# Patient Record
Sex: FEMALE | Race: WHITE | NOT HISPANIC OR LATINO | Employment: OTHER | URBAN - METROPOLITAN AREA
[De-identification: names, ages, dates, MRNs, and addresses within clinical notes are randomized per-mention and may not be internally consistent; named-entity substitution may affect disease eponyms.]

---

## 2017-03-14 ENCOUNTER — COMMUNICATION - HEALTHEAST (OUTPATIENT)
Dept: FAMILY MEDICINE | Facility: CLINIC | Age: 82
End: 2017-03-14

## 2017-03-14 DIAGNOSIS — F41.9 ANXIETY: ICD-10-CM

## 2017-04-14 ENCOUNTER — COMMUNICATION - HEALTHEAST (OUTPATIENT)
Dept: FAMILY MEDICINE | Facility: CLINIC | Age: 82
End: 2017-04-14

## 2017-04-14 DIAGNOSIS — E78.00 HYPERCHOLESTEREMIA: ICD-10-CM

## 2017-05-22 ENCOUNTER — OFFICE VISIT - HEALTHEAST (OUTPATIENT)
Dept: FAMILY MEDICINE | Facility: CLINIC | Age: 82
End: 2017-05-22

## 2017-05-22 DIAGNOSIS — I10 ESSENTIAL HYPERTENSION: ICD-10-CM

## 2017-05-22 DIAGNOSIS — E03.9 HYPOTHYROIDISM: ICD-10-CM

## 2017-05-22 ASSESSMENT — MIFFLIN-ST. JEOR: SCORE: 1049.57

## 2017-05-25 ENCOUNTER — COMMUNICATION - HEALTHEAST (OUTPATIENT)
Dept: FAMILY MEDICINE | Facility: CLINIC | Age: 82
End: 2017-05-25

## 2017-05-25 ENCOUNTER — AMBULATORY - HEALTHEAST (OUTPATIENT)
Dept: FAMILY MEDICINE | Facility: CLINIC | Age: 82
End: 2017-05-25

## 2017-06-11 ENCOUNTER — COMMUNICATION - HEALTHEAST (OUTPATIENT)
Dept: FAMILY MEDICINE | Facility: CLINIC | Age: 82
End: 2017-06-11

## 2017-06-11 DIAGNOSIS — F41.9 ANXIETY: ICD-10-CM

## 2017-06-26 ENCOUNTER — COMMUNICATION - HEALTHEAST (OUTPATIENT)
Dept: FAMILY MEDICINE | Facility: CLINIC | Age: 82
End: 2017-06-26

## 2017-06-26 DIAGNOSIS — I10 ESSENTIAL HYPERTENSION: ICD-10-CM

## 2017-09-04 ENCOUNTER — COMMUNICATION - HEALTHEAST (OUTPATIENT)
Dept: FAMILY MEDICINE | Facility: CLINIC | Age: 82
End: 2017-09-04

## 2017-09-04 DIAGNOSIS — F41.9 ANXIETY: ICD-10-CM

## 2017-09-07 ENCOUNTER — COMMUNICATION - HEALTHEAST (OUTPATIENT)
Dept: FAMILY MEDICINE | Facility: CLINIC | Age: 82
End: 2017-09-07

## 2017-09-07 DIAGNOSIS — F41.9 ANXIETY: ICD-10-CM

## 2017-10-09 ENCOUNTER — COMMUNICATION - HEALTHEAST (OUTPATIENT)
Dept: FAMILY MEDICINE | Facility: CLINIC | Age: 82
End: 2017-10-09

## 2017-10-09 DIAGNOSIS — E78.00 HYPERCHOLESTEREMIA: ICD-10-CM

## 2017-10-09 DIAGNOSIS — I10 ESSENTIAL HYPERTENSION: ICD-10-CM

## 2017-10-23 ENCOUNTER — RECORDS - HEALTHEAST (OUTPATIENT)
Dept: ADMINISTRATIVE | Facility: OTHER | Age: 82
End: 2017-10-23

## 2017-11-13 ENCOUNTER — COMMUNICATION - HEALTHEAST (OUTPATIENT)
Dept: FAMILY MEDICINE | Facility: CLINIC | Age: 82
End: 2017-11-13

## 2017-11-13 DIAGNOSIS — I10 ESSENTIAL HYPERTENSION: ICD-10-CM

## 2017-11-29 ENCOUNTER — OFFICE VISIT - HEALTHEAST (OUTPATIENT)
Dept: FAMILY MEDICINE | Facility: CLINIC | Age: 82
End: 2017-11-29

## 2017-11-29 ENCOUNTER — RECORDS - HEALTHEAST (OUTPATIENT)
Dept: ADMINISTRATIVE | Facility: OTHER | Age: 82
End: 2017-11-29

## 2017-11-29 ENCOUNTER — RECORDS - HEALTHEAST (OUTPATIENT)
Dept: GENERAL RADIOLOGY | Facility: CLINIC | Age: 82
End: 2017-11-29

## 2017-11-29 ENCOUNTER — COMMUNICATION - HEALTHEAST (OUTPATIENT)
Dept: FAMILY MEDICINE | Facility: CLINIC | Age: 82
End: 2017-11-29

## 2017-11-29 DIAGNOSIS — R06.02 SOB (SHORTNESS OF BREATH) ON EXERTION: ICD-10-CM

## 2017-11-29 DIAGNOSIS — R06.02 SHORTNESS OF BREATH: ICD-10-CM

## 2017-11-29 DIAGNOSIS — E78.00 PURE HYPERCHOLESTEROLEMIA: ICD-10-CM

## 2017-11-29 DIAGNOSIS — E03.9 ACQUIRED HYPOTHYROIDISM: ICD-10-CM

## 2017-11-29 DIAGNOSIS — I48.91 ATRIAL FIBRILLATION (H): ICD-10-CM

## 2017-11-29 DIAGNOSIS — F41.9 ANXIETY: ICD-10-CM

## 2017-11-29 DIAGNOSIS — I10 ESSENTIAL HYPERTENSION: ICD-10-CM

## 2017-11-29 LAB
ATRIAL RATE - MUSE: 468 BPM
CHOLEST SERPL-MCNC: 168 MG/DL
DIASTOLIC BLOOD PRESSURE - MUSE: NORMAL MMHG
FASTING STATUS PATIENT QL REPORTED: YES
HDLC SERPL-MCNC: 87 MG/DL
INTERPRETATION ECG - MUSE: NORMAL
LDLC SERPL CALC-MCNC: 72 MG/DL
P AXIS - MUSE: NORMAL DEGREES
PR INTERVAL - MUSE: NORMAL MS
QRS DURATION - MUSE: 88 MS
QT - MUSE: 426 MS
QTC - MUSE: 469 MS
R AXIS - MUSE: 53 DEGREES
SYSTOLIC BLOOD PRESSURE - MUSE: NORMAL MMHG
T AXIS - MUSE: 51 DEGREES
TRIGL SERPL-MCNC: 47 MG/DL
VENTRICULAR RATE- MUSE: 73 BPM

## 2017-11-29 ASSESSMENT — MIFFLIN-ST. JEOR: SCORE: 1031.43

## 2017-11-30 ENCOUNTER — OFFICE VISIT - HEALTHEAST (OUTPATIENT)
Dept: CARDIOLOGY | Facility: CLINIC | Age: 82
End: 2017-11-30

## 2017-11-30 DIAGNOSIS — I48.91 ATRIAL FIBRILLATION, UNSPECIFIED TYPE (H): ICD-10-CM

## 2017-11-30 ASSESSMENT — MIFFLIN-ST. JEOR: SCORE: 1035.96

## 2017-12-01 ENCOUNTER — COMMUNICATION - HEALTHEAST (OUTPATIENT)
Dept: CARDIOLOGY | Facility: CLINIC | Age: 82
End: 2017-12-01

## 2017-12-01 LAB
ATRIAL RATE - MUSE: 340 BPM
DIASTOLIC BLOOD PRESSURE - MUSE: NORMAL MMHG
INTERPRETATION ECG - MUSE: NORMAL
P AXIS - MUSE: NORMAL DEGREES
PR INTERVAL - MUSE: NORMAL MS
QRS DURATION - MUSE: 94 MS
QT - MUSE: 422 MS
QTC - MUSE: 428 MS
R AXIS - MUSE: 58 DEGREES
SYSTOLIC BLOOD PRESSURE - MUSE: NORMAL MMHG
T AXIS - MUSE: 68 DEGREES
VENTRICULAR RATE- MUSE: 62 BPM

## 2017-12-14 ENCOUNTER — OFFICE VISIT - HEALTHEAST (OUTPATIENT)
Dept: FAMILY MEDICINE | Facility: CLINIC | Age: 82
End: 2017-12-14

## 2017-12-14 DIAGNOSIS — J40 BRONCHITIS: ICD-10-CM

## 2017-12-15 ENCOUNTER — HOSPITAL ENCOUNTER (OUTPATIENT)
Dept: CARDIOLOGY | Facility: CLINIC | Age: 82
Discharge: HOME | End: 2017-12-15
Attending: INTERNAL MEDICINE

## 2017-12-15 DIAGNOSIS — I48.91 ATRIAL FIBRILLATION, UNSPECIFIED TYPE (H): ICD-10-CM

## 2017-12-15 LAB
AORTIC ROOT: 3.2 CM
AORTIC VALVE MEAN VELOCITY: 139 CM/S
AR DECEL SLOPE: 1590 MM/S2
AR PEAK VELOCITY: 421 CM/S
AV DIMENSIONLESS INDEX VTI: 0.5
AV MEAN GRADIENT: 8 MMHG
AV PEAK GRADIENT: 15.1 MMHG
AV REGURGITANT PEAK GRADIENT: 70.9 MMHG
AV REGURGITATION PRESSURE HALF TIME: 775 MS
AV VALVE AREA: 1.5 CM2
AV VELOCITY RATIO: 0.4
BSA FOR ECHO PROCEDURE: 1.66 M2
CV BLOOD PRESSURE: NORMAL MMHG
CV ECHO HEIGHT: 64 IN
CV ECHO WEIGHT: 135 LBS
DOP CALC AO PEAK VEL: 194 CM/S
DOP CALC AO VTI: 40.5 CM
DOP CALC LVOT AREA: 2.83 CM2
DOP CALC LVOT DIAMETER: 1.9 CM
DOP CALC LVOT PEAK VEL: 83.5 CM/S
DOP CALC LVOT STROKE VOLUME: 60.4 CM3
DOP CALCLVOT PEAK VEL VTI: 21.3 CM
EJECTION FRACTION: 59 % (ref 55–75)
FRACTIONAL SHORTENING: 38.7 % (ref 28–44)
INTERVENTRICULAR SEPTUM IN END DIASTOLE: 1.03 CM (ref 0.6–0.9)
IVS/PW RATIO: 0.9
LA AREA 1: 22.8 CM2
LA AREA 2: 19.5 CM2
LEFT ATRIUM LENGTH: 5.59 CM
LEFT ATRIUM SIZE: 3.7 CM
LEFT ATRIUM VOLUME INDEX: 40.7 ML/M2
LEFT ATRIUM VOLUME: 67.6 CM3
LEFT VENTRICLE CARDIAC INDEX: 2.9 L/MIN/M2
LEFT VENTRICLE CARDIAC OUTPUT: 4.8 L/MIN
LEFT VENTRICLE DIASTOLIC VOLUME INDEX: 32.5 CM3/M2 (ref 34–74)
LEFT VENTRICLE DIASTOLIC VOLUME: 54 CM3 (ref 46–106)
LEFT VENTRICLE HEART RATE: 80 BPM
LEFT VENTRICLE MASS INDEX: 68 G/M2
LEFT VENTRICLE SYSTOLIC VOLUME INDEX: 13.3 CM3/M2 (ref 11–31)
LEFT VENTRICLE SYSTOLIC VOLUME: 22 CM3 (ref 14–42)
LEFT VENTRICULAR INTERNAL DIMENSION IN DIASTOLE: 3.33 CM (ref 3.8–5.2)
LEFT VENTRICULAR INTERNAL DIMENSION IN SYSTOLE: 2.04 CM (ref 2.2–3.5)
LEFT VENTRICULAR MASS: 112.9 G
LEFT VENTRICULAR OUTFLOW TRACT MEAN GRADIENT: 2 MMHG
LEFT VENTRICULAR OUTFLOW TRACT MEAN VELOCITY: 69.5 CM/S
LEFT VENTRICULAR OUTFLOW TRACT PEAK GRADIENT: 3 MMHG
LEFT VENTRICULAR POSTERIOR WALL IN END DIASTOLE: 1.2 CM (ref 0.6–0.9)
LV STROKE VOLUME INDEX: 36.4 ML/M2
MV AVERAGE E/E' RATIO: 17.6 CM/S
MV DECELERATION TIME: 197 MS
MV E'TISSUE VEL-LAT: 6.43 CM/S
MV E'TISSUE VEL-MED: 5.26 CM/S
MV LATERAL E/E' RATIO: 16
MV MEDIAL E/E' RATIO: 19.6
MV PEAK E VELOCITY: 103 CM/S
NUC REST DIASTOLIC VOLUME INDEX: 2160 LBS
NUC REST SYSTOLIC VOLUME INDEX: 64 IN
TRICUSPID REGURGITATION PEAK PRESSURE GRADIENT: 24 MMHG
TRICUSPID VALVE ANULAR PLANE SYSTOLIC EXCURSION: 1.8 CM
TRICUSPID VALVE PEAK REGURGITANT VELOCITY: 245 CM/S

## 2017-12-15 ASSESSMENT — MIFFLIN-ST. JEOR: SCORE: 1022.36

## 2017-12-26 ENCOUNTER — COMMUNICATION - HEALTHEAST (OUTPATIENT)
Dept: FAMILY MEDICINE | Facility: CLINIC | Age: 82
End: 2017-12-26

## 2017-12-26 DIAGNOSIS — I10 ESSENTIAL HYPERTENSION: ICD-10-CM

## 2017-12-27 ENCOUNTER — COMMUNICATION - HEALTHEAST (OUTPATIENT)
Dept: CARDIOLOGY | Facility: CLINIC | Age: 82
End: 2017-12-27

## 2017-12-27 DIAGNOSIS — I48.91 ATRIAL FIBRILLATION, UNSPECIFIED TYPE (H): ICD-10-CM

## 2018-01-18 ENCOUNTER — OFFICE VISIT - HEALTHEAST (OUTPATIENT)
Dept: CARDIOLOGY | Facility: CLINIC | Age: 83
End: 2018-01-18

## 2018-01-18 DIAGNOSIS — I10 ESSENTIAL HYPERTENSION: ICD-10-CM

## 2018-01-18 DIAGNOSIS — I48.19 PERSISTENT ATRIAL FIBRILLATION (H): ICD-10-CM

## 2018-01-18 ASSESSMENT — MIFFLIN-ST. JEOR: SCORE: 1041.18

## 2018-02-25 ENCOUNTER — COMMUNICATION - HEALTHEAST (OUTPATIENT)
Dept: FAMILY MEDICINE | Facility: CLINIC | Age: 83
End: 2018-02-25

## 2018-02-25 DIAGNOSIS — F41.9 ANXIETY: ICD-10-CM

## 2018-02-25 DIAGNOSIS — I10 ESSENTIAL HYPERTENSION: ICD-10-CM

## 2018-03-28 ENCOUNTER — OFFICE VISIT - HEALTHEAST (OUTPATIENT)
Dept: FAMILY MEDICINE | Facility: CLINIC | Age: 83
End: 2018-03-28

## 2018-03-28 DIAGNOSIS — I48.19 PERSISTENT ATRIAL FIBRILLATION (H): ICD-10-CM

## 2018-03-28 ASSESSMENT — MIFFLIN-ST. JEOR: SCORE: 1057.51

## 2018-04-08 ENCOUNTER — COMMUNICATION - HEALTHEAST (OUTPATIENT)
Dept: FAMILY MEDICINE | Facility: CLINIC | Age: 83
End: 2018-04-08

## 2018-04-08 DIAGNOSIS — E78.00 HYPERCHOLESTEREMIA: ICD-10-CM

## 2018-04-08 DIAGNOSIS — I10 ESSENTIAL HYPERTENSION: ICD-10-CM

## 2018-04-30 ENCOUNTER — OFFICE VISIT - HEALTHEAST (OUTPATIENT)
Dept: CARDIOLOGY | Facility: CLINIC | Age: 83
End: 2018-04-30

## 2018-04-30 DIAGNOSIS — R60.9 EDEMA, UNSPECIFIED TYPE: ICD-10-CM

## 2018-04-30 DIAGNOSIS — I48.19 PERSISTENT ATRIAL FIBRILLATION (H): ICD-10-CM

## 2018-04-30 DIAGNOSIS — R06.09 DYSPNEA ON EXERTION: ICD-10-CM

## 2018-04-30 LAB
ANION GAP SERPL CALCULATED.3IONS-SCNC: 12 MMOL/L (ref 5–18)
BUN SERPL-MCNC: 19 MG/DL (ref 8–28)
CALCIUM SERPL-MCNC: 9.7 MG/DL (ref 8.5–10.5)
CHLORIDE BLD-SCNC: 96 MMOL/L (ref 98–107)
CO2 SERPL-SCNC: 27 MMOL/L (ref 22–31)
CREAT SERPL-MCNC: 0.72 MG/DL (ref 0.6–1.1)
GFR SERPL CREATININE-BSD FRML MDRD: >60 ML/MIN/1.73M2
GLUCOSE BLD-MCNC: 87 MG/DL (ref 70–125)
POTASSIUM BLD-SCNC: 3.8 MMOL/L (ref 3.5–5)
SODIUM SERPL-SCNC: 135 MMOL/L (ref 136–145)
TSH SERPL DL<=0.005 MIU/L-ACNC: 0.45 UIU/ML (ref 0.3–5)

## 2018-04-30 ASSESSMENT — MIFFLIN-ST. JEOR: SCORE: 1062.05

## 2018-05-01 LAB — BNP SERPL-MCNC: 363 PG/ML (ref 0–167)

## 2018-05-14 ENCOUNTER — COMMUNICATION - HEALTHEAST (OUTPATIENT)
Dept: FAMILY MEDICINE | Facility: CLINIC | Age: 83
End: 2018-05-14

## 2018-05-14 DIAGNOSIS — E03.9 HYPOTHYROIDISM: ICD-10-CM

## 2018-05-15 ENCOUNTER — COMMUNICATION - HEALTHEAST (OUTPATIENT)
Dept: CARDIOLOGY | Facility: CLINIC | Age: 83
End: 2018-05-15

## 2018-05-16 ENCOUNTER — HOSPITAL ENCOUNTER (OUTPATIENT)
Dept: NUCLEAR MEDICINE | Facility: CLINIC | Age: 83
Discharge: HOME | End: 2018-05-16
Attending: INTERNAL MEDICINE

## 2018-05-16 ENCOUNTER — HOSPITAL ENCOUNTER (OUTPATIENT)
Dept: CARDIOLOGY | Facility: CLINIC | Age: 83
Discharge: HOME | End: 2018-05-16
Attending: INTERNAL MEDICINE

## 2018-05-16 DIAGNOSIS — R06.09 OTHER FORMS OF DYSPNEA: ICD-10-CM

## 2018-05-16 DIAGNOSIS — R06.09 DYSPNEA ON EXERTION: ICD-10-CM

## 2018-05-16 DIAGNOSIS — I48.19 PERSISTENT ATRIAL FIBRILLATION (H): ICD-10-CM

## 2018-05-16 LAB
CV STRESS CURRENT BP HE: NORMAL
CV STRESS CURRENT HR HE: 100
CV STRESS CURRENT HR HE: 101
CV STRESS CURRENT HR HE: 103
CV STRESS CURRENT HR HE: 104
CV STRESS CURRENT HR HE: 104
CV STRESS CURRENT HR HE: 107
CV STRESS CURRENT HR HE: 75
CV STRESS CURRENT HR HE: 77
CV STRESS CURRENT HR HE: 80
CV STRESS CURRENT HR HE: 81
CV STRESS CURRENT HR HE: 82
CV STRESS CURRENT HR HE: 83
CV STRESS CURRENT HR HE: 84
CV STRESS CURRENT HR HE: 86
CV STRESS CURRENT HR HE: 87
CV STRESS CURRENT HR HE: 89
CV STRESS CURRENT HR HE: 90
CV STRESS CURRENT HR HE: 90
CV STRESS CURRENT HR HE: 91
CV STRESS CURRENT HR HE: 94
CV STRESS CURRENT HR HE: 94
CV STRESS CURRENT HR HE: 96
CV STRESS CURRENT HR HE: 97
CV STRESS CURRENT HR HE: 98
CV STRESS DEVIATION TIME HE: NORMAL
CV STRESS ECHO PERCENT HR HE: NORMAL
CV STRESS EXERCISE STAGE HE: NORMAL
CV STRESS FINAL RESTING BP HE: NORMAL
CV STRESS FINAL RESTING HR HE: 80
CV STRESS MAX HR HE: 112
CV STRESS MAX TREADMILL GRADE HE: 0
CV STRESS MAX TREADMILL SPEED HE: 0
CV STRESS PEAK DIA BP HE: NORMAL
CV STRESS PEAK SYS BP HE: NORMAL
CV STRESS PHASE HE: NORMAL
CV STRESS PROTOCOL HE: NORMAL
CV STRESS RESTING PT POSITION HE: NORMAL
CV STRESS RESTING PT POSITION HE: NORMAL
CV STRESS ST DEVIATION AMOUNT HE: NORMAL
CV STRESS ST DEVIATION ELEVATION HE: NORMAL
CV STRESS ST EVELATION AMOUNT HE: NORMAL
CV STRESS TEST TYPE HE: NORMAL
CV STRESS TOTAL STAGE TIME MIN 1 HE: NORMAL
NUC STRESS EJECTION FRACTION: >70 %
STRESS ECHO BASELINE BP: NORMAL
STRESS ECHO BASELINE HR: 78
STRESS ECHO CALCULATED PERCENT HR: 84 %
STRESS ECHO LAST STRESS BP: NORMAL
STRESS ECHO LAST STRESS HR: 81
STRESS ECHO POST ESTIMATED WORKLOAD: 3.8
STRESS ECHO POST EXERCISE DUR MIN: 7
STRESS ECHO POST EXERCISE DUR SEC: 30
STRESS ECHO TARGET HR: 114

## 2018-05-21 ENCOUNTER — SURGERY - HEALTHEAST (OUTPATIENT)
Dept: CARDIOLOGY | Facility: CLINIC | Age: 83
End: 2018-05-21

## 2018-05-21 ENCOUNTER — AMBULATORY - HEALTHEAST (OUTPATIENT)
Dept: CARDIOLOGY | Facility: CLINIC | Age: 83
End: 2018-05-21

## 2018-05-21 ENCOUNTER — COMMUNICATION - HEALTHEAST (OUTPATIENT)
Dept: FAMILY MEDICINE | Facility: CLINIC | Age: 83
End: 2018-05-21

## 2018-05-21 DIAGNOSIS — F41.9 ANXIETY: ICD-10-CM

## 2018-05-21 DIAGNOSIS — I48.91 A-FIB (H): ICD-10-CM

## 2018-05-21 DIAGNOSIS — I10 ESSENTIAL HYPERTENSION: ICD-10-CM

## 2018-05-23 ENCOUNTER — AMBULATORY - HEALTHEAST (OUTPATIENT)
Dept: CARDIOLOGY | Facility: CLINIC | Age: 83
End: 2018-05-23

## 2018-05-29 ENCOUNTER — ANESTHESIA - HEALTHEAST (OUTPATIENT)
Dept: CARDIOLOGY | Facility: CLINIC | Age: 83
End: 2018-05-29

## 2018-06-25 ENCOUNTER — COMMUNICATION - HEALTHEAST (OUTPATIENT)
Dept: FAMILY MEDICINE | Facility: CLINIC | Age: 83
End: 2018-06-25

## 2018-06-25 DIAGNOSIS — I10 ESSENTIAL HYPERTENSION: ICD-10-CM

## 2018-06-28 ENCOUNTER — OFFICE VISIT - HEALTHEAST (OUTPATIENT)
Dept: CARDIOLOGY | Facility: CLINIC | Age: 83
End: 2018-06-28

## 2018-06-28 DIAGNOSIS — I48.19 PERSISTENT ATRIAL FIBRILLATION (H): ICD-10-CM

## 2018-06-28 DIAGNOSIS — I10 ESSENTIAL HYPERTENSION: ICD-10-CM

## 2018-06-28 RX ORDER — ACETAMINOPHEN 500 MG
500 TABLET ORAL AT BEDTIME
Status: SHIPPED | COMMUNITY
Start: 2018-06-28

## 2018-06-28 ASSESSMENT — MIFFLIN-ST. JEOR: SCORE: 1040.5

## 2018-08-09 ENCOUNTER — OFFICE VISIT - HEALTHEAST (OUTPATIENT)
Dept: CARDIOLOGY | Facility: CLINIC | Age: 83
End: 2018-08-09

## 2018-08-09 DIAGNOSIS — I10 ESSENTIAL HYPERTENSION: ICD-10-CM

## 2018-08-09 DIAGNOSIS — I48.19 PERSISTENT ATRIAL FIBRILLATION (H): ICD-10-CM

## 2018-08-09 ASSESSMENT — MIFFLIN-ST. JEOR: SCORE: 1045.94

## 2018-08-21 ENCOUNTER — COMMUNICATION - HEALTHEAST (OUTPATIENT)
Dept: FAMILY MEDICINE | Facility: CLINIC | Age: 83
End: 2018-08-21

## 2018-08-21 DIAGNOSIS — F41.9 ANXIETY: ICD-10-CM

## 2018-09-03 ENCOUNTER — COMMUNICATION - HEALTHEAST (OUTPATIENT)
Dept: FAMILY MEDICINE | Facility: CLINIC | Age: 83
End: 2018-09-03

## 2018-09-03 DIAGNOSIS — I10 ESSENTIAL HYPERTENSION: ICD-10-CM

## 2018-10-15 ENCOUNTER — COMMUNICATION - HEALTHEAST (OUTPATIENT)
Dept: FAMILY MEDICINE | Facility: CLINIC | Age: 83
End: 2018-10-15

## 2018-10-15 DIAGNOSIS — E78.00 HYPERCHOLESTEREMIA: ICD-10-CM

## 2018-10-18 ENCOUNTER — RECORDS - HEALTHEAST (OUTPATIENT)
Dept: ADMINISTRATIVE | Facility: OTHER | Age: 83
End: 2018-10-18

## 2018-10-24 ENCOUNTER — OFFICE VISIT - HEALTHEAST (OUTPATIENT)
Dept: FAMILY MEDICINE | Facility: CLINIC | Age: 83
End: 2018-10-24

## 2018-10-24 DIAGNOSIS — Z23 NEED FOR SHINGLES VACCINE: ICD-10-CM

## 2018-10-24 DIAGNOSIS — I10 ESSENTIAL HYPERTENSION: ICD-10-CM

## 2018-10-24 DIAGNOSIS — I48.0 EPISODIC ATRIAL FIBRILLATION (H): ICD-10-CM

## 2018-10-24 DIAGNOSIS — E78.00 HYPERCHOLESTEROLEMIA: ICD-10-CM

## 2018-10-24 DIAGNOSIS — E03.9 ACQUIRED HYPOTHYROIDISM: ICD-10-CM

## 2018-10-24 LAB
ANION GAP SERPL CALCULATED.3IONS-SCNC: 14 MMOL/L (ref 5–18)
BUN SERPL-MCNC: 12 MG/DL (ref 8–28)
CALCIUM SERPL-MCNC: 10 MG/DL (ref 8.5–10.5)
CHLORIDE BLD-SCNC: 92 MMOL/L (ref 98–107)
CHOLEST SERPL-MCNC: 197 MG/DL
CO2 SERPL-SCNC: 25 MMOL/L (ref 22–31)
CREAT SERPL-MCNC: 0.72 MG/DL (ref 0.6–1.1)
ERYTHROCYTE [DISTWIDTH] IN BLOOD BY AUTOMATED COUNT: 11.3 % (ref 11–14.5)
FASTING STATUS PATIENT QL REPORTED: NO
GFR SERPL CREATININE-BSD FRML MDRD: >60 ML/MIN/1.73M2
GLUCOSE BLD-MCNC: 93 MG/DL (ref 70–125)
HCT VFR BLD AUTO: 42.6 % (ref 35–47)
HDLC SERPL-MCNC: 104 MG/DL
HGB BLD-MCNC: 14.7 G/DL (ref 12–16)
LDLC SERPL CALC-MCNC: 77 MG/DL
MCH RBC QN AUTO: 32.7 PG (ref 27–34)
MCHC RBC AUTO-ENTMCNC: 34.4 G/DL (ref 32–36)
MCV RBC AUTO: 95 FL (ref 80–100)
PLATELET # BLD AUTO: 271 THOU/UL (ref 140–440)
PMV BLD AUTO: 7.3 FL (ref 7–10)
POTASSIUM BLD-SCNC: 3.9 MMOL/L (ref 3.5–5)
RBC # BLD AUTO: 4.48 MILL/UL (ref 3.8–5.4)
SODIUM SERPL-SCNC: 131 MMOL/L (ref 136–145)
TRIGL SERPL-MCNC: 80 MG/DL
TSH SERPL DL<=0.005 MIU/L-ACNC: 0.42 UIU/ML (ref 0.3–5)
WBC: 5.5 THOU/UL (ref 4–11)

## 2018-10-24 ASSESSMENT — MIFFLIN-ST. JEOR: SCORE: 1058.64

## 2018-11-19 ENCOUNTER — COMMUNICATION - HEALTHEAST (OUTPATIENT)
Dept: FAMILY MEDICINE | Facility: CLINIC | Age: 83
End: 2018-11-19

## 2018-11-19 DIAGNOSIS — F41.9 ANXIETY: ICD-10-CM

## 2018-12-19 ENCOUNTER — COMMUNICATION - HEALTHEAST (OUTPATIENT)
Dept: FAMILY MEDICINE | Facility: CLINIC | Age: 83
End: 2018-12-19

## 2018-12-23 ENCOUNTER — COMMUNICATION - HEALTHEAST (OUTPATIENT)
Dept: FAMILY MEDICINE | Facility: CLINIC | Age: 83
End: 2018-12-23

## 2018-12-23 ENCOUNTER — COMMUNICATION - HEALTHEAST (OUTPATIENT)
Dept: CARDIOLOGY | Facility: CLINIC | Age: 83
End: 2018-12-23

## 2018-12-23 DIAGNOSIS — I10 ESSENTIAL HYPERTENSION: ICD-10-CM

## 2019-01-14 ENCOUNTER — COMMUNICATION - HEALTHEAST (OUTPATIENT)
Dept: FAMILY MEDICINE | Facility: CLINIC | Age: 84
End: 2019-01-14

## 2019-01-14 DIAGNOSIS — E78.00 HYPERCHOLESTEREMIA: ICD-10-CM

## 2019-01-21 ENCOUNTER — COMMUNICATION - HEALTHEAST (OUTPATIENT)
Dept: CARDIOLOGY | Facility: CLINIC | Age: 84
End: 2019-01-21

## 2019-01-21 DIAGNOSIS — I48.91 ATRIAL FIBRILLATION, UNSPECIFIED TYPE (H): ICD-10-CM

## 2019-01-22 ENCOUNTER — COMMUNICATION - HEALTHEAST (OUTPATIENT)
Dept: CARDIOLOGY | Facility: CLINIC | Age: 84
End: 2019-01-22

## 2019-01-22 DIAGNOSIS — I48.91 ATRIAL FIBRILLATION, UNSPECIFIED TYPE (H): ICD-10-CM

## 2019-02-19 ENCOUNTER — COMMUNICATION - HEALTHEAST (OUTPATIENT)
Dept: FAMILY MEDICINE | Facility: CLINIC | Age: 84
End: 2019-02-19

## 2019-02-19 DIAGNOSIS — F41.9 ANXIETY: ICD-10-CM

## 2019-03-14 ENCOUNTER — OFFICE VISIT - HEALTHEAST (OUTPATIENT)
Dept: CARDIOLOGY | Facility: CLINIC | Age: 84
End: 2019-03-14

## 2019-03-14 DIAGNOSIS — I48.19 PERSISTENT ATRIAL FIBRILLATION (H): ICD-10-CM

## 2019-03-14 DIAGNOSIS — I10 ESSENTIAL HYPERTENSION: ICD-10-CM

## 2019-03-14 DIAGNOSIS — I48.91 ATRIAL FIBRILLATION (H): ICD-10-CM

## 2019-03-14 LAB
ATRIAL RATE - MUSE: 59 BPM
DIASTOLIC BLOOD PRESSURE - MUSE: NORMAL MMHG
INTERPRETATION ECG - MUSE: NORMAL
P AXIS - MUSE: 93 DEGREES
PR INTERVAL - MUSE: 250 MS
QRS DURATION - MUSE: 104 MS
QT - MUSE: 424 MS
QTC - MUSE: 419 MS
R AXIS - MUSE: 52 DEGREES
SYSTOLIC BLOOD PRESSURE - MUSE: NORMAL MMHG
T AXIS - MUSE: 64 DEGREES
VENTRICULAR RATE- MUSE: 59 BPM

## 2019-03-14 ASSESSMENT — MIFFLIN-ST. JEOR: SCORE: 1068.76

## 2019-04-03 ENCOUNTER — COMMUNICATION - HEALTHEAST (OUTPATIENT)
Dept: FAMILY MEDICINE | Facility: CLINIC | Age: 84
End: 2019-04-03

## 2019-04-03 DIAGNOSIS — Z23 NEED FOR SHINGLES VACCINE: ICD-10-CM

## 2019-04-09 ENCOUNTER — AMBULATORY - HEALTHEAST (OUTPATIENT)
Dept: NURSING | Facility: CLINIC | Age: 84
End: 2019-04-09

## 2019-04-09 DIAGNOSIS — Z23 NEED FOR SHINGLES VACCINE: ICD-10-CM

## 2019-05-13 ENCOUNTER — COMMUNICATION - HEALTHEAST (OUTPATIENT)
Dept: FAMILY MEDICINE | Facility: CLINIC | Age: 84
End: 2019-05-13

## 2019-05-13 DIAGNOSIS — E03.9 HYPOTHYROIDISM: ICD-10-CM

## 2019-05-16 ENCOUNTER — COMMUNICATION - HEALTHEAST (OUTPATIENT)
Dept: FAMILY MEDICINE | Facility: CLINIC | Age: 84
End: 2019-05-16

## 2019-05-16 DIAGNOSIS — F41.9 ANXIETY: ICD-10-CM

## 2019-05-22 ENCOUNTER — COMMUNICATION - HEALTHEAST (OUTPATIENT)
Dept: FAMILY MEDICINE | Facility: CLINIC | Age: 84
End: 2019-05-22

## 2019-05-22 DIAGNOSIS — I10 ESSENTIAL HYPERTENSION: ICD-10-CM

## 2019-06-05 ENCOUNTER — OFFICE VISIT - HEALTHEAST (OUTPATIENT)
Dept: FAMILY MEDICINE | Facility: CLINIC | Age: 84
End: 2019-06-05

## 2019-06-05 DIAGNOSIS — I10 ESSENTIAL HYPERTENSION: ICD-10-CM

## 2019-06-05 DIAGNOSIS — E03.9 ACQUIRED HYPOTHYROIDISM: ICD-10-CM

## 2019-06-05 DIAGNOSIS — I48.19 PERSISTENT ATRIAL FIBRILLATION (H): ICD-10-CM

## 2019-06-05 LAB
ANION GAP SERPL CALCULATED.3IONS-SCNC: 10 MMOL/L (ref 5–18)
BUN SERPL-MCNC: 12 MG/DL (ref 8–28)
CALCIUM SERPL-MCNC: 9.9 MG/DL (ref 8.5–10.5)
CHLORIDE BLD-SCNC: 92 MMOL/L (ref 98–107)
CO2 SERPL-SCNC: 30 MMOL/L (ref 22–31)
CREAT SERPL-MCNC: 0.74 MG/DL (ref 0.6–1.1)
GFR SERPL CREATININE-BSD FRML MDRD: >60 ML/MIN/1.73M2
GLUCOSE BLD-MCNC: 92 MG/DL (ref 70–125)
POTASSIUM BLD-SCNC: 3.9 MMOL/L (ref 3.5–5)
SODIUM SERPL-SCNC: 132 MMOL/L (ref 136–145)

## 2019-06-05 ASSESSMENT — MIFFLIN-ST. JEOR: SCORE: 1047.31

## 2019-06-20 ENCOUNTER — COMMUNICATION - HEALTHEAST (OUTPATIENT)
Dept: FAMILY MEDICINE | Facility: CLINIC | Age: 84
End: 2019-06-20

## 2019-06-20 DIAGNOSIS — F41.9 ANXIETY: ICD-10-CM

## 2019-07-01 ENCOUNTER — COMMUNICATION - HEALTHEAST (OUTPATIENT)
Dept: CARDIOLOGY | Facility: CLINIC | Age: 84
End: 2019-07-01

## 2019-07-01 DIAGNOSIS — I10 ESSENTIAL HYPERTENSION: ICD-10-CM

## 2019-07-23 ENCOUNTER — COMMUNICATION - HEALTHEAST (OUTPATIENT)
Dept: FAMILY MEDICINE | Facility: CLINIC | Age: 84
End: 2019-07-23

## 2019-07-23 DIAGNOSIS — F41.9 ANXIETY: ICD-10-CM

## 2019-07-24 ENCOUNTER — COMMUNICATION - HEALTHEAST (OUTPATIENT)
Dept: CARDIOLOGY | Facility: CLINIC | Age: 84
End: 2019-07-24

## 2019-07-24 DIAGNOSIS — I48.91 ATRIAL FIBRILLATION, UNSPECIFIED TYPE (H): ICD-10-CM

## 2019-08-07 ENCOUNTER — COMMUNICATION - HEALTHEAST (OUTPATIENT)
Dept: FAMILY MEDICINE | Facility: CLINIC | Age: 84
End: 2019-08-07

## 2019-08-07 DIAGNOSIS — E03.9 HYPOTHYROIDISM: ICD-10-CM

## 2019-08-15 ENCOUNTER — OFFICE VISIT - HEALTHEAST (OUTPATIENT)
Dept: FAMILY MEDICINE | Facility: CLINIC | Age: 84
End: 2019-08-15
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
## 2019-08-15 ASSESSMENT — MIFFLIN-ST. JEOR: SCORE: 1051.85

## 2019-08-19 ENCOUNTER — COMMUNICATION - HEALTHEAST (OUTPATIENT)
Dept: FAMILY MEDICINE | Facility: CLINIC | Age: 84
End: 2019-08-19

## 2019-08-19 DIAGNOSIS — F41.9 ANXIETY: ICD-10-CM

## 2019-09-18 ENCOUNTER — COMMUNICATION - HEALTHEAST (OUTPATIENT)
Dept: FAMILY MEDICINE | Facility: CLINIC | Age: 84
End: 2019-09-18

## 2019-09-18 DIAGNOSIS — F41.9 ANXIETY: ICD-10-CM

## 2019-10-09 ENCOUNTER — OFFICE VISIT - HEALTHEAST (OUTPATIENT)
Dept: CARDIOLOGY | Facility: CLINIC | Age: 84
End: 2019-10-09

## 2019-10-09 DIAGNOSIS — I48.91 ATRIAL FIBRILLATION, UNSPECIFIED TYPE (H): ICD-10-CM

## 2019-10-09 DIAGNOSIS — I10 ESSENTIAL HYPERTENSION: ICD-10-CM

## 2019-10-09 DIAGNOSIS — I48.19 PERSISTENT ATRIAL FIBRILLATION (H): ICD-10-CM

## 2019-10-10 ENCOUNTER — RECORDS - HEALTHEAST (OUTPATIENT)
Dept: ADMINISTRATIVE | Facility: OTHER | Age: 84
End: 2019-10-10

## 2019-10-15 ENCOUNTER — COMMUNICATION - HEALTHEAST (OUTPATIENT)
Dept: CARDIOLOGY | Facility: CLINIC | Age: 84
End: 2019-10-15

## 2019-10-15 DIAGNOSIS — I48.19 PERSISTENT ATRIAL FIBRILLATION (H): ICD-10-CM

## 2019-10-20 ENCOUNTER — COMMUNICATION - HEALTHEAST (OUTPATIENT)
Dept: FAMILY MEDICINE | Facility: CLINIC | Age: 84
End: 2019-10-20

## 2019-10-20 DIAGNOSIS — F41.9 ANXIETY: ICD-10-CM

## 2019-11-09 ENCOUNTER — COMMUNICATION - HEALTHEAST (OUTPATIENT)
Dept: FAMILY MEDICINE | Facility: CLINIC | Age: 84
End: 2019-11-09

## 2019-11-09 DIAGNOSIS — E03.9 HYPOTHYROIDISM: ICD-10-CM

## 2020-01-05 ENCOUNTER — COMMUNICATION - HEALTHEAST (OUTPATIENT)
Dept: FAMILY MEDICINE | Facility: CLINIC | Age: 85
End: 2020-01-05

## 2020-01-05 ENCOUNTER — COMMUNICATION - HEALTHEAST (OUTPATIENT)
Dept: CARDIOLOGY | Facility: CLINIC | Age: 85
End: 2020-01-05

## 2020-01-05 DIAGNOSIS — E78.00 HYPERCHOLESTEREMIA: ICD-10-CM

## 2020-01-05 DIAGNOSIS — I10 ESSENTIAL HYPERTENSION: ICD-10-CM

## 2020-01-06 ENCOUNTER — RECORDS - HEALTHEAST (OUTPATIENT)
Dept: GENERAL RADIOLOGY | Facility: CLINIC | Age: 85
End: 2020-01-06

## 2020-01-06 ENCOUNTER — OFFICE VISIT - HEALTHEAST (OUTPATIENT)
Dept: FAMILY MEDICINE | Facility: CLINIC | Age: 85
End: 2020-01-06

## 2020-01-06 DIAGNOSIS — R06.02 SOB (SHORTNESS OF BREATH): ICD-10-CM

## 2020-01-06 DIAGNOSIS — R06.02 SHORTNESS OF BREATH: ICD-10-CM

## 2020-01-06 ASSESSMENT — MIFFLIN-ST. JEOR: SCORE: 1047.31

## 2020-01-19 ENCOUNTER — COMMUNICATION - HEALTHEAST (OUTPATIENT)
Dept: FAMILY MEDICINE | Facility: CLINIC | Age: 85
End: 2020-01-19

## 2020-01-19 ENCOUNTER — COMMUNICATION - HEALTHEAST (OUTPATIENT)
Dept: CARDIOLOGY | Facility: CLINIC | Age: 85
End: 2020-01-19

## 2020-01-19 DIAGNOSIS — I48.91 ATRIAL FIBRILLATION, UNSPECIFIED TYPE (H): ICD-10-CM

## 2020-01-19 DIAGNOSIS — F41.9 ANXIETY: ICD-10-CM

## 2020-02-04 ENCOUNTER — COMMUNICATION - HEALTHEAST (OUTPATIENT)
Dept: FAMILY MEDICINE | Facility: CLINIC | Age: 85
End: 2020-02-04

## 2020-02-04 DIAGNOSIS — E03.9 HYPOTHYROIDISM: ICD-10-CM

## 2020-02-15 ENCOUNTER — COMMUNICATION - HEALTHEAST (OUTPATIENT)
Dept: FAMILY MEDICINE | Facility: CLINIC | Age: 85
End: 2020-02-15

## 2020-02-15 DIAGNOSIS — F41.9 ANXIETY: ICD-10-CM

## 2020-02-15 DIAGNOSIS — I10 ESSENTIAL HYPERTENSION: ICD-10-CM

## 2020-03-03 ENCOUNTER — COMMUNICATION - HEALTHEAST (OUTPATIENT)
Dept: FAMILY MEDICINE | Facility: CLINIC | Age: 85
End: 2020-03-03

## 2020-03-16 ENCOUNTER — COMMUNICATION - HEALTHEAST (OUTPATIENT)
Dept: FAMILY MEDICINE | Facility: CLINIC | Age: 85
End: 2020-03-16

## 2020-03-16 DIAGNOSIS — F41.9 ANXIETY: ICD-10-CM

## 2020-04-23 ENCOUNTER — OFFICE VISIT - HEALTHEAST (OUTPATIENT)
Dept: CARDIOLOGY | Facility: CLINIC | Age: 85
End: 2020-04-23

## 2020-04-23 DIAGNOSIS — I10 ESSENTIAL HYPERTENSION: ICD-10-CM

## 2020-04-23 DIAGNOSIS — I48.19 PERSISTENT ATRIAL FIBRILLATION (H): ICD-10-CM

## 2020-05-08 ENCOUNTER — OFFICE VISIT - HEALTHEAST (OUTPATIENT)
Dept: CARDIOLOGY | Facility: CLINIC | Age: 85
End: 2020-05-08

## 2020-05-08 DIAGNOSIS — I10 ESSENTIAL HYPERTENSION: ICD-10-CM

## 2020-05-08 DIAGNOSIS — I48.19 PERSISTENT ATRIAL FIBRILLATION (H): ICD-10-CM

## 2020-05-08 RX ORDER — FLECAINIDE ACETATE 50 MG/1
50 TABLET ORAL 2 TIMES DAILY
Qty: 180 TABLET | Refills: 3 | Status: SHIPPED | OUTPATIENT
Start: 2020-05-08 | End: 2022-03-28

## 2020-05-15 ENCOUNTER — COMMUNICATION - HEALTHEAST (OUTPATIENT)
Dept: FAMILY MEDICINE | Facility: CLINIC | Age: 85
End: 2020-05-15

## 2020-05-15 DIAGNOSIS — I10 ESSENTIAL HYPERTENSION: ICD-10-CM

## 2020-06-15 ENCOUNTER — COMMUNICATION - HEALTHEAST (OUTPATIENT)
Dept: FAMILY MEDICINE | Facility: CLINIC | Age: 85
End: 2020-06-15

## 2020-06-15 DIAGNOSIS — F41.9 ANXIETY: ICD-10-CM

## 2020-06-18 ENCOUNTER — OFFICE VISIT - HEALTHEAST (OUTPATIENT)
Dept: FAMILY MEDICINE | Facility: CLINIC | Age: 85
End: 2020-06-18

## 2020-06-18 DIAGNOSIS — E03.9 ACQUIRED HYPOTHYROIDISM: ICD-10-CM

## 2020-06-18 DIAGNOSIS — I48.19 PERSISTENT ATRIAL FIBRILLATION (H): ICD-10-CM

## 2020-06-18 DIAGNOSIS — I10 ESSENTIAL HYPERTENSION: ICD-10-CM

## 2020-06-18 DIAGNOSIS — E78.00 HYPERCHOLESTEREMIA: ICD-10-CM

## 2020-06-24 ENCOUNTER — COMMUNICATION - HEALTHEAST (OUTPATIENT)
Dept: FAMILY MEDICINE | Facility: CLINIC | Age: 85
End: 2020-06-24

## 2020-06-24 DIAGNOSIS — I10 ESSENTIAL HYPERTENSION: ICD-10-CM

## 2020-06-24 DIAGNOSIS — E78.00 HYPERCHOLESTEREMIA: ICD-10-CM

## 2020-07-06 ENCOUNTER — AMBULATORY - HEALTHEAST (OUTPATIENT)
Dept: LAB | Facility: CLINIC | Age: 85
End: 2020-07-06

## 2020-07-06 DIAGNOSIS — E03.9 ACQUIRED HYPOTHYROIDISM: ICD-10-CM

## 2020-07-06 DIAGNOSIS — I10 ESSENTIAL HYPERTENSION: ICD-10-CM

## 2020-07-06 DIAGNOSIS — E78.00 HYPERCHOLESTEREMIA: ICD-10-CM

## 2020-07-06 LAB
ALBUMIN SERPL-MCNC: 3.8 G/DL (ref 3.5–5)
ALP SERPL-CCNC: 86 U/L (ref 45–120)
ALT SERPL W P-5'-P-CCNC: 16 U/L (ref 0–45)
ANION GAP SERPL CALCULATED.3IONS-SCNC: 11 MMOL/L (ref 5–18)
AST SERPL W P-5'-P-CCNC: 23 U/L (ref 0–40)
BILIRUB SERPL-MCNC: 0.6 MG/DL (ref 0–1)
BUN SERPL-MCNC: 12 MG/DL (ref 8–28)
CALCIUM SERPL-MCNC: 9.8 MG/DL (ref 8.5–10.5)
CHLORIDE BLD-SCNC: 91 MMOL/L (ref 98–107)
CHOLEST SERPL-MCNC: 179 MG/DL
CO2 SERPL-SCNC: 27 MMOL/L (ref 22–31)
CREAT SERPL-MCNC: 0.74 MG/DL (ref 0.6–1.1)
FASTING STATUS PATIENT QL REPORTED: YES
GFR SERPL CREATININE-BSD FRML MDRD: >60 ML/MIN/1.73M2
GLUCOSE BLD-MCNC: 105 MG/DL (ref 70–125)
HDLC SERPL-MCNC: 85 MG/DL
LDLC SERPL CALC-MCNC: 75 MG/DL
POTASSIUM BLD-SCNC: 3.7 MMOL/L (ref 3.5–5)
PROT SERPL-MCNC: 7 G/DL (ref 6–8)
SODIUM SERPL-SCNC: 129 MMOL/L (ref 136–145)
TRIGL SERPL-MCNC: 93 MG/DL
TSH SERPL DL<=0.005 MIU/L-ACNC: 0.31 UIU/ML (ref 0.3–5)

## 2020-07-07 ENCOUNTER — COMMUNICATION - HEALTHEAST (OUTPATIENT)
Dept: FAMILY MEDICINE | Facility: CLINIC | Age: 85
End: 2020-07-07

## 2020-07-14 ENCOUNTER — COMMUNICATION - HEALTHEAST (OUTPATIENT)
Dept: FAMILY MEDICINE | Facility: CLINIC | Age: 85
End: 2020-07-14

## 2020-07-14 DIAGNOSIS — F41.9 ANXIETY: ICD-10-CM

## 2020-07-21 ENCOUNTER — COMMUNICATION - HEALTHEAST (OUTPATIENT)
Dept: CARDIOLOGY | Facility: CLINIC | Age: 85
End: 2020-07-21

## 2020-07-21 DIAGNOSIS — I48.91 ATRIAL FIBRILLATION, UNSPECIFIED TYPE (H): ICD-10-CM

## 2020-08-17 ENCOUNTER — COMMUNICATION - HEALTHEAST (OUTPATIENT)
Dept: FAMILY MEDICINE | Facility: CLINIC | Age: 85
End: 2020-08-17

## 2020-08-17 DIAGNOSIS — I10 ESSENTIAL HYPERTENSION: ICD-10-CM

## 2020-08-18 RX ORDER — CHLORTHALIDONE 25 MG/1
25 TABLET ORAL DAILY
Qty: 90 TABLET | Refills: 3 | Status: SHIPPED | OUTPATIENT
Start: 2020-08-18 | End: 2021-08-09

## 2020-09-21 ENCOUNTER — COMMUNICATION - HEALTHEAST (OUTPATIENT)
Dept: FAMILY MEDICINE | Facility: CLINIC | Age: 85
End: 2020-09-21

## 2020-09-21 DIAGNOSIS — I10 ESSENTIAL HYPERTENSION: ICD-10-CM

## 2020-10-11 ENCOUNTER — COMMUNICATION - HEALTHEAST (OUTPATIENT)
Dept: FAMILY MEDICINE | Facility: CLINIC | Age: 85
End: 2020-10-11

## 2020-10-11 DIAGNOSIS — F41.9 ANXIETY: ICD-10-CM

## 2020-11-10 ENCOUNTER — COMMUNICATION - HEALTHEAST (OUTPATIENT)
Dept: FAMILY MEDICINE | Facility: CLINIC | Age: 85
End: 2020-11-10

## 2020-11-10 DIAGNOSIS — F41.9 ANXIETY: ICD-10-CM

## 2020-11-19 ENCOUNTER — RECORDS - HEALTHEAST (OUTPATIENT)
Dept: ADMINISTRATIVE | Facility: OTHER | Age: 85
End: 2020-11-19

## 2020-12-12 ENCOUNTER — COMMUNICATION - HEALTHEAST (OUTPATIENT)
Dept: FAMILY MEDICINE | Facility: CLINIC | Age: 85
End: 2020-12-12

## 2020-12-12 DIAGNOSIS — F41.9 ANXIETY: ICD-10-CM

## 2020-12-17 ENCOUNTER — COMMUNICATION - HEALTHEAST (OUTPATIENT)
Dept: CARDIOLOGY | Facility: CLINIC | Age: 85
End: 2020-12-17

## 2020-12-17 DIAGNOSIS — I10 ESSENTIAL HYPERTENSION: ICD-10-CM

## 2020-12-17 DIAGNOSIS — I48.91 ATRIAL FIBRILLATION, UNSPECIFIED TYPE (H): ICD-10-CM

## 2020-12-21 ENCOUNTER — COMMUNICATION - HEALTHEAST (OUTPATIENT)
Dept: CARDIOLOGY | Facility: CLINIC | Age: 85
End: 2020-12-21

## 2020-12-21 DIAGNOSIS — I48.91 ATRIAL FIBRILLATION, UNSPECIFIED TYPE (H): ICD-10-CM

## 2020-12-29 ENCOUNTER — OFFICE VISIT - HEALTHEAST (OUTPATIENT)
Dept: FAMILY MEDICINE | Facility: CLINIC | Age: 85
End: 2020-12-29

## 2020-12-29 DIAGNOSIS — E78.00 HYPERCHOLESTEREMIA: ICD-10-CM

## 2020-12-29 DIAGNOSIS — Z00.00 ROUTINE GENERAL MEDICAL EXAMINATION AT A HEALTH CARE FACILITY: ICD-10-CM

## 2020-12-29 DIAGNOSIS — I10 ESSENTIAL HYPERTENSION: ICD-10-CM

## 2020-12-29 DIAGNOSIS — I48.19 PERSISTENT ATRIAL FIBRILLATION (H): ICD-10-CM

## 2020-12-29 DIAGNOSIS — E03.9 ACQUIRED HYPOTHYROIDISM: ICD-10-CM

## 2020-12-29 LAB
ALBUMIN SERPL-MCNC: 4.2 G/DL (ref 3.5–5)
ALP SERPL-CCNC: 89 U/L (ref 45–120)
ALT SERPL W P-5'-P-CCNC: 16 U/L (ref 0–45)
ANION GAP SERPL CALCULATED.3IONS-SCNC: 12 MMOL/L (ref 5–18)
AST SERPL W P-5'-P-CCNC: 23 U/L (ref 0–40)
BILIRUB SERPL-MCNC: 0.6 MG/DL (ref 0–1)
BUN SERPL-MCNC: 17 MG/DL (ref 8–28)
CALCIUM SERPL-MCNC: 9.5 MG/DL (ref 8.5–10.5)
CHLORIDE BLD-SCNC: 91 MMOL/L (ref 98–107)
CO2 SERPL-SCNC: 30 MMOL/L (ref 22–31)
CREAT SERPL-MCNC: 0.71 MG/DL (ref 0.6–1.1)
GFR SERPL CREATININE-BSD FRML MDRD: >60 ML/MIN/1.73M2
GLUCOSE BLD-MCNC: 108 MG/DL (ref 70–125)
POTASSIUM BLD-SCNC: 3.7 MMOL/L (ref 3.5–5)
PROT SERPL-MCNC: 6.8 G/DL (ref 6–8)
SODIUM SERPL-SCNC: 133 MMOL/L (ref 136–145)
TSH SERPL DL<=0.005 MIU/L-ACNC: 0.31 UIU/ML (ref 0.3–5)

## 2020-12-29 ASSESSMENT — MIFFLIN-ST. JEOR: SCORE: 1025.99

## 2021-01-06 ENCOUNTER — OFFICE VISIT - HEALTHEAST (OUTPATIENT)
Dept: CARDIOLOGY | Facility: CLINIC | Age: 86
End: 2021-01-06

## 2021-01-06 DIAGNOSIS — I48.91 ATRIAL FIBRILLATION, UNSPECIFIED TYPE (H): ICD-10-CM

## 2021-01-06 DIAGNOSIS — I10 ESSENTIAL HYPERTENSION: ICD-10-CM

## 2021-01-06 DIAGNOSIS — I48.19 PERSISTENT ATRIAL FIBRILLATION (H): ICD-10-CM

## 2021-01-06 RX ORDER — METOPROLOL TARTRATE 25 MG/1
12.5 TABLET, FILM COATED ORAL 2 TIMES DAILY
Qty: 90 TABLET | Refills: 3 | Status: SHIPPED | OUTPATIENT
Start: 2021-01-06 | End: 2022-02-28

## 2021-01-12 ENCOUNTER — COMMUNICATION - HEALTHEAST (OUTPATIENT)
Dept: FAMILY MEDICINE | Facility: CLINIC | Age: 86
End: 2021-01-12

## 2021-01-12 DIAGNOSIS — F41.9 ANXIETY: ICD-10-CM

## 2021-01-24 ENCOUNTER — COMMUNICATION - HEALTHEAST (OUTPATIENT)
Dept: FAMILY MEDICINE | Facility: CLINIC | Age: 86
End: 2021-01-24

## 2021-01-24 DIAGNOSIS — E03.9 HYPOTHYROIDISM: ICD-10-CM

## 2021-03-15 ENCOUNTER — COMMUNICATION - HEALTHEAST (OUTPATIENT)
Dept: CARDIOLOGY | Facility: CLINIC | Age: 86
End: 2021-03-15

## 2021-03-15 DIAGNOSIS — I10 ESSENTIAL HYPERTENSION: ICD-10-CM

## 2021-03-16 RX ORDER — AMLODIPINE BESYLATE 2.5 MG/1
TABLET ORAL
Qty: 180 TABLET | Refills: 1 | Status: SHIPPED | OUTPATIENT
Start: 2021-03-16 | End: 2021-10-07

## 2021-05-27 NOTE — TELEPHONE ENCOUNTER
New Appointment Needed  What is the reason for the visit:    2nd shingles vaccine  Provider Preference: nurse  How soon do you need to be seen?: next available   Waitlist offered?: No  Okay to leave a detailed message:  Yes

## 2021-05-28 NOTE — TELEPHONE ENCOUNTER
Refill Approved    Rx renewed per Medication Renewal Policy. Medication was last renewed on 5/14/18.    Debbie Joseph, [facility] Triage/Med Refill 5/13/2019     Requested Prescriptions   Pending Prescriptions Disp Refills     levothyroxine (SYNTHROID, LEVOTHROID) 75 MCG tablet [Pharmacy Med Name: LEVOTHYROXIN 75MCG  TAB] 90 tablet 3     Sig: TAKE 1 TABLET BY MOUTH ONCE DAILY       Thyroid Hormones Protocol Passed - 5/13/2019  4:40 AM        Passed - Provider visit in past 12 months or next 3 months     Last office visit with prescriber/PCP: 10/24/2018 Gabrielle Orozco MD OR same dept: 10/24/2018 Gabrielle Orozco MD OR same specialty: 10/24/2018 Gabrielle Orozco MD  Last physical: Visit date not found Last MTM visit: Visit date not found   Next visit within 3 mo: Visit date not found  Next physical within 3 mo: Visit date not found  Prescriber OR PCP: Gabrielle Orozco MD  Last diagnosis associated with med order: 1. Hypothyroidism  - levothyroxine (SYNTHROID, LEVOTHROID) 75 MCG tablet [Pharmacy Med Name: LEVOTHYROXIN 75MCG  TAB]; TAKE 1 TABLET BY MOUTH ONCE DAILY  Dispense: 90 tablet; Refill: 3    If protocol passes may refill for 12 months if within 3 months of last provider visit (or a total of 15 months).             Passed - TSH on file in past 12 months for patient age 12 & older     TSH   Date Value Ref Range Status   10/24/2018 0.42 0.30 - 5.00 uIU/mL Final

## 2021-05-28 NOTE — TELEPHONE ENCOUNTER
Controlled Substance Refill Request  Medication:   Requested Prescriptions     Pending Prescriptions Disp Refills     LORazepam (ATIVAN) 0.5 MG tablet [Pharmacy Med Name: LORAZEPAM 0.5MG     TAB] 90 tablet 0     Sig: TAKE 1 TABLET BY MOUTH ONCE DAILY AT BEDTIME     Date Last Fill: 02 20 19  Pharmacy: [email or web address]   Submit electronically to pharmacy  Controlled Substance Agreement on File:   Encounter-Level CSA Scan Date:    There are no encounter-level csa scan date.       Last office visit: Last office visit pertaining to requested medication was 10 24 19

## 2021-05-29 NOTE — TELEPHONE ENCOUNTER
Refill Approved    Rx renewed per Medication Renewal Policy. Medication was last renewed on 5/22/18  #90 R-3.    Last OV 10/24/18    Safia Kay, [facility] Triage/Med Refill 5/23/2019     Requested Prescriptions   Pending Prescriptions Disp Refills     chlorthalidone (HYGROTEN) 25 MG tablet [Pharmacy Med Name: CHLORTHALIDONE 25MG    TAB] 90 tablet 3     Sig: TAKE 1 TABLET BY MOUTH ONCE DAILY       Diuretics/Combination Diuretics Refill Protocol  Passed - 5/22/2019  7:02 PM        Passed - Visit with PCP or prescribing provider visit in past 12 months     Last office visit with prescriber/PCP: 10/24/2018 Gabrielle Orozco MD OR same dept: 10/24/2018 Gabrielle Orozco MD OR same specialty: 10/24/2018 Gabrielle Orozco MD  Last physical: Visit date not found Last MTM visit: Visit date not found   Next visit within 3 mo: Visit date not found  Next physical within 3 mo: Visit date not found  Prescriber OR PCP: Gabrielle Orozco MD  Last diagnosis associated with med order: 1. Essential hypertension  - chlorthalidone (HYGROTEN) 25 MG tablet [Pharmacy Med Name: CHLORTHALIDONE 25MG    TAB]; TAKE 1 TABLET BY MOUTH ONCE DAILY  Dispense: 90 tablet; Refill: 3    If protocol passes may refill for 12 months if within 3 months of last provider visit (or a total of 15 months).             Passed - Serum Potassium in past 12 months      Lab Results   Component Value Date    Potassium 3.9 10/24/2018             Passed - Serum Sodium in past 12 months      Lab Results   Component Value Date    Sodium 131 (L) 10/24/2018             Passed - Blood pressure on file in past 12 months     BP Readings from Last 1 Encounters:   03/14/19 114/52             Passed - Serum Creatinine in past 12 months      Creatinine   Date Value Ref Range Status   10/24/2018 0.72 0.60 - 1.10 mg/dL Final

## 2021-05-29 NOTE — PROGRESS NOTES
Chief Complaint   Patient presents with     Medication Management     Follow-up     Following up from cardiologist.        HPI: Patient presents today for medication check.  I reviewed cardiology notes that occurred a few months ago and everything seems to be going well with her atrial fibrillation.  She continues to be in normal sinus rhythm/sinus bradycardia.  She continues with her apixaban.  No significant side effects.  No unexplained bruising or bleeding.  Blood pressure well controlled there and today.  Her medications taken as directed.  She brings a list of her blood pressures taken at home and her heart rate seems to be consistently running mid 50s-mid 60s and blood pressure 80/120s-130s.    Known history of hypothyroidism managed with supplementation.  No temperature intolerance, dry skin, racing heart, hair loss.  Last TSH checked in October 2018 was 0.42.    ROS:Review of Systems - History obtained from the patient  General ROS: negative  Ophthalmic ROS: negative  Hematological and Lymphatic ROS: negative  Endocrine ROS: negative  Respiratory ROS: negative  Cardiovascular ROS: negative  Neurological ROS: negative  Dermatological ROS: negative    SH: The Patient's  reports that she has never smoked. She has never used smokeless tobacco. She reports that she drinks about 4.2 oz of alcohol per week. She reports that she does not use drugs.      FH: The Patient's family history includes Alcoholism in her brother; CABG (age of onset: 53) in her brother; Cirrhosis in her brother; Heart attack (age of onset: 49) in her brother and father; Heart failure (age of onset: 76) in her mother; Liver cancer in her father; No Medical Problems in her son, son, and son; Ovarian cancer in her paternal aunt.     Meds:    Current Outpatient Medications on File Prior to Visit   Medication Sig Dispense Refill     acetaminophen (TYLENOL) 500 MG tablet Take 500 mg by mouth at bedtime.       amLODIPine (NORVASC) 2.5 MG tablet  "TAKE 2 TABLETS BY MOUTH ONCE DAILY 180 tablet 1     apixaban (ELIQUIS) 5 mg Tab tablet Take 1 tablet (5 mg total) by mouth 2 (two) times a day. 180 tablet 1     atorvastatin (LIPITOR) 20 MG tablet Take 1 tablet (20 mg total) by mouth daily. 90 tablet 3     chlorthalidone (HYGROTEN) 25 MG tablet TAKE 1 TABLET BY MOUTH ONCE DAILY 90 tablet 2     diphenhydrAMINE (ANTIHISTAMINE) 25 mg tablet Take 25 mg by mouth at bedtime.       flecainide (TAMBOCOR) 50 MG tablet Take 1 tablet (50 mg total) by mouth 2 (two) times a day. 180 tablet 3     levothyroxine (SYNTHROID, LEVOTHROID) 75 MCG tablet TAKE 1 TABLET BY MOUTH ONCE DAILY 90 tablet 0     LORazepam (ATIVAN) 0.5 MG tablet TAKE 1 TABLET BY MOUTH ONCE DAILY AT BEDTIME 30 tablet 0     losartan (COZAAR) 100 MG tablet Take 1 tablet (100 mg total) by mouth daily. 90 tablet 3     metoprolol tartrate (LOPRESSOR) 50 MG tablet Take 0.5 tablets (25 mg total) by mouth 2 (two) times a day. 90 tablet 1     KLOR-CON M10 10 mEq tablet TAKE ONE TABLET BY MOUTH ONCE DAILY 90 tablet 2     No current facility-administered medications on file prior to visit.        O:  /58   Pulse (!) 55   Ht 5' 3\" (1.6 m)   Wt 144 lb (65.3 kg)   SpO2 97%   BMI 25.51 kg/m      Physical Examination:   General appearance - alert, well appearing, and in no distress  Mental status - alert, oriented to person, place, and time  Eyes - pupils equal and reactive, extraocular eye movements intact  Ears - bilateral TM's and external ear canals normal  Mouth - mucous membranes moist, pharynx normal without lesions  Neck - supple, no significant adenopathy.  Thyroid palpated and normal size.  Appropriate lift with swallowing.  Lymphatics - no palpable lymphadenopathy, no hepatosplenomegaly  Chest - clear to auscultation, no wheezes, rales or rhonchi, symmetric air entry  Heart -bradycardic rate and regular rhythm, S1 and S2 normal, no murmurs noted  Neurological - alert, oriented, normal speech, no focal " findings or movement disorder noted, neck supple without rigidity, cranial nerves II through XII intact, motor and sensory grossly normal bilaterally, normal muscle tone, no tremors, strength 5/5  Extremities -+1 bilateral lower extremity edema.  Pulses palpable.  Skin - normal coloration and turgor, no rashes, no suspicious skin lesions noted      A/P:     Problem List Items Addressed This Visit        ENT/CARD/PULM/ENDO Problems    Hypothyroidism (Chronic)    Essential hypertension - Primary (Chronic)    Relevant Orders    Basic Metabolic Panel    Persistent atrial fibrillation (H) (Chronic)          1. Essential hypertension  Well-controlled.  No changes.  - Basic Metabolic Panel    2. Acquired hypothyroidism  Well-controlled.  No changes.  Too soon to recheck thyroid function.    3. Persistent atrial fibrillation (H)  No evidence of atrial fibrillation today.  Cardiology notes have her following up in 6 months.  EKG reviewed.    Aguila Lal, CNP

## 2021-05-29 NOTE — TELEPHONE ENCOUNTER
Controlled Substance Refill Request  Medication:   Requested Prescriptions     Pending Prescriptions Disp Refills     LORazepam (ATIVAN) 0.5 MG tablet [Pharmacy Med Name: LORAZEPAM 0.5MG     TAB] 30 tablet 0     Sig: TAKE 1 TABLET BY MOUTH ONCE DAILY AT BEDTIME     Date Last Fill: 5/21/19  Pharm[facility]: [id number]   Submit electronically to pharmacy  Controlled Substance Agreement on File:   Encounter-Level CSA Scan Date:    There are no encounter-level csa scan date.       Last office visit: Last office visit pertaining to requested medication was 6/5/19.

## 2021-05-29 NOTE — PATIENT INSTRUCTIONS - HE
Recheck of your potassium and kidney functions today.    Otherwise, everything is looking good today!    Thank you for coming in today!    If you receive a survey from [employer] about your experience today, it would be very helpful if you could fill it out to let us know what went well and what we can improve!    General Information:    Today you had your appointment with Aguila Lal NP    My hours are:    Monday : Out of clinic  Tuesday : 8:00AM - 5:00 PM  Wednesday: 8:00AM - 5:00 PM  Thursday: 8:00AM - 5:00 PM  Friday: 8:00AM - 5:00 PM    I am not in the office Mondays. Therefore non-urgent calls and medical messages received on Monday will be addressed when I am back in the office on Tuesday. Urgent matters will be reviewed and addressed by one of my partners in the office as needed.    If lab work was done today as part of your evaluation you will generally be contacted via [employer], mail, or phone with the results within 1-5 days. If there is an alarming result we will contact you by phone. Lab results come back at varying times, I generally wait until all lab results are available before making comments on the results.     If you need refills please contact your pharmacy. They will send a refill request to me to review. Please allow 3-5 business days for us to process all refill requests.     My Clinical Assistant is Katie. Please call us at [phone] or send a medical message with any questions or concerns.

## 2021-05-29 NOTE — TELEPHONE ENCOUNTER
Pt verifies understanding and appointment scheduled for 6/5/19 at 10am with Aguila Lal CNP.     Katie Cain, CMA

## 2021-05-30 NOTE — TELEPHONE ENCOUNTER
Controlled Substance Refill Request  Medication:   Requested Prescriptions     Pending Prescriptions Disp Refills     LORazepam (ATIVAN) 0.5 MG tablet [Pharmacy Med Name: LORAZEPAM 0.5MG     TAB] 30 tablet 0     Sig: TAKE 1 TABLET BY MOUTH ONCE DAILY AT BEDTIME     Date Last Fill: 6/25/19  Pharmacy: [facility] in [place]   Submit electronically to pharmacy  Controlled Substance Agreement on File: None  Encounter-Level CSA Scan Date:    There are no encounter-level csa scan date.       Last office visit: 6/5/2019 Aguila Lal, CNP

## 2021-05-31 VITALS — BODY MASS INDEX: 22.89 KG/M2 | HEIGHT: 65 IN | WEIGHT: 137.4 LBS

## 2021-05-31 VITALS — BODY MASS INDEX: 23.39 KG/M2 | HEIGHT: 64 IN | WEIGHT: 137 LBS

## 2021-05-31 VITALS — WEIGHT: 138 LBS | BODY MASS INDEX: 23.56 KG/M2 | HEIGHT: 64 IN

## 2021-05-31 VITALS — WEIGHT: 135 LBS | HEIGHT: 64 IN | BODY MASS INDEX: 23.05 KG/M2

## 2021-05-31 VITALS — BODY MASS INDEX: 24.07 KG/M2 | WEIGHT: 141 LBS | HEIGHT: 64 IN

## 2021-05-31 VITALS — WEIGHT: 135 LBS | BODY MASS INDEX: 23.17 KG/M2

## 2021-05-31 NOTE — TELEPHONE ENCOUNTER
Controlled Substance Refill Request  Medication:   Requested Prescriptions     Pending Prescriptions Disp Refills     LORazepam (ATIVAN) 0.5 MG tablet [Pharmacy Med Name: LORAZEPAM 0.5MG     TAB] 30 tablet 0     Sig: TAKE 1 TABLET BY MOUTH ONCE DAILY AT BEDTIME     Date Last Fill: 7.24.19  Pharmacy: [facility]   Submit electronically to pharmacy  Controlled Substance Agreement on File:   Encounter-Level CSA Scan Date:    There are no encounter-level csa scan date.       Last office visit: Last office visit pertaining to requested medication was 6.5.19.

## 2021-05-31 NOTE — PROGRESS NOTES
1. Fall, initial encounter           ASSESSMENT/PLAN:     The following high BMI interventions were performed this visit: weight monitoring    1. Fall, initial encounter    -Right elbow was irrigated using saline flush and covered using Tegaderm and Kerlix gauze.  Patient was instructed to keep the site covered for the next 2 to 3 days.  She may reapply Band-Aids and cover again with gauze if there is any drainage.  We discussed signs and symptoms to watch for in regards to infection which would require further follow-up.  No antibiotic therapy is necessary at this time.      There are no Patient Instructions on file for this visit.  There are no discontinued medications.  Return if symptoms worsen or fail to improve, for right arm pain with abrasion from fall.        Jessy Vizcarra NP          SUBJECTIVE:  Katie Mar is a 87 y.o. female who presents as a walk in clinic patient for evaluation after she was getting off a tour bus today using her walker and she slipped going down the stairs.  The  was able to catch her as she was on her way down the stairs, she did not hit her head but does recall that her arm was dragging across the stairs on her way down.  She was wearing a sweatshirt when she got home to remove the sweatshirt, she noticed a very large abrasion with skin tearing on the right elbow with a moderate amount of bleeding.  She does have full range of motion today, she denies any numbness or tingling of the extremity.  She does take a blood thinner at this time for her atrial fibrillation.  She is denying pain at the site today. Recommend use of Tylenol for pain control if she develops any tonight, she may use ice packs to the site for swelling.  Initial blood pressure is elevated today, patient blames it on the recent construction going on her from the clinic.  Recheck of her blood pressure after 5 minutes was normotensive.  Chief Complaint   Patient presents with     Fall     RT  arm abrasion,          Patient Active Problem List   Diagnosis     Irritable bowel syndrome     Hypothyroidism     Essential hypertension     Persistent atrial fibrillation (H)       Current Outpatient Medications   Medication Sig Dispense Refill     acetaminophen (TYLENOL) 500 MG tablet Take 500 mg by mouth at bedtime.       amLODIPine (NORVASC) 2.5 MG tablet TAKE 2 TABLETS BY MOUTH ONCE DAILY 180 tablet 1     apixaban (ELIQUIS) 5 mg Tab tablet Take 1 tablet (5 mg total) by mouth 2 (two) times a day. 180 tablet 1     atorvastatin (LIPITOR) 20 MG tablet Take 1 tablet (20 mg total) by mouth daily. 90 tablet 3     chlorthalidone (HYGROTEN) 25 MG tablet TAKE 1 TABLET BY MOUTH ONCE DAILY 90 tablet 2     diphenhydrAMINE (ANTIHISTAMINE) 25 mg tablet Take 25 mg by mouth at bedtime.       flecainide (TAMBOCOR) 50 MG tablet Take 1 tablet (50 mg total) by mouth 2 (two) times a day. 180 tablet 3     KLOR-CON M10 10 mEq tablet TAKE ONE TABLET BY MOUTH ONCE DAILY 90 tablet 2     levothyroxine (SYNTHROID, LEVOTHROID) 75 MCG tablet TAKE 1 TABLET BY MOUTH ONCE DAILY 90 tablet 0     LORazepam (ATIVAN) 0.5 MG tablet TAKE 1 TABLET BY MOUTH ONCE DAILY AT BEDTIME 30 tablet 0     losartan (COZAAR) 100 MG tablet Take 1 tablet (100 mg total) by mouth daily. 90 tablet 3     metoprolol tartrate (LOPRESSOR) 50 MG tablet Take 0.5 tablets (25 mg total) by mouth 2 (two) times a day. 90 tablet 1     No current facility-administered medications for this visit.        Social History     Tobacco Use   Smoking Status Never Smoker   Smokeless Tobacco Never Used       REVIEW OF SYSTEMS: Denies locking, clicking, giving away, fevers, chills, sweating, rash or warmth.'    TOBACCO USE:  Social History     Tobacco Use   Smoking Status Never Smoker   Smokeless Tobacco Never Used     Social History     Socioeconomic History     Marital status:      Spouse name: Not on file     Number of children: 3     Years of education: 16     Highest education  level: Not on file   Occupational History     Occupation:      Employer: RETIRED     Comment: worked for a [employer]   Social Needs     Financial resource strain: Not on file     Food insecurity:     Worry: Not on file     Inability: Not on file     Transportation needs:     Medical: Not on file     Non-medical: Not on file   Tobacco Use     Smoking status: Never Smoker     Smokeless tobacco: Never Used   Substance and Sexual Activity     Alcohol use: Yes     Alcohol/week: 4.2 oz     Types: 7 Glasses of wine per week     Drug use: No     Sexual activity: Not Currently     Partners: Male   Lifestyle     Physical activity:     Days per week: Not on file     Minutes per session: Not on file     Stress: Not on file   Relationships     Social connections:     Talks on phone: Not on file     Gets together: Not on file     Attends Congregation service: Not on file     Active member of club or organization: Not on file     Attends meetings of clubs or organizations: Not on file     Relationship status: Not on file     Intimate partner violence:     Fear of current or ex partner: Not on file     Emotionally abused: Not on file     Physically abused: Not on file     Forced sexual activity: Not on file   Other Topics Concern     Not on file   Social History Narrative    Lives alone since her  passed in 2013. She lives in [place]. She still drives in 2018. She walks on her own. She enjoys playing bridge and going out to lunch.        She has 3 sons: Bi lives in [place] and does check in on her.  Another son lives in [place] and travels a lot.  The other son lives in [place] and is retired.  She has 2 grandchildren.        She has advanced directives and Bi is in charge of her affairs.         OBJECTIVE:            Vitals:    08/15/19 1657   BP: 138/54   Pulse:    Resp:    Temp:    SpO2:      Weight: 145 lb (65.8 kg)    Wt Readings from Last 3 Encounters:    08/15/19 145 lb (65.8 kg)   06/05/19 144 lb (65.3 kg)   03/14/19 146 lb (66.2 kg)     Body mass index is 25.69 kg/m .        Physical Exam:  GENERAL APPEARANCE: A&A, NAD, well hydrated, well nourished  HEAD: atraumatic, no deformity  NECK: Supple, without lymphadenopathy, no thyroid mass, no JVD, no bruit, full ROM  CV: RRR, rate controlled, no M/G/R   LUNGS: CTAB, normal respiratory effort  EXTREMITY: no edema, large abrasion with 2 large skin tears on the right elbow. Moderate amount of bleeding noted.   strength is equal. Patient has full ROM of right upper extremity, able to flex and extend without difficulty  SKIN:  Normal skin turgor, no lesions/rashes, warm and dry   NEURO: no gross deficits

## 2021-05-31 NOTE — TELEPHONE ENCOUNTER
Refill Approved    Rx renewed per Medication Renewal Policy. Medication was last renewed on 10.24.18.    Shoshana Pickard, [facility] Triage/Med Refill 8/9/2019     Requested Prescriptions   Pending Prescriptions Disp Refills     levothyroxine (SYNTHROID, LEVOTHROID) 75 MCG tablet [Pharmacy Med Name: LEVOTHYROXIN 75MCG  TAB] 90 tablet 0     Sig: TAKE 1 TABLET BY MOUTH ONCE DAILY       Thyroid Hormones Protocol Passed - 8/7/2019  7:50 PM        Passed - Provider visit in past 12 months or next 3 months     Last office visit with prescriber/PCP: 10/24/2018 Gabrielle Orozco MD OR same dept: 6/5/2019 Aguila Lal CNP OR same specialty: 6/5/2019 Aguila Lal CNP  Last physical: Visit date not found Last MTM visit: Visit date not found   Next visit within 3 mo: Visit date not found  Next physical within 3 mo: Visit date not found  Prescriber OR PCP: Gabrielle Orozco MD  Last diagnosis associated with med order: 1. Hypothyroidism  - levothyroxine (SYNTHROID, LEVOTHROID) 75 MCG tablet [Pharmacy Med Name: LEVOTHYROXIN 75MCG  TAB]; TAKE 1 TABLET BY MOUTH ONCE DAILY  Dispense: 90 tablet; Refill: 0    If protocol passes may refill for 12 months if within 3 months of last provider visit (or a total of 15 months).             Passed - TSH on file in past 12 months for patient age 12 & older     TSH   Date Value Ref Range Status   10/24/2018 0.42 0.30 - 5.00 uIU/mL Final

## 2021-06-01 VITALS — BODY MASS INDEX: 23.49 KG/M2 | HEIGHT: 65 IN | WEIGHT: 141 LBS

## 2021-06-01 VITALS — HEIGHT: 64 IN | WEIGHT: 139 LBS | BODY MASS INDEX: 23.73 KG/M2

## 2021-06-01 VITALS — BODY MASS INDEX: 23.93 KG/M2 | WEIGHT: 140.2 LBS | HEIGHT: 64 IN

## 2021-06-01 VITALS — HEIGHT: 65 IN | BODY MASS INDEX: 23.66 KG/M2 | WEIGHT: 142 LBS

## 2021-06-01 NOTE — TELEPHONE ENCOUNTER
Controlled Substance Refill Request  Medication:   Requested Prescriptions     Pending Prescriptions Disp Refills     LORazepam (ATIVAN) 0.5 MG tablet [Pharmacy Med Name: LORAZEPAM 0.5MG     TAB] 30 tablet 0     Sig: TAKE 1 TABLET BY MOUTH AT BEDTIME AS NEEDED FOR SLEEP     Date Last Fill: 8/20/2019  Pharmacy: [facility], [place], [place]   Submit electronically to pharmacy  Controlled Substance Agreement on File:   Encounter-Level CSA Scan Date:    There are no encounter-level csa scan date.       Last office visit: 8/15/2019. Last office visit pertaining to requested medication was ? Chart reviewed to 8/9/2018 and it was not found.

## 2021-06-02 VITALS — HEIGHT: 64 IN | WEIGHT: 146 LBS | BODY MASS INDEX: 24.92 KG/M2

## 2021-06-02 VITALS — WEIGHT: 143 LBS | HEIGHT: 64 IN | BODY MASS INDEX: 24.41 KG/M2

## 2021-06-02 NOTE — PATIENT INSTRUCTIONS - HE
Katie Mar,    It was a pleasure to see you today at the [facility].     My recommendations after this visit include:    Continue current medications, except:  Decrease metoprolol tartrate to 25 mg (1/2 tab) twice daily.  New prescription for 25 mg tabs.  Take an extra tab of metoprolol at onset of A fib.    Follow up in 1 year, or sooner if needed.    Pauline Carr, [facility], Electrophysiology  [phone]  EP nurses [phone]

## 2021-06-02 NOTE — TELEPHONE ENCOUNTER
Controlled Substance Refill Request  Medication:   Requested Prescriptions     Pending Prescriptions Disp Refills     LORazepam (ATIVAN) 0.5 MG tablet [Pharmacy Med Name: LORAZEPAM 0.5MG     TAB] 30 tablet 0     Sig: TAKE 1 TABLET BY MOUTH AT BEDTIME AS NEEDED FOR SLEEP     Date Last Fill: 9/19/19  Pharmacy:  [id number]  Submit electronically to pharmacy  Controlled Substance Agreement on File:   Encounter-Level CSA Scan Date:    There are no encounter-level csa scan date.       Last office visit: Last office visit pertaining to requested medication was 10/24/18.

## 2021-06-03 VITALS — BODY MASS INDEX: 25.69 KG/M2 | HEIGHT: 63 IN | WEIGHT: 145 LBS

## 2021-06-03 VITALS
WEIGHT: 145 LBS | RESPIRATION RATE: 16 BRPM | DIASTOLIC BLOOD PRESSURE: 68 MMHG | HEART RATE: 60 BPM | SYSTOLIC BLOOD PRESSURE: 146 MMHG | BODY MASS INDEX: 25.69 KG/M2

## 2021-06-03 VITALS — HEIGHT: 63 IN | WEIGHT: 144 LBS | BODY MASS INDEX: 25.52 KG/M2

## 2021-06-03 NOTE — TELEPHONE ENCOUNTER
RN cannot approve Refill Request    RN can NOT refill this medication PCP messaged that patient is overdue for Labs. Last office visit: 10/24/2018 Gabrielle Orozco MD Last Physical: Visit date not found Last MTM visit: Visit date not found Last visit same specialty: 8/15/2019 Jessy Vizcarra NP.  Next visit within 3 mo: Visit date not found  Next physical within 3 mo: Visit date not found      Grace L Adenike, [facility] Triage/Med Refill 11/9/2019    Requested Prescriptions   Pending Prescriptions Disp Refills     levothyroxine (SYNTHROID, LEVOTHROID) 75 MCG tablet [Pharmacy Med Name: LEVOTHYROXIN 75MCG  TAB] 90 tablet 0     Sig: TAKE 1 TABLET BY MOUTH ONCE DAILY       Thyroid Hormones Protocol Failed - 11/9/2019  7:29 PM        Failed - TSH on file in past 12 months for patient age 12 & older     TSH   Date Value Ref Range Status   10/24/2018 0.42 0.30 - 5.00 uIU/mL Final                   Passed - Provider visit in past 12 months or next 3 months     Last office visit with prescriber/PCP: 10/24/2018 Gabrielle Orozco MD OR same dept: 8/15/2019 Jessy Vizcarra NP OR same specialty: 8/15/2019 Jessy Vizcarra NP  Last physical: Visit date not found Last MTM visit: Visit date not found   Next visit within 3 mo: Visit date not found  Next physical within 3 mo: Visit date not found  Prescriber OR PCP: Gabrielle Orozco MD  Last diagnosis associated with med order: 1. Hypothyroidism  - levothyroxine (SYNTHROID, LEVOTHROID) 75 MCG tablet [Pharmacy Med Name: LEVOTHYROXIN 75MCG  TAB]; TAKE 1 TABLET BY MOUTH ONCE DAILY  Dispense: 90 tablet; Refill: 0    If protocol passes may refill for 12 months if within 3 months of last provider visit (or a total of 15 months).

## 2021-06-04 VITALS
SYSTOLIC BLOOD PRESSURE: 132 MMHG | WEIGHT: 144 LBS | DIASTOLIC BLOOD PRESSURE: 60 MMHG | HEART RATE: 53 BPM | BODY MASS INDEX: 25.51 KG/M2

## 2021-06-04 VITALS
DIASTOLIC BLOOD PRESSURE: 62 MMHG | HEIGHT: 63 IN | HEART RATE: 70 BPM | WEIGHT: 144 LBS | SYSTOLIC BLOOD PRESSURE: 130 MMHG | BODY MASS INDEX: 25.52 KG/M2

## 2021-06-04 VITALS — SYSTOLIC BLOOD PRESSURE: 134 MMHG | WEIGHT: 139 LBS | DIASTOLIC BLOOD PRESSURE: 62 MMHG | BODY MASS INDEX: 24.62 KG/M2

## 2021-06-04 VITALS
DIASTOLIC BLOOD PRESSURE: 63 MMHG | HEART RATE: 52 BPM | WEIGHT: 138 LBS | SYSTOLIC BLOOD PRESSURE: 127 MMHG | BODY MASS INDEX: 24.45 KG/M2

## 2021-06-04 NOTE — TELEPHONE ENCOUNTER
Refill Approved    Rx renewed per Medication Renewal Policy. Medication was last renewed on 1/14/19.12/25/19.    Debbie Joseph, [facility] Triage/Med Refill 1/5/2020     Requested Prescriptions   Pending Prescriptions Disp Refills     atorvastatin (LIPITOR) 20 MG tablet [Pharmacy Med Name: Atorvastatin Calcium 20 MG Oral Tablet] 90 tablet 0     Sig: TAKE 1 TABLET BY MOUTH ONCE DAILY       Statins Refill Protocol (Hmg CoA Reductase Inhibitors) Passed - 1/5/2020  1:32 PM        Passed - PCP or prescribing provider visit in past 12 months      Last office visit with prescriber/PCP: 10/24/2018 Gabrielle Orozco MD OR same dept: 8/15/2019 Jessy Vizcarra NP OR same specialty: 8/15/2019 Jessy Vizcarra NP  Last physical: Visit date not found Last MTM visit: Visit date not found   Next visit within 3 mo: Visit date not found  Next physical within 3 mo: Visit date not found  Prescriber OR PCP: Gabrielle Orozco MD  Last diagnosis associated with med order: 1. Hypercholesteremia  - atorvastatin (LIPITOR) 20 MG tablet [Pharmacy Med Name: Atorvastatin Calcium 20 MG Oral Tablet]; TAKE 1 TABLET BY MOUTH ONCE DAILY  Dispense: 90 tablet; Refill: 0    2. Essential hypertension  - losartan (COZAAR) 100 MG tablet [Pharmacy Med Name: Losartan Potassium 100 MG Oral Tablet]; TAKE 1 TABLET BY MOUTH ONCE DAILY  Dispense: 90 tablet; Refill: 0    If protocol passes may refill for 12 months if within 3 months of last provider visit (or a total of 15 months).             losartan (COZAAR) 100 MG tablet [Pharmacy Med Name: Losartan Potassium 100 MG Oral Tablet] 90 tablet 0     Sig: TAKE 1 TABLET BY MOUTH ONCE DAILY       Angiotensin Receptor Blocker Protocol Passed - 1/5/2020  1:32 PM        Passed - PCP or prescribing provider visit in past 12 months       Last office visit with prescriber/PCP: 10/24/2018 Gabrielle Orozco MD OR same dept: 8/15/2019 Jessy Vizcarra NP OR same specialty: 8/15/2019 Jessy Vizcarra NP   Last physical: Visit date not found Last MTM visit: Visit date not found   Next visit within 3 mo: Visit date not found  Next physical within 3 mo: Visit date not found  Prescriber OR PCP: Gabrielle Orozco MD  Last diagnosis associated with med order: 1. Hypercholesteremia  - atorvastatin (LIPITOR) 20 MG tablet [Pharmacy Med Name: Atorvastatin Calcium 20 MG Oral Tablet]; TAKE 1 TABLET BY MOUTH ONCE DAILY  Dispense: 90 tablet; Refill: 0    2. Essential hypertension  - losartan (COZAAR) 100 MG tablet [Pharmacy Med Name: Losartan Potassium 100 MG Oral Tablet]; TAKE 1 TABLET BY MOUTH ONCE DAILY  Dispense: 90 tablet; Refill: 0    If protocol passes may refill for 12 months if within 3 months of last provider visit (or a total of 15 months).             Passed - Serum potassium within the past 12 months     Lab Results   Component Value Date    Potassium 3.9 06/05/2019             Passed - Blood pressure filed in past 12 months     BP Readings from Last 1 Encounters:   10/09/19 146/68             Passed - Serum creatinine within the past 12 months     Creatinine   Date Value Ref Range Status   06/05/2019 0.74 0.60 - 1.10 mg/dL Final

## 2021-06-04 NOTE — TELEPHONE ENCOUNTER
RN cannot approve Refill Request    RN can NOT refill this medication med is not covered by policy/route to provider     . Last office visit: 10/9/2019 Pauline Carr CNP Last Physical: Visit date not found Last MTM visit: Visit date not found Last visit same specialty: 10/9/2019 Pauline Carr CNP.  Next visit within 3 mo: Visit date not found  Next physical within 3 mo: Visit date not found      Debbie Joseph, [facility] Triage/Med Refill 1/5/2020    Requested Prescriptions   Pending Prescriptions Disp Refills     amLODIPine (NORVASC) 2.5 MG tablet [Pharmacy Med Name: amLODIPine Besylate 2.5 MG Oral Tablet] 180 tablet 3     Sig: TAKE 2 TABLETS BY MOUTH ONCE DAILY       There is no refill protocol information for this order

## 2021-06-05 VITALS
WEIGHT: 139.3 LBS | HEART RATE: 57 BPM | DIASTOLIC BLOOD PRESSURE: 60 MMHG | OXYGEN SATURATION: 97 % | HEIGHT: 63 IN | BODY MASS INDEX: 24.68 KG/M2 | SYSTOLIC BLOOD PRESSURE: 123 MMHG

## 2021-06-05 NOTE — TELEPHONE ENCOUNTER
RN cannot approve Refill Request    RN can NOT refill this medication Protocol failed and NO refill given..    Debbie Joseph, [facility] Triage/Med Refill 2/4/2020    Requested Prescriptions   Pending Prescriptions Disp Refills     levothyroxine (SYNTHROID, LEVOTHROID) 75 MCG tablet [Pharmacy Med Name: Levothyroxine Sodium 75 MCG Oral Tablet] 90 tablet 3     Sig: TAKE 1 TABLET BY MOUTH ONCE DAILY       Thyroid Hormones Protocol Failed - 2/4/2020  7:47 PM        Failed - TSH on file in past 12 months for patient age 12 & older     TSH   Date Value Ref Range Status   10/24/2018 0.42 0.30 - 5.00 uIU/mL Final                   Passed - Provider visit in past 12 months or next 3 months     Last office visit with prescriber/PCP: 1/6/2020 Gabrielle Orozco MD OR same dept: 1/6/2020 Gabrielle Orozco MD OR same specialty: 1/6/2020 Gabrielle Orozco MD  Last physical: Visit date not found Last MTM visit: Visit date not found   Next visit within 3 mo: Visit date not found  Next physical within 3 mo: Visit date not found  Prescriber OR PCP: Gabrielle Orozco MD  Last diagnosis associated with med order: 1. Hypothyroidism  - levothyroxine (SYNTHROID, LEVOTHROID) 75 MCG tablet [Pharmacy Med Name: Levothyroxine Sodium 75 MCG Oral Tablet]; TAKE 1 TABLET BY MOUTH ONCE DAILY  Dispense: 90 tablet; Refill: 0    If protocol passes may refill for 12 months if within 3 months of last provider visit (or a total of 15 months).

## 2021-06-05 NOTE — PROGRESS NOTES
PROGRESS NOTE       SUBJECTIVE:  Katie Mar is a 88 y.o. female   Chief Complaint   Patient presents with     Medication Refill     MED CHECK AND REFILLS      Fatigue     Patient is here for her blood pressure check.  She admits that she feels older than she used to and does not have as much energy.  She feels short of breath when she does things.  She has no history of smoking although she does recall her father smoked when she was a child.  Has had no other exposures to any thing that could cause problems with her lungs.  Her heart rhythm has been appropriate and recently the beta-blocker was slightly decreased and now her pulse has come up into the 60s as opposed to the low 50s.  She did not notice the difference physiologically.  This change was made by cardiology.    Patient Active Problem List   Diagnosis     Irritable bowel syndrome     Hypothyroidism     Essential hypertension     Persistent atrial fibrillation       Current Outpatient Medications   Medication Sig Dispense Refill     acetaminophen (TYLENOL) 500 MG tablet Take 500 mg by mouth at bedtime.       apixaban (ELIQUIS) 5 mg Tab tablet Take 1 tablet (5 mg total) by mouth 2 (two) times a day. 180 tablet 1     atorvastatin (LIPITOR) 20 MG tablet TAKE 1 TABLET BY MOUTH ONCE DAILY 90 tablet 1     chlorthalidone (HYGROTEN) 25 MG tablet TAKE 1 TABLET BY MOUTH ONCE DAILY 90 tablet 2     diphenhydrAMINE (ANTIHISTAMINE) 25 mg tablet Take 25 mg by mouth at bedtime.       flecainide (TAMBOCOR) 50 MG tablet TAKE 1 TABLET BY MOUTH TWICE DAILY 180 tablet 2     levothyroxine (SYNTHROID, LEVOTHROID) 75 MCG tablet TAKE 1 TABLET BY MOUTH ONCE DAILY 90 tablet 0     LORazepam (ATIVAN) 0.5 MG tablet TAKE 1 TABLET BY MOUTH AT BEDTIME AS NEEDED FOR SLEEP 30 tablet 2     losartan (COZAAR) 100 MG tablet TAKE 1 TABLET BY MOUTH ONCE DAILY 90 tablet 1     metoprolol tartrate (LOPRESSOR) 25 MG tablet Take 0.5 tablets (12.5 mg total) by mouth 2 (two) times a day. 180  tablet 3     amLODIPine (NORVASC) 2.5 MG tablet TAKE 2 TABLETS BY MOUTH ONCE DAILY 180 tablet 3     No current facility-administered medications for this visit.        Social History     Tobacco Use   Smoking Status Never Smoker   Smokeless Tobacco Never Used       REVIEW OF SYSTEMS:  Patient denies fever, chills, dizziness, headache, visual change, ear pain, cough, chest pain,  abdominal pain, extremity pain or swelling, rash,  depression or anxiety.    POSITIVES: Shortness of breath and fatigue.      OBJECTIVE:       Vitals:    01/06/20 1135   BP: 130/62   Pulse: 70     Weight: 144 lb (65.3 kg)    Wt Readings from Last 3 Encounters:   01/06/20 144 lb (65.3 kg)   10/09/19 145 lb (65.8 kg)   08/15/19 145 lb (65.8 kg)     Body mass index is 25.51 kg/m .        Physical Exam:  GENERAL APPEARANCE: A&A, NAD, well hydrated, well nourished  SKIN:  Normal skin turgor, no lesions/rashes   EARS: TM's normal, gray with nl light reflex  OROPHARYNX: without erythema, no post nasal drainage or thrush  NECK: Supple, without lymphadenopathy, no thyroid mass, no carotid bruits.  CV: RRR, no M/G/R   LUNGS: CTAB, normal respiratory effort  EXTREMITY: Extremities normal, atraumatic, no swelling  NEURO: no gross deficits   PSYCHIATRIC:  Mood appropriate, memory intact        ASSESSMENT/PLAN:     1. SOB (shortness of breath)  Chest x-ray is completely normal.  Patient is reassured that things are looking fine and she should get a little bit of exercise every day and try to slowly increase.  She should follow-up in 3 months.  Sooner if any change in symptoms.  - XR Chest 2 Views; Future        I spent a total of 25 minutes face to face with the patient.  Over 50% of the time spent counseling and educating the patient about all of the above.      Gabrielle Orozco MD

## 2021-06-05 NOTE — TELEPHONE ENCOUNTER
Controlled Substance Refill Request  Medication Name:   Requested Prescriptions     Pending Prescriptions Disp Refills     LORazepam (ATIVAN) 0.5 MG tablet [Pharmacy Med Name: LORazepam 0.5 MG Oral Tablet] 30 tablet 0     Sig: TAKE 1 TABLET BY MOUTH AT BEDTIME AS NEEDED FOR SLEEP     Date Last Fill: 10/22/19  Requested Pharmacy: [facility]  Submit electronically to pharmacy  Controlled Substance Agreement on file:   Encounter-Level CSA Scan Date:    There are no encounter-level csa scan date.        Last office visit:  1/6/20

## 2021-06-06 ENCOUNTER — COMMUNICATION - HEALTHEAST (OUTPATIENT)
Dept: FAMILY MEDICINE | Facility: CLINIC | Age: 86
End: 2021-06-06

## 2021-06-06 DIAGNOSIS — F41.9 ANXIETY: ICD-10-CM

## 2021-06-06 NOTE — TELEPHONE ENCOUNTER
Controlled Substance Refill Request  Medication Name:   Requested Prescriptions     Pending Prescriptions Disp Refills     chlorthalidone (HYGROTEN) 25 MG tablet [Pharmacy Med Name: Chlorthalidone 25 MG Oral Tablet] 90 tablet 0     Sig: TAKE 1 TABLET BY MOUTH ONCE DAILY     LORazepam (ATIVAN) 0.5 MG tablet [Pharmacy Med Name: LORazepam 0.5 MG Oral Tablet] 30 tablet 0     Sig: TAKE 1 TABLET BY MOUTH AT BEDTIME AS NEEDED FOR SLEEP     Date Last Fill: 1/22/20  Requested Pharmacy: [facility]  Submit electronically to pharmacy  Controlled Substance Agreement on file:   Encounter-Level CSA Scan Date:    There are no encounter-level csa scan date.        Last office visit:  1/6/20         Rx renewed per Medication Renewal policy  Chlorthalidone  Lab Results   Component Value Date    CREATININE 0.74 06/05/2019    BUN 12 06/05/2019     (L) 06/05/2019    K 3.9 06/05/2019    CL 92 (L) 06/05/2019    CO2 30 06/05/2019

## 2021-06-06 NOTE — TELEPHONE ENCOUNTER
Controlled Substance Refill Request  Medication Name:   Requested Prescriptions     Pending Prescriptions Disp Refills     LORazepam (ATIVAN) 0.5 MG tablet [Pharmacy Med Name: LORazepam 0.5 MG Oral Tablet] 30 tablet 0     Sig: TAKE 1 TABLET BY MOUTH AT BEDTIME AS NEEDED FOR SLEEP     Date Last Fill: 2/20/20  Requested Pharmacy: [facility]  Submit electronically to pharmacy  Controlled Substance Agreement on file:   Encounter-Level CSA Scan Date:    There are no encounter-level csa scan date.        Last office visit:  1/6/20

## 2021-06-07 NOTE — PROGRESS NOTES
Review of Systems - History obtained from the patient  General ROS: negative  Psychological ROS: positive for - anxiety and depression  Ophthalmic ROS: negative  ENT ROS: positive for - hearing change  Allergy and Immunology ROS: negative  Hematological and Lymphatic ROS: positive for - bleeding problems and fatigue  Endocrine ROS: negative  Respiratory ROS: positive for - shortness of breath  Cardiovascular ROS: positive for - irregular heartbeat  Gastrointestinal ROS: negative  Genito-Urinary ROS: positive for - nocturia  Musculoskeletal ROS: negative  Neurological ROS: negative  Dermatological ROS: negative

## 2021-06-07 NOTE — PATIENT INSTRUCTIONS - HE
Katie Mar,    It was a pleasure to see you today at the [facility].     My recommendations after this visit include:    Increase flecainide to 100 mg (2 tabs) two times a day  Decrease metoprolol back to 12.5 mg (1/2 tab) two times a day     Follow up in 2 weeks    Pauline Carr, [facility], Electrophysiology  [phone]  EP nurses [phone]

## 2021-06-08 RX ORDER — LORAZEPAM 0.5 MG/1
TABLET ORAL
Qty: 30 TABLET | Refills: 0 | Status: SHIPPED | OUTPATIENT
Start: 2021-06-08 | End: 2022-03-28

## 2021-06-08 NOTE — PATIENT INSTRUCTIONS - HE
Katie Mar,    It was a pleasure to see you today at the [facility].     My recommendations after this visit include:    Continue flecainide 50 mg (1 tab) two times a day with metoprolol 12.5 (1/2 tab) twice daily    Follow up in 3 months, or sooner if needed    Pauline Carr, [facility], Electrophysiology  [phone]  EP nurses [phone]

## 2021-06-08 NOTE — PROGRESS NOTES
Review of Systems - History obtained from the patient  General ROS: negative  Psychological ROS: positive for - anxiety  Ophthalmic ROS: negative   ENT ROS: negative  Hematological and Lymphatic ROS: positive for - easy bruising  Respiratory ROS: negative  Cardiovascular ROS: positive for - irregular heartbeat, palpitations, shortness of breath and leg swelling  Gastrointestinal ROS: negative  Genito-Urinary ROS: positive for - frequent urination at night   Musculoskeletal ROS: positive for - joint pain and muscle pain  Neurological ROS: positive for - confusion, dizziness and daytime sleepiness  Dermatological ROS: negative

## 2021-06-08 NOTE — TELEPHONE ENCOUNTER
Controlled Substance Refill Request  Medication Name:   Requested Prescriptions     Pending Prescriptions Disp Refills     LORazepam (ATIVAN) 0.5 MG tablet [Pharmacy Med Name: LORazepam 0.5 MG Oral Tablet] 90 tablet 0     Sig: TAKE 1 TABLET BY MOUTH AT BEDTIME AS NEEDED FOR SLEEP     Date Last Fill: 3/18/20  Requested Pharmacy: [facility]  Submit electronically to pharmacy  Controlled Substance Agreement on file:   Encounter-Level CSA Scan Date:    There are no encounter-level csa scan date.        Last office visit:  1/6/20

## 2021-06-09 NOTE — TELEPHONE ENCOUNTER
Refill Approved    Rx renewed per Medication Renewal Policy. Medication was last renewed on 1/5/20.    Debbie Joseph, [facility] Triage/Med Refill 6/24/2020     Requested Prescriptions   Pending Prescriptions Disp Refills     losartan (COZAAR) 100 MG tablet [Pharmacy Med Name: Losartan Potassium 100 MG Oral Tablet] 90 tablet 0     Sig: Take 1 tablet by mouth once daily       Angiotensin Receptor Blocker Protocol Failed - 6/24/2020 10:46 AM        Failed - Serum potassium within the past 12 months     No results found for: LN-POTASSIUM          Failed - Serum creatinine within the past 12 months     Creatinine   Date Value Ref Range Status   06/05/2019 0.74 0.60 - 1.10 mg/dL Final             Passed - PCP or prescribing provider visit in past 12 months       Last office visit with prescriber/PCP: 1/6/2020 Gabrielle Orozco MD OR same dept: 1/6/2020 Gabrielle Orozco MD OR same specialty: 1/6/2020 Gabrielle Orozco MD  Last physical: Visit date not found Last MTM visit: Visit date not found   Next visit within 3 mo: Visit date not found  Next physical within 3 mo: Visit date not found  Prescriber OR PCP: Gabrielle Orozco MD  Last diagnosis associated with med order: 1. Essential hypertension  - losartan (COZAAR) 100 MG tablet [Pharmacy Med Name: Losartan Potassium 100 MG Oral Tablet]; TAKE 1 TABLET BY MOUTH ONCE DAILY  Dispense: 90 tablet; Refill: 0    2. Hypercholesteremia  - atorvastatin (LIPITOR) 20 MG tablet [Pharmacy Med Name: Atorvastatin Calcium 20 MG Oral Tablet]; TAKE 1 TABLET BY MOUTH ONCE DAILY  Dispense: 90 tablet; Refill: 0    If protocol passes may refill for 12 months if within 3 months of last provider visit (or a total of 15 months).             Passed - Blood pressure filed in past 12 months     BP Readings from Last 1 Encounters:   06/18/20 127/63                atorvastatin (LIPITOR) 20 MG tablet [Pharmacy Med Name: Atorvastatin Calcium 20 MG Oral Tablet] 90 tablet 0     Sig: Take 1 tablet by  mouth once daily       Statins Refill Protocol (Hmg CoA Reductase Inhibitors) Passed - 6/24/2020 10:46 AM        Passed - PCP or prescribing provider visit in past 12 months      Last office visit with prescriber/PCP: 1/6/2020 Gabrielle Orozco MD OR same dept: 1/6/2020 Gabrielle Orozco MD OR same specialty: 1/6/2020 Gabrielle Orozco MD  Last physical: Visit date not found Last MTM visit: Visit date not found   Next visit within 3 mo: Visit date not found  Next physical within 3 mo: Visit date not found  Prescriber OR PCP: Gabrielle Orozco MD  Last diagnosis associated with med order: 1. Essential hypertension  - losartan (COZAAR) 100 MG tablet [Pharmacy Med Name: Losartan Potassium 100 MG Oral Tablet]; TAKE 1 TABLET BY MOUTH ONCE DAILY  Dispense: 90 tablet; Refill: 0    2. Hypercholesteremia  - atorvastatin (LIPITOR) 20 MG tablet [Pharmacy Med Name: Atorvastatin Calcium 20 MG Oral Tablet]; TAKE 1 TABLET BY MOUTH ONCE DAILY  Dispense: 90 tablet; Refill: 0    If protocol passes may refill for 12 months if within 3 months of last provider visit (or a total of 15 months).

## 2021-06-09 NOTE — TELEPHONE ENCOUNTER
Last Med Check: ?    Next med check due on: ?    CSA on File: None on file.     Future Appointment Scheduled ? No.     Last Med Refill? 6/16/20      Katie Cain, CMA

## 2021-06-09 NOTE — PROGRESS NOTES
"Katie Mar is a 88 y.o. female who is being evaluated via a billable telephone visit.      The patient has been notified of following:     \"This telephone visit will be conducted via a call between you and your physician/provider. We have found that certain health care needs can be provided without the need for a physical exam.  This service lets us provide the care you need with a short phone conversation.  If a prescription is necessary we can send it directly to your pharmacy.  If lab work is needed we can place an order for that and you can then stop by our lab to have the test done at a later time.    Telephone visits are billed at different rates depending on your insurance coverage. During this emergency period, for some insurers they may be billed the same as an in-person visit.  Please reach out to your insurance provider with any questions.    If during the course of the call the physician/provider feels a telephone visit is not appropriate, you will not be charged for this service.\"    Patient has given verbal consent to a Telephone visit? Yes    What phone number would you like to be contacted at? [phone].    Patient would like to receive their AVS by AVS Preference: Mikala.    Additional provider notes: Patient presents today for a telephone visit for med check.  Known history of atrial fibrillation.  No chest pain or palpitations.  Continues to follow with cardiology.  Rate controlled.  Continues with Eliquis.  Hypercholesterolemia treated with atorvastatin 20 mg.  BP checks have shown good control.  Continues with levothyroxine 75 mcg daily.  No missed dosages.  No temperature intolerance.  No skin dryness.  No palpitations or hair thinning.    Assessment/Plan:  1. Essential hypertension    Well-controlled.  Update labs.    - Comprehensive Metabolic Panel; Future    2. Acquired hypothyroidism  Update labs.  - Thyroid Stimulating Hormone (TSH); Future    3. Persistent atrial fibrillation " (H)  Continue with recommendations from cardiology.    4. Hypercholesteremia  Recheck and update labs.  - Lipid [place] FASTING; Future        Phone call duration:  16 minutes    Aguila Lal, CNP

## 2021-06-09 NOTE — TELEPHONE ENCOUNTER
RN cannot approve Refill Request    RN can NOT refill this medication Protocol failed and NO refill given.       Debbie Joseph, [facility] Triage/Med Refill 6/24/2020    Requested Prescriptions   Pending Prescriptions Disp Refills     losartan (COZAAR) 100 MG tablet [Pharmacy Med Name: Losartan Potassium 100 MG Oral Tablet] 90 tablet 3     Sig: Take 1 tablet by mouth once daily       Angiotensin Receptor Blocker Protocol Failed - 6/24/2020 10:46 AM        Failed - Serum potassium within the past 12 months     No results found for: LN-POTASSIUM          Failed - Serum creatinine within the past 12 months     Creatinine   Date Value Ref Range Status   06/05/2019 0.74 0.60 - 1.10 mg/dL Final             Passed - PCP or prescribing provider visit in past 12 months       Last office visit with prescriber/PCP: 1/6/2020 Gabrielle Orozco MD OR same dept: 1/6/2020 Gabrielle Orozco MD OR same specialty: 1/6/2020 Gabrielle Orozco MD  Last physical: Visit date not found Last MTM visit: Visit date not found   Next visit within 3 mo: Visit date not found  Next physical within 3 mo: Visit date not found  Prescriber OR PCP: Gabrielle Orozco MD  Last diagnosis associated with med order: 1. Essential hypertension  - losartan (COZAAR) 100 MG tablet [Pharmacy Med Name: Losartan Potassium 100 MG Oral Tablet]; Take 1 tablet by mouth once daily  Dispense: 90 tablet; Refill: 3    2. Hypercholesteremia  - atorvastatin (LIPITOR) 20 MG tablet; Take 1 tablet by mouth once daily  Dispense: 90 tablet; Refill: 3    If protocol passes may refill for 12 months if within 3 months of last provider visit (or a total of 15 months).             Passed - Blood pressure filed in past 12 months     BP Readings from Last 1 Encounters:   06/18/20 127/63              Signed Prescriptions Disp Refills    atorvastatin (LIPITOR) 20 MG tablet 90 tablet 3     Sig: Take 1 tablet by mouth once daily       Statins Refill Protocol (Hmg CoA Reductase  Inhibitors) Passed - 6/24/2020 10:46 AM        Passed - PCP or prescribing provider visit in past 12 months      Last office visit with prescriber/PCP: 1/6/2020 Gabrielle Orozco MD OR same dept: 1/6/2020 Gabrielle Orozco MD OR same specialty: 1/6/2020 Gabrielle Orozco MD  Last physical: Visit date not found Last MTM visit: Visit date not found   Next visit within 3 mo: Visit date not found  Next physical within 3 mo: Visit date not found  Prescriber OR PCP: Gabrielle Orozco MD  Last diagnosis associated with med order: 1. Essential hypertension  - losartan (COZAAR) 100 MG tablet [Pharmacy Med Name: Losartan Potassium 100 MG Oral Tablet]; Take 1 tablet by mouth once daily  Dispense: 90 tablet; Refill: 3    2. Hypercholesteremia  - atorvastatin (LIPITOR) 20 MG tablet; Take 1 tablet by mouth once daily  Dispense: 90 tablet; Refill: 3    If protocol passes may refill for 12 months if within 3 months of last provider visit (or a total of 15 months).

## 2021-06-10 ENCOUNTER — COMMUNICATION - HEALTHEAST (OUTPATIENT)
Dept: FAMILY MEDICINE | Facility: CLINIC | Age: 86
End: 2021-06-10

## 2021-06-10 DIAGNOSIS — E78.00 HYPERCHOLESTEREMIA: ICD-10-CM

## 2021-06-10 DIAGNOSIS — I10 ESSENTIAL HYPERTENSION: ICD-10-CM

## 2021-06-10 NOTE — PROGRESS NOTES
ASSESSMENT:  1. Essential hypertension  B/P is well controlled  - Basic Metabolic Panel    2. Hypothyroidism    - levothyroxine (SYNTHROID, LEVOTHROID) 75 MCG tablet; TAKE ONE TABLET BY MOUTH ONCE DAILY  Dispense: 90 tablet; Refill: 3      PLAN:      Orders Placed This Encounter   Procedures     Basic Metabolic Panel     Medications Discontinued During This Encounter   Medication Reason     levothyroxine (SYNTHROID, LEVOTHROID) 75 MCG tablet Reorder       Return in about 6 months (around 11/22/2017) for medication check.  Gabrielle Orozco MD    CHIEF COMPLAINT:  Chief Complaint   Patient presents with     Medication Refill     med check , refills      Blood Pressure Check       HISTORY OF PRESENT ILLNESS:  Katie is a 85 y.o. female presenting to the clinic today for a blood pressure and medication check. She has record of her blood pressures since her last check, her systolic numbers are all under 140 but 12 out of her 45 recordings were between 140-150.. She has some anxiety and notes that her BP is higher during stressful times. She states that she is rarely thirsty and has to remind herself to drink water but often forgets. She cannot walk as fast as she would like without becoming short of breath, she is looking forward to summer time when she will be able to walk more outside. Her ankles are slightly swollen by the end of each day.    Health Maintenance: She will have labs drawn today, see results in chart.     REVIEW OF SYSTEMS:   She needs a refill of her levothyroxine medication today. She had a viral illness with a slight fever for 3 weeks in February. She was able to clear this without medication and is doing well. She has a history of depression which is worse on cloudy and rainy days.  She practices her balance and states that she is doing well, she can count up to 15 standing on one foot. She has knee stiffness and uses Joint Flex every night. 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 She has history of carpal tunnel surgery many years ago in her right thumb. She watches her diet because she transitions between diarrhea and constipation. She received a bidet for mothers day which helps her hemorrhoids. All other systems are negative.    PFSH:  She has a gentleman friend that she travels some places with. She drove to [place] with him 2 years ago in the summer. She has a son in [place] and he has a son in [place] that they also go to visit.    TOBACCO USE:  History   Smoking Status     Never Smoker   Smokeless Tobacco     Not on file     Social History     Social History     Marital status:      Spouse name: N/A     Number of children: N/A     Years of education: N/A     Occupational History     Not on file.     Social History Main Topics     Smoking status: Never Smoker     Smokeless tobacco: Not on file     Alcohol use Not on file     Drug use: Not on file     Sexual activity: Not on file     Other Topics Concern     Not on file     Social History Narrative       VITALS:  Vitals:    05/22/17 0914   BP: 118/62   Pulse: 64     Wt Readings from Last 3 Encounters:   05/22/17 141 lb (64 kg)   10/20/16 145 lb (65.8 kg)   04/07/16 146 lb (66.2 kg)     Weight: 141 lb (64 kg)  Body mass index is 24.2 kg/(m^2).  No results found for this or any previous visit (from the past 240 hour(s)).    Physical Exam:  GENERAL APPEARANCE: A&A, NAD, well hydrated, well nourished  SKIN:  Normal skin turgor, no lesions/rashes   EARS: TM's normal, gray with nl light reflex  OROPHARYNX: without erythema, no post nasal drainage or thrush  NECK: Supple, without lymphadenopathy, no thyroid mass  CV: RRR, no M/G/R   LUNGS: CTAB, normal respiratory effort  EXTREMITY: no edema   NEURO: no gross deficits   PSYCHIATRIC;  Mood appropriate, memory intact    Additional history summarized:    ADDITIONAL HISTORY SUMMARIZED (2): None.  DECISION TO OBTAIN EXTRA INFORMATION (1): None.   RADIOLOGY TESTS (1): None.  LABS  (1): Labs reviewed and ordered.  MEDICINE TESTS (1): None.  INDEPENDENT REVIEW (2 each): None.     The visit lasted a total of 25 minutes face to face with the patient. Over 50% of the time was spent counseling and educating the patient about hypertension check.    I, Laurie Amaro, am scribing for and in the presence of Dr. Orozco    I, Dr. Orozco, personally performed the services described in this documentation, as scribed by Laurie Amaro in my presence, and it is both accurate and complete.     MEDICATIONS:  Current Outpatient Prescriptions   Medication Sig Dispense Refill     amLODIPine (NORVASC) 2.5 MG tablet Take 1 tablet (2.5 mg total) by mouth daily. 90 tablet 3     atenolol (TENORMIN) 25 MG tablet TAKE ONE TABLET BY MOUTH TWICE DAILY 180 tablet 3     atorvastatin (LIPITOR) 20 MG tablet TAKE ONE TABLET BY MOUTH ONCE DAILY 90 tablet 1     chlorthalidone (HYGROTEN) 25 MG tablet TAKE ONE TABLET BY MOUTH ONCE DAILY 90 tablet 3     levothyroxine (SYNTHROID, LEVOTHROID) 75 MCG tablet TAKE ONE TABLET BY MOUTH ONCE DAILY 90 tablet 3     LORazepam (ATIVAN) 0.5 MG tablet TAKE ONE TABLET BY MOUTH ONCE DAILY AT BEDTIME 90 tablet 0     losartan (COZAAR) 100 MG tablet TAKE ONE TABLET BY MOUTH ONCE DAILY 90 tablet 2     No current facility-administered medications for this visit.        Total data points: 1    Gabrielle Orozco MD

## 2021-06-10 NOTE — TELEPHONE ENCOUNTER
Refill Approved    Rx renewed per Medication Renewal Policy. Medication was last renewed on 5/18/20.    Debbie Joseph, [facility] Triage/Med Refill 8/18/2020     Requested Prescriptions   Pending Prescriptions Disp Refills     chlorthalidone (HYGROTEN) 25 MG tablet 90 tablet 0     Sig: Take 1 tablet (25 mg total) by mouth daily.       Diuretics/Combination Diuretics Refill Protocol  Passed - 8/17/2020  3:23 PM        Passed - Visit with PCP or prescribing provider visit in past 12 months     Last office visit with prescriber/PCP: 6/5/2019 Aguila Lal CNP OR same dept: 1/6/2020 Gabrielle Orozco MD OR same specialty: 1/6/2020 Gabrielle Orozco MD  Last physical: Visit date not found Last MTM visit: Visit date not found   Next visit within 3 mo: Visit date not found  Next physical within 3 mo: Visit date not found  Prescriber OR PCP: Aguila Lal CNP  Last diagnosis associated with med order: 1. Essential hypertension  - chlorthalidone (HYGROTEN) 25 MG tablet; Take 1 tablet (25 mg total) by mouth daily.  Dispense: 90 tablet; Refill: 0    If protocol passes may refill for 12 months if within 3 months of last provider visit (or a total of 15 months).             Passed - Serum Potassium in past 12 months      Lab Results   Component Value Date    Potassium 3.7 07/06/2020             Passed - Serum Sodium in past 12 months      Lab Results   Component Value Date    Sodium 129 (L) 07/06/2020             Passed - Blood pressure on file in past 12 months     BP Readings from Last 1 Encounters:   06/18/20 127/63             Passed - Serum Creatinine in past 12 months      Creatinine   Date Value Ref Range Status   07/06/2020 0.74 0.60 - 1.10 mg/dL Final

## 2021-06-11 ENCOUNTER — COMMUNICATION - HEALTHEAST (OUTPATIENT)
Dept: FAMILY MEDICINE | Facility: CLINIC | Age: 86
End: 2021-06-11

## 2021-06-11 DIAGNOSIS — E78.00 HYPERCHOLESTEREMIA: ICD-10-CM

## 2021-06-11 DIAGNOSIS — I10 ESSENTIAL HYPERTENSION: ICD-10-CM

## 2021-06-11 NOTE — TELEPHONE ENCOUNTER
Refill Approved    Rx renewed per Medication Renewal Policy. Medication was last renewed on 6/25/20.    Debbie Joseph, [facility] Triage/Med Refill 9/23/2020     Requested Prescriptions   Pending Prescriptions Disp Refills     losartan (COZAAR) 100 MG tablet [Pharmacy Med Name: Losartan Potassium 100 MG Oral Tablet] 90 tablet 0     Sig: Take 1 tablet by mouth once daily       Angiotensin Receptor Blocker Protocol Passed - 9/21/2020  9:04 AM        Passed - PCP or prescribing provider visit in past 12 months       Last office visit with prescriber/PCP: 6/5/2019 Aguila Lal CNP OR same dept: 1/6/2020 Gabrielle Orozco MD OR same specialty: 1/6/2020 Gabrielle Orozco MD  Last physical: Visit date not found Last MTM visit: Visit date not found   Next visit within 3 mo: Visit date not found  Next physical within 3 mo: Visit date not found  Prescriber OR PCP: Aguila Lal CNP  Last diagnosis associated with med order: 1. Essential hypertension  - losartan (COZAAR) 100 MG tablet [Pharmacy Med Name: Losartan Potassium 100 MG Oral Tablet]; Take 1 tablet by mouth once daily  Dispense: 90 tablet; Refill: 0    If protocol passes may refill for 12 months if within 3 months of last provider visit (or a total of 15 months).             Passed - Serum potassium within the past 12 months     Lab Results   Component Value Date    Potassium 3.7 07/06/2020             Passed - Blood pressure filed in past 12 months     BP Readings from Last 1 Encounters:   06/18/20 127/63             Passed - Serum creatinine within the past 12 months     Creatinine   Date Value Ref Range Status   07/06/2020 0.74 0.60 - 1.10 mg/dL Final

## 2021-06-12 NOTE — TELEPHONE ENCOUNTER
Controlled Substance Refill Request  Medication Name:   Requested Prescriptions     Pending Prescriptions Disp Refills     LORazepam (ATIVAN) 0.5 MG tablet [Pharmacy Med Name: LORazepam 0.5 MG Oral Tablet] 30 tablet 0     Sig: TAKE 1 TABLET BY MOUTH AT BEDTIME AS NEEDED     Date Last Fill: 7/14/20  Requested Pharmacy: [facility]  Submit electronically to pharmacy  Controlled Substance Agreement on file:   Encounter-Level CSA Scan Date:    There are no encounter-level csa scan date.        Last office visit:  6/18/20

## 2021-06-13 RX ORDER — ATORVASTATIN CALCIUM 20 MG/1
20 TABLET, FILM COATED ORAL DAILY
Qty: 90 TABLET | Refills: 1 | Status: SHIPPED | OUTPATIENT
Start: 2021-06-13 | End: 2022-01-05

## 2021-06-13 RX ORDER — LOSARTAN POTASSIUM 100 MG/1
100 TABLET ORAL DAILY
Qty: 90 TABLET | Refills: 1 | Status: SHIPPED | OUTPATIENT
Start: 2021-06-13 | End: 2022-01-05

## 2021-06-13 NOTE — TELEPHONE ENCOUNTER
Controlled Substance Refill Request  Medication Name:   Requested Prescriptions     Pending Prescriptions Disp Refills     LORazepam (ATIVAN) 0.5 MG tablet [Pharmacy Med Name: LORazepam 0.5 MG Oral Tablet] 30 tablet 0     Sig: TAKE 1 TABLET BY MOUTH AT BEDTIME AS NEEDED     Date Last Fill: 10/13/20  Requested Pharmacy: [facility]  Submit electronically to pharmacy  Controlled Substance Agreement on file:   Encounter-Level CSA Scan Date:    There are no encounter-level csa scan date.        Last office visit:  6/18/20  Vidhya Ortiz RN, MA  [facility]    Triage Nurse Advisor

## 2021-06-13 NOTE — TELEPHONE ENCOUNTER
Please call patient. Just about a year since last seen in person. Refill sent, but see about getting her in for her annual wellness visit so we can update labs and vitals.    Aguila Lal, CNP

## 2021-06-13 NOTE — TELEPHONE ENCOUNTER
RN cannot approve Refill Request    RN can NOT refill this medication med is not covered by policy/route to provider. Last office visit: 10/9/2019 Pauline Carr CNP Last Physical: Visit date not found Last MTM visit: Visit date not found Last visit same specialty: 10/9/2019 Pauline Carr CNP.  Next visit within 3 mo: Visit date not found  Next physical within 3 mo: Visit date not found      Debbie Joseph, [facility] Triage/Med Refill 12/18/2020    Requested Prescriptions   Pending Prescriptions Disp Refills     amLODIPine (NORVASC) 2.5 MG tablet [Pharmacy Med Name: amLODIPine Besylate 2.5 MG Oral Tablet] 180 tablet 0     Sig: Take 2 tablets by mouth once daily       There is no refill protocol information for this order        metoprolol tartrate (LOPRESSOR) 25 MG tablet [Pharmacy Med Name: Metoprolol Tartrate 25 MG Oral Tablet] 90 tablet 0     Sig: Take 1/2 (one-half) tablet by mouth twice daily       There is no refill protocol information for this order

## 2021-06-13 NOTE — TELEPHONE ENCOUNTER
Controlled Substance Refill Request  Medication Name:   Requested Prescriptions     Pending Prescriptions Disp Refills     LORazepam (ATIVAN) 0.5 MG tablet [Pharmacy Med Name: LORazepam 0.5 MG Oral Tablet] 30 tablet 0     Sig: TAKE 1 TABLET BY MOUTH AT BEDTIME AS NEEDED     Date Last Fill: 11/16/20  Requested Pharmacy: [facility]  Submit electronically to pharmacy  Controlled Substance Agreement on file:   Encounter-Level CSA Scan Date:    There are no encounter-level csa scan date.        Last office visit:  6/18/20  Vidhya Ortiz RN, MA  [facility]    Triage Nurse Advisor

## 2021-06-13 NOTE — TELEPHONE ENCOUNTER
30-day prescription sent to the pharmacy.  See about having her come in for her 6-month follow-up.  Can do annual wellness visit.      Aguila Lal, CNP

## 2021-06-14 NOTE — PROGRESS NOTES
"[facility] Note    Assessment:    Katie Mar is a 86 y.o. old female with HTN, HL, and hypohyroidism here for w/u of SOB and newly diagnosed AF.    Plan:    #  AF - newly diagnosed but likely ongoing for a few months by history; CHADSVasc of 4 (2 for age, 1 for gender, 1 for HTN)  - therefore, should be anticoagulated - will start apixaban today, would decrease ASA to 81mg daily  - will switch atenolol to metoprolol  - in 6 wks., could consider a trial of DCCV  - would also refer to  for consideration of utility of PVI in this symptomatic lady w/ BMI 23 - she wants to think about it, and will then let us know  - will also get a TTE to screen for valvular disease and evaluate ventricular function    # HTN - continue losartan, amlodipine, chlorthalidone, switch atenolol to metoprolol as per above    # HL -continue atorva, (HDL 87, LDL 72 on most recent re-check yesterday)    ANN MAYA  [facility]  [phone]  ______________________________________________________________________    Subjective:  CC: SOB    I had the opportunity to see Katie Mar at the [facility]. Katie Mar is a 86 y.o. female with a known history of HTN, HL, and hypohyroidism here for w/u of SOB and newly diagnosed AF.    She was seen for her 6 mos visit w/ her PCP yesterday and c/o a few months' of worsening duration dyspbea. She tells me that she has felt completely \"fine\" ~6 mos ago, and then in the end of May '17, she noted that she began to develop worsening SOB, mostly SIMPSON w/ walking, worse over the past couple of months.She used to be able to walk all 4 floors of her senior home, but is now down to being able to do no more than 2. She also notes worsenin SIMPSON w/ any incline. She denies CP, orthopnea/PND/syncope/pre-syncope but gets a little lightheaded w/ extreme exertion; no edema/palpitations/N/V/D/F/C/wt.changes. + Occassional leg aching but no " claudication.    EKG in the office w/ coarse AF at 62.    ______________________________________________________________________    Review of Systems:   As noted in HPI, all others reviewed and are negative    Problem List:  Patient Active Problem List   Diagnosis     Irritable Bowel Syndrome     Contusion Of The Face With Intact Skin Surface     Hypothyroidism     Hypercholesterolemia     Hypertension     Anxiety     Cataract In The Right Eye     Medical History:  No past medical history on file.  Surgical History:  Past Surgical History:   Procedure Laterality Date     CARPAL TUNNEL RELEASE      right thumb     Social History:  Social History     Social History     Marital status:      Spouse name: N/A     Number of children: N/A     Years of education: N/A     Occupational History     Not on file.     Social History Main Topics     Smoking status: Never Smoker     Smokeless tobacco: Not on file     Alcohol use Not on file     Drug use: Not on file     Sexual activity: Not on file     Other Topics Concern     Not on file     Social History Narrative     Family History:  Family History   Problem Relation Age of Onset     Ovarian cancer Paternal Aunt      Allergies:  Allergies   Allergen Reactions     Clindamycin Hives     Doxycycline Hyclate      Penicillins      Tetanus Toxoid      Medications:  Current Outpatient Prescriptions   Medication Sig Dispense Refill     amLODIPine (NORVASC) 2.5 MG tablet TAKE ONE TABLET BY MOUTH ONCE DAILY 90 tablet 1     aspirin 325 MG EC tablet Take 650 mg by mouth at bedtime.       atenolol (TENORMIN) 25 MG tablet TAKE ONE TABLET BY MOUTH TWICE DAILY 180 tablet 0     atorvastatin (LIPITOR) 20 MG tablet TAKE ONE TABLET BY MOUTH ONCE DAILY 90 tablet 1     chlorthalidone (HYGROTEN) 25 MG tablet TAKE ONE TABLET BY MOUTH ONCE DAILY 90 tablet 0     levothyroxine (SYNTHROID, LEVOTHROID) 75 MCG tablet TAKE ONE TABLET BY MOUTH ONCE DAILY 90 tablet 3     LORazepam (ATIVAN) 0.5 MG tablet  "TAKE ONE TABLET BY MOUTH ONCE DAILY AT BEDTIME 90 tablet 0     losartan (COZAAR) 100 MG tablet TAKE ONE TABLET BY MOUTH ONCE DAILY 90 tablet 1     potassium chloride SA (K-DUR,KLOR-CON) 10 MEQ tablet Take 1 tablet (10 mEq total) by mouth daily. 90 tablet 3     No current facility-administered medications for this visit.      Objective:   Vital signs:  /68 (Patient Site: Left Arm, Patient Position: Sitting, Cuff Size: Adult Regular)  Pulse 60  Resp 16  Ht 5' 4\" (1.626 m)  Wt 138 lb (62.6 kg)  BMI 23.69 kg/m2    Physical Exam:  GENERAL APPEARANCE: Alert, cooperative and in no acute distress.   HEENT: No scleral icterus. Oral mucuos membranes pink and moist.   NECK: JVP 6. No Hepatojugular reflux. Thyroid not visualized. No lymphadenopathy   CHEST: clear to auscultation   CARDIOVASCULAR: S1, S2 without murmur ,clicks or rubs. Brachial, radial and posterior tibial pulses are intact and symetric. No carotid bruits noted. No edema  ABDOMEN: Nontender. BS+. No bruits.   SKIN: No Xanthelasma   Musculoskeletal: No cyanosis, clubbing or swelling.    Lab Results:  LIPIDS:  Lab Results   Component Value Date    CHOL 168 11/29/2017    CHOL 174 10/20/2016    CHOL 192 04/07/2016     Lab Results   Component Value Date    HDL 87 11/29/2017    HDL 84 10/20/2016    HDL 84 04/07/2016     Lab Results   Component Value Date    LDLCALC 72 11/29/2017    LDLCALC 76 10/20/2016    LDLCALC 94 04/07/2016     Lab Results   Component Value Date    TRIG 47 11/29/2017    TRIG 68 10/20/2016    TRIG 72 04/07/2016     No components found for: CHOLHDL    BMP:  Lab Results   Component Value Date    CREATININE 0.69 11/29/2017    BUN 10 11/29/2017     (L) 11/29/2017    K 4.1 11/29/2017    CL 92 (L) 11/29/2017    CO2 28 11/29/2017     [facility]    "

## 2021-06-14 NOTE — PROGRESS NOTES
Subjective:      Patient ID: Katie Mar is a 86 y.o. female.    Chief Complaint:    HPI  Katie Mar is a 86 y.o. female who presents today complaining of cough x 4 She has also had nasal congestion, mucous, and shortness of breath. She denies any head congestion, fever, or abdominal complaints. She is up to date on her vaccinations, and does not have any history of pneumonia. She has never taken an inhaler. Last night for the first time she took Mucinex-DM and she states it made her fall asleep. The cough is sometimes productive with white and yellow mucous.       Past Surgical History:   Procedure Laterality Date     CARPAL TUNNEL RELEASE      right thumb       Family History   Problem Relation Age of Onset     Ovarian cancer Paternal Aunt        Social History   Substance Use Topics     Smoking status: Never Smoker     Smokeless tobacco: None     Alcohol use None       Review of Systems   Constitutional: Negative for fever.   HENT: Negative for congestion, ear pain, rhinorrhea, sinus pain and sore throat.    Respiratory: Positive for cough and chest tightness.    Gastrointestinal: Negative.        Objective:     /82  Pulse 77  Temp 97.8  F (36.6  C) (Oral)   Resp 12  Wt 135 lb (61.2 kg)  SpO2 97%  BMI 23.17 kg/m2    Physical Exam   Constitutional: She appears well-developed and well-nourished. No distress.   HENT:   Head: Normocephalic and atraumatic.   Right Ear: External ear normal.   Left Ear: External ear normal.   Eyes: Conjunctivae are normal.   Neck: Normal range of motion. Neck supple.   Cardiovascular: Normal rate, regular rhythm and normal heart sounds.  Exam reveals no gallop and no friction rub.    No murmur heard.  Pulmonary/Chest: Effort normal and breath sounds normal. No respiratory distress. She has no wheezes. She has no rales.   Crackles present.   Lymphadenopathy:     She has no cervical adenopathy.   Skin: She is not diaphoretic.   Psychiatric: She has a normal mood  and affect. Her behavior is normal. Judgment and thought content normal.   Nursing note and vitals reviewed.      Radiology:  Xr Chest 2 Views    Result Date: 12/14/2017  EXAM DATE:         12/14/2017 [facility] X-RAY CHEST, 2 VIEWS, FRONTAL AND LATERAL 12/14/2017 11:45 AM INDICATION: CRACKLES WITH PRODUCTIVE COUGH. UPPER LUNG FIELDS COMPARISON: 11/29/2017 FINDINGS: Heart size mildly enlarged. Slight hyperinflation likely due to COPD. Lungs otherwise clear with no acute new findings.       Assessment:     Procedures    1. Bronchitis  azithromycin (ZITHROMAX) 250 MG tablet    albuterol (PROAIR HFA;PROVENTIL HFA;VENTOLIN HFA) 90 mcg/actuation inhaler    benzonatate (TESSALON) 200 MG capsule    XR Chest 2 Views         Patient Instructions   1. You will notified via [facility] of your xray results.  2. Take antibiotic as prescribed.   3. Take albuterol 2 puffs every 6 hours as needed for shortness of breath.   4. Keep your appointment for your Echo tomorrow.   5. Continue taking the Mucinex as you have been.   6. Take Tessalon Perles up to three times daily for cough relief.   7. Follow up if you do not have improvement after 7 days.

## 2021-06-14 NOTE — TELEPHONE ENCOUNTER
Last Med Check: 12/29/20     Next med check due on: 12/29/21    CSA on File: none on file    Future Appointment Scheduled ? no    Last Med Refill? 12/15/20    Mira Duval CMA

## 2021-06-14 NOTE — PATIENT INSTRUCTIONS - HE
Katie Mar,    It was a pleasure to see you today at the [facility].     My recommendations after this visit include:    Continue current medications    Follow up in 6 months, or sooner if needed for recurrent A fib    Pauline Carr, CNP  [facility], Electrophysiology  [phone]  EP nurses [phone]

## 2021-06-14 NOTE — PROGRESS NOTES
Assessment and Plan:     Patient has been advised of split billing requirements and indicates understanding: Yes  1. Routine general medical examination at a health care facility    2. Essential hypertension  Well-controlled.  Follows with cardiology.  - Comprehensive Metabolic Panel    3. Hypercholesteremia  On atorvastatin.  Consider discontinue at age 89.  - Comprehensive Metabolic Panel    4. Acquired hypothyroidism  Recheck  - Thyroid Stimulating Hormone (TSH)    5. Persistent atrial fibrillation (H)  Stable        The patient's current medical problems were reviewed.    I have had an Advance Directives discussion with the patient.  The following health maintenance schedule was reviewed with the patient and provided in printed form in the after visit summary:   Health Maintenance   Topic Date Due     TD 18+ HE  06/05/2021 (Originally 9/27/1949)     MEDICARE ANNUAL WELLNESS VISIT  12/29/2021     FALL RISK ASSESSMENT  12/29/2021     ADVANCE CARE PLANNING  12/29/2025     Pneumococcal Vaccine: 65+ Years  Completed     INFLUENZA VACCINE RULE BASED  Completed     ZOSTER VACCINES  Completed     Pneumococcal Vaccine: Pediatrics (0 to 5 Years) and At-Risk Patients (6 to 64 Years)  Aged Out     HEPATITIS B VACCINES  Aged Out        Subjective:   Chief Complaint: Katie Mar is an 89 y.o. female here for an Annual Wellness visit.   HPI: Overall doing well.  Just before Easter she did have an episode of atrial fibrillation, but she was able to keep this under control with mindful breathing.  No further episodes that she can feel.  No chest pain issues.  Continues with metoprolol and flecainide for this.  Blood pressure today shows good control.  She brings in her numbers showing persistently good blood pressures.  Heart rate 53-63.  Continues with levothyroxine.    Review of Systems: Please see above.  The rest of the review of systems are negative for all systems.    Patient Care Team:  Aguila Lal CNP as  PCP - General (Nurse Practitioner)  Pauline Carr CNP as Nurse Practitioner (Cardiology)  Gabrielle Orozco MD as Assigned PCP  Pauline Carr CNP as Assigned Heart and Vascular Provider     Patient Active Problem List   Diagnosis     Irritable bowel syndrome     Hypothyroidism     Essential hypertension     Persistent atrial fibrillation (H)     Hypercholesteremia     Past Medical History:   Diagnosis Date     Anxiety      Essential hypertension      Hypercholesterolemia      Persistent atrial fibrillation (H) 2018      Past Surgical History:   Procedure Laterality Date     CARDIOVERSION  2018     CARPAL TUNNEL RELEASE Right      CATARACT EXTRACTION, BILATERAL Bilateral      DILATION AND CURETTAGE OF UTERUS        Family History   Problem Relation Age of Onset     Ovarian cancer Paternal Aunt      Heart failure Mother 76         at home     Heart attack Father 49        and a second at age 54     Liver cancer Father      Heart attack Brother 49        and  of the second at 62     CABG Brother 53     No Medical Problems Son      Alcoholism Brother      Cirrhosis Brother      No Medical Problems Son         Lives in [place]     No Medical Problems Son       Social History     Socioeconomic History     Marital status:      Spouse name: Not on file     Number of children: 3     Years of education: 16     Highest education level: Not on file   Occupational History     Occupation:      Employer: RETIRED     Comment: worked for a [employer]   Social Needs     Financial resource strain: Not on file     Food insecurity     Worry: Not on file     Inability: Not on file     Transportation needs     Medical: Not on file     Non-medical: Not on file   Tobacco Use     Smoking status: Never Smoker     Smokeless tobacco: Never Used   Substance and Sexual Activity     Alcohol use: Yes     Alcohol/week: 7.0 standard drinks     Types: 7 Glasses of wine per week     Drug use: No     Sexual activity: Not  Currently     Partners: Male   Lifestyle     Physical activity     Days per week: Not on file     Minutes per session: Not on file     Stress: Not on file   Relationships     Social connections     Talks on phone: Not on file     Gets together: Not on file     Attends [employer]alian service: Not on file     Active member of club or organization: Not on file     Attends meetings of clubs or organizations: Not on file     Relationship status: Not on file     Intimate partner violence     Fear of current or ex partner: Not on file     Emotionally abused: Not on file     Physically abused: Not on file     Forced sexual activity: Not on file   Other Topics Concern     Not on file   Social History Narrative    Lives alone since her  passed in 2013. She lives in [place]. She still drives in 2018. She walks on her own. She enjoys playing bridge and going out to lunch.        She has 3 sons: Bi lives in [place] and does check in on her.  Another son lives in [place] and travels a lot.  The other son lives in [place] and is retired.  She has 2 grandchildren.        She has advanced directives and Bi is in charge of her affairs.      Current Outpatient Medications   Medication Sig Dispense Refill     acetaminophen (TYLENOL) 500 MG tablet Take 500 mg by mouth at bedtime.       amLODIPine (NORVASC) 2.5 MG tablet Take 2 tablets by mouth once daily 180 tablet 0     atorvastatin (LIPITOR) 20 MG tablet Take 1 tablet by mouth once daily 90 tablet 3     chlorthalidone (HYGROTEN) 25 MG tablet Take 1 tablet (25 mg total) by mouth daily. 90 tablet 3     diphenhydrAMINE (ANTIHISTAMINE) 25 mg tablet Take 25 mg by mouth at bedtime.       ELIQUIS 5 mg Tab tablet Take 1 tablet by mouth twice daily 180 tablet 1     flecainide (TAMBOCOR) 50 MG tablet Take 1 tablet (50 mg total) by mouth 2 (two) times a day. 180 tablet 3     levothyroxine (SYNTHROID, LEVOTHROID) 75 MCG tablet TAKE  "1 TABLET BY MOUTH ONCE DAILY 90 tablet 3     LORazepam (ATIVAN) 0.5 MG tablet TAKE 1 TABLET BY MOUTH AT BEDTIME AS NEEDED 30 tablet 0     losartan (COZAAR) 100 MG tablet Take 1 tablet by mouth once daily 90 tablet 2     metoprolol tartrate (LOPRESSOR) 25 MG tablet Take 0.5 tablets (12.5 mg total) by mouth 2 (two) times a day. 180 tablet 0     No current facility-administered medications for this visit.       Objective:   Vital Signs:   Visit Vitals  /60   Pulse (!) 57   Ht 5' 3\" (1.6 m)   Wt 139 lb 4.8 oz (63.2 kg)   SpO2 97%   BMI 24.68 kg/m           VisionScreening:  No exam data present     PHYSICAL EXAM   General appearance - alert, well appearing, and in no distress, oriented to person, place, and time and normal appearing weight  Mental status - alert, oriented to person, place, and time, normal mood, behavior, speech, dress, motor activity, and thought processes  Eyes - pupils equal and reactive, extraocular eye movements intact  Ears - bilateral TM's and external ear canals normal  Nose - normal and patent, no erythema, discharge or polyps  Mouth - mucous membranes moist, pharynx normal without lesions  Neck - supple, no significant adenopathy, carotids upstroke normal bilaterally, no bruits  Lymphatics - no palpable lymphadenopathy, no hepatosplenomegaly  Chest - clear to auscultation, no wheezes, rales or rhonchi, symmetric air entry  Heart - normal rate and regular rhythm, S1 and S2 normal, no murmurs noted  Abdomen - soft, nontender, nondistended, no masses or organomegaly  Back exam - full range of motion, no tenderness, palpable spasm or pain on motion  Neurological - alert, oriented, normal speech, no focal findings or movement disorder noted, neck supple without rigidity, cranial nerves II through XII intact, motor and sensory grossly normal bilaterally, normal muscle tone, no tremors, strength 5/5  Musculoskeletal - no joint tenderness, deformity or swelling  Extremities - peripheral pulses " normal, no pedal edema, no clubbing or cyanosis  Skin - normal coloration and turgor, no rashes, no suspicious skin lesions noted      Assessment Results 12/29/2020   Activities of Daily Living No help needed   Instrumental Activities of Daily Living No help needed   Mini Cog Total Score 3   Some recent data might be hidden     A Mini-Cog score of 0-2 suggests the possibility of dementia, score of 3-5 suggests no dementia    Identified Health Risks:     The patient was counseled and encouraged to consider modifying their diet and eating habits. She was provided with information on recommended healthy diet options.  Information on urinary incontinence and treatment options given to patient.  The patient was provided with suggestions to help her develop a healthy emotional lifestyle.   Patient's advanced directive was discussed and I am comfortable with the patient's wishes.    Aguila Lal, CNP

## 2021-06-14 NOTE — PROGRESS NOTES
Review of Systems - History obtained from the patient  General ROS: negative  Psychological ROS: positive for depression and anxiety   Ophthalmic ROS: negative  ENT ROS: negative  Allergy and Immunology ROS: negative  Hematological and Lymphatic ROS: negative  Endocrine ROS: negative  Respiratory ROS: positive for - shortness of breath  Cardiovascular ROS: positive for - dyspnea on exertion  Gastrointestinal ROS: negative  Genito-Urinary ROS: positive for - nocturia  Musculoskeletal ROS: positive for - arthritis  Neurological ROS: positive for - confusion  Dermatological ROS: negative

## 2021-06-14 NOTE — TELEPHONE ENCOUNTER
Refill Approved    Rx renewed per Medication Renewal Policy. Medication was last renewed on 2/5/20.    Debbie Joseph, [facility] Triage/Med Refill 1/25/2021     Requested Prescriptions   Pending Prescriptions Disp Refills     EUTHYROX 75 mcg tablet [Pharmacy Med Name: Euthyrox 75 MCG Oral Tablet] 90 tablet 0     Sig: Take 1 tablet by mouth once daily       Thyroid Hormones Protocol Passed - 1/24/2021  6:36 PM        Passed - Provider visit in past 12 months or next 3 months     Last office visit with prescriber/PCP: 1/6/2020 Gabrielle Orozco MD OR same dept: Visit date not found OR same specialty: 1/6/2020 Gabrielle Orozco MD  Last physical: Visit date not found Last MTM visit: Visit date not found   Next visit within 3 mo: Visit date not found  Next physical within 3 mo: Visit date not found  Prescriber OR PCP: Gabrielle Orozco MD  Last diagnosis associated with med order: 1. Hypothyroidism  - EUTHYROX 75 mcg tablet [Pharmacy Med Name: Euthyrox 75 MCG Oral Tablet]; Take 1 tablet by mouth once daily  Dispense: 90 tablet; Refill: 0    If protocol passes may refill for 12 months if within 3 months of last provider visit (or a total of 15 months).             Passed - TSH on file in past 12 months for patient age 12 & older     TSH   Date Value Ref Range Status   12/29/2020 0.31 0.30 - 5.00 uIU/mL Final

## 2021-06-14 NOTE — PROGRESS NOTES
PROGRESS NOTE       SUBJECTIVE:  Katie Mar is a 86 y.o. female   Chief Complaint   Patient presents with     Follow-up     6 month med check , sob, sometimes, on going for a few months    Safia is here today for blood pressure check.  She states that since I last saw her she has noted shortness of breath with exertion.  She thinks it started this summer.  It just seems to be more effort for her to do things.  There is sometimes a cough in her throat.  She will take an antihistamine if that is bothering.  She is to be able to go up 4 floors of steps without stopping and now she can only go to floors.  She exercises inside now because the cold air bothers her.  Her blood pressure seems to be fine and she provides documentation since May 2017.  There are only 3 pressures out of 26 where the systolic is above 130.  Most of her pressures are in the 120s over 60s.  Her pulse is mostly in the 50s and 60s.      Patient Active Problem List   Diagnosis     Irritable Bowel Syndrome     Contusion Of The Face With Intact Skin Surface     Hypothyroidism     Hypercholesterolemia     Hypertension     Anxiety     Cataract In The Right Eye       Current Outpatient Prescriptions   Medication Sig Dispense Refill     amLODIPine (NORVASC) 2.5 MG tablet TAKE ONE TABLET BY MOUTH ONCE DAILY 90 tablet 1     atenolol (TENORMIN) 25 MG tablet TAKE ONE TABLET BY MOUTH TWICE DAILY 180 tablet 0     atorvastatin (LIPITOR) 20 MG tablet Take 1 tablet (20 mg total) by mouth daily. 90 tablet 3     chlorthalidone (HYGROTEN) 25 MG tablet TAKE ONE TABLET BY MOUTH ONCE DAILY 90 tablet 0     levothyroxine (SYNTHROID, LEVOTHROID) 75 MCG tablet TAKE ONE TABLET BY MOUTH ONCE DAILY 90 tablet 3     LORazepam (ATIVAN) 0.5 MG tablet TAKE ONE TABLET BY MOUTH ONCE DAILY AT BEDTIME 90 tablet 0     losartan (COZAAR) 100 MG tablet TAKE ONE TABLET BY MOUTH ONCE DAILY 90 tablet 1     potassium chloride SA (K-DUR,KLOR-CON) 10 MEQ tablet Take 1 tablet (10 mEq  total) by mouth daily. 90 tablet 3     atorvastatin (LIPITOR) 20 MG tablet TAKE ONE TABLET BY MOUTH ONCE DAILY 90 tablet 1     No current facility-administered medications for this visit.        History   Smoking Status     Never Smoker   Smokeless Tobacco     Not on file       REVIEW OF SYSTEMS:  Patient denies fever, chills, dizziness, headache, visual change, cough, chest pain, abdominal pain, extremity pain or swelling.  POSITIVES:  SOB        OBJECTIVE:       Vitals:    11/29/17 0921   BP: 120/82   Pulse: 65   SpO2: 98%     Weight: 137 lb (62.1 kg)  Wt Readings from Last 3 Encounters:   11/29/17 137 lb (62.1 kg)   05/22/17 141 lb (64 kg)   10/20/16 145 lb (65.8 kg)     Body mass index is 23.52 kg/(m^2).        Physical Exam:  GENERAL APPEARANCE: A&A, NAD, well hydrated, well nourished  SKIN:  Normal skin turgor, no lesions/rashes   EARS: TM's normal, gray with nl light reflex  OROPHARYNX: without erythema, no post nasal drainage or thrush  NECK: Supple, without lymphadenopathy, no thyroid mass  CV: Irregular, no M/G/R   LUNGS: CTAB, normal respiratory effort  EXTREMITY: Extremities normal, atraumatic, no swelling  NEURO: no gross deficits   PSYCHIATRIC:  Mood appropriate, memory intact    EKG is done and shows atrial fibrillation with a controlled rate of 73.    ASSESSMENT/PLAN:     1. Hypercholesterolemia    - Lipid Cascade    2. Acquired hypothyroidism    - Thyroid Stimulating Hormone (TSH)    3. Hypertension    - HM2(CBC w/o Differential)  - Basic Metabolic Panel    4. SOB (shortness of breath) on exertion    - XR Chest 2 Views; Future  - Electrocardiogram Perform - Clinic    5. Anxiety    - LORazepam (ATIVAN) 0.5 MG tablet; TAKE ONE TABLET BY MOUTH ONCE DAILY AT BEDTIME  Dispense: 90 tablet; Refill: 0    6. Atrial fibrillation    - [facility]    She will follow-up with me after she sees cardiology.      The visit lasted a total of 40 minutes face to face with the patient.   Over 50% of the time spent counseling and educating the patient about all of the above.      Gabrielle Orozco MD

## 2021-06-14 NOTE — TELEPHONE ENCOUNTER
Controlled Substance Refill Request  Medication Name:   Requested Prescriptions     Pending Prescriptions Disp Refills     LORazepam (ATIVAN) 0.5 MG tablet [Pharmacy Med Name: LORazepam 0.5 MG Oral Tablet] 30 tablet 0     Sig: TAKE 1 TABLET BY MOUTH AT BEDTIME AS NEEDED     Date Last Fill: 12/15/2020 30 tablets with 0 refills.   Requested Pharmacy: [facility]  Submit electronically to pharmacy  Controlled Substance Agreement on file:   Encounter-Level CSA Scan Date:    There are no encounter-level csa scan date.        Last office visit:  12/29/2020 with PCP.          Pauline Chavez RN, BSN Nurse Triage Advisor 7:13 PM 1/12/2021

## 2021-06-15 ENCOUNTER — OFFICE VISIT - HEALTHEAST (OUTPATIENT)
Dept: FAMILY MEDICINE | Facility: CLINIC | Age: 86
End: 2021-06-15

## 2021-06-15 DIAGNOSIS — I10 ESSENTIAL HYPERTENSION: ICD-10-CM

## 2021-06-15 DIAGNOSIS — E55.9 VITAMIN D DEFICIENCY: ICD-10-CM

## 2021-06-15 DIAGNOSIS — R53.83 FATIGUE, UNSPECIFIED TYPE: ICD-10-CM

## 2021-06-15 DIAGNOSIS — E03.9 ACQUIRED HYPOTHYROIDISM: ICD-10-CM

## 2021-06-15 DIAGNOSIS — E78.00 HYPERCHOLESTEREMIA: ICD-10-CM

## 2021-06-15 LAB
ALBUMIN SERPL-MCNC: 4.3 G/DL (ref 3.5–5)
ALP SERPL-CCNC: 91 U/L (ref 45–120)
ALT SERPL W P-5'-P-CCNC: 16 U/L (ref 0–45)
ANION GAP SERPL CALCULATED.3IONS-SCNC: 12 MMOL/L (ref 5–18)
AST SERPL W P-5'-P-CCNC: 21 U/L (ref 0–40)
BILIRUB SERPL-MCNC: 0.5 MG/DL (ref 0–1)
BUN SERPL-MCNC: 14 MG/DL (ref 8–28)
CALCIUM SERPL-MCNC: 9.4 MG/DL (ref 8.5–10.5)
CHLORIDE BLD-SCNC: 91 MMOL/L (ref 98–107)
CO2 SERPL-SCNC: 26 MMOL/L (ref 22–31)
CREAT SERPL-MCNC: 0.77 MG/DL (ref 0.6–1.1)
GFR SERPL CREATININE-BSD FRML MDRD: >60 ML/MIN/1.73M2
GLUCOSE BLD-MCNC: 125 MG/DL (ref 70–125)
POTASSIUM BLD-SCNC: 4 MMOL/L (ref 3.5–5)
PROT SERPL-MCNC: 7.1 G/DL (ref 6–8)
SODIUM SERPL-SCNC: 129 MMOL/L (ref 136–145)
TSH SERPL DL<=0.005 MIU/L-ACNC: 0.84 UIU/ML (ref 0.3–5)
VIT B12 SERPL-MCNC: 880 PG/ML (ref 213–816)

## 2021-06-15 ASSESSMENT — MIFFLIN-ST. JEOR: SCORE: 1039.14

## 2021-06-15 NOTE — TELEPHONE ENCOUNTER
RN cannot approve Refill Request    RN can NOT refill this medication med is not covered by policy/route to provider. Last office visit: 6/5/2019 Aguila Lal CNP Last Physical: 12/29/2020 Last MTM visit: Visit date not found Last visit same specialty: 10/9/2019 Pauline Carr CNP.  Next visit within 3 mo: Visit date not found  Next physical within 3 mo: Visit date not found      Sharon Frausto Triage/Med Refill 3/15/2021    Requested Prescriptions   Pending Prescriptions Disp Refills     amLODIPine (NORVASC) 2.5 MG tablet [Pharmacy Med Name: amLODIPine Besylate 2.5 MG Oral Tablet] 180 tablet 0     Sig: Take 2 tablets by mouth once daily       There is no refill protocol information for this order

## 2021-06-15 NOTE — PROGRESS NOTES
[facility]--Electrophysiology    Assessment/Plan:      Problem List Items Addressed This Visit     Hypertension    Persistent atrial fibrillation - Primary        1.  Persistent atrial fibrillation: Safia was previously thought to be symptomatic, though symptoms of fatigue and dyspnea on exertion have since resolved.  We had a long discussion of the physiology and natural progression of atrial fibrillation as well as treatment options of rate control versus rhythm control depending upon the presence of symptoms.  We discussed undergoing cardioversion for symptom clarification.  Discussed the cardioversion procedure, risks, expected recovery, and follow-up.  She was given some information on both atrial fibrillation and cardioversion to review at home.  At this point, she is leaning toward following a rate control strategy, but will call if she decides that she would prefer to undergo cardioversion.  We discussed that the longer she remains in atrial fibrillation, the more difficult it will be to attain and maintain sinus rhythm.    She was reassured that atrial fibrillation is not life-threatening, but carries an increased risk for stroke.  She has a FHI5NT2-[id number] score of 4 for age >75, female gender, and hypertension.  Continue Eliquis 5 mg twice daily for stroke prophylaxis.    2.  Hypertension: Blood pressure at target today on her current medication regimen.    Follow-up in 1 year, or sooner as needed.    Subjective:      Katie Mar, a very pleasant 86-year-old woman, comes in today accompanied by her daughter-in-law for EP evaluation of atrial fibrillation.  She was noted to be in atrial fibrillation with controlled ventricular response as an incidental finding during a routine exam on 11/29/2017.  She notes no specific awareness of arrhythmia, but noted that she was more tired and had some progressive dyspnea on exertion over the summer.  She was started on Eliquis 5 mg twice daily for  stroke prophylaxis on 12/1/2017 and reports no missed doses or bleeding issues.  She also has a history of hypertension, hyperlipidemia, hypothyroidism that has been well controlled for many years, and anxiety.  Echocardiogram shows normal left ventricular systolic performance and no significant valvular disease, though mild to moderate left atrial enlargement was noted.    Safia states that she has been feeling very well.  She states that she had bronchitis from which she is now recovered.  She reports that her energy level and breathing are back to normal and she has resumed previous level of activity, primarily walking.  She denies chest discomfort, palpitations, shortness of breath, paroxysmal nocturnal dyspnea, orthopnea, lightheadedness, dizziness, pre-syncope, or syncope.    Medical, surgical, family, social history, and medications were reviewed and updated as necessary.    Patient Active Problem List   Diagnosis     Irritable Bowel Syndrome     Contusion Of The Face With Intact Skin Surface     Hypothyroidism     Hypercholesterolemia     Hypertension     Anxiety     Persistent atrial fibrillation       Past Medical History:   Diagnosis Date     Atrial fibrillation        Past Surgical History:   Procedure Laterality Date     CARPAL TUNNEL RELEASE      right thumb     CARPAL TUNNEL RELEASE       CATARACT EXTRACTION, BILATERAL       DILATION AND CURETTAGE OF UTERUS         Allergies   Allergen Reactions     Clindamycin Hives     Doxycycline Hyclate      Penicillins      Tetanus Toxoid        Current Outpatient Prescriptions   Medication Sig Dispense Refill     albuterol (PROAIR HFA;PROVENTIL HFA;VENTOLIN HFA) 90 mcg/actuation inhaler Inhale 2 puffs every 6 (six) hours as needed for wheezing. 1 each 0     amLODIPine (NORVASC) 2.5 MG tablet TAKE ONE TABLET BY MOUTH ONCE DAILY 90 tablet 1     apixaban (ELIQUIS) 5 mg Tab tablet Take 1 tablet (5 mg total) by mouth 2 (two) times a day. 180 tablet 1     atorvastatin  "(LIPITOR) 20 MG tablet TAKE ONE TABLET BY MOUTH ONCE DAILY 90 tablet 1     benzonatate (TESSALON) 200 MG capsule Take 1 capsule (200 mg total) by mouth 3 (three) times a day as needed. 21 capsule 0     chlorthalidone (HYGROTEN) 25 MG tablet TAKE ONE TABLET BY MOUTH ONCE DAILY 90 tablet 0     levothyroxine (SYNTHROID, LEVOTHROID) 75 MCG tablet TAKE ONE TABLET BY MOUTH ONCE DAILY 90 tablet 3     LORazepam (ATIVAN) 0.5 MG tablet TAKE ONE TABLET BY MOUTH ONCE DAILY AT BEDTIME 90 tablet 0     losartan (COZAAR) 100 MG tablet TAKE ONE TABLET BY MOUTH ONCE DAILY 90 tablet 1     metoprolol tartrate (LOPRESSOR) 50 MG tablet Take 1 tablet (50 mg total) by mouth 2 (two) times a day. 60 tablet 12     potassium chloride SA (K-DUR,KLOR-CON) 10 MEQ tablet Take 1 tablet (10 mEq total) by mouth daily. 90 tablet 3     No current facility-administered medications for this visit.        Family History   Problem Relation Age of Onset     Ovarian cancer Paternal Aunt      Heart failure Mother      Heart attack Father      in 40s     Cancer Father      liver     Heart attack Brother      in 40s       Social History   Substance Use Topics     Smoking status: Never Smoker     Smokeless tobacco: Never Used     Alcohol use None       Review of Systems:   General: WNL  Eyes: WNL  Ears/Nose/Throat: WNL  Lungs: WNL  Heart: WNL  Stomach: WNL  Bladder: WNL  Muscle/Joints: WNL  Skin: WNL  Nervous System: WNL  Mental Health: WNL     Blood: WNL      Objective:    /58 (Patient Site: Right Arm, Patient Position: Sitting, Cuff Size: Adult Regular)  Pulse 64  Resp 16  Ht 5' 4.5\" (1.638 m) Comment: shoes on  Wt 137 lb 6.4 oz (62.3 kg) Comment: shoes on  BMI 23.22 kg/m2    Physical Exam  General Appearance:  Alert, well-appearing, in no acute distress.  Appears younger than stated age.   HEENT:  Atraumatic, normocephalic; no scleral icterus, EOM intact, PERRL; the mucous membranes were pink and moist; no obvious thyromegaly.   Chest: Chest " symmetric, spine straight.     Lungs:   Respirations unlabored; the lungs are clear to auscultation.   Cardiovascular:   Auscultation reveals normal first and second heart sounds with no murmurs, rubs, or gallops.  Irregularly irregular rate and rhythm. No JVD.  Radial and posterior tibial pulses are intact.    Abdomen:  Soft, nontender, nondistended, bowel sounds present.     Extremities: No cyanosis, clubbing, or edema.   Musculoskeletal:  Moves all extremities.     Skin: No xanthelasma.   Neurologic: Mood and affect are appropriate. Oriented to person, place, time, and situation.       Cardiographics (personally reviewed)  ECG 2017 shows atrial fibrillation with controlled ventricular response at 62 bpm  EC2017 shows atrial fibrillation with controlled ventricular response at 73 bpm.    Echocardiogram: Done 12/15/2017  1. Normal left ventricular size and systolic performance with a visually estimated ejection fraction of 65%.   2. There is trace aortic insufficiency.   3. Normal right ventricular size and systolic performance.   4. There is mild to moderate left atrial enlargement. There is mild right atrial enlargement.     Lab Review   Lab Results   Component Value Date    CREATININE 0.69 2017    BUN 10 2017     (L) 2017    K 4.1 2017    CL 92 (L) 2017    CO2 28 2017     Lab Results   Component Value Date    TSH 0.51 2017     Lab Results   Component Value Date    WBC 6.5 2017    HGB 14.7 2017    HCT 44.1 2017    MCV 93 2017     2017         Greater than 50 minutes were spent face to face in this visit with more than 50% of the time discussing diagnoses as listed above, counseling, and coordination of care.      Pauline Carr, [facility], [place], Electrophysiology  [phone]    This note has been dictated using voice recognition software. Any grammatical, typographical, or context distortions are  unintentional and inherent to the software.

## 2021-06-16 PROBLEM — E78.00 HYPERCHOLESTEREMIA: Status: ACTIVE | Noted: 2020-06-18

## 2021-06-16 LAB — 25(OH)D3 SERPL-MCNC: 30.6 NG/ML (ref 30–80)

## 2021-06-17 NOTE — PROGRESS NOTES
" PROGRESS NOTE       SUBJECTIVE:  Katie Mar is a 86 y.o. female   Chief Complaint   Patient presents with     Follow-up     pt was seen urgent care for bronciits in Dec. getting better but still having SOB , was dx with afib,    Patient states that she is \"puffing away\".  She has no energy and has to sit down after doing things.  She had a bad episode of bronchitis but her cough is now gone.  She sleeps well off and on.  She has always been very active and she is disappointed in her health because she simply cannot do things like she expects herself to be able to.  She has kept good records of her blood pressures which range from 1 teens over 60s to a highest of 151/83.  Mostly under 120/80.  Pulse is controlled between 60 and it was 91 on one occasion.  Most are in the 60s.  Echocardiogram was done in December and was basically fine showing an ejection fraction of 65%.  She does have mild atrial enlargement.  Atrial fibrillation was discovered last November but her shortness of breath started a while before that.  Cardioversion was offered but patient did not feel it was necessary.  Now given her continued shortness of breath she is thinking she should have been more aggressive in this management.  She denies any chest pain.    Patient Active Problem List   Diagnosis     Irritable Bowel Syndrome     Contusion Of The Face With Intact Skin Surface     Hypothyroidism     Hypercholesterolemia     Hypertension     Anxiety     Persistent atrial fibrillation       Current Outpatient Prescriptions   Medication Sig Dispense Refill     amLODIPine (NORVASC) 2.5 MG tablet TAKE ONE TABLET BY MOUTH ONCE DAILY 90 tablet 1     apixaban (ELIQUIS) 5 mg Tab tablet Take 1 tablet (5 mg total) by mouth 2 (two) times a day. 180 tablet 1     atorvastatin (LIPITOR) 20 MG tablet TAKE ONE TABLET BY MOUTH ONCE DAILY 90 tablet 1     chlorthalidone (HYGROTEN) 25 MG tablet TAKE ONE TABLET BY MOUTH ONCE DAILY 90 tablet 0     " levothyroxine (SYNTHROID, LEVOTHROID) 75 MCG tablet TAKE ONE TABLET BY MOUTH ONCE DAILY 90 tablet 3     LORazepam (ATIVAN) 0.5 MG tablet TAKE ONE TABLET BY MOUTH ONCE DAILY AT BEDTIME 90 tablet 0     losartan (COZAAR) 100 MG tablet TAKE ONE TABLET BY MOUTH ONCE DAILY 90 tablet 1     metoprolol tartrate (LOPRESSOR) 50 MG tablet Take 1 tablet (50 mg total) by mouth 2 (two) times a day. 60 tablet 12     potassium chloride SA (K-DUR,KLOR-CON) 10 MEQ tablet Take 1 tablet (10 mEq total) by mouth daily. 90 tablet 3     albuterol (PROAIR HFA;PROVENTIL HFA;VENTOLIN HFA) 90 mcg/actuation inhaler Inhale 2 puffs every 6 (six) hours as needed for wheezing. 1 each 0     benzonatate (TESSALON) 200 MG capsule Take 1 capsule (200 mg total) by mouth 3 (three) times a day as needed. 21 capsule 0     No current facility-administered medications for this visit.        History   Smoking Status     Never Smoker   Smokeless Tobacco     Never Used       REVIEW OF SYSTEMS: Gabrielle Orozco MD          OBJECTIVE:       Vitals:    03/28/18 1042   BP: 130/82   Pulse:    SpO2:      Weight: 141 lb (64 kg)  Wt Readings from Last 3 Encounters:   03/28/18 141 lb (64 kg)   01/18/18 137 lb 6.4 oz (62.3 kg)   12/15/17 135 lb (61.2 kg)     Body mass index is 23.83 kg/(m^2).        Physical Exam:  GENERAL APPEARANCE: A&A, NAD, well hydrated, well nourished  SKIN:  Normal skin turgor, no lesions/rashes   EARS: TM's normal, gray with nl light reflex  OROPHARYNX: without erythema, no post nasal drainage or thrush  NECK: Supple, without lymphadenopathy, no thyroid mass  CV: RRR, no M/G/R   LUNGS: CTAB, normal respiratory effort  ABDOMEN: S&NT, no masses, no organomegaly   EXTREMITY: Extremities normal, atraumatic, no swelling  NEURO: no gross deficits   PSYCHIATRIC:  Mood appropriate, memory intact        ASSESSMENT/PLAN:     1. Persistent atrial fibrillation  I see a distinct change in her symptomatology and I have encouraged her to reconsider doing a  cardioversion.  The longer she goes in atrial fibrillation it may become more difficult to convert her.  Patient agrees to revisit this and cardiology referral is placed.  - Ambulatory referral to Cardiology      There are no Patient Instructions on file for this visit.  There are no discontinued medications.  She will see me back after she sees cardiology.    The visit lasted a total of 30 minutes face to face with the patient.  Over 50% of the time spent counseling and educating the patient about all of the above.      Gabrielle Orozco MD

## 2021-06-18 NOTE — ANESTHESIA POSTPROCEDURE EVALUATION
Patient: Katie Mar  * No procedures listed *  Anesthesia type: general    Patient location: PACU  Last vitals:   Vitals:    05/29/18 1400   BP:    Pulse: 69   Resp: 26   Temp: 36.3  C (97.4  F)   SpO2: 100%     Post vital signs: stable  Level of consciousness: awake and responds to simple questions  Post-anesthesia pain: pain controlled  Post-anesthesia nausea and vomiting: no  Pulmonary: unassisted, return to baseline  Cardiovascular: stable and blood pressure at baseline  Hydration: adequate  Anesthetic events: no    QCDR Measures:  ASA# 11 - Joanie-op Cardiac Arrest: ASA11B - Patient did NOT experience unanticipated cardiac arrest  ASA# 12 - Joanie-op Mortality Rate: ASA12B - Patient did NOT die  ASA# 13 - PACU Re-Intubation Rate: ASA13B - Patient did NOT require a new airway mgmt  ASA# 10 - Composite Anes Safety: ASA10A - No serious adverse event    Additional Notes:

## 2021-06-18 NOTE — PROGRESS NOTES
9/27/1931  Home:[phone] (home) Cell:There is no such number on file (mobile).  Emergency Contact: Alejandro Mar [phone]    +++Important patient information for CSC/Cath Lab staff : PT IS ON ELIQUIS+++    [facility] EP Cath Lab Procedure Order     Cardioversion:    Cardioversion     PT IS ON ELIQUIS AND NO DOSES MISSED AND CLEARED BY BAYRON AGUILAR CNP    Diagnosis:  AF  Anticipated Case Duration:  Standard  Scheduling Needs/Timeframe:  PT IS SET FOR TUESDAY 5/29/2018 AT 11 30 WITH BAYRON AGUILAR CNP    Current Device: None None  Device Company/Device Rep Needed for Procedure: None    Anesthesia:  General-CV Only  Research Protocol:  No    [facility] EP Cath Lab Prep   Ordering Provider: DR CASTILLO  Ordering Date: 5/21/2018  Orders Status: Intial order placed and Order set placed  EP NC Contact: Elba Griffin LPN    H&P:  Compled by DR CASTILLO on 5/21/2018 if scheduled within 30 days, pt to schedule with PMD if procedure outside of this timeframe  PCP: Gabrielle Orozco MD, [phone]    Pre-op Labs: N/A for procedure    Medical Records Pertinent for Procedure:  N/A    Patient Education:    PT HAS A  FOR PROCEDURE  PT INSTRUCTED TO HOLD ANY VITAMINS, MINERALS CALCIUM, IRON OR SUPPLEMENTS THE MORNING OF CV  PT INSTRUCTED PER BAYRON AGUILAR CNP TO DECREASE METOPROLOL 25 MG THE MORNING OF CV  PT IS ON ELIQUIS AND NO MISSED DOSES, CLEARED WITH BAYRON AGUILAR CNP  PT INSTRUCTED TO BATHE OR SHOWER BEFORE COMING IN  PT INSTRUCTED TO LEAVE JEWELRY AT HOME  PT WILL HAVE A 4 WEEK FOLLOW UP WITH BAYRON AGUILAR CNP AND SCHEDULERS HAVE BEEN NOTIFIED     Teach with Patient: Completed via phone on 5/22/2018    Risks Reviewed:     Cardioversion    >90% acute success rate, <10% failure to convert or   reverts shortly after cardioversion.    <1% embolic event of (CVA, pulmonary embolism, or   other site).    75% risk for superficial burn.  Risks associated with general anesthesia will be addressed by the Anesthesiology Department    Consent:  Will be obtained in [facility] day of procedure    Pre-Procedure Instructions that were Reviewed with Patient:  NPO after midnight, Remove all jewelry prior to coming in for procedure, Shower prior to arrival, Transportation arrangements needed s/p procedure, Post-procedure follow up process and Sedation plan/orders    Pre-Procedure Medication Instructions:  Instructions given to pt regarding anticoagulants: Eliquis- instructed to continue anticoagulation uninterrupted through their procedure  Instructions given to pt regarding antiarrhythmic medication: Beta Blocker; Pt instructed to continue medication prior to procedure  Instructions for medication, other than anticoagulants/antiarrhythmics listed above, given to pt: to take all morning medications with small sips of water, with the exception of OTC supplements and MVI    Allergies   Allergen Reactions     Clindamycin Hives     Doxycycline Hyclate      Penicillins      Tetanus Toxoid        Current Outpatient Prescriptions:      amLODIPine (NORVASC) 2.5 MG tablet, TAKE ONE TABLET BY MOUTH ONCE DAILY, Disp: 90 tablet, Rfl: 1     apixaban (ELIQUIS) 5 mg Tab tablet, Take 1 tablet (5 mg total) by mouth 2 (two) times a day., Disp: 180 tablet, Rfl: 1     atorvastatin (LIPITOR) 20 MG tablet, TAKE ONE TABLET BY MOUTH ONCE DAILY, Disp: 90 tablet, Rfl: 1     chlorthalidone (HYGROTEN) 25 MG tablet, TAKE 1 TABLET BY MOUTH ONCE DAILY, Disp: 90 tablet, Rfl: 3     levothyroxine (SYNTHROID, LEVOTHROID) 75 MCG tablet, TAKE ONE TABLET BY MOUTH ONCE DAILY, Disp: 90 tablet, Rfl: 3     LORazepam (ATIVAN) 0.5 MG tablet, TAKE 1 TABLET BY MOUTH ONCE DAILY AT BEDTIME, Disp: 90 tablet, Rfl: 0     losartan (COZAAR) 100 MG tablet, TAKE ONE TABLET BY MOUTH ONCE DAILY, Disp: 90 tablet, Rfl: 1     metoprolol tartrate (LOPRESSOR) 50 MG tablet, Take 1 tablet (50 mg total) by mouth 2 (two) times a day., Disp: 60 tablet, Rfl: 12     potassium chloride SA (K-DUR,KLOR-CON) 10 MEQ tablet, Take 1 tablet (10  mEq total) by mouth daily., Disp: 90 tablet, Rfl: 3    Documentation Date:5/23/2018 8:55 AM  Torsten Griffin LPN

## 2021-06-18 NOTE — ANESTHESIA PREPROCEDURE EVALUATION
Anesthesia Evaluation      Patient summary reviewed     Airway   Mallampati: II  Neck ROM: full   Pulmonary - normal exam    breath sounds clear to auscultation                         Cardiovascular   (+) dysrhythmias, ,     Rhythm: irregular  Rate: normal,         Neuro/Psych - negative ROS     Endo/Other       GI/Hepatic/Renal       Other findings: IBS      Dental - normal exam                        Anesthesia Plan  Planned anesthetic: general mask    ASA 2   Induction: intravenous   Anesthetic plan and risks discussed with: patient    Post-op plan: routine recovery

## 2021-06-20 NOTE — LETTER
Letter by Aguila Lal CNP at      Author: Aguila Lal CNP Service: -- Author Type: --    Filed:  Encounter Date: 7/7/2020 Status: (Other)         Katie Mar  [address] [place] [id number]             July 7, 2020         Dear Ms. Mar,    Below are the results from your recent visit:    Resulted Orders   Comprehensive Metabolic Panel   Result Value Ref Range    Sodium 129 (L) 136 - 145 mmol/L    Potassium 3.7 3.5 - 5.0 mmol/L    Chloride 91 (L) 98 - 107 mmol/L    CO2 27 22 - 31 mmol/L    Anion Gap, Calculation 11 5 - 18 mmol/L    Glucose 105 70 - 125 mg/dL    BUN 12 8 - 28 mg/dL    Creatinine 0.74 0.60 - 1.10 mg/dL    GFR MDRD Af Amer >60 >60 mL/min/1.73m2    GFR MDRD Non Af Amer >60 >60 mL/min/1.73m2    Bilirubin, Total 0.6 0.0 - 1.0 mg/dL    Calcium 9.8 8.5 - 10.5 mg/dL    Protein, Total 7.0 6.0 - 8.0 g/dL    Albumin 3.8 3.5 - 5.0 g/dL    Alkaline Phosphatase 86 45 - 120 U/L    AST 23 0 - 40 U/L    ALT 16 0 - 45 U/L    Narrative    Fasting Glucose reference range is 70-99 mg/dL per  American Diabetes Association (ADA) guidelines.   Lipid [place] FASTING   Result Value Ref Range    Cholesterol 179 <=199 mg/dL    Triglycerides 93 <=149 mg/dL    HDL Cholesterol 85 >=50 mg/dL    LDL Calculated 75 <=129 mg/dL    Patient Fasting > 8hrs? Yes    Thyroid Stimulating Hormone (TSH)   Result Value Ref Range    TSH 0.31 0.30 - 5.00 uIU/mL       Cholesterol levels look great.  Normal kidney function and liver enzymes.  Sodium level is a little low, but stable compared to previous years.    Please call with questions or contact us using [employer].    Sincerely,         Aguila Lal CNP

## 2021-06-21 NOTE — PROGRESS NOTES
PROGRESS NOTE       SUBJECTIVE:  Katie Mar is a 87 y.o. female   Chief Complaint   Patient presents with     Medication Refill     med check and refills          Patient Active Problem List   Diagnosis     Irritable bowel syndrome     Hypothyroidism     Essential hypertension     Persistent atrial fibrillation (H)       Current Outpatient Prescriptions   Medication Sig Dispense Refill     acetaminophen (TYLENOL) 500 MG tablet Take 500 mg by mouth at bedtime.       amLODIPine (NORVASC) 2.5 MG tablet Take 2 tablets (5 mg total) by mouth daily. 180 tablet 1     apixaban (ELIQUIS) 5 mg Tab tablet Take 1 tablet (5 mg total) by mouth 2 (two) times a day. 180 tablet 1     atorvastatin (LIPITOR) 20 MG tablet TAKE 1 TABLET BY MOUTH ONCE DAILY 90 tablet 0     chlorthalidone (HYGROTEN) 25 MG tablet TAKE 1 TABLET BY MOUTH ONCE DAILY 90 tablet 3     diphenhydrAMINE (ANTIHISTAMINE) 25 mg tablet Take 25 mg by mouth at bedtime.       flecainide (TAMBOCOR) 50 MG tablet Take 1 tablet (50 mg total) by mouth 2 (two) times a day. 180 tablet 3     KLOR-CON M10 10 mEq tablet TAKE ONE TABLET BY MOUTH ONCE DAILY 90 tablet 2     levothyroxine (SYNTHROID, LEVOTHROID) 75 MCG tablet TAKE ONE TABLET BY MOUTH ONCE DAILY 90 tablet 3     LORazepam (ATIVAN) 0.5 MG tablet TAKE 1 TABLET BY MOUTH ONCE DAILY AT BEDTIME 90 tablet 0     losartan (COZAAR) 100 MG tablet TAKE ONE TABLET BY MOUTH ONCE DAILY 90 tablet 1     metoprolol tartrate (LOPRESSOR) 50 MG tablet Take 0.5 tablets (25 mg total) by mouth 2 (two) times a day. 60 tablet 12     No current facility-administered medications for this visit.        History   Smoking Status     Never Smoker   Smokeless Tobacco     Never Used       REVIEW OF SYSTEMS:  Patient denies fever, chills, dizziness, headache, visual change, ear pain, cough, chest pain, shortness of breath, abdominal pain, extremity pain or swelling, rash,  depression or anxiety.          OBJECTIVE:       Vitals:    10/24/18 1114    BP: 120/64   Pulse: 70     Weight: 143 lb (64.9 kg)  Wt Readings from Last 3 Encounters:   10/24/18 143 lb (64.9 kg)   08/09/18 140 lb 3.2 oz (63.6 kg)   06/28/18 139 lb (63 kg)     Body mass index is 24.55 kg/(m^2).    She brings in documentation of her blood pressures over the last 3 months she has checked 14 times.  Only 4 of them have a systolic pressure in the 140s.  Otherwise her diastolic is upper 50s-60.    Physical Exam:  GENERAL APPEARANCE: A&A, NAD, well hydrated, well nourished.  She appears much younger than her 87 years.  SKIN:  Normal skin turgor, no lesions/rashes   EARS: TM's normal, gray with nl light reflex  OROPHARYNX: without erythema, no post nasal drainage or thrush  NECK: Supple, without lymphadenopathy, no thyroid mass  CV: Patient's rhythm today is very regular without extra beats.  I hear no murmur  LUNGS: CTAB, normal respiratory effort  EXTREMITY: Extremities normal, atraumatic, subtle amount of ankle swelling may be from the addition of the amlodipine.  NEURO: no gross deficits   PSYCHIATRIC:  Mood appropriate, memory intact        ASSESSMENT/PLAN:     1. Essential hypertension  Well-controlled.  We will continue amlodipine, chlorthalidone, losartan, and metoprolol.  - Basic Metabolic Panel  - HM2(CBC w/o Differential)    2. Acquired hypothyroidism  Has been stable on replacement.  - Thyroid Stimulating Hormone (TSH)    3. Episodic atrial fibrillation (H)  Managed by cardiology  (Pauline Carr CNP)    4. Hypercholesterolemia    - Lipid [place]  Shingles vaccine is reviewed and discussed.  I recommend that she do the 2 dose vaccination regimen.    She should see me back in 6 months.    I spent a total of 28 minutes face to face with the patient.  Over 50% of the time spent counseling and educating the patient about all of the above.      Gabrielle Orozco MD

## 2021-06-23 NOTE — TELEPHONE ENCOUNTER
Refill Approved    Rx renewed per Medication Renewal Policy. Medication was last renewed on 10/16/18.    Giuliana Wagner, [facility] Triage/Med Refill 1/14/2019     Requested Prescriptions   Pending Prescriptions Disp Refills     atorvastatin (LIPITOR) 20 MG tablet [Pharmacy Med Name: ATORVASTATIN 20MG   TAB] 90 tablet 0     Sig: TAKE 1 TABLET BY MOUTH ONCE DAILY    Statins Refill Protocol (Hmg CoA Reductase Inhibitors) Passed - 1/14/2019  9:40 AM       Passed - PCP or prescribing provider visit in past 12 months     Last office visit with prescriber/PCP: 10/24/2018 Gabrielle Orozco MD OR same dept: 10/24/2018 Gabrielle Orozco MD OR same specialty: 10/24/2018 Gabrielle Orozco MD  Last physical: Visit date not found Last MTM visit: Visit date not found   Next visit within 3 mo: Visit date not found  Next physical within 3 mo: Visit date not found  Prescriber OR PCP: Gabrielle Orozco MD  Last diagnosis associated with med order: 1. Hypercholesteremia  - atorvastatin (LIPITOR) 20 MG tablet [Pharmacy Med Name: ATORVASTATIN 20MG   TAB]; TAKE 1 TABLET BY MOUTH ONCE DAILY  Dispense: 90 tablet; Refill: 0    If protocol passes may refill for 12 months if within 3 months of last provider visit (or a total of 15 months).

## 2021-06-24 NOTE — PATIENT INSTRUCTIONS - HE
Katie Mar,    It was a pleasure to see you today at the [facility].     My recommendations after this visit include:    Continue current medications.    Recommend light knee high compression stockings.    Call if A fib increases in frequency or duration (or if you are stuck in A fib).    Follow up in 6 months, or sooner if needed.    Pauline Carr, [facility], Electrophysiology  [phone]  [place] (nurse) [phone]

## 2021-06-24 NOTE — TELEPHONE ENCOUNTER
Controlled Substance Refill Request  Medication:   Requested Prescriptions     Pending Prescriptions Disp Refills     LORazepam (ATIVAN) 0.5 MG tablet [Pharmacy Med Name: LORAZEPAM 0.5MG     TAB] 90 tablet 0     Sig: TAKE 1 TABLET BY MOUTH ONCE DAILY AT BEDTIME     Date Last Fill: 11/20/18   #90 R-0  Pharmacy: [facility]   Submit electronically to pharmacy  Controlled Substance Agreement on File:   Encounter-Level CSA Scan Date:    There are no encounter-level csa scan date.       Last office visit: 10/24/2018 Gabrielle Orozco MD Katie Beck RN Triage [facility]

## 2021-06-24 NOTE — TELEPHONE ENCOUNTER
Last  Office Visit: 10/24/18.    Last Med Check: 10/24/18.    CSA on File: None on file.     Future Appointment: Due in April.     Katie Cain CMA

## 2021-06-25 NOTE — TELEPHONE ENCOUNTER
Refill Approved    Rx renewed per Medication Renewal Policy. Medication was last renewed on 9/23/20, last OV 12/29/20.    Vidhya Ortiz, [facility] Triage/Med Refill 6/13/2021     Requested Prescriptions   Pending Prescriptions Disp Refills     losartan (COZAAR) 100 MG tablet [Pharmacy Med Name: Losartan Potassium 100 MG Oral Tablet] 90 tablet 0     Sig: Take 1 tablet by mouth once daily       Angiotensin Receptor Blocker Protocol Passed - 6/10/2021  8:50 AM        Passed - PCP or prescribing provider visit in past 12 months       Last office visit with prescriber/PCP: 6/5/2019 Aguila Lal CNP OR same dept: Visit date not found OR same specialty: 1/6/2020 Gabrielle Orozco MD  Last physical: 12/29/2020 Last MTM visit: Visit date not found   Next visit within 3 mo: Visit date not found  Next physical within 3 mo: Visit date not found  Prescriber OR PCP: Aguila Lal CNP  Last diagnosis associated with med order: 1. Essential hypertension  - losartan (COZAAR) 100 MG tablet [Pharmacy Med Name: Losartan Potassium 100 MG Oral Tablet]; Take 1 tablet by mouth once daily  Dispense: 90 tablet; Refill: 0    2. Hypercholesteremia  - atorvastatin (LIPITOR) 20 MG tablet [Pharmacy Med Name: Atorvastatin Calcium 20 MG Oral Tablet]; Take 1 tablet by mouth once daily  Dispense: 90 tablet; Refill: 0    If protocol passes may refill for 12 months if within 3 months of last provider visit (or a total of 15 months).             Passed - Serum potassium within the past 12 months     Lab Results   Component Value Date    Potassium 3.7 12/29/2020             Passed - Blood pressure filed in past 12 months     BP Readings from Last 1 Encounters:   12/29/20 123/60             Passed - Serum creatinine within the past 12 months     Creatinine   Date Value Ref Range Status   12/29/2020 0.71 0.60 - 1.10 mg/dL Final                atorvastatin (LIPITOR) 20 MG tablet [Pharmacy Med Name: Atorvastatin Calcium 20 MG Oral Tablet] 90  tablet 0     Sig: Take 1 tablet by mouth once daily       Statins Refill Protocol (Hmg CoA Reductase Inhibitors) Passed - 6/10/2021  8:50 AM        Passed - PCP or prescribing provider visit in past 12 months      Last office visit with prescriber/PCP: 6/5/2019 Aguila Lal CNP OR same dept: Visit date not found OR same specialty: 1/6/2020 Gabrielle Orozco MD  Last physical: 12/29/2020 Last MTM visit: Visit date not found   Next visit within 3 mo: Visit date not found  Next physical within 3 mo: Visit date not found  Prescriber OR PCP: Aguila Lal CNP  Last diagnosis associated with med order: 1. Essential hypertension  - losartan (COZAAR) 100 MG tablet [Pharmacy Med Name: Losartan Potassium 100 MG Oral Tablet]; Take 1 tablet by mouth once daily  Dispense: 90 tablet; Refill: 0    2. Hypercholesteremia  - atorvastatin (LIPITOR) 20 MG tablet [Pharmacy Med Name: Atorvastatin Calcium 20 MG Oral Tablet]; Take 1 tablet by mouth once daily  Dispense: 90 tablet; Refill: 0    If protocol passes may refill for 12 months if within 3 months of last provider visit (or a total of 15 months).

## 2021-06-25 NOTE — TELEPHONE ENCOUNTER
Please call the patient.  Refill sent off of lorazepam.  Just about at that 6-month yessenia for a checkup for the controlled substance.  Can be virtual if she desires.    Aguila Lal, CNP

## 2021-06-25 NOTE — TELEPHONE ENCOUNTER
Controlled Substance Refill Request  Medication Name:   Requested Prescriptions     Pending Prescriptions Disp Refills     LORazepam (ATIVAN) 0.5 MG tablet [Pharmacy Med Name: LORazepam 0.5 MG Oral Tablet] 30 tablet 0     Sig: TAKE 1 TABLET BY MOUTH AT BEDTIME AS NEEDED     Date Last Fill: 1/13/21  Requested Pharmacy: [facility]  Submit electronically to pharmacy  Controlled Substance Agreement on file:   Encounter-Level CSA Scan Date:    There are no encounter-level csa scan date.        Last office visit:  12/29/20  Vidhya Ortiz RN, MA  [facility]    Triage Nurse Advisor

## 2021-06-25 NOTE — TELEPHONE ENCOUNTER
Medication: LORazepam 0.5 mg tablet  Last Date Filled 1/13/2021  Last appointment addressing medication use: 6/18/2020       Taken as prescribed from physician notes? YES    CSA in last year: NO    Random Utox in last year: NO  (if any of the above answer NO - schedule with PCP)     Opioids + benzodiazepines? NO  (if the above answer YES - schedule with PCP every 6 months)       All responses suggest: .    Everton Marin, SMA

## 2021-06-26 NOTE — PROGRESS NOTES
Progress Notes by Jefry Dc MD at 4/30/2018  3:30 PM     Author: Jefry Dc MD Service: -- Author Type: Physician    Filed: 4/30/2018  5:05 PM Encounter Date: 4/30/2018 Status: Signed    : Jefry Dc MD (Physician)           Click to link to [facility] Consultation Note    Thank you, Dr. Orozco, for asking Katie Mar to meet with me in consultation today at the [facility] for a second opinion regarding her atrial fibrillation.     Assessment:    1. Persistent atrial fibrillation  Holter Monitor   2. Dyspnea on exertion  NM Exercise Stress Test   3. Edema, unspecified type  Thyroid Stimulating Hormone (TSH)    BNP(B-type Natriuretic Peptide)    Basic metabolic panel   4. Dyspnea on exertion   BNP(B-type Natriuretic Peptide)       Discussion:    Katie states that she has had increased shortness of breath and fatigue for approximately 10 months.  She has done further research about cardioversion and now thinks it might be worth trying.  However, she is also concerned about her family history of premature atherosclerotic cardiovascular disease in her father and brother.  She has not had her heart rate control in atrial fibrillation assessed by Holter monitoring.    Plan:    1.  Laboratory will be drawn today to assess her fatigue, dyspnea, and edema.  2.  Holter monitor to ensure she has a well-controlled ventricular response to atrial fibrillation.  3.  Exercise nuclear stress test to evaluate for ischemic heart disease.    We will call her with the test results and further advice.  If the above testing is unremarkable, we will set up elective direct-current cardioversion.    An After Visit Summary was printed and given to the patient.    Current History:    I met with Katie today for second opinion consultation as requested by her family physician, Dr. Gabrielle Orozco.  I learned that Katie is a retired   who went back to complete her undergraduate education at age 48 after raising 3 sons.  She has been  for several years and lives independently in a senior apartment complex.  She still drives, enjoys going out to lunch, and plays bridge regularly.  She also walks on her own most days.    Since last summer she has noticed worsening fatigue and shortness of breath.  She has also had slight worsening of chronic lower extremity edema, which is generally worse in the left leg.  She does not describe angina pectoris, palpitations, lightheadedness, syncope, orthopnea.    The remainder of her system review is notable for easy bruising, anxiety, nocturia and joint discomfort.    Patient Active Problem List   Diagnosis   ? Irritable bowel syndrome   ? Hypothyroidism   ? Essential hypertension   ? Persistent atrial fibrillation       Past Medical History:  Past Medical History:   Diagnosis Date   ? Anxiety    ? Atrial fibrillation    ? Essential hypertension    ? Hypercholesterolemia        Past Surgical History:  Past Surgical History:   Procedure Laterality Date   ? CARPAL TUNNEL RELEASE Right    ? CATARACT EXTRACTION, BILATERAL Bilateral    ? DILATION AND CURETTAGE OF UTERUS         Family History:  Family History   Problem Relation Age of Onset   ? Ovarian cancer Paternal Aunt    ? Heart failure Mother 76      at home   ? Heart attack Father 49     and a second at age 54   ? Cancer Father 83     liver   ? Heart attack Brother 49     and  of the second at 62   ? CABG Brother 53   ? No Medical Problems Son    ? Alcoholism Brother    ? Cirrhosis Brother    ? No Medical Problems Son    ? No Medical Problems Son        Social History:   reports that she has never smoked. She has never used smokeless tobacco. She reports that she drinks about 4.2 oz of alcohol per week  She reports that she does not use illicit drugs.    Medications:  Outpatient Encounter Prescriptions as of 2018   Medication Sig Dispense  "Refill   ? albuterol (PROAIR HFA;PROVENTIL HFA;VENTOLIN HFA) 90 mcg/actuation inhaler Inhale 2 puffs every 6 (six) hours as needed for wheezing. 1 each 0   ? amLODIPine (NORVASC) 2.5 MG tablet TAKE ONE TABLET BY MOUTH ONCE DAILY 90 tablet 1   ? apixaban (ELIQUIS) 5 mg Tab tablet Take 1 tablet (5 mg total) by mouth 2 (two) times a day. 180 tablet 1   ? atorvastatin (LIPITOR) 20 MG tablet TAKE ONE TABLET BY MOUTH ONCE DAILY 90 tablet 1   ? benzonatate (TESSALON) 200 MG capsule Take 1 capsule (200 mg total) by mouth 3 (three) times a day as needed. 21 capsule 0   ? chlorthalidone (HYGROTEN) 25 MG tablet TAKE ONE TABLET BY MOUTH ONCE DAILY 90 tablet 0   ? levothyroxine (SYNTHROID, LEVOTHROID) 75 MCG tablet TAKE ONE TABLET BY MOUTH ONCE DAILY 90 tablet 3   ? LORazepam (ATIVAN) 0.5 MG tablet TAKE ONE TABLET BY MOUTH ONCE DAILY AT BEDTIME 90 tablet 0   ? losartan (COZAAR) 100 MG tablet TAKE ONE TABLET BY MOUTH ONCE DAILY 90 tablet 1   ? metoprolol tartrate (LOPRESSOR) 50 MG tablet Take 1 tablet (50 mg total) by mouth 2 (two) times a day. 60 tablet 12   ? potassium chloride SA (K-DUR,KLOR-CON) 10 MEQ tablet Take 1 tablet (10 mEq total) by mouth daily. 90 tablet 3     No facility-administered encounter medications on file as of 4/30/2018.        Allergies:  Clindamycin; Doxycycline hyclate; Penicillins; and Tetanus toxoid    Review of Systems:     General: WNL  Eyes: WNL  Ears/Nose/Throat: WNL  Lungs: WNL  Heart: Shortness of Breath with activity, Leg Swelling  Stomach: WNL  Bladder: Frequent Urination at Night  Muscle/Joints: Joint Pain  Skin: WNL  Nervous System: WNL  Mental Health: Anxiety     Blood: Easy Bruising    Objective:     Physical Exam:  Wt Readings from Last 5 Encounters:   04/30/18 142 lb (64.4 kg)   03/28/18 141 lb (64 kg)   01/18/18 137 lb 6.4 oz (62.3 kg)   12/15/17 135 lb (61.2 kg)   12/14/17 135 lb (61.2 kg)      5' 4.5\" (1.638 m)  Body mass index is 24 kg/(m^2).  /68 (Patient Site: Right Arm, " "Patient Position: Sitting, Cuff Size: Adult Regular)  Pulse 68  Resp 16  Ht 5' 4.5\" (1.638 m)  Wt 142 lb (64.4 kg)  BMI 24 kg/m2    General Appearance: Alert and not in distress   HEENT: No scleral icterus; the tongue is midline and the mucous membranes are pink and moist   Neck: No cervical bruits, adenopathy, or thyromegaly; jugular venous pressure is less than 5 cm   Chest: The spine is straight and the chest is symmetric   Lungs: Respirations are unlabored; the lungs are clear to auscultation   Cardiovasular: Auscultation reveals an irregular rhythm with normal first and second heart sounds and no murmurs, rubs, or gallops; the carotid, radial, femoral, and posterior tibial pulses are intact   Abdomen: No organomegaly, masses, bruits, or tenderness; bowel sounds are present   Extremities: No cyanosis, clubbing or edema   Skin: No xanthelasma   Neurologic: Mood and affect are appropriate       Cardiac testing:    EKG: Atrial fibrillation with a controlled ventricular response per my personal review.    Echocardiogram:   Results for orders placed during the hospital encounter of 12/15/17   Echo Complete [ECH10] 12/15/2017    Narrative 1. Normal left ventricular size and systolic performance with a visually   estimated ejection fraction of 65%.   2. There is trace aortic insufficiency.   3. Normal right ventricular size and systolic performance.   4. There is mild to moderate left atrial enlargement. There is mild right   atrial enlargement.        Imaging:    No results found.    Lab Review:    Lab Results   Component Value Date     (L) 11/29/2017    K 4.1 11/29/2017    CL 92 (L) 11/29/2017    CO2 28 11/29/2017    BUN 10 11/29/2017    CREATININE 0.69 11/29/2017    CALCIUM 9.8 11/29/2017     Lab Results   Component Value Date    WBC 6.5 11/29/2017    HGB 14.7 11/29/2017    HCT 44.1 11/29/2017    MCV 93 11/29/2017     11/29/2017     Lab Results   Component Value Date    CHOL 168 11/29/2017    TRIG " 47 11/29/2017    HDL 87 11/29/2017     Creatinine (mg/dL)   Date Value   11/29/2017 0.69   05/22/2017 0.79   10/20/2016 0.70   04/07/2016 0.78     LDL Calculated (mg/dL)   Date Value   11/29/2017 72   10/20/2016 76   04/07/2016 94           Much or all of the text in this note was generated through the use of the Dragon Dictate voice-to-text software. Errors in spelling or words which seem out of context are unintentional. Sound alike errors, in particular, may have escaped editing.

## 2021-06-26 NOTE — PROGRESS NOTES
Progress Notes by Pauline Carr CNP at 6/28/2018  8:30 AM     Author: Pauline Carr CNP Service: -- Author Type: Nurse Practitioner    Filed: 6/28/2018 10:02 AM Encounter Date: 6/28/2018 Status: Signed    : Pauline Carr CNP (Nurse Practitioner)           Click to link to [facility]     [facility] HEART CARE ELECTROPHYSIOLOGY NOTE      Assessment/Recommendations   Assessment/Plan:    1.  Persistent atrial fibrillation: Significant symptomatic improvement with maintenance of sinus rhythm.  This confirms has symptoms of A. fib consist of fatigue and dyspnea on exertion.  We reviewed the physiology and natural progression of atrial fibrillation as well as treatment options of rate control versus rhythm control depending upon the presence of symptoms.  She was in atrial fibrillation for at least 6 months prior to cardioversion and echo shows mild to moderate left atrial enlargement, recommend initiation of antiarrhythmic drug therapy to maintain sinus rhythm.  Start flecainide 50 mg twice daily, continue metoprolol tartrate 25 mg twice daily.    He was reassured that atrial fibrillation is not life-threatening, but carries an increased risk for stroke.  She has a [id number] score of 4 for age >75, female gender, and hypertension.  Continue Eliquis 5 mg twice daily for stroke prophylaxis.  She reports no missed doses or bleeding issues.    2.  Hypertension: Blood pressure at target today and per home readings.  Bilateral lower extremity edema is likely due to the increase in amlodipine dose.  Do not recommend additional diuretic as long as it continues to be mild.  We discussed the importance of following a low-sodium diet and she was encouraged to elevate her legs when sitting.    Follow up in 4-6 weeks.     History of Present Illness    Ms. Katie Mar is a very pleasant 86 y.o. female who comes in today for EP follow-up of atrial fibrillation.  She was diagnosed with atrial fibrillation  with controlled ventricular response on 11/29/2017 as an incidental finding during routine exam.  She was initially thought to have symptoms of fatigue and dyspnea on exertion, though symptoms resolved prior to her initial evaluation by me in January 2018, despite remaining in atrial fibrillation.  At that point, she decided against undergoing cardioversion.  She since had recurrence of symptoms and underwent cardioversion on 5/29/2018 for symptom clarification.  Metoprolol was decreased to 25 mg twice daily after cardioversion due to sinus bradycardia with a corresponding increase in amlodipine to 5 mg daily for hypertension.  She continues on Eliquis 5 mg twice daily for stroke prophylaxis.    Safia states that she feels much better since cardioversion, both in regards to energy level and breathing.  She states she still has some mild dyspnea with more strenuous activity, but nothing significant.  She has been checking her pulse daily and has noted no irregularity.  Her blood pressures have consistently been 120s over 60s.  She has noted some swelling in her feet and ankles.  She denies chest discomfort, palpitations, abdominal fullness/bloating, shortness of breath at rest or with normal activity, paroxysmal nocturnal dyspnea, orthopnea, lightheadedness, dizziness, pre-syncope, or syncope.    Cardiographics (personally reviewed):  EKG, Done 5/20/2019, post cardioversion, shows sinus bradycardia 58 bpm with a first-degree AV block, QT/QTc interval measures 444/435 ms.  EKG 11/30/2017 shows atrial fibrillation with controlled ventricular response at 62 bpm.    24-hour Holter monitor worn 5/16/2018 showed persistent atrial fibrillation with controlled ventricular response.    Echo done 12/15/2017:  1. Normal left ventricular size and systolic performance with a visually estimated ejection fraction of 65%.   2. There is trace aortic insufficiency.   3. Normal right ventricular size and systolic performance.    4. There is mild to moderate left atrial enlargement. There is mild right atrial enlargement.     NM pharmacologic stress test done 5/16/2018 is negative for inducible myocardial ischemia or infarction.  LVEF greater than 70%.       Physical Examination Review of Systems   Vitals:    06/28/18 0834   BP: 138/60   Pulse: 60   Resp: 16     Body mass index is 23.86 kg/(m^2).  Wt Readings from Last 3 Encounters:   06/28/18 139 lb (63 kg)   05/29/18 139 lb (63 kg)   04/30/18 142 lb (64.4 kg)     General Appearance:   Alert, well-appearing and in no acute distress.   HEENT: Atraumatic, normocephalic.  No scleral icterus, normal conjunctivae, EOM intact; mucous membranes pink and moist.     Chest: Chest symmetric, spine straight.   Lungs:   Respirations unlabored: Lungs are clear to auscultation.   Cardiovascular:   Normal first and second heart sounds with no murmurs, rubs, or gallops.  Regular rate and rhythm.  Radial and posterior tibial pulses are intact, no JVD, 1+ bilateral lower extremity edema.   Abdomen:  Soft, nontender, nondistended, bowel sounds present   Extremities: No cyanosis or clubbing   Musculoskeletal: Moves all extremities   Skin: Warm, dry, intact.    Neurologic: Mood and affect are appropriate, alert and oriented to person, place, time, and situation    General: WNL  Eyes: WNL  Ears/Nose/Throat: WNL  Lungs: WNL  Heart: WNL  Stomach: WNL  Bladder: WNL  Muscle/Joints: WNL  Skin: WNL  Nervous System: WNL  Mental Health: WNL     Blood: WNL     Medical History  Surgical History Family History Social History   Past Medical History:   Diagnosis Date   ? Anxiety    ? Essential hypertension    ? Hypercholesterolemia    ? Persistent atrial fibrillation (H) 1/18/2018    Past Surgical History:   Procedure Laterality Date   ? CARDIOVERSION  05/29/2018   ? CARPAL TUNNEL RELEASE Right    ? CATARACT EXTRACTION, BILATERAL Bilateral    ? DILATION AND CURETTAGE OF UTERUS      Family History   Problem Relation Age of  Onset   ? Ovarian cancer Paternal Aunt    ? Heart failure Mother 76      at home   ? Heart attack Father 49     and a second at age 54   ? Liver cancer Father    ? Heart attack Brother 49     and  of the second at 62   ? CABG Brother 53   ? No Medical Problems Son    ? Alcoholism Brother    ? Cirrhosis Brother    ? No Medical Problems Son    ? No Medical Problems Son     Social History     Social History   ? Marital status:      Spouse name: N/A   ? Number of children: 3   ? Years of education: 16     Occupational History   ?  Retired     worked for a [employer]     Social History Main Topics   ? Smoking status: Never Smoker   ? Smokeless tobacco: Never Used   ? Alcohol use 4.2 oz/week     7 Glasses of wine per week   ? Drug use: No   ? Sexual activity: Not Currently     Partners: Male     Other Topics Concern   ? Not on file     Social History Narrative    Lives alone since her  passed in . She lives in [facility]. She still drives in 2018. She walks on her own. She enjoys playing bridge and going out to lunch.          Medications  Allergies   Current Outpatient Prescriptions   Medication Sig Dispense Refill   ? acetaminophen (TYLENOL) 500 MG tablet Take 500 mg by mouth at bedtime.     ? amLODIPine (NORVASC) 2.5 MG tablet Take 2 tablets (5 mg total) by mouth daily. 180 tablet 1   ? apixaban (ELIQUIS) 5 mg Tab tablet Take 1 tablet (5 mg total) by mouth 2 (two) times a day. 180 tablet 1   ? atorvastatin (LIPITOR) 20 MG tablet TAKE ONE TABLET BY MOUTH ONCE DAILY 90 tablet 1   ? chlorthalidone (HYGROTEN) 25 MG tablet TAKE 1 TABLET BY MOUTH ONCE DAILY 90 tablet 3   ? diphenhydrAMINE (ANTIHISTAMINE) 25 mg tablet Take 25 mg by mouth at bedtime.     ? levothyroxine (SYNTHROID, LEVOTHROID) 75 MCG tablet TAKE ONE TABLET BY MOUTH ONCE DAILY 90 tablet 3   ? LORazepam (ATIVAN) 0.5 MG tablet TAKE 1 TABLET BY MOUTH ONCE DAILY AT BEDTIME 90 tablet 0   ?  losartan (COZAAR) 100 MG tablet TAKE ONE TABLET BY MOUTH ONCE DAILY 90 tablet 1   ? metoprolol tartrate (LOPRESSOR) 50 MG tablet Take 0.5 tablets (25 mg total) by mouth 2 (two) times a day. 60 tablet 12   ? potassium chloride SA (K-DUR,KLOR-CON) 10 MEQ tablet Take 1 tablet (10 mEq total) by mouth daily. 90 tablet 3   ? flecainide (TAMBOCOR) 50 MG tablet Take 1 tablet (50 mg total) by mouth 2 (two) times a day. 60 tablet 6     No current facility-administered medications for this visit.       Allergies   Allergen Reactions   ? Clindamycin Hives   ? Doxycycline Hyclate Hives   ? Penicillins Hives   ? Tetanus Toxoid      Site reaction. Hard area of redness and pain.      Medical, surgical, family, social history, and medications were all reviewed and updated as necessary.   Lab Results    Chemistry CBC Other   Lab Results   Component Value Date    CREATININE 0.72 04/30/2018    BUN 19 04/30/2018     (L) 04/30/2018    K 3.8 05/29/2018    CL 96 (L) 04/30/2018    CO2 27 04/30/2018     Creatinine (mg/dL)   Date Value   04/30/2018 0.72   11/29/2017 0.69   05/22/2017 0.79   10/20/2016 0.70    Lab Results   Component Value Date    WBC 6.5 11/29/2017    HGB 14.7 11/29/2017    HCT 44.1 11/29/2017    MCV 93 11/29/2017     11/29/2017    No results found for: INR  Lab Results   Component Value Date    TSH 0.45 04/30/2018            Greater than than 40 minutes were spent face to face in this visit with more than 50% spent discussing diagnoses as listed above, counseling, and coordination of care.    This note has been dictated using voice recognition software. Any grammatical, typographical, or context distortions are unintentional and inherent to the software.    Pauline Carr, [facility]   Electrophysiology

## 2021-06-26 NOTE — PROGRESS NOTES
Progress Notes by Pauline Carr CNP at 8/9/2018 12:50 PM     Author: Pauline Carr CNP Service: -- Author Type: Nurse Practitioner    Filed: 8/9/2018  1:35 PM Encounter Date: 8/9/2018 Status: Signed    : Pauline Carr CNP (Nurse Practitioner)           Click to link to [facility]     [facility] HEART CARE ELECTROPHYSIOLOGY NOTE      Assessment/Recommendations   Assessment/Plan:    1.  Persistent atrial fibrillation: No symptomatology or evidence of recurrence of A. fib.  We reviewed the physiology and natural progression of A. fib.  She was instructed to continue with her daily pulse checks and call if episodes of A. fib increase in frequency or duration.  Continue flecainide 50 mg twice daily with metoprolol tartrate 25 mg twice daily.    She has a [id number] score of 4 for age >75, female gender, and hypertension.  Continue Eliquis 5 mg twice daily for stroke prophylaxis.  She reports no missed doses or bleeding issues.    2.  Hypertension: Blood pressure at target today and per home readings.  Continues to have some mild bilateral lower extremity edema, likely due in part to amlodipine.  She is following a low-sodium diet.  She was encouraged to elevate her legs when sitting.  Recommended weight compression stockings.    Follow up in 6 months.     History of Present Illness    Ms. Katie Mar is a very pleasant 86 y.o. female who comes in today for EP follow-up of atrial fibrillation.  She was diagnosed with atrial fibrillation with controlled ventricular response on 11/29/2017 as an incidental finding during routine exam.  She was initially thought to have symptoms of fatigue and dyspnea on exertion, though symptoms resolved prior to her initial evaluation by me in January 2018, despite remaining in atrial fibrillation.  At that point, she decided against undergoing cardioversion.  She since had recurrence of symptoms and underwent cardioversion on 5/29/2018.  She was subsequently  "started on flecainide 50 mg twice daily along with her metoprolol tartrate 25 mg twice daily.  She continues on Eliquis 5 mg twice daily for stroke prophylaxis.    Safia states that she has been feeling \"great\".  She states she has so much more energy.  She has been checking her pulse daily and has noted no irregularity, heart rate consistently around 60 bpm at rest.  Blood pressures have been around 120s/60s.  She continues to have some mild swelling in her feet and ankles, progressive throughout the day, but minimal in the morning.  She denies chest discomfort, palpitations, abdominal fullness/bloating, shortness of breath, paroxysmal nocturnal dyspnea, orthopnea, lightheadedness, dizziness, pre-syncope, or syncope.    Cardiographics (personally reviewed):  EKG, Done 5/29/2018, post cardioversion, shows sinus bradycardia 58 bpm with a first-degree AV block, QT/QTc interval measures 444/435 ms.  EKG 11/30/2017 shows atrial fibrillation with controlled ventricular response at 62 bpm.    24-hour Holter monitor worn 5/16/2018 showed persistent atrial fibrillation with controlled ventricular response.    Echo done 12/15/2017:  1. Normal left ventricular size and systolic performance with a visually estimated ejection fraction of 65%.   2. There is trace aortic insufficiency.   3. Normal right ventricular size and systolic performance.   4. There is mild to moderate left atrial enlargement. There is mild right atrial enlargement.     NM pharmacologic stress test done 5/16/2018 is negative for inducible myocardial ischemia or infarction.  LVEF greater than 70%.     Physical Examination Review of Systems   Vitals:    08/09/18 1246   BP: 138/50   Pulse: 62   Resp: 8     Body mass index is 24.07 kg/(m^2).  Wt Readings from Last 3 Encounters:   08/09/18 140 lb 3.2 oz (63.6 kg)   06/28/18 139 lb (63 kg)   05/29/18 139 lb (63 kg)     General Appearance:   Alert, well-appearing and in no acute distress.   HEENT: Atraumatic, " normocephalic.  No scleral icterus, normal conjunctivae, EOM intact; mucous membranes pink and moist.     Chest: Chest symmetric, spine straight.   Lungs:   Respirations unlabored, lungs are clear to auscultation.   Cardiovascular:   Normal first and second heart sounds with no murmurs, rubs, or gallops.  Regular rate and rhythm.  Radial and posterior tibial pulses are intact, trace bilateral lower extremity edema.   Abdomen:  Soft, nontender, nondistended, bowel sounds present   Extremities: No cyanosis or clubbing   Musculoskeletal: Moves all extremities   Skin: Warm, dry, intact.    Neurologic: Mood and affect are appropriate, alert and oriented to person, place, time, and situation    General: WNL  Eyes: WNL  Ears/Nose/Throat: WNL  Lungs: WNL  Heart: WNL  Stomach: WNL  Bladder: WNL  Muscle/Joints: WNL  Skin: WNL  Nervous System: WNL  Mental Health: WNL     Blood: WNL     Medical History  Surgical History Family History Social History   Past Medical History:   Diagnosis Date   ? Anxiety    ? Essential hypertension    ? Hypercholesterolemia    ? Persistent atrial fibrillation (H) 2018    Past Surgical History:   Procedure Laterality Date   ? CARDIOVERSION  2018   ? CARPAL TUNNEL RELEASE Right    ? CATARACT EXTRACTION, BILATERAL Bilateral    ? DILATION AND CURETTAGE OF UTERUS      Family History   Problem Relation Age of Onset   ? Ovarian cancer Paternal Aunt    ? Heart failure Mother 76      at home   ? Heart attack Father 49     and a second at age 54   ? Liver cancer Father    ? Heart attack Brother 49     and  of the second at 62   ? CABG Brother 53   ? No Medical Problems Son    ? Alcoholism Brother    ? Cirrhosis Brother    ? No Medical Problems Son    ? No Medical Problems Son     Social History     Social History   ? Marital status:      Spouse name: N/A   ? Number of children: 3   ? Years of education: 16     Occupational History   ?  Retired     worked for a [employer]      Social History Main Topics   ? Smoking status: Never Smoker   ? Smokeless tobacco: Never Used   ? Alcohol use 4.2 oz/week     7 Glasses of wine per week   ? Drug use: No   ? Sexual activity: Not Currently     Partners: Male     Other Topics Concern   ? Not on file     Social History Narrative    Lives alone since her  passed in 2013. She lives in [facility]. She still drives in 2018. She walks on her own. She enjoys playing bridge and going out to lunch.          Medications  Allergies   Current Outpatient Prescriptions   Medication Sig Dispense Refill   ? acetaminophen (TYLENOL) 500 MG tablet Take 500 mg by mouth at bedtime.     ? amLODIPine (NORVASC) 2.5 MG tablet Take 2 tablets (5 mg total) by mouth daily. 180 tablet 1   ? apixaban (ELIQUIS) 5 mg Tab tablet Take 1 tablet (5 mg total) by mouth 2 (two) times a day. 180 tablet 1   ? atorvastatin (LIPITOR) 20 MG tablet TAKE ONE TABLET BY MOUTH ONCE DAILY 90 tablet 1   ? chlorthalidone (HYGROTEN) 25 MG tablet TAKE 1 TABLET BY MOUTH ONCE DAILY 90 tablet 3   ? diphenhydrAMINE (ANTIHISTAMINE) 25 mg tablet Take 25 mg by mouth at bedtime.     ? flecainide (TAMBOCOR) 50 MG tablet Take 1 tablet (50 mg total) by mouth 2 (two) times a day. 180 tablet 3   ? levothyroxine (SYNTHROID, LEVOTHROID) 75 MCG tablet TAKE ONE TABLET BY MOUTH ONCE DAILY 90 tablet 3   ? LORazepam (ATIVAN) 0.5 MG tablet TAKE 1 TABLET BY MOUTH ONCE DAILY AT BEDTIME 90 tablet 0   ? losartan (COZAAR) 100 MG tablet TAKE ONE TABLET BY MOUTH ONCE DAILY 90 tablet 1   ? metoprolol tartrate (LOPRESSOR) 50 MG tablet Take 0.5 tablets (25 mg total) by mouth 2 (two) times a day. 60 tablet 12   ? potassium chloride SA (K-DUR,KLOR-CON) 10 MEQ tablet Take 1 tablet (10 mEq total) by mouth daily. 90 tablet 3     No current facility-administered medications for this visit.       Allergies   Allergen Reactions   ? Clindamycin Hives   ? Doxycycline Hyclate Hives   ?  Penicillins Hives   ? Tetanus Toxoid      Site reaction. Hard area of redness and pain.      Medical, surgical, family, social history, and medications were all reviewed and updated as necessary.   Lab Results    Chemistry CBC Other   Lab Results   Component Value Date    CREATININE 0.72 04/30/2018    BUN 19 04/30/2018     (L) 04/30/2018    K 3.8 05/29/2018    CL 96 (L) 04/30/2018    CO2 27 04/30/2018     Creatinine (mg/dL)   Date Value   04/30/2018 0.72   11/29/2017 0.69   05/22/2017 0.79   10/20/2016 0.70    Lab Results   Component Value Date    WBC 6.5 11/29/2017    HGB 14.7 11/29/2017    HCT 44.1 11/29/2017    MCV 93 11/29/2017     11/29/2017    No results found for: INR  Lab Results   Component Value Date    TSH 0.45 04/30/2018              This note has been dictated using voice recognition software. Any grammatical, typographical, or context distortions are unintentional and inherent to the software.    Pauline Carr, [facility]   Electrophysiology

## 2021-06-26 NOTE — PROGRESS NOTES
"Chief Complaint   Patient presents with     Follow-up     Check up- age related ches and pains. Afib in February, \"cured\" it with \"mindful breathing\"        HPI: Patient presents today for medication check.  Overall doing well.  Last known history of A. fib was in February 2020 which she was able to control with mindful breathing.  She does note that triggers for her A. fib seem to be stress.  She is still driving but keeping it local around town and not at night.  She checks her blood pressure once a week and her pulse is consistently in the mid 50s/low 60s.  Blood pressure continually shows good control with no hypotensive events.  She is using her walker at her senior living complex which helps for her to exercise longer and feel more confident.  No chest pain.  No palpitations.  Denies lower extremity swelling issues.  Due for follow-up with cardiology.  Last notes mention that she does not qualify for dose decrease in her Eliquis.    ROS:Review of Systems - negative except for what's listed in the HPI    SH: The Patient's  reports that she has never smoked. She has never used smokeless tobacco. She reports current alcohol use of about 7.0 standard drinks of alcohol per week. She reports that she does not use drugs.      FH: The Patient's family history includes Alcoholism in her brother; CABG (age of onset: 53) in her brother; Cirrhosis in her brother; Heart attack (age of onset: 49) in her brother and father; Heart failure (age of onset: 76) in her mother; Liver cancer in her father; No Medical Problems in her son, son, and son; Ovarian cancer in her paternal aunt.     Meds:    Current Outpatient Medications on File Prior to Visit   Medication Sig Dispense Refill     amLODIPine (NORVASC) 2.5 MG tablet Take 2 tablets by mouth once daily 180 tablet 1     apixaban ANTICOAGULANT (ELIQUIS) 5 mg Tab tablet Take 1 tablet (5 mg total) by mouth 2 (two) times a day. 180 tablet 3     atorvastatin (LIPITOR) 20 MG tablet " "Take 1 tablet (20 mg total) by mouth daily. 90 tablet 1     chlorthalidone (HYGROTEN) 25 MG tablet Take 1 tablet (25 mg total) by mouth daily. 90 tablet 3     diphenhydrAMINE (ANTIHISTAMINE) 25 mg tablet Take 25 mg by mouth at bedtime.       EUTHYROX 75 mcg tablet Take 1 tablet by mouth once daily 90 tablet 3     flecainide (TAMBOCOR) 50 MG tablet Take 1 tablet (50 mg total) by mouth 2 (two) times a day. 180 tablet 3     LORazepam (ATIVAN) 0.5 MG tablet TAKE 1 TABLET BY MOUTH AT BEDTIME AS NEEDED 30 tablet 0     losartan (COZAAR) 100 MG tablet Take 1 tablet (100 mg total) by mouth daily. 90 tablet 1     metoprolol tartrate (LOPRESSOR) 25 MG tablet Take 0.5 tablets (12.5 mg total) by mouth 2 (two) times a day. 90 tablet 3     acetaminophen (TYLENOL) 500 MG tablet Take 500 mg by mouth at bedtime.       No current facility-administered medications on file prior to visit.        O:  /54   Pulse 75   Ht 5' 3\" (1.6 m)   Wt 142 lb 3.2 oz (64.5 kg)   SpO2 97%   Breastfeeding No   BMI 25.19 kg/m      Physical Examination:   General appearance - alert, well appearing, and in no distress  Mental status - alert, oriented to person, place, and time  Eyes -PERRLA  Ears - bilateral TM's and external ear canals normal  Chest - clear to auscultation, no wheezes, rales or rhonchi, symmetric air entry  Heart - normal rate and regular rhythm, S1 and S2 normal, no murmurs noted  Abdomen - soft, nontender, nondistended, no masses or organomegaly  Neurological - alert, oriented, normal speech, no focal findings or movement disorder noted, neck supple without rigidity, cranial nerves II through XII intact, motor and sensory grossly normal bilaterally, normal muscle tone, no tremors, strength 5/5  Extremities - peripheral pulses normal, no peripheral edema  Skin - normal coloration and turgor.      A/P:     Problem List Items Addressed This Visit        [facility] Cardiac Problems    Hypothyroidism - Primary (Chronic)    " Relevant Orders    Thyroid Stimulating Hormone (TSH)    Essential hypertension (Chronic)    Relevant Orders    Comprehensive Metabolic Panel    Hypercholesteremia    Relevant Orders    Comprehensive Metabolic Panel      Other Visit Diagnoses     Vitamin D deficiency        Relevant Orders    Vitamin D, Total (25-Hydroxy)    Fatigue, unspecified type        Relevant Orders    Vitamin B12        Recheck TSH.  No change to antihypertensive regimen.  Update metabolic panel.  Patient has questions about low vitamin D and B12.  Given symptoms, it could be worthwhile to check.  Keep planned follow-up with cardiology.    1. Acquired hypothyroidism  - Thyroid Stimulating Hormone (TSH)    2. Essential hypertension  - Comprehensive Metabolic Panel    3. Hypercholesteremia  - Comprehensive Metabolic Panel    4. Vitamin D deficiency  - Vitamin D, Total (25-Hydroxy)    5. Fatigue, unspecified type  - Vitamin B12        Aguila Lal, CNP      This note has been dictated using voice recognition software. Any grammatical or context distortions are unintentional and inherent to the software.

## 2021-06-26 NOTE — PATIENT INSTRUCTIONS - HE
Updating labs today including the b12 and D.    Otherwise you're looking good!    Follow up with cardiology later this summer.

## 2021-06-27 NOTE — PROGRESS NOTES
Progress Notes by Pauline Carr CNP at 3/14/2019  9:50 AM     Author: Pauline Carr CNP Service: -- Author Type: Nurse Practitioner    Filed: 3/14/2019 10:38 AM Encounter Date: 3/14/2019 Status: Signed    : Pauline Carr CNP (Nurse Practitioner)           Click to link to [facility]     [facility] HEART CARE ELECTROPHYSIOLOGY NOTE      Assessment/Recommendations   Assessment/Plan:    1.  Persistent atrial fibrillation: No symptomatology or evidence of recurrence of A. fib.   She was instructed to continue with her daily pulse checks and call if episodes of A. fib increase in frequency or duration.  EKG done today, QTc well within safety parameters.  Continue flecainide 50 mg twice daily with metoprolol tartrate 25 mg twice daily.    She has a [id number] score of 4 for age >75, female gender, and hypertension.  Continue Eliquis 5 mg twice daily for stroke prophylaxis.  She reports no side effects, missed doses, or bleeding issues.    2.  Hypertension: Blood pressure at target today and per home readings.  Again recommended knee-high compression stockings for her lower extremity edema which continues to be mild.    3.  Dyspnea on exertion: No evidence of acute fluid overload on exam today.  Blood pressures and heart rates look good and she is maintaining sinus rhythm.  Nuclear stress test was negative for ischemia.  This does not appear to be cardiac in etiology.  Encouraged her to remain active.    Follow up in 6 months.     History of Present Illness    Ms. Katie Mar is a very pleasant 87 y.o. female who comes in today for EP follow-up of atrial fibrillation.  She was diagnosed with atrial fibrillation with controlled ventricular response on 11/29/2017 as an incidental finding during routine exam.  She was initially thought to have symptoms of fatigue and dyspnea on exertion, though symptoms resolved prior to her initial evaluation by me in January 2018, despite remaining in atrial  fibrillation.  At that point, she decided against undergoing cardioversion.  She since had recurrence of symptoms and underwent cardioversion on 5/29/2018 after which she had significant improvement in her energy level and activity tolerance.  She was subsequently started on flecainide 50 mg twice daily along with her metoprolol tartrate 25 mg twice daily.  She continues on Eliquis 5 mg twice daily for stroke prophylaxis.    Safia states that she has been feeling very well.  She still gets winded and tired with exertion, but states she has not had any episodes of A. fib.  Her blood pressures have been consistently 130s/60s with resting heart rate in the upper 50s-60s.  She continues to have some mild swelling in her feet and ankles, progressive throughout the day, but none to minimal in the morning.  She denies chest discomfort, palpitations, abdominal fullness/bloating, shortness of breath at rest or light activity, paroxysmal nocturnal dyspnea, orthopnea, lightheadedness, dizziness, pre-syncope, or syncope.    Cardiographics (personally reviewed):  EKG, Done 3/14/2019 shows sinus rhythm at 59 bpm with first-degree AV block, QT/QTc interval measures 424/419 ms.  EKG 11/30/2017 shows atrial fibrillation with controlled ventricular response at 62 bpm.    24-hour Holter monitor worn 5/16/2018 showed persistent atrial fibrillation with controlled ventricular response.    Echo done 12/15/2017:  1. Normal left ventricular size and systolic performance with a visually estimated ejection fraction of 65%.   2. There is trace aortic insufficiency.   3. Normal right ventricular size and systolic performance.   4. There is mild to moderate left atrial enlargement. There is mild right atrial enlargement.     NM pharmacologic stress test done 5/16/2018 is negative for inducible myocardial ischemia or infarction.  LVEF greater than 70%.     Physical Examination Review of Systems   Vitals:    03/14/19 0943   BP: 114/52   Pulse: 60    Resp: 16     Body mass index is 25.23 kg/m .  Wt Readings from Last 3 Encounters:   19 146 lb (66.2 kg)   10/24/18 143 lb (64.9 kg)   18 140 lb 3.2 oz (63.6 kg)     General Appearance:   Alert, well-appearing and in no acute distress.   HEENT: Atraumatic, normocephalic.  No scleral icterus, normal conjunctivae, EOM intact; mucous membranes pink and moist.     Chest: Chest symmetric, spine straight.   Lungs:   Respirations unlabored, lungs are clear to auscultation.   Cardiovascular:   Normal first and second heart sounds with no murmurs, rubs, or gallops.  Regular rate and rhythm.  Radial and posterior tibial pulses are intact, trace left lower extremity edema.   Abdomen:  Soft, nontender, nondistended, bowel sounds present   Extremities: No cyanosis or clubbing   Musculoskeletal: Moves all extremities   Skin: Warm, dry, intact.    Neurologic: Mood and affect are appropriate, alert and oriented to person, place, time, and situation    General: WNL  Eyes: Visual Distubance  Ears/Nose/Throat: WNL  Lungs: Shortness of Breath  Heart: Shortness of Breath with activity, Leg Swelling  Stomach: WNL  Bladder: Frequent Urination at Night  Muscle/Joints: WNL  Skin: WNL  Nervous System: Daytime Sleepiness  Mental Health: Anxiety     Blood: WNL     Medical History  Surgical History Family History Social History   Past Medical History:   Diagnosis Date   ? Anxiety    ? Essential hypertension    ? Hypercholesterolemia    ? Persistent atrial fibrillation (H) 2018    Past Surgical History:   Procedure Laterality Date   ? CARDIOVERSION  2018   ? CARPAL TUNNEL RELEASE Right    ? CATARACT EXTRACTION, BILATERAL Bilateral    ? DILATION AND CURETTAGE OF UTERUS      Family History   Problem Relation Age of Onset   ? Ovarian cancer Paternal Aunt    ? Heart failure Mother 76         at home   ? Heart attack Father 49        and a second at age 54   ? Liver cancer Father    ? Heart attack Brother 49        and   of the second at 62   ? CABG Brother 53   ? No Medical Problems Son    ? Alcoholism Brother    ? Cirrhosis Brother    ? No Medical Problems Son         Lives in [place]   ? No Medical Problems Son     Social History     Socioeconomic History   ? Marital status:      Spouse name: Not on file   ? Number of children: 3   ? Years of education: 16   ? Highest education level: Not on file   Occupational History   ? Occupation:      Employer: RETIRED     Comment: worked for a [employer]   Social Needs   ? Financial resource strain: Not on file   ? Food insecurity:     Worry: Not on file     Inability: Not on file   ? Transportation needs:     Medical: Not on file     Non-medical: Not on file   Tobacco Use   ? Smoking status: Never Smoker   ? Smokeless tobacco: Never Used   Substance and Sexual Activity   ? Alcohol use: Yes     Alcohol/week: 4.2 oz     Types: 7 Glasses of wine per week   ? Drug use: No   ? Sexual activity: Not Currently     Partners: Male   Lifestyle   ? Physical activity:     Days per week: Not on file     Minutes per session: Not on file   ? Stress: Not on file   Relationships   ? Social connections:     Talks on phone: Not on file     Gets together: Not on file     Attends [employer] service: Not on file     Active member of club or organization: Not on file     Attends meetings of clubs or organizations: Not on file     Relationship status: Not on file   ? Intimate partner violence:     Fear of current or ex partner: Not on file     Emotionally abused: Not on file     Physically abused: Not on file     Forced sexual activity: Not on file   Other Topics Concern   ? Not on file   Social History Narrative    Lives alone since her  passed in . She lives in [place]. She still drives in 2018. She walks on her own. She enjoys playing bridge and going out to lunch.        She has 3 sons: Bi lives in [place] and does check in on her.   Another son lives in [place] and travels a lot.  The other son lives in [place] and is retired.  She has 2 grandchildren.        She has advanced directives and Bi is in charge of her affairs.          Medications  Allergies   Current Outpatient Medications   Medication Sig Dispense Refill   ? acetaminophen (TYLENOL) 500 MG tablet Take 500 mg by mouth at bedtime.     ? amLODIPine (NORVASC) 2.5 MG tablet TAKE 2 TABLETS BY MOUTH ONCE DAILY 180 tablet 1   ? apixaban (ELIQUIS) 5 mg Tab tablet Take 1 tablet (5 mg total) by mouth 2 (two) times a day. 180 tablet 1   ? atorvastatin (LIPITOR) 20 MG tablet Take 1 tablet (20 mg total) by mouth daily. 90 tablet 3   ? chlorthalidone (HYGROTEN) 25 MG tablet TAKE 1 TABLET BY MOUTH ONCE DAILY 90 tablet 3   ? diphenhydrAMINE (ANTIHISTAMINE) 25 mg tablet Take 25 mg by mouth at bedtime.     ? flecainide (TAMBOCOR) 50 MG tablet Take 1 tablet (50 mg total) by mouth 2 (two) times a day. 180 tablet 3   ? KLOR-CON M10 10 mEq tablet TAKE ONE TABLET BY MOUTH ONCE DAILY 90 tablet 2   ? levothyroxine (SYNTHROID, LEVOTHROID) 75 MCG tablet TAKE ONE TABLET BY MOUTH ONCE DAILY 90 tablet 3   ? LORazepam (ATIVAN) 0.5 MG tablet TAKE 1 TABLET BY MOUTH ONCE DAILY AT BEDTIME 90 tablet 0   ? losartan (COZAAR) 100 MG tablet Take 1 tablet (100 mg total) by mouth daily. 90 tablet 3   ? metoprolol tartrate (LOPRESSOR) 50 MG tablet Take 0.5 tablets (25 mg total) by mouth 2 (two) times a day. 90 tablet 1     No current facility-administered medications for this visit.       Allergies   Allergen Reactions   ? Clindamycin Hives   ? Doxycycline Hyclate Hives   ? Penicillins Hives   ? Tetanus Toxoid      Site reaction. Hard area of redness and pain.      Medical, surgical, family, social history, and medications were all reviewed and updated as necessary.   Lab Results    Chemistry CBC Other   Lab Results   Component Value Date    CREATININE 0.72 10/24/2018    BUN 12 10/24/2018     (L) 10/24/2018     K 3.9 10/24/2018    CL 92 (L) 10/24/2018    CO2 25 10/24/2018     Creatinine (mg/dL)   Date Value   10/24/2018 0.72   04/30/2018 0.72   11/29/2017 0.69   05/22/2017 0.79    Lab Results   Component Value Date    WBC 5.5 10/24/2018    HGB 14.7 10/24/2018    HCT 42.6 10/24/2018    MCV 95 10/24/2018     10/24/2018    No results found for: INR  Lab Results   Component Value Date    TSH 0.42 10/24/2018              This note has been dictated using voice recognition software. Any grammatical, typographical, or context distortions are unintentional and inherent to the software.    Pauline Carr, [facility]   Electrophysiology

## 2021-06-28 NOTE — PROGRESS NOTES
Progress Notes by Pauline Carr CNP at 10/9/2019  9:50 AM     Author: Pauline Carr CNP Service: -- Author Type: Nurse Practitioner    Filed: 10/9/2019 10:35 AM Encounter Date: 10/9/2019 Status: Signed    : Pauline Carr CNP (Nurse Practitioner)             Assessment/Recommendations   Assessment/Plan:    1.  Persistent atrial Fibrillation: No symptomatology or evidence of recurrence of A. fib.  She appears to have a bit more sinus bradycardia with heart rates consistently in the 50s at rest.  This may be contributing to her dyspnea on exertion and fatigue.  Decrease metoprolol tartrate to 12.5 mg twice daily.  Continue flecainide 50 mg twice daily.    She was reassured that atrial fibrillation is not life-threatening, but carries an increased risk for stroke.  She has a [id number] score of 4 for age >75, female gender, and hypertension.  Due to her recent fall, we discussed risk for bleed on a blood thinner and the alternative of left atrial appendage closure with the Watchman device.  However, she does not have significant instability or recurrent falls, so will remain on oral anticoagulation.  Continue Eliquis 5 mg twice daily for stroke prophylaxis    2.  Hypertension: Blood pressure insistently well within target per home readings, consistently 110s/60s.  Denies orthostatic lightheadedness.  Continue metoprolol, amlodipine, chlorthalidone, and losartan.  We again discussed compression stockings for lower extremity edema which remains mild.    Follow up in 1 year.     History of Present Illness/Subjective    Ms. Katie Mar is a 88 y.o. female who comes in today for EP follow-up of atrial fibrillation.  She was diagnosed with atrial fibrillation with controlled ventricular response on 11/29/2017 as an incidental finding during routine exam.  She was initially thought to have symptoms of fatigue and dyspnea on exertion, though symptoms resolved prior to her initial evaluation by me in January  2018, despite remaining in atrial fibrillation.  At that point, she decided against undergoing cardioversion.  She since had recurrence of symptoms and underwent cardioversion on 5/29/2018 after which she had significant improvement in her energy level and activity tolerance.  She was subsequently started on flecainide 50 mg twice daily along with her metoprolol tartrate 25 mg twice daily.  She continues on Eliquis 5 mg twice daily for stroke prophylaxis.     Safia states that she continues to feel very well.  She continues to get a bit winded and tired with exertion.  She has not had any episodes of A. fib.  Her blood pressures have been consistently 110s/60s with resting heart rate in the 50s.  She continues to have some mild swelling in her feet and ankles, progressive throughout the day, but gone by morning.  She denies chest discomfort, palpitations, abdominal fullness/bloating, shortness of breath at rest or light activity, paroxysmal nocturnal dyspnea, orthopnea, lightheadedness, dizziness, pre-syncope, or syncope.  She had a mechanical slip and fall in August and often walks with a walking stick, but denies any significant issues with balance.    Cardiographics (personally reviewed):  EKG, Done 3/14/2019 shows sinus rhythm at 59 bpm with first-degree AV block, QT/QTc interval measures 424/419 ms.  EKG 11/30/2017 shows atrial fibrillation with controlled ventricular response at 62 bpm.     24-hour Holter monitor worn 5/16/2018 showed persistent atrial fibrillation with controlled ventricular response.     Echo done 12/15/2017:  1. Normal left ventricular size and systolic performance with a visually estimated ejection fraction of 65%.   2. There is trace aortic insufficiency.   3. Normal right ventricular size and systolic performance.   4. There is mild to moderate left atrial enlargement. There is mild right atrial enlargement.      NM pharmacologic stress test done 5/16/2018 is negative for inducible  myocardial ischemia or infarction.  LVEF greater than 70%.       Physical Examination Review of Systems   Vitals:    10/09/19 0951   BP: 146/68   Pulse: 60   Resp: 16     Body mass index is 25.69 kg/m .  Wt Readings from Last 3 Encounters:   10/09/19 145 lb (65.8 kg)   08/15/19 145 lb (65.8 kg)   19 144 lb (65.3 kg)     General Appearance:   Alert, well-appearing and in no acute distress.   HEENT: Atraumatic, normocephalic.  No scleral icterus, normal conjunctivae, EOMs intact, PERRL.  Mucous membranes pink and moist.     Chest/Lungs:   Chest symmetric, spine straight.  Respirations unlabored.  Lungs are clear to auscultation.   Cardiovascular:   Regular rate and rhythm.  Normal first and second heart sounds with no murmurs, rubs, or gallops; radial and posterior tibial pulses are intact, No JVD, trace left ankle edema.   Abdomen:  Soft, nondistended, bowel sounds present.   Extremities: No cyanosis or clubbing.   Musculoskeletal: Moves all extremities.  Gait steady.   Skin: Warm, dry, intact.    Neurologic: Mood and affect are appropriate.  Alert and oriented to person, place, time, and situation.    General: WNL  Eyes: WNL  Ears/Nose/Throat: WNL  Lungs: WNL  Heart: Shortness of Breath with activity  Stomach: WNL  Bladder: WNL  Muscle/Joints: WNL  Skin: WNL  Nervous System: WNL  Mental Health: WNL     Blood: WNL       Medical History  Surgical History Family History Social History   Past Medical History:   Diagnosis Date   ? Anxiety    ? Essential hypertension    ? Hypercholesterolemia    ? Persistent atrial fibrillation 2018    Past Surgical History:   Procedure Laterality Date   ? CARDIOVERSION  2018   ? CARPAL TUNNEL RELEASE Right    ? CATARACT EXTRACTION, BILATERAL Bilateral    ? DILATION AND CURETTAGE OF UTERUS      Family History   Problem Relation Age of Onset   ? Ovarian cancer Paternal Aunt    ? Heart failure Mother 76         at home   ? Heart attack Father 49        and a second at  age 54   ? Liver cancer Father    ? Heart attack Brother 49        and  of the second at 62   ? CABG Brother 53   ? No Medical Problems Son    ? Alcoholism Brother    ? Cirrhosis Brother    ? No Medical Problems Son         Lives in [place]   ? No Medical Problems Son     Social History     Tobacco Use   ? Smoking status: Never Smoker   ? Smokeless tobacco: Never Used   Substance Use Topics   ? Alcohol use: Yes     Alcohol/week: 7.0 standard drinks     Types: 7 Glasses of wine per week   ? Drug use: No          Medications  Allergies   Current Outpatient Medications   Medication Sig Dispense Refill   ? acetaminophen (TYLENOL) 500 MG tablet Take 500 mg by mouth at bedtime.     ? amLODIPine (NORVASC) 2.5 MG tablet TAKE 2 TABLETS BY MOUTH ONCE DAILY 180 tablet 1   ? apixaban (ELIQUIS) 5 mg Tab tablet Take 1 tablet (5 mg total) by mouth 2 (two) times a day. 180 tablet 1   ? atorvastatin (LIPITOR) 20 MG tablet Take 1 tablet (20 mg total) by mouth daily. 90 tablet 3   ? chlorthalidone (HYGROTEN) 25 MG tablet TAKE 1 TABLET BY MOUTH ONCE DAILY 90 tablet 2   ? diphenhydrAMINE (ANTIHISTAMINE) 25 mg tablet Take 25 mg by mouth at bedtime.     ? flecainide (TAMBOCOR) 50 MG tablet Take 1 tablet (50 mg total) by mouth 2 (two) times a day. 180 tablet 3   ? levothyroxine (SYNTHROID, LEVOTHROID) 75 MCG tablet TAKE 1 TABLET BY MOUTH ONCE DAILY 90 tablet 0   ? LORazepam (ATIVAN) 0.5 MG tablet TAKE 1 TABLET BY MOUTH AT BEDTIME AS NEEDED FOR SLEEP 30 tablet 0   ? losartan (COZAAR) 100 MG tablet Take 1 tablet (100 mg total) by mouth daily. 90 tablet 3   ? metoprolol tartrate (LOPRESSOR) 25 MG tablet Take 0.5 tablets (12.5 mg total) by mouth 2 (two) times a day. 180 tablet 3     No current facility-administered medications for this visit.     Allergies   Allergen Reactions   ? Clindamycin Hives   ? Doxycycline Hyclate Hives   ? Penicillins Hives   ? Tetanus Toxoid      Site reaction. Hard area of redness and pain.         Lab Results     Chemistry CBC Other   Lab Results   Component Value Date    CREATININE 0.74 06/05/2019    BUN 12 06/05/2019     (L) 06/05/2019    K 3.9 06/05/2019    CL 92 (L) 06/05/2019    CO2 30 06/05/2019     Creatinine (mg/dL)   Date Value   06/05/2019 0.74   10/24/2018 0.72   04/30/2018 0.72   11/29/2017 0.69      Lab Results   Component Value Date    WBC 5.5 10/24/2018    HGB 14.7 10/24/2018    HCT 42.6 10/24/2018    MCV 95 10/24/2018     10/24/2018    Lab Results   Component Value Date     (H) 04/30/2018    TSH 0.42 10/24/2018     No results found for: INR     This note has been dictated using voice recognition software. Any grammatical, typographical, or context distortions are unintentional and inherent to the software.

## 2021-06-29 NOTE — PROGRESS NOTES
"Progress Notes by Pauline Carr CNP at 5/8/2020  9:10 AM     Author: Pauline Carr CNP Service: -- Author Type: Nurse Practitioner    Filed: 5/8/2020  9:42 AM Encounter Date: 5/8/2020 Status: Signed    : Pauline Carr CNP (Nurse Practitioner)           The patient has been notified of following:     \"This telephone visit will be conducted via a call between you and your physician/provider. We have found that certain health care needs can be provided without the need for a physical exam.  This service lets us provide the care you need with a phone conversation.  If a prescription is necessary we can send it directly to your pharmacy.  If lab work is needed we can place an order for that and you can then stop by our lab to have the test done at a later time. If during the course of the call the physician/provider feels a telephone visit is not appropriate, you will not be charged for this service.\" Verbal consent has been obtained for this service by care team member:         HEART CARE PHONE ENCOUNTER        The patient has chosen to have the visit conducted as a telephone visit, to reduce risk of exposure given the current status of Coronavirus in our community. This telephone visit is being conducted via a call between the patient and physician/provider. Health care needs are being provided without a physical exam.     Assessment/Recommendations   Assessment and Plan:    1.  Persistent atrial Fibrillation:  She did not tolerate the increase in flecainide due to side effects of dizziness, though palpitations have essentially resolved on their own, likely stress related PACs.  Continue flecainide 50 mg twice daily with metoprolol tartrate 12.5 mg twice daily.  She continues to have mild bradycardia, but is asymptomatic.     She was reassured that atrial fibrillation is not life-threatening, but carries an increased risk for stroke.  She has a [id number] score of 4 for age >75, female gender, and " "hypertension.   Continue Eliquis 5 mg twice daily for stroke prophylaxis.  (She does not meet criteria for dose adjustment of Eliquis)     2.  Hypertension: Blood pressure at target per home readings.  Denies orthostatic lightheadedness.  Continue metoprolol, amlodipine, chlorthalidone, and losartan.      Follow Up Plan: Follow up in 3 months  I have reviewed the note as documented.  This accurately captures the substance of my conversation with the patient.    Total time of call between patient and provider was 12 minutes   Start Time: 0914   Stop Time: 0926       History of Present Illness/Subjective    Katie Mar is a 88 y.o. female who is being evaluated via a billable telephone visit for urgent EP follow up of atrial fibrillation.  She has a history of atrial fibrillation, hypertension, hypothyroidism, and IBS.  She was diagnosed with atrial fibrillation with controlled ventricular response on 11/29/2017 as an incidental finding during routine exam.  She was initially thought to have symptoms of fatigue and dyspnea on exertion, though symptoms resolved prior to her initial evaluation by me in January 2018, despite remaining in atrial fibrillation.  At that point, she decided against undergoing cardioversion.  She since had recurrence of symptoms and underwent cardioversion on 5/29/2018 after which she had significant improvement in her energy level and activity tolerance.  She was subsequently started on flecainide 50 mg twice daily along with her metoprolol tartrate 25 mg twice daily. Metoprolol was decreased due to sinus bradycardia.  She continues on Eliquis 5 mg twice daily for stroke prophylaxis.     Katie states that she was dizzy on the higher dose of flecainide, so she decreased back to 50 mg two times a day and feels better.  She notes that the \"skips\" did not decrease on the higher dose, but have subsequently resolved.  She has been doing breathing exercises to decrease stress, so believes " "the \"skips\" were stress-related.  She continues to get a bit winded and tired with exertion, but states this is at baseline.  Her blood pressures have been consistently 120s/60s with resting heart rate in the 50s.  She continues to have some mild swelling in her feet and ankles, progressive throughout the day, but gone by morning.  She denies chest discomfort, palpitations, abdominal fullness/bloating, shortness of breath at rest or light activity, paroxysmal nocturnal dyspnea, orthopnea, lightheadedness, dizziness, pre-syncope, or syncope.       Cardiographics (EKGs personally reviewed):  EKG, Done 3/14/2019 shows sinus rhythm at 59 bpm with first-degree AV block, QT/QTc interval measures 424/419 ms.  EKG 11/30/2017 shows atrial fibrillation with controlled ventricular response at 62 bpm.     24-hour Holter monitor worn 5/16/2018 showed persistent atrial fibrillation with controlled ventricular response.     Echo done 12/15/2017:  1. Normal left ventricular size and systolic performance with a visually estimated ejection fraction of 65%.   2. There is trace aortic insufficiency.   3. Normal right ventricular size and systolic performance.   4. There is mild to moderate left atrial enlargement. There is mild right atrial enlargement.      NM pharmacologic stress test done 5/16/2018 is negative for inducible myocardial ischemia or infarction.  LVEF greater than 70%.    I have reviewed and updated the patient's Past Medical History, Social History, Family History and Medication List.     Physical Examination not performed given phone encounter Review of Systems          See CMA note attached, personally reviewed.                                      Medical History  Surgical History Family History Social History   Past Medical History:   Diagnosis Date   ? Anxiety    ? Essential hypertension    ? Hypercholesterolemia    ? Persistent atrial fibrillation 1/18/2018    Past Surgical History:   Procedure Laterality Date "   ? CARDIOVERSION  2018   ? CARPAL TUNNEL RELEASE Right    ? CATARACT EXTRACTION, BILATERAL Bilateral    ? DILATION AND CURETTAGE OF UTERUS      Family History   Problem Relation Age of Onset   ? Ovarian cancer Paternal Aunt    ? Heart failure Mother 76         at home   ? Heart attack Father 49        and a second at age 54   ? Liver cancer Father    ? Heart attack Brother 49        and  of the second at 62   ? CABG Brother 53   ? No Medical Problems Son    ? Alcoholism Brother    ? Cirrhosis Brother    ? No Medical Problems Son         Lives in [place]   ? No Medical Problems Son     Social History     Socioeconomic History   ? Marital status:      Spouse name: Not on file   ? Number of children: 3   ? Years of education: 16   ? Highest education level: Not on file   Occupational History   ? Occupation:      Employer: RETIRED     Comment: worked for a [employer]   Social Needs   ? Financial resource strain: Not on file   ? Food insecurity     Worry: Not on file     Inability: Not on file   ? Transportation needs     Medical: Not on file     Non-medical: Not on file   Tobacco Use   ? Smoking status: Never Smoker   ? Smokeless tobacco: Never Used   Substance and Sexual Activity   ? Alcohol use: Yes     Alcohol/week: 7.0 standard drinks     Types: 7 Glasses of wine per week   ? Drug use: No   ? Sexual activity: Not Currently     Partners: Male   Lifestyle   ? Physical activity     Days per week: Not on file     Minutes per session: Not on file   ? Stress: Not on file   Relationships   ? Social connections     Talks on phone: Not on file     Gets together: Not on file     Attends Nondenominational service: Not on file     Active member of club or organization: Not on file     Attends meetings of clubs or organizations: Not on file     Relationship status: Not on file   ? Intimate partner violence     Fear of current or ex partner: Not on file     Emotionally abused: Not on file     Physically abused:  Not on file     Forced sexual activity: Not on file   Other Topics Concern   ? Not on file   Social History Narrative    Lives alone since her  passed in 2013. She lives in [facility]. She still drives in 2018. She walks on her own. She enjoys playing bridge and going out to lunch.        She has 3 sons: Bi lives in [facility] and does check in on her.  Another son lives in [facility] and travels a lot.  The other son lives in [facility] and is retired.  She has 2 grandchildren.        She has advanced directives and Bi is in charge of her affairs.          Medications  Allergies   Current Outpatient Medications   Medication Sig Dispense Refill   ? acetaminophen (TYLENOL) 500 MG tablet Take 500 mg by mouth at bedtime.     ? amLODIPine (NORVASC) 2.5 MG tablet TAKE 2 TABLETS BY MOUTH ONCE DAILY 180 tablet 3   ? atorvastatin (LIPITOR) 20 MG tablet TAKE 1 TABLET BY MOUTH ONCE DAILY 90 tablet 1   ? chlorthalidone (HYGROTEN) 25 MG tablet TAKE 1 TABLET BY MOUTH ONCE DAILY 90 tablet 0   ? diphenhydrAMINE (ANTIHISTAMINE) 25 mg tablet Take 25 mg by mouth at bedtime.     ? ELIQUIS 5 mg Tab tablet TAKE 1 TABLET BY MOUTH TWICE DAILY 180 tablet 1   ? flecainide (TAMBOCOR) 50 MG tablet Take 1 tablet (50 mg total) by mouth 2 (two) times a day. 180 tablet 3   ? levothyroxine (SYNTHROID, LEVOTHROID) 75 MCG tablet TAKE 1 TABLET BY MOUTH ONCE DAILY 90 tablet 3   ? LORazepam (ATIVAN) 0.5 MG tablet TAKE 1 TABLET BY MOUTH AT BEDTIME AS NEEDED FOR SLEEP 90 tablet 0   ? losartan (COZAAR) 100 MG tablet TAKE 1 TABLET BY MOUTH ONCE DAILY 90 tablet 1   ? metoprolol tartrate (LOPRESSOR) 25 MG tablet Take 0.5 tablets (12.5 mg total) by mouth 2 (two) times a day. 180 tablet 3     No current facility-administered medications for this visit.     Allergies   Allergen Reactions   ? Clindamycin Hives   ? Doxycycline Hyclate Hives   ? Penicillins Hives   ? Tetanus Toxoid      Site reaction. Hard  area of redness and pain.         Lab Results    Chemistry/lipid CBC Cardiac Enzymes/BNP/TSH/INR   Lab Results   Component Value Date    CHOL 197 10/24/2018     10/24/2018    LDLCALC 77 10/24/2018    TRIG 80 10/24/2018    CREATININE 0.74 06/05/2019    BUN 12 06/05/2019    K 3.9 06/05/2019     (L) 06/05/2019    CL 92 (L) 06/05/2019    CO2 30 06/05/2019    Lab Results   Component Value Date    WBC 5.5 10/24/2018    HGB 14.7 10/24/2018    HCT 42.6 10/24/2018    MCV 95 10/24/2018     10/24/2018    Lab Results   Component Value Date     (H) 04/30/2018    TSH 0.42 10/24/2018        Pauline Carr CNP  Cardiac Electrophysiology

## 2021-06-29 NOTE — PROGRESS NOTES
"Progress Notes by Pauline Carr CNP at 4/23/2020  3:30 PM     Author: Pauline Carr CNP Service: -- Author Type: Nurse Practitioner    Filed: 4/23/2020  4:03 PM Encounter Date: 4/23/2020 Status: Signed    : Pauline Carr CNP (Nurse Practitioner)           The patient has been notified of following:     \"This telephone visit will be conducted via a call between you and your physician/provider. We have found that certain health care needs can be provided without the need for a physical exam.  This service lets us provide the care you need with a phone conversation.  If a prescription is necessary we can send it directly to your pharmacy.  If lab work is needed we can place an order for that and you can then stop by our lab to have the test done at a later time. If during the course of the call the physician/provider feels a telephone visit is not appropriate, you will not be charged for this service.\" Verbal consent has been obtained for this service by care team member:         HEART CARE PHONE ENCOUNTER        The patient has chosen to have the visit conducted as a telephone visit, to reduce risk of exposure given the current status of Coronavirus in our community. This telephone visit is being conducted via a call between the patient and physician/provider. Health care needs are being provided without a physical exam.     Assessment/Recommendations   Assessment and Plan:    1.  Persistent atrial Fibrillation:  Intermittent palpitations since 4/9/2020 without other associated symptoms.  Decrease metoprolol tartrate back to 12.5 mg twice daily due to sinus bradycardia.  Increase flecainide to 100 mg twice daily.       She was reassured that atrial fibrillation is not life-threatening, but carries an increased risk for stroke.  She has a [id number] score of 4 for age >75, female gender, and hypertension.   Continue Eliquis 5 mg twice daily for stroke prophylaxis.     2.  Hypertension: Blood pressure at " "target per home readings.  Denies orthostatic lightheadedness.  Continue metoprolol, amlodipine, chlorthalidone, and losartan.      Follow Up Plan: Follow up in 2 weeks  I have reviewed the note as documented.  This accurately captures the substance of my conversation with the patient.    Total time of call between patient and provider was 21 minutes   Start Time: 1530   Stop Time: 1551       History of Present Illness/Subjective    Katie Mar is a 88 y.o. female who is being evaluated via a billable telephone visit for urgent EP follow up of atrial fibrillation.  She has a history of atrial fibrillation, hypertension, hypothyroidism, and IBS.  She was diagnosed with atrial fibrillation with controlled ventricular response on 11/29/2017 as an incidental finding during routine exam.  She was initially thought to have symptoms of fatigue and dyspnea on exertion, though symptoms resolved prior to her initial evaluation by me in January 2018, despite remaining in atrial fibrillation.  At that point, she decided against undergoing cardioversion.  She since had recurrence of symptoms and underwent cardioversion on 5/29/2018 after which she had significant improvement in her energy level and activity tolerance.  She was subsequently started on flecainide 50 mg twice daily along with her metoprolol tartrate 25 mg twice daily.  She continues on Eliquis 5 mg twice daily for stroke prophylaxis.     Safia states that she feels well, but has been having intermittent palpitations off and on for the past 2 weeks.  Some days she has frequent skips, other days none.  She increased her metoprolol back to 25 mg twice daily and it was quiet for a couple of days, then with recurrent \"skips\".  She has no other associated symptoms.  She continues to get a bit winded and tired with exertion, but states this is at baseline.  Her blood pressures have been consistently 120s/60s with resting heart rate in the 50s.  She has had a couple " of heart rate readings in the upper 40s since increasing metoprolol.  She continues to have some mild swelling in her feet and ankles, progressive throughout the day, but gone by morning.  She denies chest discomfort, palpitations, abdominal fullness/bloating, shortness of breath at rest or light activity, paroxysmal nocturnal dyspnea, orthopnea, lightheadedness, dizziness, pre-syncope, or syncope.       Cardiographics (EKGs personally reviewed):  EKG, Done 3/14/2019 shows sinus rhythm at 59 bpm with first-degree AV block, QT/QTc interval measures 424/419 ms.  EKG 11/30/2017 shows atrial fibrillation with controlled ventricular response at 62 bpm.     24-hour Holter monitor worn 5/16/2018 showed persistent atrial fibrillation with controlled ventricular response.     Echo done 12/15/2017:  1. Normal left ventricular size and systolic performance with a visually estimated ejection fraction of 65%.   2. There is trace aortic insufficiency.   3. Normal right ventricular size and systolic performance.   4. There is mild to moderate left atrial enlargement. There is mild right atrial enlargement.      NM pharmacologic stress test done 5/16/2018 is negative for inducible myocardial ischemia or infarction.  LVEF greater than 70%.    I have reviewed and updated the patient's Past Medical History, Social History, Family History and Medication List.     Physical Examination not performed given phone encounter Review of Systems          See CMA note attached, personally reviewed.                                      Medical History  Surgical History Family History Social History   Past Medical History:   Diagnosis Date   ? Anxiety    ? Essential hypertension    ? Hypercholesterolemia    ? Persistent atrial fibrillation 1/18/2018    Past Surgical History:   Procedure Laterality Date   ? CARDIOVERSION  05/29/2018   ? CARPAL TUNNEL RELEASE Right    ? CATARACT EXTRACTION, BILATERAL Bilateral    ? DILATION AND CURETTAGE OF UTERUS       Family History   Problem Relation Age of Onset   ? Ovarian cancer Paternal Aunt    ? Heart failure Mother 76         at home   ? Heart attack Father 49        and a second at age 54   ? Liver cancer Father    ? Heart attack Brother 49        and  of the second at 62   ? CABG Brother 53   ? No Medical Problems Son    ? Alcoholism Brother    ? Cirrhosis Brother    ? No Medical Problems Son         Lives in [place]   ? No Medical Problems Son     Social History     Socioeconomic History   ? Marital status:      Spouse name: Not on file   ? Number of children: 3   ? Years of education: 16   ? Highest education level: Not on file   Occupational History   ? Occupation:      Employer: RETIRED     Comment: worked for a [employer]PA   Social Needs   ? Financial resource strain: Not on file   ? Food insecurity     Worry: Not on file     Inability: Not on file   ? Transportation needs     Medical: Not on file     Non-medical: Not on file   Tobacco Use   ? Smoking status: Never Smoker   ? Smokeless tobacco: Never Used   Substance and Sexual Activity   ? Alcohol use: Yes     Alcohol/week: 7.0 standard drinks     Types: 7 Glasses of wine per week   ? Drug use: No   ? Sexual activity: Not Currently     Partners: Male   Lifestyle   ? Physical activity     Days per week: Not on file     Minutes per session: Not on file   ? Stress: Not on file   Relationships   ? Social connections     Talks on phone: Not on file     Gets together: Not on file     Attends Voodoo service: Not on file     Active member of club or organization: Not on file     Attends meetings of clubs or organizations: Not on file     Relationship status: Not on file   ? Intimate partner violence     Fear of current or ex partner: Not on file     Emotionally abused: Not on file     Physically abused: Not on file     Forced sexual activity: Not on file   Other Topics Concern   ? Not on file   Social History Narrative    Lives alone since her   passed in 2013. She lives in [facility]. She still drives in 2018. She walks on her own. She enjoys playing bridge and going out to lunch.        She has 3 sons: Bi lives in [facility] and does check in on her.  Another son lives in [facility] [place] and travels a lot.  The other son lives in [facility] and is retired.  She has 2 grandchildren.        She has advanced directives and Bi is in charge of her affairs.          Medications  Allergies   Current Outpatient Medications   Medication Sig Dispense Refill   ? acetaminophen (TYLENOL) 500 MG tablet Take 500 mg by mouth at bedtime.     ? amLODIPine (NORVASC) 2.5 MG tablet TAKE 2 TABLETS BY MOUTH ONCE DAILY 180 tablet 3   ? atorvastatin (LIPITOR) 20 MG tablet TAKE 1 TABLET BY MOUTH ONCE DAILY 90 tablet 1   ? chlorthalidone (HYGROTEN) 25 MG tablet TAKE 1 TABLET BY MOUTH ONCE DAILY 90 tablet 0   ? diphenhydrAMINE (ANTIHISTAMINE) 25 mg tablet Take 25 mg by mouth at bedtime.     ? ELIQUIS 5 mg Tab tablet TAKE 1 TABLET BY MOUTH TWICE DAILY 180 tablet 1   ? flecainide (TAMBOCOR) 50 MG tablet Take 2 tablets (100 mg total) by mouth 2 (two) times a day. 180 tablet 2   ? levothyroxine (SYNTHROID, LEVOTHROID) 75 MCG tablet TAKE 1 TABLET BY MOUTH ONCE DAILY 90 tablet 3   ? LORazepam (ATIVAN) 0.5 MG tablet TAKE 1 TABLET BY MOUTH AT BEDTIME AS NEEDED FOR SLEEP 90 tablet 0   ? losartan (COZAAR) 100 MG tablet TAKE 1 TABLET BY MOUTH ONCE DAILY 90 tablet 1   ? metoprolol tartrate (LOPRESSOR) 25 MG tablet Take 0.5 tablets (12.5 mg total) by mouth 2 (two) times a day. (Patient taking differently: Take 25 mg by mouth 2 (two) times a day. ) 180 tablet 3     No current facility-administered medications for this visit.     Allergies   Allergen Reactions   ? Clindamycin Hives   ? Doxycycline Hyclate Hives   ? Penicillins Hives   ? Tetanus Toxoid      Site reaction. Hard area of redness and pain.         Lab Results    Chemistry/lipid CBC  Cardiac Enzymes/BNP/TSH/INR   Lab Results   Component Value Date    CHOL 197 10/24/2018     10/24/2018    LDLCALC 77 10/24/2018    TRIG 80 10/24/2018    CREATININE 0.74 06/05/2019    BUN 12 06/05/2019    K 3.9 06/05/2019     (L) 06/05/2019    CL 92 (L) 06/05/2019    CO2 30 06/05/2019    Lab Results   Component Value Date    WBC 5.5 10/24/2018    HGB 14.7 10/24/2018    HCT 42.6 10/24/2018    MCV 95 10/24/2018     10/24/2018    Lab Results   Component Value Date     (H) 04/30/2018    TSH 0.42 10/24/2018        Pauline Carr CNP  Cardiac Electrophysiology

## 2021-06-30 ENCOUNTER — RECORDS - HEALTHEAST (OUTPATIENT)
Dept: ADMINISTRATIVE | Facility: OTHER | Age: 86
End: 2021-06-30

## 2021-06-30 DIAGNOSIS — I48.19 PERSISTENT ATRIAL FIBRILLATION (H): ICD-10-CM

## 2021-06-30 RX ORDER — FLECAINIDE ACETATE 50 MG/1
TABLET ORAL
Qty: 180 TABLET | Refills: 1 | Status: SHIPPED | OUTPATIENT
Start: 2021-06-30 | End: 2022-02-08

## 2021-06-30 NOTE — PROGRESS NOTES
"Progress Notes by Pauline Carr CNP at 1/6/2021  9:50 AM     Author: Pauline Carr CNP Service: -- Author Type: Nurse Practitioner    Filed: 1/6/2021 10:23 AM Encounter Date: 1/6/2021 Status: Signed    : Pauline Carr CNP (Nurse Practitioner)           The patient has been notified of following:     \"This telephone visit will be conducted via a call between you and your physician/provider. We have found that certain health care needs can be provided without the need for a physical exam.  This service lets us provide the care you need with a phone conversation.  If a prescription is necessary we can send it directly to your pharmacy.  If lab work is needed we can place an order for that and you can then stop by our lab to have the test done at a later time. If during the course of the call the physician/provider feels a telephone visit is not appropriate, you will not be charged for this service.\" Verbal consent has been obtained for this service by care team member:         HEART CARE PHONE ENCOUNTER        The patient has chosen to have the visit conducted as a telephone visit, to reduce risk of exposure given the current status of Coronavirus in our community. This telephone visit is being conducted via a call between the patient and physician/provider. Health care needs are being provided without a physical exam.     Assessment/Recommendations   Assessment and Plan:    1.  Persistent atrial Fibrillation:  Only one brief episode of Afib.  She was in regular rhythm at her recent annual exam (note reviewed).  Continue flecainide 50 mg twice daily with metoprolol tartrate 12.5 mg twice daily.  She continues to have mild asymptomatic bradycardia.     She was reassured that atrial fibrillation is not life-threatening, but carries an increased risk for stroke.  She has a [id number] score of 4 for age >75, female gender, and hypertension.   Continue Eliquis 5 mg twice daily for stroke prophylaxis.  (She does " "not meet criteria for dose adjustment of Eliquis)     2.  Hypertension: Blood pressure at target per home readings and recent clinic visit.  Denies orthostatic lightheadedness.  Continue metoprolol, amlodipine, chlorthalidone, and losartan.      Follow Up Plan: Follow up in 6 months  I have reviewed the note as documented.  This accurately captures the substance of my conversation with the patient.    Total time of call between patient and provider was 13 minutes   Start Time: 1001   Stop Time: 1014       History of Present Illness/Subjective    Katie Mar is a 89 y.o. female who is being evaluated via a billable telephone visit for urgent EP follow up of atrial fibrillation.  She has a history of atrial fibrillation, hypertension, hypothyroidism, and IBS.  She was diagnosed with atrial fibrillation with controlled ventricular response on 11/29/2017 as an incidental finding during routine exam.  She was initially thought to have symptoms of fatigue and dyspnea on exertion, though symptoms resolved prior to her initial evaluation by me in January 2018, despite remaining in atrial fibrillation.  At that point, she decided against undergoing cardioversion.  However, she had recurrence of symptoms, so underwent cardioversion on 5/29/2018.  She had significant improvement in her energy level and activity tolerance with maintenance of sinus rhythm.  She was subsequently started on flecainide 50 mg twice daily along with her metoprolol tartrate 25 mg twice daily. Metoprolol was decreased due to sinus bradycardia. She did not tolerate a higher dose of flecainide due to side effects of dizziness.  She continues on Eliquis 5 mg twice daily for stroke prophylaxis.     Safia states that she has been feeling well.  She has had only one brief episode of A fib she believes was triggered by stres and terminated with \"mindful breathing\".  She continues to do her breathing exercises to decrease stress.  She gets a bit winded " and tired with exertion, but states this is at baseline.  Her blood pressures have been consistently 120s/60s with resting heart rate in the 50s.  She continues to have some mild swelling in her feet and ankles, progressive throughout the day, but gone by morning.  She denies chest discomfort, palpitations, abdominal fullness/bloating, shortness of breath at rest or light activity, paroxysmal nocturnal dyspnea, orthopnea, lightheadedness, dizziness, pre-syncope, or syncope.      Vitals - Patient Reported  Systolic (Patient Reported): 136  Diastolic (Patient Reported): 60  Weight (Patient Reported): 136 lb 9.6 oz (62 kg)  Pulse (Patient Reported): 55     Cardiographics (EKGs personally reviewed):  EKG, Done 3/14/2019 shows sinus rhythm at 59 bpm with first-degree AV block, QT/QTc interval measures 424/419 ms.  EKG 11/30/2017 shows atrial fibrillation with controlled ventricular response at 62 bpm.     24-hour Holter monitor worn 5/16/2018 showed persistent atrial fibrillation with controlled ventricular response.     Echo done 12/15/2017:  1. Normal left ventricular size and systolic performance with a visually estimated ejection fraction of 65%.   2. There is trace aortic insufficiency.   3. Normal right ventricular size and systolic performance.   4. There is mild to moderate left atrial enlargement. There is mild right atrial enlargement.      NM pharmacologic stress test done 5/16/2018 is negative for inducible myocardial ischemia or infarction.  LVEF greater than 70%.    I have reviewed and updated the patient's Past Medical History, Social History, Family History and Medication List.     Physical Examination not performed given phone encounter Review of Systems          See CMA note attached, personally reviewed.                                      Medical History  Surgical History Family History Social History   Past Medical History:   Diagnosis Date   ? Anxiety    ? Essential hypertension    ?  Hypercholesterolemia    ? Persistent atrial fibrillation (H) 2018    Past Surgical History:   Procedure Laterality Date   ? CARDIOVERSION  2018   ? CARPAL TUNNEL RELEASE Right    ? CATARACT EXTRACTION, BILATERAL Bilateral    ? DILATION AND CURETTAGE OF UTERUS      Family History   Problem Relation Age of Onset   ? Ovarian cancer Paternal Aunt    ? Heart failure Mother 76         at home   ? Heart attack Father 49        and a second at age 54   ? Liver cancer Father    ? Heart attack Brother 49        and  of the second at 62   ? CABG Brother 53   ? No Medical Problems Son    ? Alcoholism Brother    ? Cirrhosis Brother    ? No Medical Problems Son         Lives in [place]   ? No Medical Problems Son     Social History     Socioeconomic History   ? Marital status:      Spouse name: Not on file   ? Number of children: 3   ? Years of education: 16   ? Highest education level: Not on file   Occupational History   ? Occupation:      Employer: RETIRED     Comment: worked for a [employer]   Social Needs   ? Financial resource strain: Not on file   ? Food insecurity     Worry: Not on file     Inability: Not on file   ? Transportation needs     Medical: Not on file     Non-medical: Not on file   Tobacco Use   ? Smoking status: Never Smoker   ? Smokeless tobacco: Never Used   Substance and Sexual Activity   ? Alcohol use: Yes     Alcohol/week: 7.0 standard drinks     Types: 7 Glasses of wine per week   ? Drug use: No   ? Sexual activity: Not Currently     Partners: Male   Lifestyle   ? Physical activity     Days per week: Not on file     Minutes per session: Not on file   ? Stress: Not on file   Relationships   ? Social connections     Talks on phone: Not on file     Gets together: Not on file     Attends Christian service: Not on file     Active member of club or organization: Not on file     Attends meetings of clubs or organizations: Not on file     Relationship status: Not on file   ?  Intimate partner violence     Fear of current or ex partner: Not on file     Emotionally abused: Not on file     Physically abused: Not on file     Forced sexual activity: Not on file   Other Topics Concern   ? Not on file   Social History Kathrine    Lives alone since her  passed in 2013. She lives in [facility]. She still drives in 2018. She walks on her own. She enjoys playing bridge and going out to lunch.        She has 3 sons: Bi lives in [facility] and does check in on her.  Another son lives in [facility] and travels a lot.  The other son lives in [facility] and is retired.  She has 2 grandchildren.        She has advanced directives and Bi is in charge of her affairs.          Medications  Allergies   Current Outpatient Medications   Medication Sig Dispense Refill   ? acetaminophen (TYLENOL) 500 MG tablet Take 500 mg by mouth at bedtime.     ? amLODIPine (NORVASC) 2.5 MG tablet Take 2 tablets by mouth once daily 180 tablet 0   ? apixaban ANTICOAGULANT (ELIQUIS) 5 mg Tab tablet Take 1 tablet (5 mg total) by mouth 2 (two) times a day. 180 tablet 3   ? atorvastatin (LIPITOR) 20 MG tablet Take 1 tablet by mouth once daily 90 tablet 3   ? chlorthalidone (HYGROTEN) 25 MG tablet Take 1 tablet (25 mg total) by mouth daily. 90 tablet 3   ? diphenhydrAMINE (ANTIHISTAMINE) 25 mg tablet Take 25 mg by mouth at bedtime.     ? flecainide (TAMBOCOR) 50 MG tablet Take 1 tablet (50 mg total) by mouth 2 (two) times a day. 180 tablet 3   ? levothyroxine (SYNTHROID, LEVOTHROID) 75 MCG tablet TAKE 1 TABLET BY MOUTH ONCE DAILY 90 tablet 3   ? LORazepam (ATIVAN) 0.5 MG tablet TAKE 1 TABLET BY MOUTH AT BEDTIME AS NEEDED 30 tablet 0   ? losartan (COZAAR) 100 MG tablet Take 1 tablet by mouth once daily 90 tablet 2   ? metoprolol tartrate (LOPRESSOR) 25 MG tablet Take 0.5 tablets (12.5 mg total) by mouth 2 (two) times a day. 90 tablet 3     No current facility-administered  medications for this visit.     Allergies   Allergen Reactions   ? Clindamycin Hives   ? Doxycycline Hyclate Hives   ? Penicillins Hives   ? Tetanus Toxoid      Site reaction. Hard area of redness and pain.         Lab Results    Chemistry/lipid CBC Cardiac Enzymes/BNP/TSH/INR   Lab Results   Component Value Date    CHOL 179 07/06/2020    HDL 85 07/06/2020    LDLCALC 75 07/06/2020    TRIG 93 07/06/2020    CREATININE 0.71 12/29/2020    BUN 17 12/29/2020    K 3.7 12/29/2020     (L) 12/29/2020    CL 91 (L) 12/29/2020    CO2 30 12/29/2020    Lab Results   Component Value Date    WBC 5.5 10/24/2018    HGB 14.7 10/24/2018    HCT 42.6 10/24/2018    MCV 95 10/24/2018     10/24/2018    Lab Results   Component Value Date     (H) 04/30/2018    TSH 0.31 12/29/2020        Pauline Carr CNP  Cardiac Electrophysiology

## 2021-07-06 VITALS
OXYGEN SATURATION: 97 % | BODY MASS INDEX: 25.2 KG/M2 | SYSTOLIC BLOOD PRESSURE: 122 MMHG | DIASTOLIC BLOOD PRESSURE: 54 MMHG | HEIGHT: 63 IN | WEIGHT: 142.2 LBS | HEART RATE: 75 BPM

## 2021-07-08 ENCOUNTER — COMMUNICATION - HEALTHEAST (OUTPATIENT)
Dept: FAMILY MEDICINE | Facility: CLINIC | Age: 86
End: 2021-07-08

## 2021-07-08 DIAGNOSIS — F41.9 ANXIETY: ICD-10-CM

## 2021-07-08 RX ORDER — LORAZEPAM 0.5 MG/1
TABLET ORAL
Qty: 30 TABLET | Refills: 2 | Status: SHIPPED | OUTPATIENT
Start: 2021-07-08 | End: 2022-03-28

## 2021-07-08 NOTE — TELEPHONE ENCOUNTER
Telephone Encounter by Can Marin MA at 7/8/2021  7:50 AM     Author: Can Marin MA Service: -- Author Type: Medical Assistant    Filed: 7/8/2021  7:51 AM Encounter Date: 7/8/2021 Status: Signed    : Can Marin MA (Medical Assistant)       Medication: LORazepam (ATIVAN) 0.5 MG tablet     Last Date Filled 6/8/2021  Last appointment addressing medication use: 6/15/2021      Taken as prescribed from physician notes? YES    CSA in last year: NO    Random Utox in last year: NO  (if any of the above answer NO - schedule with PCP)     Opioids + benzodiazepines? YES  (if the above answer YES - schedule with PCP every 6 months)       All responses suggest: Refilling prescription

## 2021-07-08 NOTE — TELEPHONE ENCOUNTER
Telephone Encounter by Tom Gee RN at 7/8/2021  7:14 AM     Author: Tom Gee RN Service: -- Author Type: Registered Nurse    Filed: 7/8/2021  7:15 AM Encounter Date: 7/8/2021 Status: Signed    : Tom Gee RN (Registered Nurse)       Controlled Substance Refill Request  Medication Name:   Requested Prescriptions     Pending Prescriptions Disp Refills   ? LORazepam (ATIVAN) 0.5 MG tablet [Pharmacy Med Name: LORazepam 0.5 MG Oral Tablet] 30 tablet 0     Sig: TAKE 1 TABLET BY MOUTH AT BEDTIME AS NEEDED     Date Last Fill: 6/8/21  Requested Pharmacy: [facility]  Submit electronically to pharmacy  Controlled Substance Agreement on file:   Encounter-Level CSA Scan Date:    There are no encounter-level csa scan date.        Last office visit:  6/15/21

## 2021-07-21 ENCOUNTER — RECORDS - HEALTHEAST (OUTPATIENT)
Dept: ADMINISTRATIVE | Facility: CLINIC | Age: 86
End: 2021-07-21

## 2021-07-22 ENCOUNTER — RECORDS - HEALTHEAST (OUTPATIENT)
Dept: FAMILY MEDICINE | Facility: CLINIC | Age: 86
End: 2021-07-22

## 2021-07-22 DIAGNOSIS — Z13.9 SCREENING FOR CONDITION: ICD-10-CM

## 2021-08-04 DIAGNOSIS — I10 ESSENTIAL HYPERTENSION: ICD-10-CM

## 2021-08-06 NOTE — PATIENT INSTRUCTIONS - HE
Patient Instructions by Aguila Lal CNP at 12/29/2020  9:20 AM     Author: Aguila Lal CNP Service: -- Author Type: Nurse Practitioner    Filed: 12/29/2020  9:28 AM Encounter Date: 12/29/2020 Status: Signed    : Aguila Lal CNP (Nurse Practitioner)         Patient Education   Understanding USDA MyPlate  The USDA (US Department of Agriculture) has guidelines to help you make healthy food choices. These are called MyPlate. MyPlate shows the food groups that make up healthy meals using the image of a place setting. Before you eat, think about the healthiest choices for what to put onto your plate or into your cup or bowl. To learn more about building a healthy plate, visit www.choosemyplate.gov.       The Food Groups    Fruits: Any fruit or 100% fruit juice counts as part of the Fruit Group. Fruits may be fresh, canned, frozen, or dried, and may be whole, cut-up, or pureed. Make half your plate fruits and vegetables.    Vegetables: Any vegetable or 100% vegetable juice counts as a member of the Vegetable Group. Vegetables may be fresh, frozen, canned, or dried. They can be served raw or cooked and may be whole, cut-up, or mashed. Make half your plate fruits and vegetables.     Grains: All foods made from grains are part of the Grains Group. These include wheat, rice, oats, cornmeal, and barley such as bread, pasta, oatmeal, cereal, tortillas, and grits. Grains should be no more than a quarter of your plate. At least half of your grains should be whole grains.    Protein: This group includes meat, poultry, seafood, beans and peas, eggs, processed soy products (like tofu), nuts (including nut butters), and seeds. Make protein choices no more than a quarter of your plate. Meat and poultry choices should be lean or low fat.    Dairy: All fluid milk products and foods made from milk that contain calcium, like yogurt and cheese are part of the Dairy Group. (Foods that have little calcium, such as cream,  butter, and cream cheese, are not part of the group.) Most dairy choices should be low-fat or fat-free.    Oils: These are fats that are liquid at room temperature. They include canola, corn, olive, soybean, and sunflower oil. Foods that are mainly oil include mayonnaise, certain salad dressings, and soft margarines. You should have only 5 to 7 teaspoons of oils a day. You probably already get this much from the food you eat.  Use [employer] to Help Build Your Meals  The [employer] can help you plan and track your meals and activity. You can look up individual foods to see or compare their nutritional value. You can get guidelines for what and how much you should eat. You can compare your food choices. And you can assess personal physical activities and see ways you can improve. Go to www.[employer].gov/Jaspercker/.    3053-2914 The [employer]. [address] [place] [address]. All rights reserved. This information is not intended as a substitute for professional medical care. Always follow your healthcare professional's instructions.           Patient Education   Urinary Incontinence, Female (Adult)  Urinary incontinence means loss of control of the bladder. This problem affects many women, especially as they get older. If you have incontinence, you may be embarrassed to ask for help. But know that this problem can be treated.  Types of Incontinence  There are different types of incontinence. Two of the main types are described here. You can have more than one type.    Stress incontinence. With this type, urine leaks when pressure (stress) is put on the bladder. This may happen when you cough, sneeze, or laugh. Stress incontinence most often occurs because the pelvic floor muscles that support the bladder and urethra are weak. This can happen after pregnancy and vaginal childbirth or a hysterectomy. It can also be due to excess body weight or hormone changes.    Urge incontinence (also  called overactive bladder). With this type, a sudden urge to urinate is felt often. This may happen even though there may not be much urine in the bladder. The need to urinate often during the night is common. Urge incontinence most often occurs because of bladder spasms. This may be due to bladder irritation or infection. Damage to bladder nerves or pelvic muscles, constipation, and certain medicines can also lead to urge incontinence.  Treatment of urinary incontinence depends on the cause. Further evaluation is needed to find the type you have. This will likely include an exam and certain tests. Based on the results, you and your healthcare provider can then plan treatment. Until a diagnosis is made, the home care tips below can help relieve symptoms.  Home care    Do pelvic floor muscle exercises, if they are prescribed. The pelvic floor muscles help support the bladder and urethra. Many women find that their symptoms improve when doing special exercises that strengthen these muscles. To do the exercises contract the muscles you would use to stop your stream of urine, but do this when youre not urinating. Hold for 10 seconds, then relax. Repeat 10 to 20 times in a row, at least 3 times a day. Your provider may give you other instructions for how to do the exercises and how often.    Keep a bladder diary. This helps track how often and how much you urinate over a set period of time. Bring this diary with you to your next visit with the provider. The information can help your provider learn more about your bladder problem.    Lose weight, if advised to by your provider. Excess weight puts pressure on the bladder. Your provider can help you create a weight-loss plan thats right for you. This may include exercising more and making certain diet changes.    Don't consume foods and drinks that may irritate the bladder. These can include alcohol and caffeinated drinks.    Quit smoking. Smoking and other tobacco use can  lead to chronic cough that strains the pelvic floor muscles. Smoking may also damage the bladder and urethra. Talk with your provider about treatments or methods you can use to quit smoking.    If drinking large amounts of fluid causes you to have symptoms, you may be advised to limit your fluid intake. You may also be advised to drink most of your fluids during the day and to limit fluids at night.    If youre worried about urine leakage or accidents, you may wear absorbent pads to catch urine. Change the pads often. This helps reduce discomfort. It may also reduce the risk of skin or bladder infections.  Follow-up care  Follow up with your healthcare provider, or as directed. It may take some to find the right treatment for your problem. Your treatment plan may include special therapies or medicines. Certain procedures or surgery may also be options. Be sure to discuss any questions you have with your provider.  When to seek medical advice  Call the healthcare provider right away if any of these occur:    Fever of 100.4 F (38 C) or higher, or as directed by your provider    Bladder pain or fullness    Abdominal swelling    Nausea or vomiting    Back pain    Weakness, dizziness or fainting  Date Last Reviewed: 10/1/2017    4905-5023 The [employer]. [address] [place] [address]. All rights reserved. This information is not intended as a substitute for professional medical care. Always follow your healthcare professional's instructions.     Patient Education   Your Health Risk Assessment indicates you feel you are not in good emotional health.    Recreation   Recreation is not limited to sports and team events. It includes any activity that provides relaxation, interest, enjoyment, and exercise. Recreation provides an outlet for physical, mental, and social energy. It can give a sense of worth and achievement. It can help you stay healthy.    Mental Exercise and Social Involvement  Mental and  emotional health is as important as physical health. Keep in touch with friends and family. Stay as active as possible. Continue to learn and challenge yourself.   Things you can do to stay mentally active are:    Learn something new, like a foreign language or musical instrument.     Play SCRABBLE or do crossword puzzles. If you cannot find people to play these games with you at home, you can play them with others on your computer through the Internet.     Join a games club--anything from card games to chess or checkers or lawn bowling.     Start a new hobby.     Go back to school.     Volunteer.     Read.     Keep up with world events.         Advance Directive  Patients advance directive was discussed and I am comfortable with the patients wishes.  Patient Education   Personalized Prevention Plan  You are due for the preventive services outlined below.  Your care team is available to assist you in scheduling these services.  If you have already completed any of these items, please share that information with your care team to update in your medical record.  Health Maintenance   Topic Date Due   ? TD 18+ HE  06/05/2021 (Originally 9/27/1949)   ? MEDICARE ANNUAL WELLNESS VISIT  12/29/2021   ? FALL RISK ASSESSMENT  12/29/2021   ? ADVANCE CARE PLANNING  06/05/2024   ? Pneumococcal Vaccine: 65+ Years  Completed   ? INFLUENZA VACCINE RULE BASED  Completed   ? ZOSTER VACCINES  Completed   ? Pneumococcal Vaccine: Pediatrics (0 to 5 Years) and At-Risk Patients (6 to 64 Years)  Aged Out   ? HEPATITIS B VACCINES  Aged Out

## 2021-08-08 NOTE — TELEPHONE ENCOUNTER
"Routing refill request to provider for review/approval because:  Labs not current:  Na    Last Written Prescription Date:  8/18/20  Last Fill Quantity: 90,  # refills: 3   Last office visit provider:  6/15/21     Requested Prescriptions   Pending Prescriptions Disp Refills     chlorthalidone (HYGROTON) 25 MG tablet [Pharmacy Med Name: Chlorthalidone 25 MG Oral Tablet] 90 tablet 0     Sig: Take 1 tablet by mouth once daily       Diuretics (Including Combos) Protocol Failed - 8/4/2021  7:55 PM        Failed - Normal serum sodium on file in past 12 months     Recent Labs   Lab Test 06/15/21  0955   *              Passed - Blood pressure under 140/90 in past 12 months     BP Readings from Last 3 Encounters:   06/15/21 122/54   12/29/20 123/60   06/18/20 127/63                 Passed - Recent (12 mo) or future (30 days) visit within the authorizing provider's specialty     Patient has had an office visit with the authorizing provider or a provider within the authorizing providers department within the previous 12 mos or has a future within next 30 days. See \"Patient Info\" tab in inbasket, or \"Choose Columns\" in Meds & Orders section of the refill encounter.              Passed - Medication is active on med list        Passed - Patient is age 18 or older        Passed - No active pregancy on record        Passed - Normal serum creatinine on file in past 12 months     Recent Labs   Lab Test 06/15/21  0955   CR 0.77              Passed - Normal serum potassium on file in past 12 months     Recent Labs   Lab Test 06/15/21  0955   POTASSIUM 4.0                    Passed - No positive pregnancy test in past 12 months             jamal rao RN 08/08/21 10:58 AM  "

## 2021-08-09 ENCOUNTER — NURSE TRIAGE (OUTPATIENT)
Dept: NURSING | Facility: CLINIC | Age: 86
End: 2021-08-09

## 2021-08-09 RX ORDER — CHLORTHALIDONE 25 MG/1
TABLET ORAL
Qty: 90 TABLET | Refills: 3 | Status: SHIPPED | OUTPATIENT
Start: 2021-08-09 | End: 2022-04-21

## 2021-08-09 NOTE — TELEPHONE ENCOUNTER
Pt calls again for chlorthalidone refill.  Refill was previously failed due to non-current labs.  However upon chart review, labs are indeed current.  Pt's refill therefore processed now for full year supply.  No further action required for this encounter.  Now closing.

## 2021-08-09 NOTE — TELEPHONE ENCOUNTER
Pt calls again re chlorthalidone refill.  Refill was previously failed due to non-current labs.  However upon chart review, labs are indeed current.  Pt's refill therefore processed now for full year supply.  No further action required for this encounter.  Now closing.    [facility] Nurse Advisor     Additional Information    Caller requesting a refill, no triage required, and triager able to refill per department policy    Protocols used: MEDICATION QUESTION CALL-A-OH

## 2021-08-10 DIAGNOSIS — I10 ESSENTIAL HYPERTENSION: ICD-10-CM

## 2021-08-12 RX ORDER — CHLORTHALIDONE 25 MG/1
25 TABLET ORAL DAILY
Qty: 90 TABLET | Refills: 3 | OUTPATIENT
Start: 2021-08-12

## 2021-10-06 DIAGNOSIS — F41.9 ANXIETY: ICD-10-CM

## 2021-10-06 DIAGNOSIS — I10 ESSENTIAL HYPERTENSION: ICD-10-CM

## 2021-10-06 DIAGNOSIS — G47.00 INSOMNIA, UNSPECIFIED TYPE: Primary | ICD-10-CM

## 2021-10-07 RX ORDER — LORAZEPAM 0.5 MG/1
TABLET ORAL
Qty: 30 TABLET | Refills: 0 | Status: SHIPPED | OUTPATIENT
Start: 2021-10-07 | End: 2021-11-05

## 2021-10-07 RX ORDER — AMLODIPINE BESYLATE 2.5 MG/1
TABLET ORAL
Qty: 180 TABLET | Refills: 2 | Status: ON HOLD | OUTPATIENT
Start: 2021-10-07 | End: 2022-04-21

## 2021-10-07 RX ORDER — LORAZEPAM 0.5 MG/1
TABLET ORAL
Qty: 30 TABLET | Refills: 2 | Status: CANCELLED | OUTPATIENT
Start: 2021-10-07

## 2021-10-07 NOTE — TELEPHONE ENCOUNTER
Medication: lorazepam (ATIVAN) 0.5 MG tablet  Last Date Filled 8/9/21  Last appointment addressing medication use: 11/29/17      Taken as prescribed from physician notes? YES    CSA in last year: NO    Random Utox in last year: NO  (if any of the above answer NO - schedule with PCP)     Opioids + benzodiazepines? NO  (if the above answer YES - schedule with PCP every 6 months)       All responses suggest: .

## 2021-10-07 NOTE — TELEPHONE ENCOUNTER
"Last Written Prescription Date:  3/16/21  Last Fill Quantity: 180,  # refills: 1   Last office visit provider:  6/15/21     Requested Prescriptions   Pending Prescriptions Disp Refills     LORazepam (ATIVAN) 0.5 MG tablet [Pharmacy Med Name: LORazepam 0.5 MG Oral Tablet] 30 tablet 0     Sig: TAKE 1 TABLET BY MOUTH AT BEDTIME AS NEEDED       There is no refill protocol information for this order        amLODIPine (NORVASC) 2.5 MG tablet [Pharmacy Med Name: amLODIPine Besylate 2.5 MG Oral Tablet] 180 tablet 0     Sig: Take 2 tablets by mouth once daily       Calcium Channel Blockers Protocol  Passed - 10/6/2021  8:06 PM        Passed - Blood pressure under 140/90 in past 12 months     BP Readings from Last 3 Encounters:   06/15/21 122/54   12/29/20 123/60   06/18/20 127/63                 Passed - Recent (12 mo) or future (30 days) visit within the authorizing provider's specialty     Patient has had an office visit with the authorizing provider or a provider within the authorizing providers department within the previous 12 mos or has a future within next 30 days. See \"Patient Info\" tab in inbasket, or \"Choose Columns\" in Meds & Orders section of the refill encounter.              Passed - Medication is active on med list        Passed - Patient is age 18 or older        Passed - No active pregnancy on record        Passed - Normal serum creatinine on file in past 12 months     Recent Labs   Lab Test 06/15/21  0955   CR 0.77       Ok to refill medication if creatinine is low          Passed - No positive pregnancy test in past 12 months             Gabby Benjamin RN 10/07/21 10:41 AM  "

## 2021-10-07 NOTE — TELEPHONE ENCOUNTER
"Routing refill request to provider for review/approval because:  Controlled substance request.    Last Written Prescription Date:  7/8/21  Last Fill Quantity: 30,  # refills: 2   Last office visit provider:  6/15/21     Requested Prescriptions   Pending Prescriptions Disp Refills     LORazepam (ATIVAN) 0.5 MG tablet [Pharmacy Med Name: LORazepam 0.5 MG Oral Tablet] 30 tablet 0     Sig: TAKE 1 TABLET BY MOUTH AT BEDTIME AS NEEDED       There is no refill protocol information for this order      Signed Prescriptions Disp Refills    amLODIPine (NORVASC) 2.5 MG tablet 180 tablet 2     Sig: Take 2 tablets by mouth once daily       Calcium Channel Blockers Protocol  Passed - 10/6/2021  8:06 PM        Passed - Blood pressure under 140/90 in past 12 months     BP Readings from Last 3 Encounters:   06/15/21 122/54   12/29/20 123/60   06/18/20 127/63                 Passed - Recent (12 mo) or future (30 days) visit within the authorizing provider's specialty     Patient has had an office visit with the authorizing provider or a provider within the authorizing providers department within the previous 12 mos or has a future within next 30 days. See \"Patient Info\" tab in inbasket, or \"Choose Columns\" in Meds & Orders section of the refill encounter.              Passed - Medication is active on med list        Passed - Patient is age 18 or older        Passed - No active pregnancy on record        Passed - Normal serum creatinine on file in past 12 months     Recent Labs   Lab Test 06/15/21  0955   CR 0.77       Ok to refill medication if creatinine is low          Passed - No positive pregnancy test in past 12 months             Gabby Benjamin RN 10/07/21 10:42 AM  "

## 2021-10-16 ENCOUNTER — HEALTH MAINTENANCE LETTER (OUTPATIENT)
Age: 86
End: 2021-10-16

## 2021-11-02 DIAGNOSIS — F41.9 ANXIETY: ICD-10-CM

## 2021-11-02 DIAGNOSIS — G47.00 INSOMNIA, UNSPECIFIED TYPE: ICD-10-CM

## 2021-11-04 NOTE — TELEPHONE ENCOUNTER
Routing refill request to provider for review/approval because:  Controlled substance request    Last Written Prescription Date:  10/7/21  Last Fill Quantity: 30,  # refills: 0   Last office visit provider:  6/15/21     Requested Prescriptions   Pending Prescriptions Disp Refills     LORazepam (ATIVAN) 0.5 MG tablet [Pharmacy Med Name: LORazepam 0.5 MG Oral Tablet] 30 tablet 0     Sig: TAKE 1 TABLET BY MOUTH AT BEDTIME AS NEEDED       There is no refill protocol information for this order          Tom Gee RN 11/04/21 8:33 AM

## 2021-11-05 RX ORDER — LORAZEPAM 0.5 MG/1
TABLET ORAL
Qty: 30 TABLET | Refills: 0 | Status: SHIPPED | OUTPATIENT
Start: 2021-11-05 | End: 2021-12-07

## 2022-01-02 DIAGNOSIS — I48.19 PERSISTENT ATRIAL FIBRILLATION (H): Primary | ICD-10-CM

## 2022-01-02 DIAGNOSIS — G47.00 INSOMNIA, UNSPECIFIED TYPE: ICD-10-CM

## 2022-01-02 DIAGNOSIS — I10 ESSENTIAL HYPERTENSION: ICD-10-CM

## 2022-01-02 DIAGNOSIS — F41.9 ANXIETY: ICD-10-CM

## 2022-01-02 DIAGNOSIS — E78.00 HYPERCHOLESTEREMIA: ICD-10-CM

## 2022-01-03 RX ORDER — FLECAINIDE ACETATE 50 MG/1
TABLET ORAL
Qty: 60 TABLET | Refills: 0 | Status: SHIPPED | OUTPATIENT
Start: 2022-01-03 | End: 2022-02-07

## 2022-01-04 ENCOUNTER — OFFICE VISIT (OUTPATIENT)
Dept: FAMILY MEDICINE | Facility: CLINIC | Age: 87
End: 2022-01-04
Payer: MEDICARE

## 2022-01-04 VITALS
SYSTOLIC BLOOD PRESSURE: 128 MMHG | HEART RATE: 69 BPM | DIASTOLIC BLOOD PRESSURE: 52 MMHG | OXYGEN SATURATION: 98 % | WEIGHT: 138.3 LBS | BODY MASS INDEX: 24.5 KG/M2

## 2022-01-04 DIAGNOSIS — I10 ESSENTIAL HYPERTENSION: ICD-10-CM

## 2022-01-04 DIAGNOSIS — H91.90 HEARING LOSS, UNSPECIFIED HEARING LOSS TYPE, UNSPECIFIED LATERALITY: ICD-10-CM

## 2022-01-04 DIAGNOSIS — I48.19 PERSISTENT ATRIAL FIBRILLATION (H): ICD-10-CM

## 2022-01-04 DIAGNOSIS — E03.9 HYPOTHYROIDISM, UNSPECIFIED TYPE: Primary | ICD-10-CM

## 2022-01-04 DIAGNOSIS — E78.00 HYPERCHOLESTEREMIA: ICD-10-CM

## 2022-01-04 LAB
ALBUMIN SERPL-MCNC: 4.1 G/DL (ref 3.5–5)
ALP SERPL-CCNC: 84 U/L (ref 45–120)
ALT SERPL W P-5'-P-CCNC: 16 U/L (ref 0–45)
ANION GAP SERPL CALCULATED.3IONS-SCNC: 12 MMOL/L (ref 5–18)
AST SERPL W P-5'-P-CCNC: 23 U/L (ref 0–40)
BILIRUB SERPL-MCNC: 0.5 MG/DL (ref 0–1)
BUN SERPL-MCNC: 11 MG/DL (ref 8–28)
CALCIUM SERPL-MCNC: 9.9 MG/DL (ref 8.5–10.5)
CHLORIDE BLD-SCNC: 91 MMOL/L (ref 98–107)
CO2 SERPL-SCNC: 28 MMOL/L (ref 22–31)
CREAT SERPL-MCNC: 0.76 MG/DL (ref 0.6–1.1)
GFR SERPL CREATININE-BSD FRML MDRD: 74 ML/MIN/1.73M2
GLUCOSE BLD-MCNC: 108 MG/DL (ref 70–125)
POTASSIUM BLD-SCNC: 3.6 MMOL/L (ref 3.5–5)
PROT SERPL-MCNC: 7 G/DL (ref 6–8)
SODIUM SERPL-SCNC: 131 MMOL/L (ref 136–145)
TSH SERPL DL<=0.005 MIU/L-ACNC: 0.14 UIU/ML (ref 0.3–5)

## 2022-01-04 PROCEDURE — 36415 COLL VENOUS BLD VENIPUNCTURE: CPT | Performed by: NURSE PRACTITIONER

## 2022-01-04 PROCEDURE — 80053 COMPREHEN METABOLIC PANEL: CPT | Performed by: NURSE PRACTITIONER

## 2022-01-04 PROCEDURE — 99214 OFFICE O/P EST MOD 30 MIN: CPT | Performed by: NURSE PRACTITIONER

## 2022-01-04 PROCEDURE — 84443 ASSAY THYROID STIM HORMONE: CPT | Performed by: NURSE PRACTITIONER

## 2022-01-04 RX ORDER — LEVOTHYROXINE SODIUM 75 UG/1
TABLET ORAL
COMMUNITY
Start: 2021-10-24 | End: 2022-01-27

## 2022-01-04 NOTE — PATIENT INSTRUCTIONS
It looks like you're due to check in with cardiology since it's been a year.     Their contact number is [phone] to set up an appointment with Pauline.     Updating every 6 month labs today.     Ears look ok. Consider getting that hearing test through [facility] like you were thinking about.

## 2022-01-04 NOTE — PROGRESS NOTES
Chief Complaint   Patient presents with     Recheck Medication     pt sent refill requests online.      Health Maintenance     wants ears checked.        HPI: Patient presents today for medication check.  Overall doing well.  Continues with flecainide for management of A. fib and low-dose metoprolol.  She brings in a log of her blood pressures which consistently show good control.  Heart rate today is 69 but her at home heart rate has been averaging in the mid 50s.  No lightheaded episodes.  No recent falls.  No chest pain or palpitations.  She still will use mindful breathing when she feels more chest palpitations which usually resolves the symptoms within 5-10 minutes.  Overdue for follow-up with cardiology.    Patient has noticed worsening double vision over the last few months.  She does have an appointment with ophthalmology coming up shortly.  Because of this she is thinking about giving up her car.  No visual field cut.  This has been a slow progressive issue for her.  She does have access to transportation via her son who lives nearby and a transport bus at [facility].    Patient makes mention of some intermittent hearing difficulties.  She occasionally will miss words in a sentence.  No ear pain.  No drainage.  She wonders if she has a cerumen impaction.    ROS:Review of Systems - negative except for what's listed in the HPI    SH: The Patient's  reports that she has never smoked. She has never used smokeless tobacco. She reports current alcohol use of about 7.0 standard drinks of alcohol per week. She reports that she does not use drugs.      FH: The Patient's family history includes Alcoholism in her brother; CABG (age of onset: 53.00) in her brother; Cirrhosis in her brother; Coronary Artery Disease (age of onset: 49.00) in her brother and father; Heart Failure (age of onset: 76.00) in her mother; Liver Cancer in her father; No Known Problems in her son, son, and son; Ovarian Cancer in her paternal  aunt.     Meds:    Current Outpatient Medications   Medication     acetaminophen (TYLENOL) 500 MG tablet     amLODIPine (NORVASC) 2.5 MG tablet     apixaban ANTICOAGULANT (ELIQUIS) 5 mg Tab tablet     atorvastatin (LIPITOR) 20 MG tablet     chlorthalidone (HYGROTON) 25 MG tablet     EUTHYROX 75 MCG tablet     flecainide (TAMBOCOR) 50 MG tablet     LORazepam (ATIVAN) 0.5 MG tablet     losartan (COZAAR) 100 MG tablet     metoprolol tartrate (LOPRESSOR) 25 MG tablet     diphenhydrAMINE (ANTIHISTAMINE) 25 mg tablet     flecainide (TAMBOCOR) 50 MG tablet     flecainide (TAMBOCOR) 50 MG tablet     LORazepam (ATIVAN) 0.5 MG tablet     LORazepam (ATIVAN) 0.5 MG tablet     MEDICATION CANNOT BE REORDERED - PLEASE MANUALLY REORDER AND DISCONTINUE THE OLD ORDER     No current facility-administered medications for this visit.       O:  /52   Pulse 69   Wt 62.7 kg (138 lb 4.8 oz)   LMP  (LMP Unknown)   SpO2 98%   Breastfeeding No   BMI 24.50 kg/m      Physical Examination:   General appearance - alert, well appearing, and in no distress  Mental status - alert, oriented to person, place, and time  Eyes -PERRLA  Ears - bilateral TM's and external ear canals normal  Lymphatics - no palpable lymphadenopathy  Chest - clear to auscultation, no wheezes, rales or rhonchi, symmetric air entry  Heart - normal rate and regular rhythm, S1 and S2 normal, no murmurs noted  Abdomen - soft, nontender, nondistended, no masses or organomegaly  Neurological - alert, oriented, normal speech, no focal findings or movement disorder noted, neck supple without rigidity, cranial nerves II through XII intact, motor and sensory grossly normal bilaterally, normal muscle tone, no tremors, strength 5/5  Musculoskeletal -walking with walker for stability.  Extremities - peripheral pulses normal, no peripheral edema  Skin - normal coloration and turgor.      A/P:       ICD-10-CM    1. Hypothyroidism, unspecified type  E03.9 TSH     Comprehensive  metabolic panel (BMP + Alb, Alk Phos, ALT, AST, Total. Bili, TP)   2. Essential hypertension  I10 Comprehensive metabolic panel (BMP + Alb, Alk Phos, ALT, AST, Total. Bili, TP)   3. Persistent atrial fibrillation (H)  I48.19 Comprehensive metabolic panel (BMP + Alb, Alk Phos, ALT, AST, Total. Bili, TP)   4. Hypercholesteremia  E78.00 Comprehensive metabolic panel (BMP + Alb, Alk Phos, ALT, AST, Total. Bili, TP)     Overall doing well.  Continue same prescriptions through cardiology.  Blood pressure shows good control.  Update labs.  Reviewed no cerumen impaction.  Would benefit from audiology exam which patient plans to do through [facility].  Recommended following up with cardiology    Hypothyroidism, unspecified type  - TSH  - Comprehensive metabolic panel (BMP + Alb, Alk Phos, ALT, AST, Total. Bili, TP)    Essential hypertension  - Comprehensive metabolic panel (BMP + Alb, Alk Phos, ALT, AST, Total. Bili, TP)    Persistent atrial fibrillation (H)  - Comprehensive metabolic panel (BMP + Alb, Alk Phos, ALT, AST, Total. Bili, TP)    Hypercholesteremia  - Comprehensive metabolic panel (BMP + Alb, Alk Phos, ALT, AST, Total. Bili, TP)    Hearing loss    Aguila Lal, CNP      This note has been dictated using voice recognition software. Any grammatical or context distortions are unintentional and inherent to the software.

## 2022-01-04 NOTE — LETTER
January 5, 2022      Katie Mar  [address]   [facility] [address]        Dear ,    We are writing to inform you of your test results.    Electrolytes are stable.  Kidneys and liver look good.  Your thyroid levels show that you are very mildly overtreated, but this is not far off from your baseline.  Given that you feel completely normal I would say that we can just leave things where they are and recheck again in 6 months.  If continually overtreated then we can always bring your thyroid medication dosing down a little bit.      Resulted Orders   TSH   Result Value Ref Range    TSH 0.14 (L) 0.30 - 5.00 uIU/mL   Comprehensive metabolic panel (BMP + Alb, Alk Phos, ALT, AST, Total. Bili, TP)   Result Value Ref Range    Sodium 131 (L) 136 - 145 mmol/L    Potassium 3.6 3.5 - 5.0 mmol/L    Chloride 91 (L) 98 - 107 mmol/L    Carbon Dioxide (CO2) 28 22 - 31 mmol/L    Anion Gap 12 5 - 18 mmol/L    Urea Nitrogen 11 8 - 28 mg/dL    Creatinine 0.76 0.60 - 1.10 mg/dL    Calcium 9.9 8.5 - 10.5 mg/dL    Glucose 108 70 - 125 mg/dL    Alkaline Phosphatase 84 45 - 120 U/L    AST 23 0 - 40 U/L    ALT 16 0 - 45 U/L    Protein Total 7.0 6.0 - 8.0 g/dL    Albumin 4.1 3.5 - 5.0 g/dL    Bilirubin Total 0.5 0.0 - 1.0 mg/dL    GFR Estimate 74 >60 mL/min/1.73m2      Comment:      Effective December 21, 2021 eGFRcr in adults is calculated using the 2021 CKD-EPI creatinine equation which includes age and gender (Kranthi et al., NEJM, DOI: 10.1056/FAPChs0876215)       If you have any questions or concerns, please call the clinic at the number listed above.       Sincerely,      Aguila Lal NP

## 2022-01-05 DIAGNOSIS — I10 ESSENTIAL HYPERTENSION: ICD-10-CM

## 2022-01-05 RX ORDER — ATORVASTATIN CALCIUM 20 MG/1
TABLET, FILM COATED ORAL
Qty: 90 TABLET | Refills: 1 | Status: SHIPPED | OUTPATIENT
Start: 2022-01-05

## 2022-01-05 RX ORDER — LOSARTAN POTASSIUM 100 MG/1
TABLET ORAL
Qty: 90 TABLET | Refills: 1 | Status: SHIPPED | OUTPATIENT
Start: 2022-01-05

## 2022-01-05 RX ORDER — LORAZEPAM 0.5 MG/1
TABLET ORAL
Qty: 30 TABLET | Refills: 5 | Status: ON HOLD | OUTPATIENT
Start: 2022-01-05 | End: 2022-04-21

## 2022-01-05 NOTE — TELEPHONE ENCOUNTER
"Routing refill request to provider for review/approval because:  Labs not current:  Lipid panel.  Controlled Substance Request-lorazepam.    Last Written Prescription Date:  06/13/2021-atorvastatin  Last Fill Quantity: 90,  # refills: 1   Last office visit provider:  01/04/2021 with Aguila Lal CNP    Last Written Prescription Date:  12/07/2021-lorazepam  Last Fill Quantity: 30,  # refills: 0   Last office visit provider:  01/04/2021 with Aguila Lal CNP    Last Written Prescription Date:  06/13/2021-losartan.  Last Fill Quantity: 90,  # refills: 1   Last office visit provider:  01/04/2021 with Aguila Lal CNP    Requested Prescriptions   Pending Prescriptions Disp Refills     losartan (COZAAR) 100 MG tablet [Pharmacy Med Name: Losartan Potassium 100 MG Oral Tablet] 90 tablet 0     Sig: Take 1 tablet by mouth once daily       Angiotensin-II Receptors Passed - 1/2/2022  6:11 PM        Passed - Last blood pressure under 140/90 in past 12 months     BP Readings from Last 3 Encounters:   01/04/22 128/52   06/15/21 122/54   12/29/20 123/60                 Passed - Recent (12 mo) or future (30 days) visit within the authorizing provider's specialty     Patient has had an office visit with the authorizing provider or a provider within the authorizing providers department within the previous 12 mos or has a future within next 30 days. See \"Patient Info\" tab in inbasket, or \"Choose Columns\" in Meds & Orders section of the refill encounter.              Passed - Medication is active on med list        Passed - Patient is age 18 or older        Passed - No active pregnancy on record        Passed - Normal serum creatinine on file in past 12 months     Recent Labs   Lab Test 01/04/22  1049   CR 0.76       Ok to refill medication if creatinine is low          Passed - Normal serum potassium on file in past 12 months     Recent Labs   Lab Test 01/04/22  1049   POTASSIUM 3.6                    Passed - No positive pregnancy " "test in past 12 months           LORazepam (ATIVAN) 0.5 MG tablet [Pharmacy Med Name: LORazepam 0.5 MG Oral Tablet] 30 tablet 0     Sig: TAKE 1 TABLET BY MOUTH AT BEDTIME AS NEEDED       There is no refill protocol information for this order        atorvastatin (LIPITOR) 20 MG tablet [Pharmacy Med Name: Atorvastatin Calcium 20 MG Oral Tablet] 90 tablet 0     Sig: Take 1 tablet by mouth once daily       Statins Protocol Failed - 1/2/2022  6:11 PM        Failed - LDL on file in past 12 months     Recent Labs   Lab Test 07/06/20  1110   LDL 75             Passed - No abnormal creatine kinase in past 12 months     No lab results found.             Passed - Recent (12 mo) or future (30 days) visit within the authorizing provider's specialty     Patient has had an office visit with the authorizing provider or a provider within the authorizing providers department within the previous 12 mos or has a future within next 30 days. See \"Patient Info\" tab in inbasket, or \"Choose Columns\" in Meds & Orders section of the refill encounter.              Passed - Medication is active on med list        Passed - Patient is age 18 or older        Passed - No active pregnancy on record        Passed - No positive pregnancy test in past 12 months             Gabrielle Gillis 01/05/22 12:11 AM  "

## 2022-01-07 RX ORDER — LOSARTAN POTASSIUM 100 MG/1
100 TABLET ORAL DAILY
Qty: 90 TABLET | Refills: 1 | OUTPATIENT
Start: 2022-01-07

## 2022-01-07 NOTE — TELEPHONE ENCOUNTER
Duplicate Rx, medication was sent in recently to same pharmacy requested.     losartan (COZAAR) 100 MG tablet 90 tablet 1 1/5/2022  No   Sig: Take 1 tablet by mouth once daily   Sent to pharmacy as: Losartan Potassium 100 MG Oral Tablet (COZAAR)   Class: E-Prescribe   Order: [id number]   E-Prescribing Status: Receipt confirmed by pharmacy (1/5/2022  4:22 PM CST)         Pauline Chavez RN, BSN Nurse Triage Advisor 12:04 PM 1/7/2022

## 2022-01-26 RX ORDER — LEVOTHYROXINE SODIUM 75 UG/1
TABLET ORAL
Qty: 90 TABLET | Refills: 0 | OUTPATIENT
Start: 2022-01-26

## 2022-01-26 NOTE — TELEPHONE ENCOUNTER
Outpatient Medication Detail     Disp Refills Start End JIMI   levothyroxine (SYNTHROID, LEVOTHROID) 75 MCG tablet (Discontinued) 90 tablet 3 2/5/2020 1/25/2021 No   Sig: TAKE 1 TABLET BY MOUTH ONCE DAILY   Sent to pharmacy as: levothyroxine 75 mcg tablet (SYNTHROID, LEVOTHROID)   E-Prescribing Status: Receipt confirmed by pharmacy (2/5/2020  9:31 AM CST)       levothyroxine (SYNTHROID, LEVOTHROID) 75 MCG tablet [[id number]]    Electronically signed by: Gabrielle Orozco MD on 02/05/20 0931 Status: Discontinued   Ordering user: Gabrielle Orozco MD 02/05/20 0931 Authorized by: Gabrielle Orozco MD   Frequency:  02/05/20 - 01/25/21 Released by: Gabrielle Orozco MD 02/05/20 0931   Discontinued by: Debbie Joseph RN 01/25/21 0909   Diagnoses  Hypothyroidism [E03.9]

## 2022-01-27 DIAGNOSIS — E03.9 HYPOTHYROIDISM, UNSPECIFIED TYPE: Primary | ICD-10-CM

## 2022-01-27 NOTE — TELEPHONE ENCOUNTER
Medication: Euthyrox 75mcg tablet  Last Date Filled: 1/25/21 #90 RF 3  Last appointment addressing medication: 1/4/22  Last B/P:  BP Readings from Last 3 Encounters:   01/04/22 128/52   06/15/21 122/54   12/29/20 123/60     Last labs pertaining to refill:1/4/22

## 2022-01-27 NOTE — TELEPHONE ENCOUNTER
Reason for Call:  Other prescription    Detailed comments: Pt calling because farzad said they discontinued her med EUTHYROX 75 MCG tablet and she needs a new script sent over she is almost out  Phone Number Patient can be reached at: Home number on file [phone] (home)    Best Time: anytime    Can we leave a detailed message on this number? YES    Call taken on 1/27/2022 at 10:49 AM by Carley Cuenca

## 2022-01-28 RX ORDER — LEVOTHYROXINE SODIUM 75 UG/1
75 TABLET ORAL DAILY
Qty: 90 TABLET | Refills: 3 | Status: SHIPPED | OUTPATIENT
Start: 2022-01-28 | End: 2022-05-26

## 2022-01-30 ENCOUNTER — HEALTH MAINTENANCE LETTER (OUTPATIENT)
Age: 87
End: 2022-01-30

## 2022-02-07 ENCOUNTER — TRANSFERRED RECORDS (OUTPATIENT)
Dept: HEALTH INFORMATION MANAGEMENT | Facility: CLINIC | Age: 87
End: 2022-02-07
Payer: MEDICARE

## 2022-02-08 DIAGNOSIS — I48.19 PERSISTENT ATRIAL FIBRILLATION (H): ICD-10-CM

## 2022-02-08 RX ORDER — FLECAINIDE ACETATE 50 MG/1
50 TABLET ORAL 2 TIMES DAILY
Qty: 180 TABLET | Refills: 0 | Status: SHIPPED | OUTPATIENT
Start: 2022-02-08 | End: 2022-03-28

## 2022-03-28 ENCOUNTER — OFFICE VISIT (OUTPATIENT)
Dept: CARDIOLOGY | Facility: CLINIC | Age: 87
End: 2022-03-28
Payer: MEDICARE

## 2022-03-28 VITALS
HEART RATE: 60 BPM | DIASTOLIC BLOOD PRESSURE: 62 MMHG | SYSTOLIC BLOOD PRESSURE: 144 MMHG | RESPIRATION RATE: 12 BRPM | BODY MASS INDEX: 24.62 KG/M2 | WEIGHT: 139 LBS

## 2022-03-28 DIAGNOSIS — I10 ESSENTIAL HYPERTENSION: Chronic | ICD-10-CM

## 2022-03-28 DIAGNOSIS — I48.19 PERSISTENT ATRIAL FIBRILLATION (H): Primary | ICD-10-CM

## 2022-03-28 DIAGNOSIS — I48.91 ATRIAL FIBRILLATION, UNSPECIFIED TYPE (H): ICD-10-CM

## 2022-03-28 LAB
ATRIAL RATE - MUSE: 59 BPM
DIASTOLIC BLOOD PRESSURE - MUSE: NORMAL MMHG
INTERPRETATION ECG - MUSE: NORMAL
P AXIS - MUSE: 83 DEGREES
PR INTERVAL - MUSE: 286 MS
QRS DURATION - MUSE: 110 MS
QT - MUSE: 418 MS
QTC - MUSE: 413 MS
R AXIS - MUSE: 47 DEGREES
SYSTOLIC BLOOD PRESSURE - MUSE: NORMAL MMHG
T AXIS - MUSE: 61 DEGREES
VENTRICULAR RATE- MUSE: 59 BPM

## 2022-03-28 PROCEDURE — 93000 ELECTROCARDIOGRAM COMPLETE: CPT | Performed by: INTERNAL MEDICINE

## 2022-03-28 PROCEDURE — 99214 OFFICE O/P EST MOD 30 MIN: CPT | Performed by: NURSE PRACTITIONER

## 2022-03-28 RX ORDER — FLECAINIDE ACETATE 50 MG/1
50 TABLET ORAL 2 TIMES DAILY
Qty: 180 TABLET | Refills: 3 | Status: SHIPPED | OUTPATIENT
Start: 2022-03-28

## 2022-03-28 RX ORDER — METOPROLOL TARTRATE 25 MG/1
TABLET, FILM COATED ORAL
Qty: 90 TABLET | Refills: 3 | Status: ON HOLD | OUTPATIENT
Start: 2022-03-28 | End: 2022-04-21

## 2022-03-28 NOTE — PATIENT INSTRUCTIONS
Katie Mar,    It was a pleasure to see you today at the [facility].     My recommendations after this visit include:    Continue current medications    Call if you have frequent or prolonged A fib    Follow up in 1 year, or sooner if needed    Pauline Carr, [facility], Electrophysiology  [phone]  EP nurses [phone]

## 2022-03-28 NOTE — LETTER
3/28/2022    Aguila Lal, NP  [address] [place] [facility] [address]    RE: Katie Mar       Dear Colleague,     [facility] the [facility].    HEART CARE ELECTROPHYSIOLOGY NOTE      [facility]  [phone]      Assessment/Recommendations   Assessment/Plan:  1.  Persistent atrial Fibrillation:   No symptomatology or evidence of AF recurrence.  Continue flecainide 50 mg twice daily with metoprolol tartrate 12.5 mg twice daily.  She continues to have mild, but asymptomatic asymptomatic bradycardia.     She was reassured that atrial fibrillation is not life-threatening, but carries an increased risk for stroke.  She has a [id number] score of 4 for age >75, female gender, and hypertension.   Continue Eliquis 5 mg twice daily for stroke prophylaxis.      2.  Hypertension: Blood pressure elevated today in clinic, but consistently at target per home readings and recent clinic visit.   Continue metoprolol, amlodipine, chlorthalidone, and losartan.      Follow up in 1 year     History of Present Illness/Subjective    HPI: Katie Mar is a 89 year old female who comes in today for EP follow-up of atrial fibrillation.  She has a history of atrial fibrillation, hypertension, hypercholesterolemia, hypothyroidism, and IBS.      She was diagnosed with atrial fibrillation with controlled ventricular response on 11/29/2017 as an incidental finding during routine exam.  She was initially thought to have symptoms of fatigue and dyspnea on exertion, though symptoms resolved prior to her initial evaluation by me in January 2018, despite remaining in atrial fibrillation.  At that point, she decided against undergoing cardioversion.  However, she had recurrence of symptoms, so underwent cardioversion on 5/29/2018.  She had significant improvement in her energy level and activity tolerance with maintenance of sinus rhythm.  She was  "subsequently started on flecainide 50 mg twice daily along with her metoprolol tartrate 25 mg twice daily. Metoprolol was decreased due to sinus bradycardia. She did not tolerate a higher dose of flecainide due to side effects of dizziness.  She continues on Eliquis 5 mg twice daily for stroke prophylaxis.     Safia states that she has been feeling well.    She has not had any episodes of A. fib.  She continues to do her \"mindful breathing\" to decrease stress.  She has some very mild fatigue and dyspnea on exertion, both she reports at baseline.  Her blood pressures have been consistently 120s/60s with resting heart rate in the 50s-60.  She gets some mild swelling in her feet and ankles, progressive throughout the day, but gone by morning.  She denies chest discomfort, palpitations, abdominal fullness/bloating, shortness of breath at rest or light activity, paroxysmal nocturnal dyspnea, orthopnea, lightheadedness, dizziness, pre-syncope, or syncope.  She has some double vision, so is not driving as much.     Cardiographics (EKGs personally reviewed):  EKG done 3/28/2022 shows sinus rhythm at 59 bpm with first-degree AV block, QT/QTc interval measures 418/413 ms.  EKG 11/30/2017 shows atrial fibrillation with controlled ventricular response at 62 bpm.     24-hour Holter monitor worn 5/16/2018 showed persistent atrial fibrillation with controlled ventricular response.     Echo done 12/15/2017:  1. Normal left ventricular size and systolic performance with a visually estimated ejection fraction of 65%.   2. There is trace aortic insufficiency.   3. Normal right ventricular size and systolic performance.   4. There is mild to moderate left atrial enlargement. There is mild right atrial enlargement.      NM pharmacologic stress test done 5/16/2018 is negative for inducible myocardial ischemia or infarction.  LVEF greater than 70%.    I have reviewed and updated the patient's Past Medical History, Social History, Family " History and Medication List.  Outside records personally reviewed.     Physical Examination  Review of Systems   Vitals: BP (!) 144/62 (BP Location: Left arm, Patient Position: Sitting, Cuff Size: Adult Large)   Pulse 60   Resp 12   Wt 63 kg (139 lb)   LMP  (LMP Unknown)   BMI 24.62 kg/m    BMI= Body mass index is 24.62 kg/m .  Wt Readings from Last 3 Encounters:   22 63 kg (139 lb)   22 62.7 kg (138 lb 4.8 oz)   06/15/21 64.5 kg (142 lb 3.2 oz)       General Appearance:   Alert, well-appearing and in no acute distress.   HEENT: Atraumatic, normocephalic.  No scleral icterus, normal conjunctivae, EOMs intact, PERRL.  Wearing a mask.   Chest/Lungs:   Chest symmetric, spine straight.  Respirations unlabored.  Lungs are clear to auscultation.   Cardiovascular:   Regular rate and rhythm.  Normal first and second heart sounds with no murmurs, rubs, or gallops; radial and posterior tibial pulses are intact, no edema.   Abdomen:  Soft, nondistended, bowel sounds present.   Extremities: No cyanosis or clubbing.   Musculoskeletal: Moves all extremities.     Skin: Warm, dry, intact.    Neurologic: Mood and affect are appropriate.  Alert and oriented to person, place, time, and situation.        ROS: 10 point ROS neg other than the symptoms noted above in the HPI.         Medical History  Surgical History Family History Social History   Past Medical History:   Diagnosis Date     Anxiety state      Essential hypertension      Persistent atrial fibrillation (H) 2018     Pure hypercholesterolemia      Past Surgical History:   Procedure Laterality Date     CARDIOVERSION  2018     CATARACT EXTRACTION, BILATERAL Bilateral      DILATION AND CURETTAGE       RELEASE CARPAL TUNNEL Right      Family History   Problem Relation Age of Onset     Ovarian Cancer Paternal Aunt      Heart Failure Mother 76.00         at home     Coronary Artery Disease Father 49.00        and a second at age 54     Liver Cancer  Father      Coronary Artery Disease Brother 49.00        and  of the second at 62     CABG Brother 53.00     No Known Problems Son      Alcoholism Brother      Cirrhosis Brother      No Known Problems Son         Lives in [place]     No Known Problems Son         Social History     Socioeconomic History     Marital status:      Spouse name: Not on file     Number of children: 3     Years of education: 16     Highest education level: Not on file   Occupational History     Not on file   Tobacco Use     Smoking status: Never Smoker     Smokeless tobacco: Never Used   Substance and Sexual Activity     Alcohol use: Yes     Alcohol/week: 7.0 standard drinks     Drug use: No     Sexual activity: Not Currently     Partners: Male   Other Topics Concern     Not on file   Social History Narrative    Lives alone since her  passed in . She lives in [place]. She still drives in 2018. She walks on her own. She enjoys playing bridge and going out to lunch.    She has 3 sons: Bi lives in [place] and does check in on her.  Another son lives in [place] and travels a lot.  The other son lives in [place] [facility] and is retired.  She has 2 grandchildren.    She has advanced directives and Bi is in charge of her affairs.     Social Determinants of Health     Financial Resource Strain: Not on file   Food Insecurity: Not on file   Transportation Needs: Not on file   Physical Activity: Not on file   Stress: Not on file   Social Connections: Not on file   Intimate Partner Violence: Not on file   Housing Stability: Not on file           Medications  Allergies   Current Outpatient Medications   Medication Sig Dispense Refill     acetaminophen (TYLENOL) 500 MG tablet [ACETAMINOPHEN (TYLENOL) 500 MG TABLET] Take 500 mg by mouth at bedtime.       amLODIPine (NORVASC) 2.5 MG tablet Take 2 tablets by mouth once daily 180 tablet 2     apixaban ANTICOAGULANT (ELIQUIS  ANTICOAGULANT) 5 MG tablet Take 1 tablet (5 mg) by mouth 2 times daily 180 tablet 3     atorvastatin (LIPITOR) 20 MG tablet Take 1 tablet by mouth once daily 90 tablet 1     chlorthalidone (HYGROTON) 25 MG tablet Take 1 tablet by mouth once daily 90 tablet 3     diphenhydrAMINE (ANTIHISTAMINE) 25 mg tablet [DIPHENHYDRAMINE (ANTIHISTAMINE) 25 MG TABLET] Take 25 mg by mouth at bedtime.       EUTHYROX 75 MCG tablet Take 1 tablet (75 mcg) by mouth daily 90 tablet 3     flecainide (TAMBOCOR) 50 MG tablet Take 1 tablet (50 mg) by mouth 2 times daily 180 tablet 3     LORazepam (ATIVAN) 0.5 MG tablet TAKE 1 TABLET BY MOUTH AT BEDTIME AS NEEDED 30 tablet 5     losartan (COZAAR) 100 MG tablet Take 1 tablet by mouth once daily 90 tablet 1     metoprolol tartrate (LOPRESSOR) 25 MG tablet Take 1/2 (one-half) tablet by mouth twice daily 90 tablet 3       Allergies   Allergen Reactions     Clindamycin Hives     Doxycycline Hyclate [Doxycycline] Hives     Penicillins Hives     Tetanus Toxoid Unknown     Site reaction. Hard area of redness and pain.          Lab Results    Chemistry/lipid CBC Cardiac Enzymes/BNP/TSH/INR   Recent Labs   Lab Test 07/06/20  1110   CHOL 179   HDL 85   LDL 75   TRIG 93     Recent Labs   Lab Test 07/06/20  1110 10/24/18  1156 11/29/17  1045   LDL 75 77 72     Recent Labs   Lab Test 01/04/22  1049   *   POTASSIUM 3.6   CHLORIDE 91*   CO2 28      BUN 11   CR 0.76   GFRESTIMATED 74   MAIRA 9.9     Recent Labs   Lab Test 01/04/22  1049 06/15/21  0955 12/29/20  0947   CR 0.76 0.77 0.71      Recent Labs   Lab Test 10/24/18  1156   WBC 5.5   HGB 14.7   HCT 42.6   MCV 95        Recent Labs   Lab Test 10/24/18  1156   HGB 14.7    No results for input(s): TROPONINI in the last 51312 hours.  Recent Labs   Lab Test 04/30/18  1653   *     Recent Labs   Lab Test 01/04/22  1049   TSH 0.14*     No results for input(s): INR in the last 49498 hours.                                                Thank you for allowing me to participate in the care of your patient.      Sincerely,     FÉLIX Goodwin CNP     [facility]  cc:   FÉLIX Goodwin CNP  [address] [place] [id number]

## 2022-03-28 NOTE — PROGRESS NOTES
HEART CARE ELECTROPHYSIOLOGY NOTE      [facility]  [phone]      Assessment/Recommendations   Assessment/Plan:  1.  Persistent atrial Fibrillation:   No symptomatology or evidence of AF recurrence.  Continue flecainide 50 mg twice daily with metoprolol tartrate 12.5 mg twice daily.  She continues to have mild, but asymptomatic asymptomatic bradycardia.     She was reassured that atrial fibrillation is not life-threatening, but carries an increased risk for stroke.  She has a [id number] score of 4 for age >75, female gender, and hypertension.   Continue Eliquis 5 mg twice daily for stroke prophylaxis.      2.  Hypertension: Blood pressure elevated today in clinic, but consistently at target per home readings and recent clinic visit.   Continue metoprolol, amlodipine, chlorthalidone, and losartan.      Follow up in 1 year     History of Present Illness/Subjective    HPI: Katie Mar is a 89 year old female who comes in today for EP follow-up of atrial fibrillation.  She has a history of atrial fibrillation, hypertension, hypercholesterolemia, hypothyroidism, and IBS.      She was diagnosed with atrial fibrillation with controlled ventricular response on 11/29/2017 as an incidental finding during routine exam.  She was initially thought to have symptoms of fatigue and dyspnea on exertion, though symptoms resolved prior to her initial evaluation by me in January 2018, despite remaining in atrial fibrillation.  At that point, she decided against undergoing cardioversion.  However, she had recurrence of symptoms, so underwent cardioversion on 5/29/2018.  She had significant improvement in her energy level and activity tolerance with maintenance of sinus rhythm.  She was subsequently started on flecainide 50 mg twice daily along with her metoprolol tartrate 25 mg twice daily. Metoprolol was decreased due to sinus bradycardia. She did not tolerate a higher dose of flecainide due to side  "effects of dizziness.  She continues on Eliquis 5 mg twice daily for stroke prophylaxis.     Safia states that she has been feeling well.    She has not had any episodes of A. fib.  She continues to do her \"mindful breathing\" to decrease stress.  She has some very mild fatigue and dyspnea on exertion, both she reports at baseline.  Her blood pressures have been consistently 120s/60s with resting heart rate in the 50s-60.  She gets some mild swelling in her feet and ankles, progressive throughout the day, but gone by morning.  She denies chest discomfort, palpitations, abdominal fullness/bloating, shortness of breath at rest or light activity, paroxysmal nocturnal dyspnea, orthopnea, lightheadedness, dizziness, pre-syncope, or syncope.  She has some double vision, so is not driving as much.     Cardiographics (EKGs personally reviewed):  EKG done 3/28/2022 shows sinus rhythm at 59 bpm with first-degree AV block, QT/QTc interval measures 418/413 ms.  EKG 11/30/2017 shows atrial fibrillation with controlled ventricular response at 62 bpm.     24-hour Holter monitor worn 5/16/2018 showed persistent atrial fibrillation with controlled ventricular response.     Echo done 12/15/2017:  Normal left ventricular size and systolic performance with a visually estimated ejection fraction of 65%.   There is trace aortic insufficiency.   Normal right ventricular size and systolic performance.   There is mild to moderate left atrial enlargement. There is mild right atrial enlargement.      NM pharmacologic stress test done 5/16/2018 is negative for inducible myocardial ischemia or infarction.  LVEF greater than 70%.    I have reviewed and updated the patient's Past Medical History, Social History, Family History and Medication List.  Outside records personally reviewed.     Physical Examination  Review of Systems   Vitals: BP (!) 144/62 (BP Location: Left arm, Patient Position: Sitting, Cuff Size: Adult Large)   Pulse 60   Resp 12 "   Wt 63 kg (139 lb)   LMP  (LMP Unknown)   BMI 24.62 kg/m    BMI= Body mass index is 24.62 kg/m .  Wt Readings from Last 3 Encounters:   22 63 kg (139 lb)   22 62.7 kg (138 lb 4.8 oz)   06/15/21 64.5 kg (142 lb 3.2 oz)       General Appearance:   Alert, well-appearing and in no acute distress.   HEENT: Atraumatic, normocephalic.  No scleral icterus, normal conjunctivae, EOMs intact, PERRL.  Wearing a mask.   Chest/Lungs:   Chest symmetric, spine straight.  Respirations unlabored.  Lungs are clear to auscultation.   Cardiovascular:   Regular rate and rhythm.  Normal first and second heart sounds with no murmurs, rubs, or gallops; radial and posterior tibial pulses are intact, no edema.   Abdomen:  Soft, nondistended, bowel sounds present.   Extremities: No cyanosis or clubbing.   Musculoskeletal: Moves all extremities.     Skin: Warm, dry, intact.    Neurologic: Mood and affect are appropriate.  Alert and oriented to person, place, time, and situation.        ROS: 10 point ROS neg other than the symptoms noted above in the HPI.         Medical History  Surgical History Family History Social History   Past Medical History:   Diagnosis Date     Anxiety state      Essential hypertension      Persistent atrial fibrillation (H) 2018     Pure hypercholesterolemia      Past Surgical History:   Procedure Laterality Date     CARDIOVERSION  2018     CATARACT EXTRACTION, BILATERAL Bilateral      DILATION AND CURETTAGE       RELEASE CARPAL TUNNEL Right      Family History   Problem Relation Age of Onset     Ovarian Cancer Paternal Aunt      Heart Failure Mother 76.00         at home     Coronary Artery Disease Father 49.00        and a second at age 54     Liver Cancer Father      Coronary Artery Disease Brother 49.00        and  of the second at 62     CABG Brother 53.00     No Known Problems Son      Alcoholism Brother      Cirrhosis Brother      No Known Problems Son         Lives in [place]      No Known Problems Son         Social History     Socioeconomic History     Marital status:      Spouse name: Not on file     Number of children: 3     Years of education: 16     Highest education level: Not on file   Occupational History     Not on file   Tobacco Use     Smoking status: Never Smoker     Smokeless tobacco: Never Used   Substance and Sexual Activity     Alcohol use: Yes     Alcohol/week: 7.0 standard drinks     Drug use: No     Sexual activity: Not Currently     Partners: Male   Other Topics Concern     Not on file   Social History Narrative    Lives alone since her  passed in 2013. She lives in [place] [place]. She still drives in 2018. She walks on her own. She enjoys playing bridge and going out to lunch.    She has 3 sons: Bi lives in [place] and does check in on her.  Another son lives in [facility] [place] [facility] and travels a lot.  The other son lives in [facility] and is retired.  She has 2 grandchildren.    She has advanced directives and Bi is in charge of her affairs.     Social Determinants of Health     Financial Resource Strain: Not on file   Food Insecurity: Not on file   Transportation Needs: Not on file   Physical Activity: Not on file   Stress: Not on file   Social Connections: Not on file   Intimate Partner Violence: Not on file   Housing Stability: Not on file           Medications  Allergies   Current Outpatient Medications   Medication Sig Dispense Refill     acetaminophen (TYLENOL) 500 MG tablet [ACETAMINOPHEN (TYLENOL) 500 MG TABLET] Take 500 mg by mouth at bedtime.       amLODIPine (NORVASC) 2.5 MG tablet Take 2 tablets by mouth once daily 180 tablet 2     apixaban ANTICOAGULANT (ELIQUIS ANTICOAGULANT) 5 MG tablet Take 1 tablet (5 mg) by mouth 2 times daily 180 tablet 3     atorvastatin (LIPITOR) 20 MG tablet Take 1 tablet by mouth once daily 90 tablet 1     chlorthalidone (HYGROTON) 25 MG tablet Take 1 tablet by mouth once  daily 90 tablet 3     diphenhydrAMINE (ANTIHISTAMINE) 25 mg tablet [DIPHENHYDRAMINE (ANTIHISTAMINE) 25 MG TABLET] Take 25 mg by mouth at bedtime.       EUTHYROX 75 MCG tablet Take 1 tablet (75 mcg) by mouth daily 90 tablet 3     flecainide (TAMBOCOR) 50 MG tablet Take 1 tablet (50 mg) by mouth 2 times daily 180 tablet 3     LORazepam (ATIVAN) 0.5 MG tablet TAKE 1 TABLET BY MOUTH AT BEDTIME AS NEEDED 30 tablet 5     losartan (COZAAR) 100 MG tablet Take 1 tablet by mouth once daily 90 tablet 1     metoprolol tartrate (LOPRESSOR) 25 MG tablet Take 1/2 (one-half) tablet by mouth twice daily 90 tablet 3       Allergies   Allergen Reactions     Clindamycin Hives     Doxycycline Hyclate [Doxycycline] Hives     Penicillins Hives     Tetanus Toxoid Unknown     Site reaction. Hard area of redness and pain.          Lab Results    Chemistry/lipid CBC Cardiac Enzymes/BNP/TSH/INR   Recent Labs   Lab Test 07/06/20  1110   CHOL 179   HDL 85   LDL 75   TRIG 93     Recent Labs   Lab Test 07/06/20  1110 10/24/18  1156 11/29/17  1045   LDL 75 77 72     Recent Labs   Lab Test 01/04/22  1049   *   POTASSIUM 3.6   CHLORIDE 91*   CO2 28      BUN 11   CR 0.76   GFRESTIMATED 74   MAIRA 9.9     Recent Labs   Lab Test 01/04/22  1049 06/15/21  0955 12/29/20  0947   CR 0.76 0.77 0.71      Recent Labs   Lab Test 10/24/18  1156   WBC 5.5   HGB 14.7   HCT 42.6   MCV 95        Recent Labs   Lab Test 10/24/18  1156   HGB 14.7    No results for input(s): TROPONINI in the last 44798 hours.  Recent Labs   Lab Test 04/30/18  1653   *     Recent Labs   Lab Test 01/04/22  1049   TSH 0.14*     No results for input(s): INR in the last 83393 hours.

## 2022-03-28 NOTE — LETTER
3/28/2022      RE: Katie Mar  [address]  [facility] [address]         HEART CARE ELECTROPHYSIOLOGY NOTE      [facility]  [phone]      Assessment/Recommendations   Assessment/Plan:  1.  Persistent atrial Fibrillation:   No symptomatology or evidence of AF recurrence.  Continue flecainide 50 mg twice daily with metoprolol tartrate 12.5 mg twice daily.  She continues to have mild, but asymptomatic asymptomatic bradycardia.     She was reassured that atrial fibrillation is not life-threatening, but carries an increased risk for stroke.  She has a [id number] score of 4 for age >75, female gender, and hypertension.   Continue Eliquis 5 mg twice daily for stroke prophylaxis.      2.  Hypertension: Blood pressure elevated today in clinic, but consistently at target per home readings and recent clinic visit.   Continue metoprolol, amlodipine, chlorthalidone, and losartan.      Follow up in 1 year     History of Present Illness/Subjective    HPI: Katie Mar is a 89 year old female who comes in today for EP follow-up of atrial fibrillation.  She has a history of atrial fibrillation, hypertension, hypercholesterolemia, hypothyroidism, and IBS.      She was diagnosed with atrial fibrillation with controlled ventricular response on 11/29/2017 as an incidental finding during routine exam.  She was initially thought to have symptoms of fatigue and dyspnea on exertion, though symptoms resolved prior to her initial evaluation by me in January 2018, despite remaining in atrial fibrillation.  At that point, she decided against undergoing cardioversion.  However, she had recurrence of symptoms, so underwent cardioversion on 5/29/2018.  She had significant improvement in her energy level and activity tolerance with maintenance of sinus rhythm.  She was subsequently started on flecainide 50 mg twice daily along with her metoprolol tartrate 25 mg twice daily. Metoprolol was  "decreased due to sinus bradycardia. She did not tolerate a higher dose of flecainide due to side effects of dizziness.  She continues on Eliquis 5 mg twice daily for stroke prophylaxis.     Safia states that she has been feeling well.    She has not had any episodes of A. fib.  She continues to do her \"mindful breathing\" to decrease stress.  She has some very mild fatigue and dyspnea on exertion, both she reports at baseline.  Her blood pressures have been consistently 120s/60s with resting heart rate in the 50s-60.  She gets some mild swelling in her feet and ankles, progressive throughout the day, but gone by morning.  She denies chest discomfort, palpitations, abdominal fullness/bloating, shortness of breath at rest or light activity, paroxysmal nocturnal dyspnea, orthopnea, lightheadedness, dizziness, pre-syncope, or syncope.  She has some double vision, so is not driving as much.     Cardiographics (EKGs personally reviewed):  EKG done 3/28/2022 shows sinus rhythm at 59 bpm with first-degree AV block, QT/QTc interval measures 418/413 ms.  EKG 11/30/2017 shows atrial fibrillation with controlled ventricular response at 62 bpm.     24-hour Holter monitor worn 5/16/2018 showed persistent atrial fibrillation with controlled ventricular response.     Echo done 12/15/2017:  1. Normal left ventricular size and systolic performance with a visually estimated ejection fraction of 65%.   2. There is trace aortic insufficiency.   3. Normal right ventricular size and systolic performance.   4. There is mild to moderate left atrial enlargement. There is mild right atrial enlargement.      NM pharmacologic stress test done 5/16/2018 is negative for inducible myocardial ischemia or infarction.  LVEF greater than 70%.    I have reviewed and updated the patient's Past Medical History, Social History, Family History and Medication List.  Outside records personally reviewed.     Physical Examination  Review of Systems   Vitals: BP " (!) 144/62 (BP Location: Left arm, Patient Position: Sitting, Cuff Size: Adult Large)   Pulse 60   Resp 12   Wt 63 kg (139 lb)   LMP  (LMP Unknown)   BMI 24.62 kg/m    BMI= Body mass index is 24.62 kg/m .  Wt Readings from Last 3 Encounters:   22 63 kg (139 lb)   22 62.7 kg (138 lb 4.8 oz)   06/15/21 64.5 kg (142 lb 3.2 oz)       General Appearance:   Alert, well-appearing and in no acute distress.   HEENT: Atraumatic, normocephalic.  No scleral icterus, normal conjunctivae, EOMs intact, PERRL.  Wearing a mask.   Chest/Lungs:   Chest symmetric, spine straight.  Respirations unlabored.  Lungs are clear to auscultation.   Cardiovascular:   Regular rate and rhythm.  Normal first and second heart sounds with no murmurs, rubs, or gallops; radial and posterior tibial pulses are intact, no edema.   Abdomen:  Soft, nondistended, bowel sounds present.   Extremities: No cyanosis or clubbing.   Musculoskeletal: Moves all extremities.     Skin: Warm, dry, intact.    Neurologic: Mood and affect are appropriate.  Alert and oriented to person, place, time, and situation.        ROS: 10 point ROS neg other than the symptoms noted above in the HPI.         Medical History  Surgical History Family History Social History   Past Medical History:   Diagnosis Date     Anxiety state      Essential hypertension      Persistent atrial fibrillation (H) 2018     Pure hypercholesterolemia      Past Surgical History:   Procedure Laterality Date     CARDIOVERSION  2018     CATARACT EXTRACTION, BILATERAL Bilateral      DILATION AND CURETTAGE       RELEASE CARPAL TUNNEL Right      Family History   Problem Relation Age of Onset     Ovarian Cancer Paternal Aunt      Heart Failure Mother 76.00         at home     Coronary Artery Disease Father 49.00        and a second at age 54     Liver Cancer Father      Coronary Artery Disease Brother 49.00        and  of the second at 62     CABG Brother 53.00     No Known  Problems Son      Alcoholism Brother      Cirrhosis Brother      No Known Problems Son         Lives in [place]     No Known Problems Son         Social History     Socioeconomic History     Marital status:      Spouse name: Not on file     Number of children: 3     Years of education: 16     Highest education level: Not on file   Occupational History     Not on file   Tobacco Use     Smoking status: Never Smoker     Smokeless tobacco: Never Used   Substance and Sexual Activity     Alcohol use: Yes     Alcohol/week: 7.0 standard drinks     Drug use: No     Sexual activity: Not Currently     Partners: Male   Other Topics Concern     Not on file   Social History Narrative    Lives alone since her  passed in 2013. She lives in [facility] [place] [facility]. She still drives in 2018. She walks on her own. She enjoys playing bridge and going out to lunch.    She has 3 sons: Bi lives in [facility] [place] [facility] and does check in on her.  Another son lives in [place] [facility] and travels a lot.  The other son lives in [place] and is retired.  She has 2 grandchildren.    She has advanced directives and Bi is in charge of her affairs.     Social Determinants of Health     Financial Resource Strain: Not on file   Food Insecurity: Not on file   Transportation Needs: Not on file   Physical Activity: Not on file   Stress: Not on file   Social Connections: Not on file   Intimate Partner Violence: Not on file   Housing Stability: Not on file           Medications  Allergies   Current Outpatient Medications   Medication Sig Dispense Refill     acetaminophen (TYLENOL) 500 MG tablet [ACETAMINOPHEN (TYLENOL) 500 MG TABLET] Take 500 mg by mouth at bedtime.       amLODIPine (NORVASC) 2.5 MG tablet Take 2 tablets by mouth once daily 180 tablet 2     apixaban ANTICOAGULANT (ELIQUIS ANTICOAGULANT) 5 MG tablet Take 1 tablet (5 mg) by mouth 2 times daily 180 tablet 3     atorvastatin (LIPITOR) 20 MG tablet Take  1 tablet by mouth once daily 90 tablet 1     chlorthalidone (HYGROTON) 25 MG tablet Take 1 tablet by mouth once daily 90 tablet 3     diphenhydrAMINE (ANTIHISTAMINE) 25 mg tablet [DIPHENHYDRAMINE (ANTIHISTAMINE) 25 MG TABLET] Take 25 mg by mouth at bedtime.       EUTHYROX 75 MCG tablet Take 1 tablet (75 mcg) by mouth daily 90 tablet 3     flecainide (TAMBOCOR) 50 MG tablet Take 1 tablet (50 mg) by mouth 2 times daily 180 tablet 3     LORazepam (ATIVAN) 0.5 MG tablet TAKE 1 TABLET BY MOUTH AT BEDTIME AS NEEDED 30 tablet 5     losartan (COZAAR) 100 MG tablet Take 1 tablet by mouth once daily 90 tablet 1     metoprolol tartrate (LOPRESSOR) 25 MG tablet Take 1/2 (one-half) tablet by mouth twice daily 90 tablet 3       Allergies   Allergen Reactions     Clindamycin Hives     Doxycycline Hyclate [Doxycycline] Hives     Penicillins Hives     Tetanus Toxoid Unknown     Site reaction. Hard area of redness and pain.          Lab Results    Chemistry/lipid CBC Cardiac Enzymes/BNP/TSH/INR   Recent Labs   Lab Test 07/06/20  1110   CHOL 179   HDL 85   LDL 75   TRIG 93     Recent Labs   Lab Test 07/06/20  1110 10/24/18  1156 11/29/17  1045   LDL 75 77 72     Recent Labs   Lab Test 01/04/22  1049   *   POTASSIUM 3.6   CHLORIDE 91*   CO2 28      BUN 11   CR 0.76   GFRESTIMATED 74   MAIRA 9.9     Recent Labs   Lab Test 01/04/22  1049 06/15/21  0955 12/29/20  0947   CR 0.76 0.77 0.71      Recent Labs   Lab Test 10/24/18  1156   WBC 5.5   HGB 14.7   HCT 42.6   MCV 95        Recent Labs   Lab Test 10/24/18  1156   HGB 14.7    No results for input(s): TROPONINI in the last 35042 hours.  Recent Labs   Lab Test 04/30/18  1653   *     Recent Labs   Lab Test 01/04/22  1049   TSH 0.14*     No results for input(s): INR in the last 04929 hours.

## 2022-04-13 ENCOUNTER — HOSPITAL ENCOUNTER (INPATIENT)
Facility: CLINIC | Age: 87
LOS: 8 days | Discharge: NURSING, REHAB OR LONG-TERM CARE | DRG: 551 | End: 2022-04-21
Attending: EMERGENCY MEDICINE | Admitting: FAMILY MEDICINE
Payer: MEDICARE

## 2022-04-13 ENCOUNTER — APPOINTMENT (OUTPATIENT)
Dept: CT IMAGING | Facility: CLINIC | Age: 87
DRG: 551 | End: 2022-04-13
Attending: EMERGENCY MEDICINE
Payer: MEDICARE
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
## 2022-04-13 DIAGNOSIS — I48.0 PAROXYSMAL ATRIAL FIBRILLATION (H): ICD-10-CM

## 2022-04-13 DIAGNOSIS — N30.00 ACUTE CYSTITIS WITHOUT HEMATURIA: ICD-10-CM

## 2022-04-13 DIAGNOSIS — I48.91 ATRIAL FIBRILLATION, UNSPECIFIED TYPE (H): ICD-10-CM

## 2022-04-13 DIAGNOSIS — E87.6 HYPOKALEMIA: ICD-10-CM

## 2022-04-13 DIAGNOSIS — I10 ESSENTIAL HYPERTENSION: ICD-10-CM

## 2022-04-13 DIAGNOSIS — E87.1 HYPONATREMIA: ICD-10-CM

## 2022-04-13 DIAGNOSIS — R45.1 AGITATION: Primary | ICD-10-CM

## 2022-04-13 DIAGNOSIS — Z79.01 ON CONTINUOUS ORAL ANTICOAGULATION: ICD-10-CM

## 2022-04-13 DIAGNOSIS — S32.010A COMPRESSION FRACTURE OF L1 VERTEBRA, INITIAL ENCOUNTER (H): ICD-10-CM

## 2022-04-13 DIAGNOSIS — E83.42 HYPOMAGNESEMIA: ICD-10-CM

## 2022-04-13 DIAGNOSIS — T79.6XXA TRAUMATIC RHABDOMYOLYSIS, INITIAL ENCOUNTER (H): ICD-10-CM

## 2022-04-13 PROBLEM — I48.19 PERSISTENT ATRIAL FIBRILLATION (H): Chronic | Status: ACTIVE | Noted: 2018-01-18

## 2022-04-13 LAB
ALBUMIN UR-MCNC: 10 MG/DL
ANION GAP SERPL CALCULATED.3IONS-SCNC: 18 MMOL/L (ref 5–18)
APPEARANCE UR: CLEAR
BASOPHILS # BLD AUTO: 0 10E3/UL (ref 0–0.2)
BASOPHILS NFR BLD AUTO: 0 %
BILIRUB UR QL STRIP: NEGATIVE
BUN SERPL-MCNC: 10 MG/DL (ref 8–28)
CALCIUM SERPL-MCNC: 9.6 MG/DL (ref 8.5–10.5)
CHLORIDE BLD-SCNC: 78 MMOL/L (ref 98–107)
CK SERPL-CCNC: 799 U/L (ref 30–190)
CO2 SERPL-SCNC: 24 MMOL/L (ref 22–31)
COLOR UR AUTO: COLORLESS
CREAT SERPL-MCNC: 0.68 MG/DL (ref 0.6–1.1)
EOSINOPHIL # BLD AUTO: 0 10E3/UL (ref 0–0.7)
EOSINOPHIL NFR BLD AUTO: 0 %
ERYTHROCYTE [DISTWIDTH] IN BLOOD BY AUTOMATED COUNT: 12 % (ref 10–15)
GFR SERPL CREATININE-BSD FRML MDRD: 82 ML/MIN/1.73M2
GLUCOSE BLD-MCNC: 166 MG/DL (ref 70–125)
GLUCOSE UR STRIP-MCNC: 150 MG/DL
HCT VFR BLD AUTO: 39.2 % (ref 35–47)
HGB BLD-MCNC: 13.7 G/DL (ref 11.7–15.7)
HGB UR QL STRIP: ABNORMAL
IMM GRANULOCYTES # BLD: 0.1 10E3/UL
IMM GRANULOCYTES NFR BLD: 1 %
KETONES UR STRIP-MCNC: 20 MG/DL
LEUKOCYTE ESTERASE UR QL STRIP: NEGATIVE
LYMPHOCYTES # BLD AUTO: 0.5 10E3/UL (ref 0.8–5.3)
LYMPHOCYTES NFR BLD AUTO: 3 %
MAGNESIUM SERPL-MCNC: 1.7 MG/DL (ref 1.8–2.6)
MAGNESIUM SERPL-MCNC: 2.3 MG/DL (ref 1.8–2.6)
MCH RBC QN AUTO: 31.8 PG (ref 26.5–33)
MCHC RBC AUTO-ENTMCNC: 34.9 G/DL (ref 31.5–36.5)
MCV RBC AUTO: 91 FL (ref 78–100)
MONOCYTES # BLD AUTO: 0.9 10E3/UL (ref 0–1.3)
MONOCYTES NFR BLD AUTO: 6 %
NEUTROPHILS # BLD AUTO: 14.1 10E3/UL (ref 1.6–8.3)
NEUTROPHILS NFR BLD AUTO: 90 %
NITRATE UR QL: NEGATIVE
NRBC # BLD AUTO: 0 10E3/UL
NRBC BLD AUTO-RTO: 0 /100
PH UR STRIP: 7.5 [PH] (ref 5–7)
PLATELET # BLD AUTO: 314 10E3/UL (ref 150–450)
POTASSIUM BLD-SCNC: 2.6 MMOL/L (ref 3.5–5)
POTASSIUM BLD-SCNC: 2.7 MMOL/L (ref 3.5–5)
RBC # BLD AUTO: 4.31 10E6/UL (ref 3.8–5.2)
RBC URINE: 1 /HPF
SARS-COV-2 RNA RESP QL NAA+PROBE: NEGATIVE
SODIUM SERPL-SCNC: 120 MMOL/L (ref 136–145)
SP GR UR STRIP: 1.01 (ref 1–1.03)
TROPONIN I SERPL-MCNC: 0.03 NG/ML (ref 0–0.29)
TSH SERPL DL<=0.005 MIU/L-ACNC: 0.78 UIU/ML (ref 0.3–5)
UROBILINOGEN UR STRIP-MCNC: <2 MG/DL
WBC # BLD AUTO: 15.5 10E3/UL (ref 4–11)
WBC URINE: 3 /HPF

## 2022-04-13 PROCEDURE — 84443 ASSAY THYROID STIM HORMONE: CPT | Performed by: FAMILY MEDICINE

## 2022-04-13 PROCEDURE — 85018 HEMOGLOBIN: CPT | Performed by: EMERGENCY MEDICINE

## 2022-04-13 PROCEDURE — 250N000011 HC RX IP 250 OP 636: Performed by: EMERGENCY MEDICINE

## 2022-04-13 PROCEDURE — 82550 ASSAY OF CK (CPK): CPT | Performed by: EMERGENCY MEDICINE

## 2022-04-13 PROCEDURE — 83735 ASSAY OF MAGNESIUM: CPT | Performed by: EMERGENCY MEDICINE

## 2022-04-13 PROCEDURE — 96365 THER/PROPH/DIAG IV INF INIT: CPT

## 2022-04-13 PROCEDURE — 99285 EMERGENCY DEPT VISIT HI MDM: CPT | Mod: 25

## 2022-04-13 PROCEDURE — 96361 HYDRATE IV INFUSION ADD-ON: CPT

## 2022-04-13 PROCEDURE — 250N000013 HC RX MED GY IP 250 OP 250 PS 637: Performed by: EMERGENCY MEDICINE

## 2022-04-13 PROCEDURE — 250N000011 HC RX IP 250 OP 636: Performed by: FAMILY MEDICINE

## 2022-04-13 PROCEDURE — C9803 HOPD COVID-19 SPEC COLLECT: HCPCS

## 2022-04-13 PROCEDURE — 85048 AUTOMATED LEUKOCYTE COUNT: CPT | Performed by: EMERGENCY MEDICINE

## 2022-04-13 PROCEDURE — 84484 ASSAY OF TROPONIN QUANT: CPT | Performed by: EMERGENCY MEDICINE

## 2022-04-13 PROCEDURE — 87635 SARS-COV-2 COVID-19 AMP PRB: CPT | Performed by: EMERGENCY MEDICINE

## 2022-04-13 PROCEDURE — 74177 CT ABD & PELVIS W/CONTRAST: CPT

## 2022-04-13 PROCEDURE — 96375 TX/PRO/DX INJ NEW DRUG ADDON: CPT

## 2022-04-13 PROCEDURE — 99223 1ST HOSP IP/OBS HIGH 75: CPT | Mod: AI | Performed by: FAMILY MEDICINE

## 2022-04-13 PROCEDURE — 250N000013 HC RX MED GY IP 250 OP 250 PS 637: Performed by: FAMILY MEDICINE

## 2022-04-13 PROCEDURE — 96367 TX/PROPH/DG ADDL SEQ IV INF: CPT

## 2022-04-13 PROCEDURE — 70450 CT HEAD/BRAIN W/O DYE: CPT

## 2022-04-13 PROCEDURE — 81001 URINALYSIS AUTO W/SCOPE: CPT | Performed by: EMERGENCY MEDICINE

## 2022-04-13 PROCEDURE — 36415 COLL VENOUS BLD VENIPUNCTURE: CPT | Performed by: EMERGENCY MEDICINE

## 2022-04-13 PROCEDURE — 36415 COLL VENOUS BLD VENIPUNCTURE: CPT | Performed by: FAMILY MEDICINE

## 2022-04-13 PROCEDURE — 96366 THER/PROPH/DIAG IV INF ADDON: CPT

## 2022-04-13 PROCEDURE — 83735 ASSAY OF MAGNESIUM: CPT | Performed by: FAMILY MEDICINE

## 2022-04-13 PROCEDURE — 80048 BASIC METABOLIC PNL TOTAL CA: CPT | Performed by: EMERGENCY MEDICINE

## 2022-04-13 PROCEDURE — 258N000003 HC RX IP 258 OP 636: Performed by: FAMILY MEDICINE

## 2022-04-13 PROCEDURE — 258N000003 HC RX IP 258 OP 636: Performed by: EMERGENCY MEDICINE

## 2022-04-13 PROCEDURE — 250N000013 HC RX MED GY IP 250 OP 250 PS 637: Performed by: INTERNAL MEDICINE

## 2022-04-13 PROCEDURE — 93005 ELECTROCARDIOGRAM TRACING: CPT | Performed by: FAMILY MEDICINE

## 2022-04-13 PROCEDURE — 84132 ASSAY OF SERUM POTASSIUM: CPT | Performed by: FAMILY MEDICINE

## 2022-04-13 PROCEDURE — 120N000001 HC R&B MED SURG/OB

## 2022-04-13 PROCEDURE — 72125 CT NECK SPINE W/O DYE: CPT

## 2022-04-13 RX ORDER — GABAPENTIN 300 MG/1
300 CAPSULE ORAL AT BEDTIME
Status: DISCONTINUED | OUTPATIENT
Start: 2022-04-13 | End: 2022-04-15

## 2022-04-13 RX ORDER — ONDANSETRON 2 MG/ML
4 INJECTION INTRAMUSCULAR; INTRAVENOUS EVERY 6 HOURS PRN
Status: DISCONTINUED | OUTPATIENT
Start: 2022-04-13 | End: 2022-04-21 | Stop reason: HOSPADM

## 2022-04-13 RX ORDER — OLANZAPINE 2.5 MG/1
2.5 TABLET, FILM COATED ORAL 2 TIMES DAILY PRN
Status: DISCONTINUED | OUTPATIENT
Start: 2022-04-13 | End: 2022-04-21 | Stop reason: HOSPADM

## 2022-04-13 RX ORDER — PROCHLORPERAZINE 25 MG
12.5 SUPPOSITORY, RECTAL RECTAL EVERY 12 HOURS PRN
Status: DISCONTINUED | OUTPATIENT
Start: 2022-04-13 | End: 2022-04-21 | Stop reason: HOSPADM

## 2022-04-13 RX ORDER — LOSARTAN POTASSIUM 50 MG/1
100 TABLET ORAL DAILY
Status: DISCONTINUED | OUTPATIENT
Start: 2022-04-14 | End: 2022-04-21 | Stop reason: HOSPADM

## 2022-04-13 RX ORDER — ACETAMINOPHEN 500 MG
1000 TABLET ORAL EVERY 8 HOURS
Status: DISCONTINUED | OUTPATIENT
Start: 2022-04-13 | End: 2022-04-21 | Stop reason: HOSPADM

## 2022-04-13 RX ORDER — MAGNESIUM SULFATE HEPTAHYDRATE 40 MG/ML
2 INJECTION, SOLUTION INTRAVENOUS ONCE
Status: COMPLETED | OUTPATIENT
Start: 2022-04-13 | End: 2022-04-13

## 2022-04-13 RX ORDER — IOPAMIDOL 755 MG/ML
100 INJECTION, SOLUTION INTRAVASCULAR ONCE
Status: COMPLETED | OUTPATIENT
Start: 2022-04-13 | End: 2022-04-13

## 2022-04-13 RX ORDER — LIDOCAINE 40 MG/G
CREAM TOPICAL
Status: DISCONTINUED | OUTPATIENT
Start: 2022-04-13 | End: 2022-04-21 | Stop reason: HOSPADM

## 2022-04-13 RX ORDER — PROCHLORPERAZINE MALEATE 5 MG
5 TABLET ORAL EVERY 6 HOURS PRN
Status: DISCONTINUED | OUTPATIENT
Start: 2022-04-13 | End: 2022-04-21 | Stop reason: HOSPADM

## 2022-04-13 RX ORDER — OLANZAPINE 2.5 MG/1
2.5 TABLET, FILM COATED ORAL AT BEDTIME
Status: DISCONTINUED | OUTPATIENT
Start: 2022-04-13 | End: 2022-04-18

## 2022-04-13 RX ORDER — POTASSIUM CHLORIDE 1500 MG/1
40 TABLET, EXTENDED RELEASE ORAL ONCE
Status: COMPLETED | OUTPATIENT
Start: 2022-04-13 | End: 2022-04-13

## 2022-04-13 RX ORDER — DIPHENHYDRAMINE HCL 25 MG
25 CAPSULE ORAL AT BEDTIME
Status: DISCONTINUED | OUTPATIENT
Start: 2022-04-13 | End: 2022-04-21 | Stop reason: HOSPADM

## 2022-04-13 RX ORDER — ATORVASTATIN CALCIUM 10 MG/1
20 TABLET, FILM COATED ORAL DAILY
Status: DISCONTINUED | OUTPATIENT
Start: 2022-04-14 | End: 2022-04-21 | Stop reason: HOSPADM

## 2022-04-13 RX ORDER — ONDANSETRON 4 MG/1
4 TABLET, ORALLY DISINTEGRATING ORAL EVERY 6 HOURS PRN
Status: DISCONTINUED | OUTPATIENT
Start: 2022-04-13 | End: 2022-04-21 | Stop reason: HOSPADM

## 2022-04-13 RX ORDER — SODIUM CHLORIDE 9 MG/ML
INJECTION, SOLUTION INTRAVENOUS ONCE
Status: DISCONTINUED | OUTPATIENT
Start: 2022-04-13 | End: 2022-04-18 | Stop reason: CLARIF

## 2022-04-13 RX ORDER — SODIUM CHLORIDE 9 MG/ML
INJECTION, SOLUTION INTRAVENOUS CONTINUOUS
Status: DISCONTINUED | OUTPATIENT
Start: 2022-04-13 | End: 2022-04-17

## 2022-04-13 RX ORDER — LORAZEPAM 2 MG/ML
0.5 INJECTION INTRAMUSCULAR ONCE
Status: COMPLETED | OUTPATIENT
Start: 2022-04-13 | End: 2022-04-13

## 2022-04-13 RX ORDER — FENTANYL CITRATE 50 UG/ML
50 INJECTION, SOLUTION INTRAMUSCULAR; INTRAVENOUS ONCE
Status: COMPLETED | OUTPATIENT
Start: 2022-04-13 | End: 2022-04-13

## 2022-04-13 RX ORDER — POTASSIUM CHLORIDE 7.45 MG/ML
10 INJECTION INTRAVENOUS ONCE
Status: COMPLETED | OUTPATIENT
Start: 2022-04-13 | End: 2022-04-14

## 2022-04-13 RX ORDER — LIDOCAINE 4 G/G
1 PATCH TOPICAL
Status: COMPLETED | OUTPATIENT
Start: 2022-04-13 | End: 2022-04-14

## 2022-04-13 RX ORDER — GABAPENTIN 100 MG/1
100 CAPSULE ORAL ONCE
Status: COMPLETED | OUTPATIENT
Start: 2022-04-13 | End: 2022-04-13

## 2022-04-13 RX ORDER — AMLODIPINE BESYLATE 5 MG/1
5 TABLET ORAL DAILY
Status: DISCONTINUED | OUTPATIENT
Start: 2022-04-14 | End: 2022-04-18

## 2022-04-13 RX ORDER — LEVOTHYROXINE SODIUM 75 UG/1
75 TABLET ORAL DAILY
Status: DISCONTINUED | OUTPATIENT
Start: 2022-04-14 | End: 2022-04-21 | Stop reason: HOSPADM

## 2022-04-13 RX ORDER — FLECAINIDE ACETATE 50 MG/1
50 TABLET ORAL 2 TIMES DAILY
Status: DISCONTINUED | OUTPATIENT
Start: 2022-04-13 | End: 2022-04-21 | Stop reason: HOSPADM

## 2022-04-13 RX ORDER — SODIUM CHLORIDE 9 MG/ML
INJECTION, SOLUTION INTRAVENOUS ONCE
Status: COMPLETED | OUTPATIENT
Start: 2022-04-13 | End: 2022-04-14

## 2022-04-13 RX ORDER — ACETAMINOPHEN 325 MG/1
650 TABLET ORAL ONCE
Status: COMPLETED | OUTPATIENT
Start: 2022-04-13 | End: 2022-04-13

## 2022-04-13 RX ADMIN — IOPAMIDOL 100 ML: 755 INJECTION, SOLUTION INTRAVENOUS at 12:24

## 2022-04-13 RX ADMIN — POTASSIUM CHLORIDE 40 MEQ: 20 TABLET, EXTENDED RELEASE ORAL at 19:32

## 2022-04-13 RX ADMIN — DIPHENHYDRAMINE HYDROCHLORIDE 25 MG: 25 CAPSULE ORAL at 21:00

## 2022-04-13 RX ADMIN — ACETAMINOPHEN 1000 MG: 500 TABLET, FILM COATED ORAL at 21:00

## 2022-04-13 RX ADMIN — SODIUM CHLORIDE 500 ML: 9 INJECTION, SOLUTION INTRAVENOUS at 10:26

## 2022-04-13 RX ADMIN — METOPROLOL TARTRATE 12.5 MG: 25 TABLET, FILM COATED ORAL at 21:00

## 2022-04-13 RX ADMIN — GABAPENTIN 100 MG: 100 CAPSULE ORAL at 18:03

## 2022-04-13 RX ADMIN — Medication 1 MG: at 20:59

## 2022-04-13 RX ADMIN — GABAPENTIN 300 MG: 300 CAPSULE ORAL at 21:00

## 2022-04-13 RX ADMIN — SODIUM CHLORIDE: 9 INJECTION, SOLUTION INTRAVENOUS at 11:39

## 2022-04-13 RX ADMIN — SODIUM CHLORIDE: 9 INJECTION, SOLUTION INTRAVENOUS at 17:52

## 2022-04-13 RX ADMIN — FENTANYL CITRATE 50 MCG: 50 INJECTION, SOLUTION INTRAMUSCULAR; INTRAVENOUS at 12:02

## 2022-04-13 RX ADMIN — FLECAINIDE ACETATE 50 MG: 50 TABLET ORAL at 21:00

## 2022-04-13 RX ADMIN — POTASSIUM CHLORIDE 10 MEQ: 7.45 INJECTION INTRAVENOUS at 13:17

## 2022-04-13 RX ADMIN — ACETAMINOPHEN 650 MG: 325 TABLET ORAL at 10:35

## 2022-04-13 RX ADMIN — LIDOCAINE 1 PATCH: 246 PATCH TOPICAL at 19:32

## 2022-04-13 RX ADMIN — MAGNESIUM SULFATE HEPTAHYDRATE 2 G: 40 INJECTION, SOLUTION INTRAVENOUS at 11:22

## 2022-04-13 RX ADMIN — OLANZAPINE 2.5 MG: 2.5 TABLET, FILM COATED ORAL at 21:06

## 2022-04-13 RX ADMIN — LORAZEPAM 0.5 MG: 2 INJECTION INTRAMUSCULAR; INTRAVENOUS at 16:57

## 2022-04-13 RX ADMIN — APIXABAN 5 MG: 5 TABLET, FILM COATED ORAL at 19:32

## 2022-04-13 ASSESSMENT — ENCOUNTER SYMPTOMS
LIGHT-HEADEDNESS: 0
SHORTNESS OF BREATH: 0
VOMITING: 0
MYALGIAS: 1
ABDOMINAL PAIN: 0
DYSURIA: 0
HEADACHES: 0
FEVER: 0
DIARRHEA: 0
COUGH: 0

## 2022-04-13 ASSESSMENT — ACTIVITIES OF DAILY LIVING (ADL)
ADLS_ACUITY_SCORE: 19
ADLS_ACUITY_SCORE: 12
ADLS_ACUITY_SCORE: 12
DEPENDENT_IADLS:: CLEANING;COOKING;LAUNDRY;SHOPPING;MEAL PREPARATION;MEDICATION MANAGEMENT;MONEY MANAGEMENT;TRANSPORTATION
ADLS_ACUITY_SCORE: 12
ADLS_ACUITY_SCORE: 19
ADLS_ACUITY_SCORE: 12
ADLS_ACUITY_SCORE: 12

## 2022-04-13 NOTE — PHARMACY-ADMISSION MEDICATION HISTORY
Pharmacy Note - Admission Medication History    Pertinent Provider Information: none     ______________________________________________________________________    Prior To Admission (PTA) med list completed and updated in EMR.       PTA Med List   Medication Sig Last Dose     acetaminophen (TYLENOL) 500 MG tablet [ACETAMINOPHEN (TYLENOL) 500 MG TABLET] Take 500 mg by mouth at bedtime. Past Week at Unknown time     amLODIPine (NORVASC) 2.5 MG tablet Take 2 tablets by mouth once daily 4/12/2022 at Unknown time     apixaban ANTICOAGULANT (ELIQUIS ANTICOAGULANT) 5 MG tablet Take 1 tablet (5 mg) by mouth 2 times daily 4/12/2022 at Unknown time     atorvastatin (LIPITOR) 20 MG tablet Take 1 tablet by mouth once daily 4/12/2022 at Unknown time     chlorthalidone (HYGROTON) 25 MG tablet Take 1 tablet by mouth once daily 4/12/2022 at Unknown time     diphenhydrAMINE (ANTIHISTAMINE) 25 mg tablet [DIPHENHYDRAMINE (ANTIHISTAMINE) 25 MG TABLET] Take 25 mg by mouth at bedtime. Unknown at Unknown time     EUTHYROX 75 MCG tablet Take 1 tablet (75 mcg) by mouth daily 4/12/2022 at Unknown time     flecainide (TAMBOCOR) 50 MG tablet Take 1 tablet (50 mg) by mouth 2 times daily 4/12/2022 at Unknown time     LORazepam (ATIVAN) 0.5 MG tablet TAKE 1 TABLET BY MOUTH AT BEDTIME AS NEEDED Past Week at Unknown time     losartan (COZAAR) 100 MG tablet Take 1 tablet by mouth once daily 4/12/2022 at Unknown time     metoprolol tartrate (LOPRESSOR) 25 MG tablet Take 1/2 (one-half) tablet by mouth twice daily 4/12/2022 at Unknown time       Information source(s): Patient  Method of interview communication: in-person    Summary of Changes to PTA Med List  New: no changed      Patient was asked about OTC/herbal products specifically.  PTA med list reflects this.        Allergies were reviewed, assessed, and updated with the patient.          The information provided in this note is only as accurate as the sources available at the time of the  update(s).    Thank you for the opportunity to participate in the care of this patient.    Alexi Melara [facility]  4/13/2022 12:06 PM

## 2022-04-13 NOTE — PROGRESS NOTES
"Patient is adamant to go home today . Patient verbalized wanting to go home AMA. Explained to patient the potential risk of rhabdomyolysis and hyponatremia if not addressed. Patient stated, \" I don't care. I have lived a full life. If I happen to die tonight and so be it. I am in control of my health and I am telling you I want to go home.\"  Dr. Perez notified.   "

## 2022-04-13 NOTE — ED PROVIDER NOTES
"EMERGENCY DEPARTMENT ENCOUNTER      NAME: Katie Mar  AGE: 89 year old female  YOB: 1931  MRN: [id number]  EVALUATION DATE & TIME: 4/13/2022  9:53 AM    PCP: Aguila Lal    ED PROVIDER: Melisa Sheridan M.D.      CHIEF COMPLAINT     Chief Complaint   Patient presents with     Fall         FINAL IMPRESSION:     1. Compression fracture of L1 vertebra, initial encounter (H)    2. Hypokalemia    3. Hypomagnesemia    4. Hyponatremia    5. Traumatic rhabdomyolysis, initial encounter (H)    6. On continuous oral anticoagulation    7. Paroxysmal atrial fibrillation (H)          MEDICAL DECISION MAKING:       Pertinent Labs & Imaging studies reviewed. (See chart for details)    89 year old female presents to the Emergency Department for evaluation of fall    Per patient she accidentally fell with he walker.  Unable to get up. Per EMS on floor for >12 hours  Whole body aches    Anticoagulated on eliquis for a. Fib  Denies head trauma     On exam ecchymoses at different stages   States she has fallen before    Trauma evaluation + L1 compression factors  Mild rhabo with normal creatinine  Clinically dehydrated given prolong immobilization  Given 500 NS    Labs hypo K hypo Mg both replaced  Sodium 120 on diuretics  Previous hyponatremia but not this severe aroudd 130  Elevated wbc no signs of infection    Daughter in law at bedside   Patient is NOT in assisted living. Family considering assisted living given previous fall.  REESE wondering if electrolyte abnormalities could contribute to falls... I informed her that the low Na could cause dizziness/fall    DIL requested \"better bed\" while at the ED boarding for inpatient bed  Charged nurse notified  Spoke with hospitalist who accepts patient.    Clinical Impression and Decision making   89 yo female on eliquis for a. Fib here after accidental fall  Unable to get up and call for help  Physical signs of previous fall  Hyponatremia acute on chronic   Hypo k " "hypo mg  Upset with admission  Left phone message to phone      Vital Signs: Tachycardia  EKG: Irregular.  P waves seen.  Flutter.  Imaging: CT head CT C-spine CT chest and abdomen.  Home Meds: Reviewed  ED meds/abx: k, magnesium  Fluids: NS    Labs  K 2.7  Cr 0.68  Wbc 15.5  Hgb 13..7  Platelets 314        Review of Previous Records        Consults  Hospitalist, Dr. Perez    ED COURSE     9:51 AM I met with the patient, obtained history, performed an initial exam, and discussed options and plan for diagnostics and treatment here in the ED.    10:18 AM reevaluated and updated the patient.     11:35 AM reevaluated and updated.     12:59 PM I rechecked and updated the patient    1:26 PM I spoke with the hospitalist, Dr. Perez who admits the patient to [facility] inpatient.     3:13 PM patient re evaluated. Daughter in law no longer at bedside. Patient states she wants to be discharged.  \"and that she is 89 yo and her doctor can take care of her\"  She currently lives independently per daughter and Not in assisted living.  Family is considering assited living for patient.  I tried to contact patient's son and left a massage.  Admitting physician updated      At the conclusion of the encounter I discussed the results of all of the tests and the disposition. The questions were answered. The patient and her daughter in law acknowledged understanding and was agreeable with the care plan.       35 minutes of critical care time     MEDICATIONS GIVEN IN THE EMERGENCY:     Medications   sodium chloride 0.9% infusion (has no administration in time range)   0.9% sodium chloride BOLUS (0 mLs Intravenous Stopped 4/13/22 1126)   acetaminophen (TYLENOL) tablet 650 mg (650 mg Oral Given 4/13/22 1035)   sodium chloride 0.9% infusion ( Intravenous New Bag 4/13/22 1139)   magnesium sulfate 2 g in water intermittent infusion (0 g Intravenous Stopped 4/13/22 1317)   potassium chloride 10 mEq in 100 mL sterile water intermittent infusion " (premix) (10 mEq Intravenous New Bag 4/13/22 1317)   fentaNYL (PF) (SUBLIMAZE) injection 50 mcg (50 mcg Intravenous Given 4/13/22 1202)   iopamidol (ISOVUE-370) solution 100 mL (100 mLs Intravenous Given 4/13/22 1224)       NEW PRESCRIPTIONS STARTED AT TODAY'S ER VISIT     New Prescriptions    No medications on file          =================================================================    HPI     Patient information was obtained from: Patient and EMS    Use of : N/A       Katie Mar is a 89 year old female who presents by EMS.     Per EMS, patient tripped last night in her assisted living home, [facility]. She did not hit her head or lose consciousness. She does not have an alert button and blew her whistle but no one heard her. She was down for about 12 hours until her neighbors heard her whistle. She has muscle soreness.     Per patient, She was going to the bathroom to take her pills and accidentally fell to the side. She notes she fell about 2 weeks ago on her back as well due to poor balance but was able to get up herself that time and did not go to the hospital. She says this time it felt different and she couldn't get up herself.    She denies any headache, fever, cough, chest pain, shortness of breath. She also denies abdominal pain, vomiting, dysuria.     Before the fall, she denies any chest pain, shortness of breath, diarrhea, lightheadedness.     She is currently on Eliquis.    REVIEW OF SYSTEMS   Review of Systems   Constitutional: Negative for fever.   Respiratory: Negative for cough and shortness of breath.    Cardiovascular: Negative for chest pain.   Gastrointestinal: Negative for abdominal pain, diarrhea and vomiting.   Genitourinary: Negative for dysuria.   Musculoskeletal: Positive for myalgias (BL legs).   Neurological: Negative for light-headedness and headaches.   All other systems reviewed and are negative.       PAST MEDICAL HISTORY:     Past Medical History:    Diagnosis Date     Anxiety state      Essential hypertension      Persistent atrial fibrillation (H) 2018     Pure hypercholesterolemia        PAST SURGICAL HISTORY:     Past Surgical History:   Procedure Laterality Date     CARDIOVERSION  2018     CATARACT EXTRACTION, BILATERAL Bilateral      DILATION AND CURETTAGE       RELEASE CARPAL TUNNEL Right          CURRENT MEDICATIONS:   acetaminophen (TYLENOL) 500 MG tablet  amLODIPine (NORVASC) 2.5 MG tablet  apixaban ANTICOAGULANT (ELIQUIS ANTICOAGULANT) 5 MG tablet  atorvastatin (LIPITOR) 20 MG tablet  chlorthalidone (HYGROTON) 25 MG tablet  diphenhydrAMINE (ANTIHISTAMINE) 25 mg tablet  EUTHYROX 75 MCG tablet  flecainide (TAMBOCOR) 50 MG tablet  LORazepam (ATIVAN) 0.5 MG tablet  losartan (COZAAR) 100 MG tablet  metoprolol tartrate (LOPRESSOR) 25 MG tablet         ALLERGIES:     Allergies   Allergen Reactions     Clindamycin Hives     Doxycycline Hyclate [Doxycycline] Hives     Penicillins Hives     Tetanus Toxoid Unknown     Site reaction. Hard area of redness and pain.       FAMILY HISTORY:     Family History   Problem Relation Age of Onset     Ovarian Cancer Paternal Aunt      Heart Failure Mother 76.00         at home     Coronary Artery Disease Father 49.00        and a second at age 54     Liver Cancer Father      Coronary Artery Disease Brother 49.00        and  of the second at 62     CABG Brother 53.00     No Known Problems Son      Alcoholism Brother      Cirrhosis Brother      No Known Problems Son         Lives in [place]     No Known Problems Son        SOCIAL HISTORY:     Social History     Socioeconomic History     Marital status:      Number of children: 3     Years of education: 16   Tobacco Use     Smoking status: Never Smoker     Smokeless tobacco: Never Used   Substance and Sexual Activity     Alcohol use: Yes     Alcohol/week: 7.0 standard drinks     Drug use: No     Sexual activity: Not Currently     Partners: Male  "  Social History Narrative    Lives alone since her  passed in 2013. She lives in [place] [facility]. She still drives in 2018. She walks on her own. She enjoys playing bridge and going out to lunch.    She has 3 sons: Bi lives in [place] [place] and does check in on her.  Another son lives in [place] and travels a lot.  The other son lives in [place] and is retired.  She has 2 grandchildren.    She has advanced directives and Bi is in charge of her affairs.       VITALS:   BP (!) 147/66   Pulse 80   Temp 97.8  F (36.6  C) (Oral)   Resp 16   Ht 1.6 m (5' 3\")   Wt 61.7 kg (136 lb)   LMP  (LMP Unknown)   SpO2 97%   BMI 24.09 kg/m      PHYSICAL EXAM     Physical Exam  Vitals and nursing note reviewed. Exam conducted with a chaperone present.   Constitutional:       General: She is not in acute distress.     Appearance: Normal appearance. She is obese. She is not ill-appearing, toxic-appearing or diaphoretic.   Musculoskeletal:        Arms:    Skin:     Coloration: Skin is pale.   Neurological:      Mental Status: She is alert and oriented to person, place, and time.   Psychiatric:         Mood and Affect: Mood normal.         Physical Exam   Constitutional: Well appearing, cooperative, pleasant, no distress.     Head: Atraumatic.     Nose: Nose normal.     Mouth/Throat: Oropharynx is clear and moist.     Eyes: EOM are normal. Pupils are equal, round, and reactive to light.     Ears: No hemotympanum in ears.     Neck: Normal range of motion. Neck supple.     Cardiovascular: regular rhythm and normal heart sounds. Irregular heart rate.     Pulmonary/Chest: Normal effort  and breath sounds normal.     Abdominal: NA    Musculoskeletal: Full ROM bilateral arms. No midline cervical tenderness to palpation.     Neurological: No deficit. GCS 15.     Lymphatics: No edema    : NA    Skin: Skin is warm and dry. Ecchymosis appears older on left back and left lower " flank. Small abrasion on left anterior chest.     Psychiatric: Normal mood and affect. Behavior is normal.       LAB:     All pertinent labs reviewed and interpreted.  Labs Ordered and Resulted from Time of ED Arrival to Time of ED Departure   BASIC METABOLIC PANEL - Abnormal       Result Value    Sodium 120 (*)     Potassium 2.7 (*)     Chloride 78 (*)     Carbon Dioxide (CO2) 24      Anion Gap 18      Urea Nitrogen 10      Creatinine 0.68      Calcium 9.6      Glucose 166 (*)     GFR Estimate 82     CK TOTAL - Abnormal     (*)    MAGNESIUM - Abnormal    Magnesium 1.7 (*)    CBC WITH PLATELETS AND DIFFERENTIAL - Abnormal    WBC Count 15.5 (*)     RBC Count 4.31      Hemoglobin 13.7      Hematocrit 39.2      MCV 91      MCH 31.8      MCHC 34.9      RDW 12.0      Platelet Count 314      % Neutrophils 90      % Lymphocytes 3      % Monocytes 6      % Eosinophils 0      % Basophils 0      % Immature Granulocytes 1      NRBCs per 100 WBC 0      Absolute Neutrophils 14.1 (*)     Absolute Lymphocytes 0.5 (*)     Absolute Monocytes 0.9      Absolute Eosinophils 0.0      Absolute Basophils 0.0      Absolute Immature Granulocytes 0.1      Absolute NRBCs 0.0     ROUTINE UA WITH MICROSCOPIC REFLEX TO CULTURE - Abnormal    Color Urine Colorless      Appearance Urine Clear      Glucose Urine 150  (*)     Bilirubin Urine Negative      Ketones Urine 20  (*)     Specific Gravity Urine 1.010      Blood Urine 0.03 mg/dL (*)     pH Urine 7.5 (*)     Protein Albumin Urine 10  (*)     Urobilinogen Urine <2.0      Nitrite Urine Negative      Leukocyte Esterase Urine Negative      RBC Urine 1      WBC Urine 3     TROPONIN I - Normal    Troponin I 0.03     COVID-19 VIRUS (CORONAVIRUS) BY PCR        RADIOLOGY:     Reviewed all pertinent imaging. Please see official radiology report.  CT Chest/Abdomen/Pelvis w Contrast   Final Result   IMPRESSION:      1.  Thoracic and abdominal aorta atheromatous calcifications but no acute aortopathy  or thoracic or abdominal hemorrhage.    2.  Mild superior endplate deformity of L1 with haziness in the surrounding tissues likely an acute fracture.   3.  Focal edema within the soft tissues of the right upper chest posterolaterally but no related acute fracture in the chest.      Cervical spine CT w/o contrast   Final Result   IMPRESSION:   HEAD CT:   1.  No acute intracranial abnormality.   2.  Mild age-related changes.      CERVICAL SPINE CT:   1.  No CT evidence for acute fracture or post traumatic subluxation.      Head CT w/o contrast   Final Result   IMPRESSION:   HEAD CT:   1.  No acute intracranial abnormality.   2.  Mild age-related changes.      CERVICAL SPINE CT:   1.  No CT evidence for acute fracture or post traumatic subluxation.           EKG:     EKG #1  Atrial flutter with variable AV block normal anterior progression normal axis    Time:[id number]    Ventricular rate 92 bmp  Axis normal  NV interval ms  QRS duration 102 ms  QT//72 ms    Compared to previous EKG on 28 2022 sinus rhythm with first-degree AV block.    I have independently reviewed and interpreted the EKG(s) documented above.      PROCEDURES:     Procedures      I, Jose Schwartz, am serving as a scribe to document services personally performed by Dr. Sheridan based on my observation and the provider's statements to me. I, Melisa Sheridan MD attest that Jose Schwartz is acting in a scribe capacity, has observed my performance of the services and has documented them in accordance with my direction.    Melisa Sheridan M.D.  Emergency Medicine  [facility] EMERGENCY ROOM  [id number] [facility] [id number] [phone]  Dept: [phone]     Melisa Sheridan MD  04/13/22 2403

## 2022-04-13 NOTE — ED TRIAGE NOTES
Pt lost her balance last night at around 2100 and was not found until 0900 today denies loc only c/o bilateral leg discomfort

## 2022-04-13 NOTE — H&P
[facility] MEDICINE ADMISSION HISTORY AND PHYSICAL     Assessment & Plan       Katie Mar is a 89 year old old female with history of hypertension, persistent atrial fibrillation on chronic anticoagulation, and hypothyroidism presents to the hospital complaining of diffuse pain after a fall.  In the ER CT of the head neck unremarkable.  She was found to have an L1 compression fracture believed to be acute.  She is being admitted.      Rhabdomyolysis/moderate dehydration  IV fluids  Trend renal function and CK  Recheck TSH    Acute metabolic encephalopathy/hyponatremia/agitation  Encephalopathy likely due to hyponatremia  Patient insistent on leaving the hospital  Is holdable if she attempts to leave at this time  Ultimately agreed to stay with sons insistence  Gentle IV fluids  Oral fluid restriction  Schedule Zyprexa 2.5 mg at night    Restless leg syndrome  No formal diagnosis noted in the chart  Patient has classic symptoms of waking with a sensation of needing to ambulate  And having episodes even during the day where she needs to get up and move  Schedule some gabapentin 300 mg at bedtime  Ativan scheduled at bedtime and as needed    Hypokalemia  Likely related to chlorthalidone  Hold this medication  Gentle IV fluids  Potassium replacement protocol    Hypomagnesemia  Protocol    Persistent atrial fibrillation  Continue flecainide and metoprolol and Eliquis    Essential hypertension    Hypothyroidism  Recheck TSH in a.m.      # Hypokalemia: K = 2.7 mmol/L (Ref range: 3.5 - 5.0 mmol/L) on admission, will replace as needed  # Hyponatremia: Na = 120 mmol/L (Ref range: 136 - 145 mmol/L) on admission, will monitor as appropriate    # Anion Gap Metabolic Acidosis: AG = 18 mmol/L (Ref range: 5 - 18 mmol/L) on admission, will monitor and treat as appropriate    # Coagulation Defect: home medication list includes an anticoagulant medication          DVTP: Direct Oral Anticagulants    Code Status: No CPR- Do NOT Intubate  Disposition: Inpatient   Expected LOS: 2 days   Goals for the hospitalization: Correct hyponatremia and rhabdomyolysis  Disposition Plan          The patient's care was discussed with the Bedside Nurse, Patient, Patient's Family and ER providers.    Chief Complaint      HISTORY     Katie Mar is a 89 year old old female with h/o hypertension and permanent atrial fibrillation presents to the hospital after a fall at home.  She is poor historian and is quite agitated when I see her.  But reportedly she had fallen and was on the floor overnight.  Ultimately someone found her and she was brought to the ER.  She is found to be in rhabdo and to be dehydrated.  Her sodium was 120 and potassium was low.  Imaging revealed a probable acute compression fracture of L1.  The patient agreed to stay in the hospital but after her daughter left she became quite agitated and insistent on leaving.  She was clearly not at her baseline mentally and was really unable to explain her reasoning for wanting to leave.  Family agreed that she was encephalopathic and not capable of making decisions.  We were in place to hold but ultimately her son was able to convince her to stay.    Past Medical History     Past Medical History:  No date: Anxiety state  No date: Essential hypertension  2018: Persistent atrial fibrillation (H)  No date: Pure hypercholesterolemia     Surgical History     Past Surgical History:   Procedure Laterality Date     CARDIOVERSION  2018     CATARACT EXTRACTION, BILATERAL Bilateral      DILATION AND CURETTAGE       RELEASE CARPAL TUNNEL Right      Family History    Reviewed, and   Family History   Problem Relation Age of Onset     Ovarian Cancer Paternal Aunt      Heart Failure Mother 76.00         at home     Coronary Artery Disease Father 49.00        and a second at age 54     Liver Cancer Father      Coronary Artery Disease Brother 49.00        and  of  the second at 62     CABG Brother 53.00     No Known Problems Son      Alcoholism Brother      Cirrhosis Brother      No Known Problems Son         Lives in [place]     No Known Problems Son         Social History      Social History     Tobacco Use     Smoking status: Never Smoker     Smokeless tobacco: Never Used   Substance Use Topics     Alcohol use: Yes     Alcohol/week: 7.0 standard drinks     Drug use: No        Allergies     Allergies   Allergen Reactions     Clindamycin Hives     Doxycycline Hyclate [Doxycycline] Hives     Penicillins Hives     Tetanus Toxoid Unknown     Site reaction. Hard area of redness and pain.     Prior to Admission Medications      Prior to Admission Medications   Prescriptions Last Dose Informant Patient Reported? Taking?   EUTHYROX 75 MCG tablet 4/12/2022 at Unknown time  No Yes   Sig: Take 1 tablet (75 mcg) by mouth daily   LORazepam (ATIVAN) 0.5 MG tablet Past Week at Unknown time  No Yes   Sig: TAKE 1 TABLET BY MOUTH AT BEDTIME AS NEEDED   acetaminophen (TYLENOL) 500 MG tablet Past Week at Unknown time  Yes Yes   Sig: [ACETAMINOPHEN (TYLENOL) 500 MG TABLET] Take 500 mg by mouth at bedtime.   amLODIPine (NORVASC) 2.5 MG tablet 4/12/2022 at Unknown time  No Yes   Sig: Take 2 tablets by mouth once daily   apixaban ANTICOAGULANT (ELIQUIS ANTICOAGULANT) 5 MG tablet 4/12/2022 at Unknown time  No Yes   Sig: Take 1 tablet (5 mg) by mouth 2 times daily   atorvastatin (LIPITOR) 20 MG tablet 4/12/2022 at Unknown time  No Yes   Sig: Take 1 tablet by mouth once daily   chlorthalidone (HYGROTON) 25 MG tablet 4/12/2022 at Unknown time  No Yes   Sig: Take 1 tablet by mouth once daily   diphenhydrAMINE (ANTIHISTAMINE) 25 mg tablet Unknown at Unknown time  Yes Yes   Sig: [DIPHENHYDRAMINE (ANTIHISTAMINE) 25 MG TABLET] Take 25 mg by mouth at bedtime.   flecainide (TAMBOCOR) 50 MG tablet 4/12/2022 at Unknown time  No Yes   Sig: Take 1 tablet (50 mg) by mouth 2 times daily   losartan (COZAAR) 100 MG  tablet 4/12/2022 at Unknown time  No Yes   Sig: Take 1 tablet by mouth once daily   metoprolol tartrate (LOPRESSOR) 25 MG tablet 4/12/2022 at Unknown time  No Yes   Sig: Take 1/2 (one-half) tablet by mouth twice daily      Facility-Administered Medications: None      Review of Systems     A 12 point comprehensive review of systems was negative except as noted above in HPI.    PHYSICAL EXAMINATION       Vitals      Temp:  [97.5  F (36.4  C)-97.8  F (36.6  C)] 97.8  F (36.6  C)  Pulse:  [] 80  Resp:  [16-18] 16  BP: (137-147)/(66-72) 147/66  SpO2:  [95 %-97 %] 97 %    Examination     GENERAL:  Alert, appears comfortable, in no acute distress, appears stated age   HEAD:  Normocephalic, without obvious abnormality, atraumatic   THROAT: Lips, mucosa, and tongue normal; teeth and gums normal, mouth moist   NECK: Supple, symmetrical, trachea midline   BACK:   Symmetric, no curvature, ROM normal   LUNGS:   Clear to auscultation bilaterally, no rales, rhonchi, or wheezing, symmetric chest rise on inhalation, respirations unlabored   CHEST WALL:  No tenderness or deformity   HEART:  Regular rate and rhythm, S1 and S2 normal, no murmur, rub, or gallop    ABDOMEN:   Soft, non-tender, bowel sounds active all four quadrants, no masses, no organomegaly, no rebound or guarding   EXTREMITIES: Extremities normal, atraumatic, no cyanosis or edema    SKIN: Dry to touch, no exanthems in the visualized areas   NEURO: Alert, oriented x 2, moves all four extremities freely, non-focal, agitated   PSYCH: Cooperative, but agitated     Pertinent Lab     Most Recent 3 CBC's:Recent Labs   Lab Test 04/13/22  1022 10/24/18  1156   WBC 15.5* 5.5   HGB 13.7 14.7   MCV 91 95    271     Most Recent 3 BMP's:Recent Labs   Lab Test 04/13/22  1022 01/04/22  1049 06/15/21  0955   * 131* 129*   POTASSIUM 2.7* 3.6 4.0   CHLORIDE 78* 91* 91*   CO2 24 28 26   BUN 10 11 14   CR 0.68 0.76 0.77   ANIONGAP 18 12 12   MAIRA 9.6 9.9 9.4   *  108 125     Most Recent 2 LFT's:Recent Labs   Lab Test 01/04/22  1049 06/15/21  0955   AST 23 21   ALT 16 16   ALKPHOS 84 91   BILITOTAL 0.5 0.5     Most Recent 3 INR's:No lab results found.      Pertinent Radiology     Radiology Results:   Recent Results (from the past 24 hour(s))   Head CT w/o contrast    Narrative    EXAM: CT HEAD W/O CONTRAST, CT CERVICAL SPINE W/O CONTRAST  LOCATION: [facility]  DATE/TIME: 4/13/2022 12:18 PM    INDICATION: Head trauma, minor (Age >= 65y), fall. Patient on Eliquis.  COMPARISON: MRI head 02/16/2015.  TECHNIQUE:   1) Routine CT Head without IV contrast. Multiplanar reformats. Dose reduction techniques were used.  2) Routine CT Cervical Spine without IV contrast. Multiplanar reformats. Dose reduction techniques were used.    FINDINGS:   HEAD CT:   INTRACRANIAL CONTENTS: No intracranial hemorrhage, extraaxial collection, or mass effect.  No CT evidence of acute infarct. Mild presumed chronic small vessel ischemic changes. Mild generalized volume loss. No hydrocephalus.     VISUALIZED ORBITS/SINUSES/MASTOIDS: No intraorbital abnormality. No paranasal sinus mucosal disease. No middle ear or mastoid effusion.    BONES/SOFT TISSUES: No acute abnormality.    CERVICAL SPINE CT:   VERTEBRA: Normal vertebral body heights. Straightened cervical lordosis with low-grade degenerative appearing anterior subluxation C3 on C4 and C4 on C5. No acute fracture or posttraumatic subluxation. Advanced degenerative changes in the cervical spine   with fusion of multiple cervical facets, right greater than left.     CANAL/FORAMINA: Grossly the central canal is adequate.    PARASPINAL: No extraspinal abnormality. Fibrotic changes right lung apex.      Impression    IMPRESSION:  HEAD CT:  1.  No acute intracranial abnormality.  2.  Mild age-related changes.    CERVICAL SPINE CT:  1.  No CT evidence for acute fracture or post traumatic subluxation.   Cervical spine CT w/o contrast     Narrative    EXAM: CT HEAD W/O CONTRAST, CT CERVICAL SPINE W/O CONTRAST  LOCATION: [facility]  DATE/TIME: 4/13/2022 12:18 PM    INDICATION: Head trauma, minor (Age >= 65y), fall. Patient on Eliquis.  COMPARISON: MRI head 02/16/2015.  TECHNIQUE:   1) Routine CT Head without IV contrast. Multiplanar reformats. Dose reduction techniques were used.  2) Routine CT Cervical Spine without IV contrast. Multiplanar reformats. Dose reduction techniques were used.    FINDINGS:   HEAD CT:   INTRACRANIAL CONTENTS: No intracranial hemorrhage, extraaxial collection, or mass effect.  No CT evidence of acute infarct. Mild presumed chronic small vessel ischemic changes. Mild generalized volume loss. No hydrocephalus.     VISUALIZED ORBITS/SINUSES/MASTOIDS: No intraorbital abnormality. No paranasal sinus mucosal disease. No middle ear or mastoid effusion.    BONES/SOFT TISSUES: No acute abnormality.    CERVICAL SPINE CT:   VERTEBRA: Normal vertebral body heights. Straightened cervical lordosis with low-grade degenerative appearing anterior subluxation C3 on C4 and C4 on C5. No acute fracture or posttraumatic subluxation. Advanced degenerative changes in the cervical spine   with fusion of multiple cervical facets, right greater than left.     CANAL/FORAMINA: Grossly the central canal is adequate.    PARASPINAL: No extraspinal abnormality. Fibrotic changes right lung apex.      Impression    IMPRESSION:  HEAD CT:  1.  No acute intracranial abnormality.  2.  Mild age-related changes.    CERVICAL SPINE CT:  1.  No CT evidence for acute fracture or post traumatic subluxation.   CT Chest/Abdomen/Pelvis w Contrast    Narrative    EXAM: CT CHEST/ABDOMEN/PELVIS W CONTRAST  LOCATION: [facility]  DATE/TIME: 4/13/2022 12:19 PM    INDICATION: Fall, ecchymosis of the right upper chest and left lower flank; anticoagulated with liquids. Evaluate for intrathoracic and intra-abdominal  bleeding.  COMPARISON: PA and lateral chest radiographs 1/6/2020; no prior cross-sectional imaging of the chest, abdomen, or pelvis.  TECHNIQUE: CT scan of the chest, abdomen, and pelvis was performed following injection of IV contrast. Multiplanar reformats were obtained. Dose reduction techniques were used.   CONTRAST: Isovue 370 100ml    FINDINGS:   LUNGS AND PLEURA: The lungs are symmetrically inflated. Pleural parenchymal scarring with calcification is present in both apices. Mild lung parenchymal mosaicism suspect for mild peripheral air trapping. No airspace opacities or subpleural reticulation.   Trachea and central airways are patent. No pleural effusion.    MEDIASTINUM: Cardiac chambers are normal in size. No pericardial effusion. Main pulmonary artery is normal caliber. There are no large/central pulmonary artery filling defects. Degenerative thickening and calcification of the aortic root and aortic valve   leaflets, mild. Sinotubular junction is preserved. Aortic arch, proximal great vessel and descending thoracic aorta atheromatous calcifications are present. No findings to suggest acute aortic syndrome. No mediastinal hematoma. Esophagus is   decompressed. No enlarged thoracic lymph nodes.    CORONARY ARTERY CALCIFICATION: Severe.    HEPATOBILIARY: Benign, slightly lobulated cyst in the central liver. Smaller hypoattenuating lesion along the anterior capsule of segment for a is also at other a small cyst or benign hemangioma. Liver attenuation is otherwise homogeneous. Nondistended   gallbladder. No calcified gallstones. No bile duct enlargement.    PANCREAS: Normal.    SPLEEN: Normal in size. No perisplenic inflammatory stranding. Trace amount of low-attenuation along the lateral splenic capsule is likely chronic.    ADRENAL GLANDS: 9 mm low-attenuation nodule associated with the medial limb of the left adrenal gland consistent with a benign adenoma. The right adrenal gland is  normal.    KIDNEYS/BLADDER: Moderately distended urinary bladder which has a smooth wall of uniform thickness. Kidneys are normal in size with symmetric parenchymal enhancement and normal cortex thickness. Bilateral cortical renal cysts are present which don't   require additional workup or follow-up. No nephrolithiasis. Mildly distended right renal calyces and renal pelvis with transition to normal caliber ureter, likely mild developmental UPJ obstruction.    BOWEL: Normal.    LYMPH NODES: Normal.    VASCULATURE: Moderate upper abdominal and diffuse infrarenal aorta atheromatous calcifications. No aneurysm. Celiac axis, SMA and HARSHAD are patent. No intra-abdominal hematoma/hemorrhage.    PELVIC ORGANS: Age concordant uterine atrophy. No mass in the adnexa. No pelvic free fluid.    MUSCULOSKELETAL: Generalized bone demineralization. Mild soft tissue edema is present in the right upper posterior chest overlying the scapula. No acute rib fracture. There is a mild superior endplate compression deformity of L1 which has a small amount   of adjacent hazy attenuation likely an acute fracture. Lumbar facet arthropathy and mild bilateral sacroiliac osteoarthrosis. Right worse than left hip osteoarthrosis.      Impression    IMPRESSION:    1.  Thoracic and abdominal aorta atheromatous calcifications but no acute aortopathy or thoracic or abdominal hemorrhage.   2.  Mild superior endplate deformity of L1 with haziness in the surrounding tissues likely an acute fracture.  3.  Focal edema within the soft tissues of the right upper chest posterolaterally but no related acute fracture in the chest.     EKG Results: personally reviewed.     Advance Care Planning        Emeterio Perez MD  [facility]   Phone: #[phone]

## 2022-04-14 LAB
ALBUMIN SERPL-MCNC: 3.4 G/DL (ref 3.5–5)
ALP SERPL-CCNC: 80 U/L (ref 45–120)
ALT SERPL W P-5'-P-CCNC: 25 U/L (ref 0–45)
ANION GAP SERPL CALCULATED.3IONS-SCNC: 12 MMOL/L (ref 5–18)
AST SERPL W P-5'-P-CCNC: 77 U/L (ref 0–40)
BASOPHILS # BLD AUTO: 0 10E3/UL (ref 0–0.2)
BASOPHILS NFR BLD AUTO: 0 %
BILIRUB DIRECT SERPL-MCNC: 0.3 MG/DL
BILIRUB SERPL-MCNC: 0.8 MG/DL (ref 0–1)
BUN SERPL-MCNC: 8 MG/DL (ref 8–28)
CALCIUM SERPL-MCNC: 8.7 MG/DL (ref 8.5–10.5)
CHLORIDE BLD-SCNC: 88 MMOL/L (ref 98–107)
CK SERPL-CCNC: 1635 U/L (ref 30–190)
CO2 SERPL-SCNC: 21 MMOL/L (ref 22–31)
CREAT SERPL-MCNC: 0.63 MG/DL (ref 0.6–1.1)
EOSINOPHIL # BLD AUTO: 0 10E3/UL (ref 0–0.7)
EOSINOPHIL NFR BLD AUTO: 0 %
ERYTHROCYTE [DISTWIDTH] IN BLOOD BY AUTOMATED COUNT: 12.6 % (ref 10–15)
GFR SERPL CREATININE-BSD FRML MDRD: 84 ML/MIN/1.73M2
GLUCOSE BLD-MCNC: 112 MG/DL (ref 70–125)
HCT VFR BLD AUTO: 37 % (ref 35–47)
HGB BLD-MCNC: 12.8 G/DL (ref 11.7–15.7)
IMM GRANULOCYTES # BLD: 0.1 10E3/UL
IMM GRANULOCYTES NFR BLD: 1 %
LYMPHOCYTES # BLD AUTO: 1 10E3/UL (ref 0.8–5.3)
LYMPHOCYTES NFR BLD AUTO: 9 %
MAGNESIUM SERPL-MCNC: 2.1 MG/DL (ref 1.8–2.6)
MCH RBC QN AUTO: 32.2 PG (ref 26.5–33)
MCHC RBC AUTO-ENTMCNC: 34.6 G/DL (ref 31.5–36.5)
MCV RBC AUTO: 93 FL (ref 78–100)
MONOCYTES # BLD AUTO: 0.8 10E3/UL (ref 0–1.3)
MONOCYTES NFR BLD AUTO: 7 %
NEUTROPHILS # BLD AUTO: 9.9 10E3/UL (ref 1.6–8.3)
NEUTROPHILS NFR BLD AUTO: 83 %
NRBC # BLD AUTO: 0 10E3/UL
NRBC BLD AUTO-RTO: 0 /100
PLATELET # BLD AUTO: 362 10E3/UL (ref 150–450)
POTASSIUM BLD-SCNC: 3.4 MMOL/L (ref 3.5–5)
POTASSIUM BLD-SCNC: 3.8 MMOL/L (ref 3.5–5)
PROT SERPL-MCNC: 6.4 G/DL (ref 6–8)
RBC # BLD AUTO: 3.98 10E6/UL (ref 3.8–5.2)
SODIUM SERPL-SCNC: 121 MMOL/L (ref 136–145)
WBC # BLD AUTO: 11.8 10E3/UL (ref 4–11)

## 2022-04-14 PROCEDURE — 93010 ELECTROCARDIOGRAM REPORT: CPT | Mod: HIP | Performed by: INTERNAL MEDICINE

## 2022-04-14 PROCEDURE — 250N000013 HC RX MED GY IP 250 OP 250 PS 637: Performed by: INTERNAL MEDICINE

## 2022-04-14 PROCEDURE — 258N000003 HC RX IP 258 OP 636: Performed by: FAMILY MEDICINE

## 2022-04-14 PROCEDURE — 82550 ASSAY OF CK (CPK): CPT | Performed by: FAMILY MEDICINE

## 2022-04-14 PROCEDURE — 83735 ASSAY OF MAGNESIUM: CPT | Performed by: FAMILY MEDICINE

## 2022-04-14 PROCEDURE — 93005 ELECTROCARDIOGRAM TRACING: CPT

## 2022-04-14 PROCEDURE — 93005 ELECTROCARDIOGRAM TRACING: CPT | Performed by: FAMILY MEDICINE

## 2022-04-14 PROCEDURE — 85014 HEMATOCRIT: CPT | Performed by: FAMILY MEDICINE

## 2022-04-14 PROCEDURE — 250N000013 HC RX MED GY IP 250 OP 250 PS 637: Performed by: FAMILY MEDICINE

## 2022-04-14 PROCEDURE — 99233 SBSQ HOSP IP/OBS HIGH 50: CPT | Performed by: FAMILY MEDICINE

## 2022-04-14 PROCEDURE — 36415 COLL VENOUS BLD VENIPUNCTURE: CPT | Performed by: FAMILY MEDICINE

## 2022-04-14 PROCEDURE — 82248 BILIRUBIN DIRECT: CPT | Performed by: FAMILY MEDICINE

## 2022-04-14 PROCEDURE — 80053 COMPREHEN METABOLIC PANEL: CPT | Performed by: FAMILY MEDICINE

## 2022-04-14 PROCEDURE — 120N000001 HC R&B MED SURG/OB

## 2022-04-14 PROCEDURE — 250N000011 HC RX IP 250 OP 636: Performed by: FAMILY MEDICINE

## 2022-04-14 RX ORDER — HYDRALAZINE HYDROCHLORIDE 20 MG/ML
5 INJECTION INTRAMUSCULAR; INTRAVENOUS EVERY 6 HOURS PRN
Status: DISCONTINUED | OUTPATIENT
Start: 2022-04-14 | End: 2022-04-20

## 2022-04-14 RX ORDER — LORAZEPAM 2 MG/ML
.5-1 INJECTION INTRAMUSCULAR EVERY 6 HOURS PRN
Status: DISCONTINUED | OUTPATIENT
Start: 2022-04-14 | End: 2022-04-21 | Stop reason: HOSPADM

## 2022-04-14 RX ORDER — POTASSIUM CHLORIDE 1.5 G/1.58G
20 POWDER, FOR SOLUTION ORAL ONCE
Status: COMPLETED | OUTPATIENT
Start: 2022-04-14 | End: 2022-04-14

## 2022-04-14 RX ORDER — LORAZEPAM 2 MG/ML
1 INJECTION INTRAMUSCULAR EVERY 6 HOURS PRN
Status: DISCONTINUED | OUTPATIENT
Start: 2022-04-14 | End: 2022-04-14

## 2022-04-14 RX ORDER — ALBUTEROL SULFATE 90 UG/1
2 AEROSOL, METERED RESPIRATORY (INHALATION) EVERY 6 HOURS PRN
Status: DISCONTINUED | OUTPATIENT
Start: 2022-04-14 | End: 2022-04-21 | Stop reason: HOSPADM

## 2022-04-14 RX ORDER — HALOPERIDOL 5 MG/ML
2 INJECTION INTRAMUSCULAR EVERY 6 HOURS PRN
Status: DISCONTINUED | OUTPATIENT
Start: 2022-04-14 | End: 2022-04-15

## 2022-04-14 RX ORDER — POTASSIUM CHLORIDE 750 MG/1
10 TABLET, EXTENDED RELEASE ORAL ONCE
Status: COMPLETED | OUTPATIENT
Start: 2022-04-14 | End: 2022-04-14

## 2022-04-14 RX ADMIN — METOPROLOL TARTRATE 12.5 MG: 25 TABLET, FILM COATED ORAL at 20:17

## 2022-04-14 RX ADMIN — FLECAINIDE ACETATE 50 MG: 50 TABLET ORAL at 20:19

## 2022-04-14 RX ADMIN — APIXABAN 5 MG: 5 TABLET, FILM COATED ORAL at 08:38

## 2022-04-14 RX ADMIN — SODIUM CHLORIDE: 9 INJECTION, SOLUTION INTRAVENOUS at 22:11

## 2022-04-14 RX ADMIN — ATORVASTATIN CALCIUM 20 MG: 10 TABLET, FILM COATED ORAL at 08:38

## 2022-04-14 RX ADMIN — SODIUM CHLORIDE TAB 1 GM 0.5 G: 1 TAB at 10:01

## 2022-04-14 RX ADMIN — ACETAMINOPHEN 1000 MG: 500 TABLET, FILM COATED ORAL at 20:17

## 2022-04-14 RX ADMIN — GABAPENTIN 300 MG: 300 CAPSULE ORAL at 20:17

## 2022-04-14 RX ADMIN — AMLODIPINE BESYLATE 5 MG: 5 TABLET ORAL at 08:38

## 2022-04-14 RX ADMIN — POTASSIUM CHLORIDE 10 MEQ: 750 TABLET, EXTENDED RELEASE ORAL at 10:00

## 2022-04-14 RX ADMIN — OLANZAPINE 2.5 MG: 2.5 TABLET, FILM COATED ORAL at 22:09

## 2022-04-14 RX ADMIN — SODIUM CHLORIDE: 9 INJECTION, SOLUTION INTRAVENOUS at 14:13

## 2022-04-14 RX ADMIN — LOSARTAN POTASSIUM 100 MG: 50 TABLET, FILM COATED ORAL at 08:38

## 2022-04-14 RX ADMIN — SODIUM CHLORIDE TAB 1 GM 0.5 G: 1 TAB at 12:44

## 2022-04-14 RX ADMIN — METOPROLOL TARTRATE 12.5 MG: 25 TABLET, FILM COATED ORAL at 08:39

## 2022-04-14 RX ADMIN — POTASSIUM CHLORIDE 20 MEQ: 1.5 POWDER, FOR SOLUTION ORAL at 00:10

## 2022-04-14 RX ADMIN — APIXABAN 5 MG: 5 TABLET, FILM COATED ORAL at 20:17

## 2022-04-14 RX ADMIN — SODIUM CHLORIDE: 9 INJECTION, SOLUTION INTRAVENOUS at 04:58

## 2022-04-14 RX ADMIN — LORAZEPAM 1 MG: 2 INJECTION INTRAMUSCULAR; INTRAVENOUS at 12:45

## 2022-04-14 RX ADMIN — HYDRALAZINE HYDROCHLORIDE 5 MG: 20 INJECTION INTRAMUSCULAR; INTRAVENOUS at 16:59

## 2022-04-14 RX ADMIN — ALBUTEROL SULFATE 2 PUFF: 90 AEROSOL, METERED RESPIRATORY (INHALATION) at 20:20

## 2022-04-14 RX ADMIN — LEVOTHYROXINE SODIUM 75 MCG: 75 TABLET ORAL at 08:39

## 2022-04-14 RX ADMIN — FLECAINIDE ACETATE 50 MG: 50 TABLET ORAL at 08:39

## 2022-04-14 RX ADMIN — ACETAMINOPHEN 1000 MG: 500 TABLET, FILM COATED ORAL at 06:10

## 2022-04-14 RX ADMIN — HALOPERIDOL LACTATE 2 MG: 5 INJECTION, SOLUTION INTRAMUSCULAR at 18:00

## 2022-04-14 RX ADMIN — DIPHENHYDRAMINE HYDROCHLORIDE 25 MG: 25 CAPSULE ORAL at 20:17

## 2022-04-14 ASSESSMENT — ACTIVITIES OF DAILY LIVING (ADL)
ADLS_ACUITY_SCORE: 19
ADLS_ACUITY_SCORE: 18
ADLS_ACUITY_SCORE: 19
ADLS_ACUITY_SCORE: 18
ADLS_ACUITY_SCORE: 20
ADLS_ACUITY_SCORE: 18

## 2022-04-14 NOTE — PROGRESS NOTES
[facility] MEDICINE PROGRESS NOTE      Identification/Summary:   Rhabdomyolysis/moderate dehydration  Trending up  Continue fluids  Recheck in a.m.    Acute metabolic encephalopathy/hyponatremia/agitation  Encephalopathy likely due to hyponatremia  Patient insistent on leaving the hospital but more cooperative 4/14  Is holdable if she attempts to leave at this time  Has agreed to stay with sons assistance  Gentle IV fluids  Oral fluid restriction  Schedule Zyprexa 2.5 mg at night  As needed IV Ativan  As needed Zyprexa twice daily  Should improve as sodium corrects  Added low-dose oral sodium replacement     Restless leg syndrome  No formal diagnosis noted in the chart  Patient has classic symptoms of waking with a sensation of needing to ambulate  And having episodes even during the day where she needs to get up and move  Schedule some gabapentin 300 mg at bedtime    Hypokalemia  Likely related to chlorthalidone  Hold this medication  Gentle IV fluids  Potassium replacement protocol  Improved     Hypomagnesemia  Protocol     Persistent atrial fibrillation  Continue flecainide and metoprolol and Eliquis     Essential hypertension  Blood pressures are up with holding chlorthalidone  Will order as needed hydralazine    Hypothyroidism  Recheck TSH in a.m.      Diet: Combination Diet Regular Diet Adult  Fluid restriction 1200 ML FLUID  DVT Prophylaxis:  DOAC  Code Status: No CPR- Do NOT Intubate    Anticipated possible discharge in 1 to 2 days when sodium corrects and CK trending down       The patient's care was discussed with the Bedside Nurse, Care Coordinator/ and Patient.    Emeterio Perez MD  [facility]  [facility]  Phone: #[phone]    Interval History/Subjective:  Patient agitated again this morning but better after some Ativan.  Sleepy but arousable.  Denies pain.    Physical Exam/Objective:  Temp:  [97.8  F (36.6   C)-99.1  F (37.3  C)] 97.9  F (36.6  C)  Pulse:  [67-82] 71  Resp:  [20-29] 20  BP: (136-192)/(60-98) 163/68  SpO2:  [93 %-96 %] 94 %  Body mass index is 24.62 kg/m .    GENERAL:  Alert, appears comfortable, in no acute distress, appears stated age   HEAD:  Normocephalic, without obvious abnormality, atraumatic   EYES:  PERRL, conjunctiva/corneas clear, no scleral icterus, EOM's intact   NOSE: Nares normal, septum midline, mucosa normal, no drainage   BACK:   Symmetric, no curvature, ROM normal   LUNGS:   Clear to auscultation bilaterally, no rales, rhonchi, or wheezing, symmetric chest rise on inhalation, respirations unlabored   CHEST WALL:  No tenderness or deformity   HEART:  Regular rate and rhythm, S1 and S2 normal, no murmur, rub, or gallop    ABDOMEN:   Soft, non-tender, bowel sounds active all four quadrants, no masses, no organomegaly, no rebound or guarding   EXTREMITIES: Extremities normal, atraumatic, no cyanosis or edema    SKIN: Dry to touch, no exanthems in the visualized areas     Data reviewed today: I personally reviewed all new medications, labs, imaging/diagnostics reports over the past 24 hours. Pertinent findings include:    Imaging:   No results found for this or any previous visit (from the past 24 hour(s)).    Labs:  Most Recent 3 CBC's:Recent Labs   Lab Test 04/14/22  0646 04/13/22  1022 10/24/18  1156   WBC 11.8* 15.5* 5.5   HGB 12.8 13.7 14.7   MCV 93 91 95    314 271     Most Recent 3 BMP's:Recent Labs   Lab Test 04/14/22  0814 04/13/22  2342 04/13/22  1758 04/13/22  1022 01/04/22  1049   *  --   --  120* 131*   POTASSIUM 3.8 3.4* 2.6* 2.7* 3.6   CHLORIDE 88*  --   --  78* 91*   CO2 21*  --   --  24 28   BUN 8  --   --  10 11   CR 0.63  --   --  0.68 0.76   ANIONGAP 12  --   --  18 12   MAIRA 8.7  --   --  9.6 9.9     --   --  166* 108     Most Recent 2 LFT's:Recent Labs   Lab Test 04/14/22  0814 01/04/22  1049   AST 77* 23   ALT 25 16   ALKPHOS 80 84   BILITOTAL 0.8  0.5     Most Recent 3 INR's:No lab results found.    Medications:   Personally Reviewed.  Medications     - MEDICATION INSTRUCTIONS -       sodium chloride 100 mL/hr at 04/14/22 1413       acetaminophen  1,000 mg Oral Q8H     amLODIPine  5 mg Oral Daily     apixaban ANTICOAGULANT  5 mg Oral BID     atorvastatin  20 mg Oral Daily     diphenhydrAMINE  25 mg Oral At Bedtime     flecainide  50 mg Oral BID     gabapentin  300 mg Oral At Bedtime     levothyroxine  75 mcg Oral Daily     lidocaine   Transdermal Q8H     losartan  100 mg Oral Daily     metoprolol tartrate  12.5 mg Oral BID     OLANZapine  2.5 mg Oral At Bedtime     sodium chloride (PF)  3 mL Intracatheter Q8H     sodium chloride   Intravenous Once     sodium chloride  0.5 g Oral TID w/meals

## 2022-04-14 NOTE — PLAN OF CARE
"Patient is able to answer orientation questions appropriately. Patient expressed frustration and stated, \" I just feel like my independence is taken away from.\" Patient provided with positive reenforcement. Patient is involved in her cares. Able to hold a sensible conversation at the beginning of shift  with intermittent confusion noted. Sleeping at this time and is observed talking in her sleep. Potassium replaced per protocol. Bed alarm on. Safety rounding completed.            "

## 2022-04-14 NOTE — ED NOTES
Pt attempted to crawl out of bed  Bed alarm on, call light within reach, and reoriented to situation and place  Pt is oriented to self however is not oriented to place or time  Repositioned for comfort

## 2022-04-14 NOTE — PLAN OF CARE
Problem: Risk for Delirium  Goal: Improved Attention and Thought Clarity  Outcome: Ongoing, Progressing     Patient admitted to room 226 from ED, tolerated. Reported confusion in ED, received IV ativan downstairs. Upon admission to unit, sedated, calm in bed. Rambling or reach out into the air at times. Purewick in place until able to safely advance activity. Bed alarm on safety, no attempts to get up yet.

## 2022-04-14 NOTE — PLAN OF CARE
Problem: Risk for Delirium  Goal: Improved Attention and Thought Clarity  Outcome: Ongoing, Progressing       D: Patient hypertensive 190-220s. Hallucinating but resting in bed.     A: MD pagemilton, PRN IV hydralazine ordered and given.    R: Patient increasingly agitated and hallucinating. Hallucinations of being in a car and family in the room, talking with them. MD pitts, IV haldol ordered and administered. Patient's hallucinations continue, more calm. Blood pressure now 116/78. Repositioned q2h as unsafe to ambulate at this time, will not follow directions.

## 2022-04-14 NOTE — PROGRESS NOTES
Pt became agitated after son arrived. Demanding to leave, unable to redirect. Dr Chris pitts and 1mg ativan given.

## 2022-04-14 NOTE — CONSULTS
Care Management Initial Consult    General Information  Assessment completed with: Patient, Children, Son Bi and his wife.  Type of CM/SW Visit: CM Role Introduction (Initial Assessment and D/C Planning as well.)  Primary Care Provider verified and updated as needed:     Readmission within the last 30 days: no previous admission in last 30 days   Reason for Consult: discharge planning, facility placement, health care directive, transportation  Advance Care Planning: Advance Care Planning Reviewed: concerns discussed  Family will provide.     Communication Assessment  Patient's communication style: spoken language (English or Bilingual)        Cognitive  Cognitive/Neuro/Behavioral:  Alert, confused                   Living Environment:   People in home: facility resident, alone     Current living Arrangements: apartment, independent living facility      Able to return to prior arrangements: other (see comments) (Family is hoping for TCU until able to transition to an assisted.)     Family/Social Support:  Care provided by: self (Pt had been reportedly independent with I/ADLs until recently.)  Provides care for: no one, unable/limited ability to care for self  Marital Status:   Children, Facility resident(s)/Staff          Description of Support System: Supportive, Involved    Support Assessment: Caregiver difficulty providing physical care/safety    Current Resources:   Patient receiving home care services: No   Community Resources: None  Equipment currently used at home: grab bar, tub/shower, walker, rolling  Supplies currently used at home: None    Employment/Financial:  Employment Status: retired     Financial Concerns: No concerns identified (Family stated finances are not an issue.)   Referral to Financial Worker: No     Lifestyle & Psychosocial Needs:  Social Determinants of Health     Tobacco Use: Low Risk      Smoking Tobacco Use: Never Smoker     Smokeless Tobacco Use: Never Used   Alcohol Use: Not on  file   Financial Resource Strain: Not on file   Food Insecurity: Not on file   Transportation Needs: Not on file   Physical Activity: Not on file   Stress: Not on file   Social Connections: Not on file   Intimate Partner Violence: Not on file   Depression: Not at risk     PHQ-2 Score: 0   Housing Stability: Not on file     Functional Status:  Prior to admission patient needed assistance:   Dependent ADLs:: Ambulation-walker, Dressing, Bathing, Positioning, Transfers, Grooming (Current state. Pt had been independent until recently except for walker use.)  Dependent IADLs:: Cleaning, Cooking, Laundry, Shopping, Meal Preparation, Medication Management, Money Management, Transportation (Current state. Pt had been independent until recently.)  Assessment of Functional Status: Not at  functional baseline    Mental Health Status:  Mental Health Status: No Current Concerns       Chemical Dependency Status:  Chemical Dependency Status: No Current Concerns           Values/Beliefs:  Spiritual, Cultural Beliefs, Yarsanism Practices, Values that affect care: no             Additional Information:  Writer met with pt, son Bi, and Bi's wife. Pt had reportedly been living in the Independent Living part of the [facility] in [place] for the last ~12 years. Pt has reportedly been totally I/ADL independent until recently. Bi would visit a couple times per week to visit. Bi stated the family has been in the process of transitioning the pt to the long-term at the same campus. Family is currently hopeful for TCU at [facility] for a couple of weeks until transition to [facility] is possible. Family states the ability to pay privately if needed.    Family was informed of hospital/TCU placement process. Bi states ability to provide an up-front check for TCU placement if needed. Also, when potential transport costs were discussed, Bi had no concerns about the cost if not covered by insurance. Bi and wife were provided with MEDICARE  "website for comparing potential TCU facilities. They only want [facility] pursued at this time. They will provide alternative placement preferences if [facility] is unable to accommodate pt.    4/13 per SHANNAN Guillaume. -\"89 year old old female with h/o hypertension and permanent atrial fibrillation presents to the hospital after a fall at home. She is poor historian and is quite agitated when I see her. But reportedly she had fallen and was on the floor overnight. Ultimately someone found her and she was brought to the ER. She is found to be in rhabdo and to be dehydrated. Her sodium was 120 and potassium was low. Imaging revealed a probable acute compression fracture of L1. The patient agreed to stay in the hospital but after her daughter left she became quite agitated and insistent on leaving. She was clearly not at her baseline mentally and was really unable to explain her reasoning for wanting to leave. Family agreed that she was encephalopathic and not capable of making decisions. We were in place to hold but ultimately her son was able to convince her to stay.\"     PT/OT Consults are currently pending. Anticipate likely TCU placement. To reiterate, family states ability to writer an up-front check for TCU placement in the stated range of 5 to 8 Thousand dollars. Family had no issue with this. Transport to be determined further. CM to follow and assist with safe placement/care coordination as needed.    Mat Jensen RN        "

## 2022-04-14 NOTE — ED NOTES
RN spoke with son, Alejandro, whom reported that pt is more disoriented over the last two weeks, reported to have been sleep walking and having vivid dreams, and reported that the pt has not slept in two weeks.

## 2022-04-14 NOTE — PROGRESS NOTES
Care Management Follow Up    Length of Stay (days): 1    Expected Discharge Date:    Concerns to be Addressed: care coordination/care conferences, discharge planning, other (see comments) (Transfer to a safe living environment.)     Patient plan of care discussed at interdisciplinary rounds: Yes  Anticipated Discharge Disposition: Assisted Living, Home Care, Transitional Care, Skilled Nursing Facility, Other (Comments) (TBD further.)   Anticipated Discharge Services: None  Anticipated Discharge DME: None  Patient/family educated on Medicare website which has current facility and service quality ratings: yes  Education Provided on the Discharge Plan:    Patient/Family in Agreement with the Plan: other (see comments) (Family plan. [place] for return to [place].)  Referrals Placed by RN/SABINE CM: Post Acute Facilities (Per family request.)  Private pay costs discussed: private room/amenity fees, transportation costs and insurance costs out of pocket expenses    Additional Information:  Son Bi was updated on the status of placement referrals. No calls have been received as of yet. Per Bi's request, additional information was sent to [facility] TCU Admissions and [facility]. Bi states, at 2pm today 4/14, he and other family are meeting with a Debbie Vaughn the [facility] Housing Counselor at [phone]. Writer called and left a  with CM contact info for this person. Pt has been assigned a room on  and will be leaving the ED. Floor CM staff to f/u on referrals placed and work with family on alternative placement if unsuccessful at [facility].    Mat Jensen RN

## 2022-04-15 ENCOUNTER — APPOINTMENT (OUTPATIENT)
Dept: CT IMAGING | Facility: CLINIC | Age: 87
DRG: 551 | End: 2022-04-15
Attending: FAMILY MEDICINE
Payer: MEDICARE

## 2022-04-15 ENCOUNTER — APPOINTMENT (OUTPATIENT)
Dept: OCCUPATIONAL THERAPY | Facility: CLINIC | Age: 87
DRG: 551 | End: 2022-04-15
Attending: FAMILY MEDICINE
Payer: MEDICARE

## 2022-04-15 ENCOUNTER — APPOINTMENT (OUTPATIENT)
Dept: SPEECH THERAPY | Facility: CLINIC | Age: 87
DRG: 551 | End: 2022-04-15
Attending: FAMILY MEDICINE
Payer: MEDICARE

## 2022-04-15 ENCOUNTER — APPOINTMENT (OUTPATIENT)
Dept: PHYSICAL THERAPY | Facility: CLINIC | Age: 87
DRG: 551 | End: 2022-04-15
Attending: FAMILY MEDICINE
Payer: MEDICARE

## 2022-04-15 LAB
ANION GAP SERPL CALCULATED.3IONS-SCNC: 13 MMOL/L (ref 5–18)
ATRIAL RATE - MUSE: 227 BPM
BUN SERPL-MCNC: 7 MG/DL (ref 8–28)
C REACTIVE PROTEIN LHE: 7.7 MG/DL (ref 0–0.8)
CALCIUM SERPL-MCNC: 8.7 MG/DL (ref 8.5–10.5)
CHLORIDE BLD-SCNC: 93 MMOL/L (ref 98–107)
CK SERPL-CCNC: 1148 U/L (ref 30–190)
CO2 SERPL-SCNC: 22 MMOL/L (ref 22–31)
CREAT SERPL-MCNC: 0.59 MG/DL (ref 0.6–1.1)
DIASTOLIC BLOOD PRESSURE - MUSE: 72 MMHG
ERYTHROCYTE [DISTWIDTH] IN BLOOD BY AUTOMATED COUNT: 12.6 % (ref 10–15)
GFR SERPL CREATININE-BSD FRML MDRD: 85 ML/MIN/1.73M2
GLUCOSE BLD-MCNC: 109 MG/DL (ref 70–125)
HCT VFR BLD AUTO: 36.3 % (ref 35–47)
HGB BLD-MCNC: 12.6 G/DL (ref 11.7–15.7)
INTERPRETATION ECG - MUSE: NORMAL
MAGNESIUM SERPL-MCNC: 2.1 MG/DL (ref 1.8–2.6)
MCH RBC QN AUTO: 31.3 PG (ref 26.5–33)
MCHC RBC AUTO-ENTMCNC: 34.7 G/DL (ref 31.5–36.5)
MCV RBC AUTO: 90 FL (ref 78–100)
P AXIS - MUSE: NORMAL DEGREES
PLATELET # BLD AUTO: 329 10E3/UL (ref 150–450)
POTASSIUM BLD-SCNC: 3.1 MMOL/L (ref 3.5–5)
POTASSIUM BLD-SCNC: 3.3 MMOL/L (ref 3.5–5)
POTASSIUM BLD-SCNC: 3.7 MMOL/L (ref 3.5–5)
PR INTERVAL - MUSE: NORMAL MS
QRS DURATION - MUSE: 102 MS
QT - MUSE: 388 MS
QTC - MUSE: 479 MS
R AXIS - MUSE: 88 DEGREES
RBC # BLD AUTO: 4.03 10E6/UL (ref 3.8–5.2)
SODIUM SERPL-SCNC: 128 MMOL/L (ref 136–145)
SYSTOLIC BLOOD PRESSURE - MUSE: 137 MMHG
T AXIS - MUSE: -28 DEGREES
VENTRICULAR RATE- MUSE: 92 BPM
WBC # BLD AUTO: 13.6 10E3/UL (ref 4–11)

## 2022-04-15 PROCEDURE — 120N000001 HC R&B MED SURG/OB

## 2022-04-15 PROCEDURE — 36415 COLL VENOUS BLD VENIPUNCTURE: CPT | Performed by: FAMILY MEDICINE

## 2022-04-15 PROCEDURE — 85041 AUTOMATED RBC COUNT: CPT | Performed by: FAMILY MEDICINE

## 2022-04-15 PROCEDURE — 97116 GAIT TRAINING THERAPY: CPT | Mod: GP

## 2022-04-15 PROCEDURE — 97535 SELF CARE MNGMENT TRAINING: CPT | Mod: GO

## 2022-04-15 PROCEDURE — 92526 ORAL FUNCTION THERAPY: CPT | Mod: GN

## 2022-04-15 PROCEDURE — 84132 ASSAY OF SERUM POTASSIUM: CPT | Performed by: FAMILY MEDICINE

## 2022-04-15 PROCEDURE — 258N000003 HC RX IP 258 OP 636: Performed by: FAMILY MEDICINE

## 2022-04-15 PROCEDURE — 85014 HEMATOCRIT: CPT | Performed by: FAMILY MEDICINE

## 2022-04-15 PROCEDURE — 97162 PT EVAL MOD COMPLEX 30 MIN: CPT | Mod: GP

## 2022-04-15 PROCEDURE — 97530 THERAPEUTIC ACTIVITIES: CPT | Mod: GP

## 2022-04-15 PROCEDURE — 86140 C-REACTIVE PROTEIN: CPT | Performed by: FAMILY MEDICINE

## 2022-04-15 PROCEDURE — 70450 CT HEAD/BRAIN W/O DYE: CPT

## 2022-04-15 PROCEDURE — 83735 ASSAY OF MAGNESIUM: CPT | Performed by: FAMILY MEDICINE

## 2022-04-15 PROCEDURE — 250N000013 HC RX MED GY IP 250 OP 250 PS 637: Performed by: FAMILY MEDICINE

## 2022-04-15 PROCEDURE — 97166 OT EVAL MOD COMPLEX 45 MIN: CPT | Mod: GO

## 2022-04-15 PROCEDURE — 250N000011 HC RX IP 250 OP 636: Performed by: FAMILY MEDICINE

## 2022-04-15 PROCEDURE — 92610 EVALUATE SWALLOWING FUNCTION: CPT | Mod: GN

## 2022-04-15 PROCEDURE — 99233 SBSQ HOSP IP/OBS HIGH 50: CPT | Performed by: FAMILY MEDICINE

## 2022-04-15 PROCEDURE — 82310 ASSAY OF CALCIUM: CPT | Performed by: FAMILY MEDICINE

## 2022-04-15 PROCEDURE — 82550 ASSAY OF CK (CPK): CPT | Performed by: FAMILY MEDICINE

## 2022-04-15 RX ORDER — POTASSIUM CHLORIDE 1.5 G/1.58G
20 POWDER, FOR SOLUTION ORAL ONCE
Status: COMPLETED | OUTPATIENT
Start: 2022-04-15 | End: 2022-04-15

## 2022-04-15 RX ORDER — POTASSIUM CHLORIDE 7.45 MG/ML
10 INJECTION INTRAVENOUS ONCE
Status: COMPLETED | OUTPATIENT
Start: 2022-04-15 | End: 2022-04-16

## 2022-04-15 RX ORDER — POTASSIUM CHLORIDE 7.45 MG/ML
10 INJECTION INTRAVENOUS
Status: COMPLETED | OUTPATIENT
Start: 2022-04-15 | End: 2022-04-15

## 2022-04-15 RX ORDER — GABAPENTIN 100 MG/1
100 CAPSULE ORAL AT BEDTIME
Status: DISCONTINUED | OUTPATIENT
Start: 2022-04-15 | End: 2022-04-21 | Stop reason: HOSPADM

## 2022-04-15 RX ORDER — POTASSIUM CHLORIDE 750 MG/1
10 TABLET, EXTENDED RELEASE ORAL ONCE
Status: DISCONTINUED | OUTPATIENT
Start: 2022-04-15 | End: 2022-04-15

## 2022-04-15 RX ADMIN — ACETAMINOPHEN 1000 MG: 500 TABLET, FILM COATED ORAL at 05:58

## 2022-04-15 RX ADMIN — ACETAMINOPHEN 1000 MG: 500 TABLET, FILM COATED ORAL at 22:22

## 2022-04-15 RX ADMIN — SODIUM CHLORIDE: 9 INJECTION, SOLUTION INTRAVENOUS at 18:05

## 2022-04-15 RX ADMIN — POTASSIUM CHLORIDE 10 MEQ: 7.45 INJECTION INTRAVENOUS at 12:27

## 2022-04-15 RX ADMIN — FLECAINIDE ACETATE 50 MG: 50 TABLET ORAL at 20:41

## 2022-04-15 RX ADMIN — ALBUTEROL SULFATE 2 PUFF: 90 AEROSOL, METERED RESPIRATORY (INHALATION) at 22:35

## 2022-04-15 RX ADMIN — SODIUM CHLORIDE: 9 INJECTION, SOLUTION INTRAVENOUS at 06:29

## 2022-04-15 RX ADMIN — APIXABAN 5 MG: 5 TABLET, FILM COATED ORAL at 20:41

## 2022-04-15 RX ADMIN — METOPROLOL TARTRATE 12.5 MG: 25 TABLET, FILM COATED ORAL at 10:39

## 2022-04-15 RX ADMIN — OLANZAPINE 2.5 MG: 2.5 TABLET, FILM COATED ORAL at 22:22

## 2022-04-15 RX ADMIN — ATORVASTATIN CALCIUM 20 MG: 10 TABLET, FILM COATED ORAL at 10:39

## 2022-04-15 RX ADMIN — AMLODIPINE BESYLATE 5 MG: 5 TABLET ORAL at 10:39

## 2022-04-15 RX ADMIN — POTASSIUM CHLORIDE 10 MEQ: 7.45 INJECTION INTRAVENOUS at 13:47

## 2022-04-15 RX ADMIN — POTASSIUM CHLORIDE 20 MEQ: 1.5 POWDER, FOR SOLUTION ORAL at 05:58

## 2022-04-15 RX ADMIN — ACETAMINOPHEN 1000 MG: 500 TABLET, FILM COATED ORAL at 15:26

## 2022-04-15 RX ADMIN — LEVOTHYROXINE SODIUM 75 MCG: 75 TABLET ORAL at 10:39

## 2022-04-15 RX ADMIN — POTASSIUM CHLORIDE 10 MEQ: 7.45 INJECTION INTRAVENOUS at 23:44

## 2022-04-15 RX ADMIN — METOPROLOL TARTRATE 12.5 MG: 25 TABLET, FILM COATED ORAL at 20:41

## 2022-04-15 RX ADMIN — FLECAINIDE ACETATE 50 MG: 50 TABLET ORAL at 10:39

## 2022-04-15 RX ADMIN — SODIUM CHLORIDE TAB 1 GM 0.5 G: 1 TAB at 13:51

## 2022-04-15 RX ADMIN — DIPHENHYDRAMINE HYDROCHLORIDE 25 MG: 25 CAPSULE ORAL at 22:22

## 2022-04-15 RX ADMIN — SODIUM CHLORIDE TAB 1 GM 0.5 G: 1 TAB at 17:12

## 2022-04-15 RX ADMIN — LOSARTAN POTASSIUM 100 MG: 50 TABLET, FILM COATED ORAL at 10:39

## 2022-04-15 RX ADMIN — GABAPENTIN 100 MG: 100 CAPSULE ORAL at 22:22

## 2022-04-15 ASSESSMENT — ACTIVITIES OF DAILY LIVING (ADL)
ADLS_ACUITY_SCORE: 27
ADLS_ACUITY_SCORE: 21
ADLS_ACUITY_SCORE: 21
ADLS_ACUITY_SCORE: 26
ADLS_ACUITY_SCORE: 26
ADLS_ACUITY_SCORE: 21
ADLS_ACUITY_SCORE: 21
ADLS_ACUITY_SCORE: 26
ADLS_ACUITY_SCORE: 26
ADLS_ACUITY_SCORE: 21
ADLS_ACUITY_SCORE: 26
ADLS_ACUITY_SCORE: 21
ADLS_ACUITY_SCORE: 27
ADLS_ACUITY_SCORE: 26
ADLS_ACUITY_SCORE: 26
ADLS_ACUITY_SCORE: 21
ADLS_ACUITY_SCORE: 21
ADLS_ACUITY_SCORE: 27
ADLS_ACUITY_SCORE: 21
ADLS_ACUITY_SCORE: 21

## 2022-04-15 NOTE — PLAN OF CARE
Problem: Plan of Care - These are the overarching goals to be used throughout the patient stay.    Goal: Readiness for Transition of Care  Outcome: Ongoing, Progressing   Goal Outcome Evaluation:                Patient alert to self only. Needing frequent reorientation and redirection. Patient is also intermittently hallucinating that she is seeing family members in her room (son, granddaughter). Patient rambling incoherently, speaking with garbled speech. She is very fidgety intermittently and can get pretty agitated sometimes. She was able to tell RN some details about her fall but other than that is only alert to self.  Attempted to assist patient to get up out of bed and ambulate yesterday evening, it required heavy assist of 2 staff to help her pivot to commode. Patient not following directions and has weakness in BLE. [place] utilized overnight as patient is incontinent. Assisted patient to turn in bed every 2 hours due to limited mobility.  IV fluids infusing per orders.  On K and Mg protocols. Mg was 2.1 this AM with recheck tomorrow AM. K recheck this AM was 3.1, replaced orally, recheck entered for 1000, will endorse this to next shift.   Patient has L1 compression fracture, does not have much back pain unless the head of the bed is raised too high. When she is at a lower angle in bed she is comfortable. Reported some discomfort in legs managed with scheduled tylenol and warm blankets, PAINAD utilized.   Telemetry reading sinus rhythm with 1st degree AV block.  Patient does not call appropriately for assistance. Not attempting to get OOB, is just intermittently restless. Bed alarm on for patient safety and 3rd side-rail up.

## 2022-04-15 NOTE — PROGRESS NOTES
04/15/22 1100   Quick Adds   Type of Visit Initial Occupational Therapy Evaluation   Living Environment   People in Home alone   Current Living Arrangements independent living facility   Home Accessibility no concerns   Living Environment Comments walk-in shower, high toilet   Self-Care   Equipment Currently Used at Home grab bar, tub/shower;walker, rolling   Fall history within last six months yes   Activity/Exercise/Self-Care Comment until recently, ind for all ADLs and IADLs; recently has been needing increased assist   Instrumental Activities of Daily Living (IADL)   IADL Comments uses cafeteria for meals   General Information   Onset of Illness/Injury or Date of Surgery 04/13/22   Referring Physician Se Perez MD   Patient/Family Therapy Goal Statement (OT) improve balance, increase ind   Existing Precautions/Restrictions other (see comments)  (stable lumbar fx, limit BLT)   Cognitive Status Examination   Orientation Status   (oriented to person, month, date, type of place; not oriented to name of hospital)   Affect/Mental Status (Cognitive) low arousal/lethargic  (lethargic initially, very alert following activity)   Follows Commands follows one-step commands;increased processing time needed;repetition of directions required   Safety Deficit moderate deficit;ability to follow commands;problem-solving   Attention Deficit moderate deficit;concentration;perseverates on recent task;perseverates on recent conversation   Cognitive Status Comments further assessment required, tangential in conversation, intermittently incorrect with PLOF reporting   Visual Perception   Visual Impairment/Limitations WFL   Pain Assessment   Patient Currently in Pain No   Range of Motion Comprehensive   General Range of Motion no range of motion deficits identified   Strength Comprehensive (MMT)   Comment, General Manual Muscle Testing (MMT) Assessment general weakness   Coordination   Upper Extremity Coordination No deficits  were identified   Transfers   Transfers sit-stand transfer   Transfer Comments requires 2 tries for 1st stand, cues for hand placement Mod A of 1 and wheeled walker; Min A of 1 for 2nd stand.   Balance   Balance Assessment sit to stand balance   Balance Comments posterior lean   Activities of Daily Living   BADL Assessment/Intervention lower body dressing   Lower Body Dressing Assessment/Training   Comment, (Lower Body Dressing) pt unable to don/doff socks at this time   Clinical Impression   Criteria for Skilled Therapeutic Interventions Met (OT) Yes, treatment indicated   OT Diagnosis decADL indep   Influenced by the following impairments L1 compression fx, hypokalemia, hypomagnesia   OT Problem List-Impairments impacting ADL problems related to;activity tolerance impaired;balance;cognition;communication;mobility;strength   Assessment of Occupational Performance 3-5 Performance Deficits   Identified Performance Deficits LB dressing, functional transfers, bathing, toileting   Planned Therapy Interventions (OT) ADL retraining;bed mobility training;balance training;cognition;transfer training   Clinical Decision Making Complexity (OT) moderate complexity   Risk & Benefits of therapy have been explained evaluation/treatment results reviewed;participants included;patient;son   OT Discharge Planning   OT Discharge Recommendation (DC Rec) (S)  Transitional Care Facility   OT Rationale for DC Rec requires assist  of 1 for ADLs, dep for IADLs. intermittent confusion - requires 24/7 cog supervision at this time   Total Evaluation Time (Minutes)   Total Evaluation Time (Minutes) 10  (co-eval with PT)   OT Goals   Therapy Frequency (OT) Daily   OT Predicted Duration/Target Date for Goal Attainment 04/20/22   OT Goals Lower Body Dressing;Toilet Transfer/Toileting;Bed Mobility   OT: Lower Body Dressing Supervision/stand-by assist   OT: Bed Mobility Supervision/stand-by assist   OT: Toilet Transfer/Toileting Supervision/stand-by  assist     Honey Goodson, OTR/L

## 2022-04-15 NOTE — PROGRESS NOTES
Speech-Language Pathology: Clinical Swallow Evaluation     04/15/22 1500   General Information   Onset of Illness/Injury or Date of Surgery 04/13/22   Pertinent History of Current Problem Per MD note: 89 year old old female with history of hypertension, persistent atrial fibrillation on chronic anticoagulation, and hypothyroidism presents to the hospital complaining of diffuse pain after a fall.  In the ER CT of the head neck unremarkable.  She was found to have an L1 compression fracture believed to be acute.  She is being admitted   General Observations Alert and cooperative   Type of Evaluation   Type of Evaluation Swallow Evaluation   Oral Motor   Oral Musculature generally intact   Dentition (Oral Motor)   Dentition (Oral Motor) natural dentition   Facial Symmetry (Oral Motor)   Facial Symmetry (Oral Motor) WNL   Lip Function (Oral Motor)   Lip Range of Motion (Oral Motor) protrusion impairment   Lip Strength (Oral Motor) mild impairment;bilateral   Tongue Function (Oral Motor)   Tongue ROM (Oral Motor) WNL   Vocal Quality/Secretion Management (Oral Motor)   Comment, Vocal Quality/Secretion Management (Oral Motor) Frequent stridor like sounds both with and without swallowing and oral intake. Appeared to have triggered by oral intake but nursing reports occurred earlier as well. Glottal sounds consistent exhalatory stridor with possibilities including paradoxical vocal fold dysfunction, spasmodic dypsphonia, swallow-breath discoordination or cognitive behavioral component.   General Swallowing Observations   Comment, General Swallowing Observations Difficulty swallowing earlier today with nursing, patient reported trouble getting enough breath while swallowing   Swallowing Evaluation Clinical swallow evaluation   Clinical Swallow Evaluation   Clinical Swallow Evaluation Textures Trialed thin liquids;moderately thick liquids/liquidized;pureed   Clinical Swallow Eval: Thin Liquid Texture Trial   Mode of  Presentation, Thin Liquids cup;straw   Volume of Liquid or Food Presented 2 ounces   Oral Phase of Swallow other (see comments)  (atypical retrieval movements, sucking in like movements, decreased coordination and oral holding)   Pharyngeal Phase of Swallow coughing/choking;repeated swallows  (inconsistent cough)   Successful Strategies Trialed During Procedure other (see comments)  (improved oral control with straw but remained difficult)   Clinical Swallow Eval: Moderately Thick Liquids   Mode of Presentation spoon;self-fed   Volume Presented 3 ounces   Oral Phase WFL;other (see comments)  (ongoing stridor like sounds but functional)   Pharyngeal Phase intact   Clinical Swallow Evaluation: Puree Solid Texture Trial   Mode of Presentation, Puree spoon;self-fed   Volume of Puree Presented 2 ounces   Oral Phase, Puree WFL   Pharyngeal Phase, Puree intact   Esophageal Phase of Swallow   Patient reports or presents with symptoms of esophageal dysphagia No   Swallowing Recommendations   Diet Consistency Recommendations pureed (level 4);moderately thick liquids/liquidized (level 3)   Supervision Level for Intake close supervision needed   Mode of Delivery Recommendations bolus size, small;slow rate of intake   Recommended Feeding/Eating Techniques (Swallow Eval) minimize distractions during oral intake;set-up and prepare tray;maintain upright sitting position for eating   Medication Administration Recommendations, Swallowing (SLP) crushed in puree   Instrumental Assessment Recommendations VFSS (videofluoroscopic swallowing study)  (May be necessary but not yet at this time given inconsistent mental status and symptoms, along with further medical diagnostics pending.)   Comment, Swallowing Recommendations Bedside Swallow Study completed. Patient had inconsistent s/s aspiration with thin liquids, most notable with cup sips and decreased with straw sips. Oral motor function was grossly intact upon observation but some  atypical movements and responses noted when retrieving bolus from cup. Mastication was not tested due to breathing pattern. Hyolaryngeal elevation appears complete but decreased coordination suspected upon visualization and palpation. Patient had consistent, atypical stridor type sounds both with and without with unclear location and cause. Patient shows some signs of spasmodic dysphonia, PVCD or possibly stridor, but unable to define. Consulted with MD and RN re: these features and will monitor. Patient has repeat head CT today. Recommend diet of Puree and Moderately thick liquids to be cautious given variable symptoms both medically and with regards to dysphagia.  Anticipate need for VFSS but not yet appropriate, will request as needed.   General Therapy Interventions   Planned Therapy Interventions Dysphagia Treatment   Dysphagia treatment Compensatory strategies for swallowing;Instruction of safe swallow strategies   Intervention Comments Further assessment as appropriate   Clinical Impression   Criteria for Skilled Therapeutic Interventions Met (SLP Eval) Yes, treatment indicated   SLP Diagnosis dysphagia   Clinical Impression Comments Mild dysphagia with unclear etiology or type. Appears to tolerate Puree and Moderately thick liquids. Concern for vocal cord or respiratory dysfunction of some unknown type.   SLP Discharge Planning   SLP Discharge Recommendation Transitional Care Facility   SLP Rationale for DC Rec below baseline   SLP Brief overview of current status  BSS showed atypical sounds and movements from throat and/or nose. Some characteristics of spasmodic dysphonia, possible stridor or PVCD; this symptom appeared triggered by oral intake but was also present at rest. Cautiously begin conservative diet and further assess and define.    Total Evaluation Time   Total Evaluation Time (Minutes) 15   SLP Goals   Therapy Frequency (SLP Eval) 5 times/wk   SLP Predicted Duration/Target Date for Goal  Attainment 04/22/22   SLP Goals Swallow   SLP: Safely tolerate diet without signs/symptoms of aspiration Regular diet;Thin liquids   Psychosocial Support   Trust Relationship/Rapport care explained;questions answered;questions encouraged

## 2022-04-15 NOTE — PROGRESS NOTES
04/15/22 1105   Quick Adds   Type of Visit Initial PT Evaluation   Living Environment   People in Home alone   Current Living Arrangements independent living facility   Home Accessibility no concerns   Self-Care   Equipment Currently Used at Home walker, rolling  (4WW)   General Information   Onset of Illness/Injury or Date of Surgery 04/13/22   Referring Physician Emeterio Perez MD   Pertinent History of Current Problem (include personal factors and/or comorbidities that impact the POC) Fall   Existing Precautions/Restrictions fall   Transfers   Transfers sit-stand transfer   Comment, (Transfers) Mod assist x 1 with first sit<>stand transfer with FWW. Verbal cues for safe hand placement. Min assist x 1 with other transfers due to pt more awake/aware during therapy session   Sit-Stand Transfer   Sit-Stand [place] (Transfers) verbal cues;minimum assist (75% patient effort);moderate assist (50% patient effort)   Assistive Device (Sit-Stand Transfers) walker, front-wheeled   Comment, (Sit-Stand Transfer) Mod/Min assist x 1 with FWW for sit<>stand transfers. Verbal cues for safe hand placement.   Gait/Stairs (Locomotion)   [place] Level (Gait) verbal cues;contact guard   Assistive Device (Gait) walker, 4-wheeled;walker, front-wheeled   Distance in Feet (Required for LE Total Joints) 450'   Pattern (Gait) step-to   Deviations/Abnormal Patterns (Gait) base of support, narrow;polo decreased;festinating/shuffling;gait speed decreased   Comment, (Gait/Stairs) Pt ambulates with FWW and CGA. Pt reports that she prefers to use a 4WW. Pt then ambulated with 4WW and CGA for safety. Verbal cues for posture, safe, and taking big steps when ambulating. Assist with locking/unlocking 4WW. Pt reports that she can only take small steps when ambulating. Chair follow for safety when ambulating in hallway   Clinical Impression   Criteria for Skilled Therapeutic Intervention Yes, treatment indicated   PT Diagnosis (PT)  impaired functional mobility   Influenced by the following impairments weakness   Functional limitations due to impairments transfers, ambulation   Clinical Presentation (PT Evaluation Complexity) Evolving/Changing   Clinical Presentation Rationale Pt presents as medically diagnosed   Clinical Decision Making (Complexity) moderate complexity   Planned Therapy Interventions (PT) bed mobility training;gait training;home exercise program;patient/family education;ROM (range of motion);stair training;strengthening;stretching;transfer training   Anticipated Equipment Needs at Discharge (PT) walker, rolling   Risk & Benefits of therapy have been explained patient   PT Discharge Planning   PT Discharge Recommendation (DC Rec) (S)  Transitional Care Facility   PT Rationale for DC Rec Pt requires assist with transfers and ambulation for safety. High fall risk   Total Evaluation Time   Total Evaluation Time (Minutes) 10   Physical Therapy Goals   PT Frequency Daily   PT Predicted Duration/Target Date for Goal Attainment 04/20/22   PT Goals Transfers;Gait   PT: Transfers Sit to/from stand;Assistive device  (CGA, FWW)   PT: Gait Supervision/stand-by assist;Assistive device;Rolling walker;Greater than 200 feet     Angela Miner, PT, DPT

## 2022-04-15 NOTE — PLAN OF CARE
Problem: Plan of Care - These are the overarching goals to be used throughout the patient stay.    Goal: Optimal Comfort and Wellbeing  Outcome: Ongoing, Progressing   Pt stated a little pain this afternoon. Was hard for pt to pin point location and was happy to get Tyl. Was rubbing thighs at one point this afternoon.      Problem: Plan of Care - These are the overarching goals to be used throughout the patient stay.    Goal: Readiness for Transition of Care  Outcome: Ongoing, Progressing   Pt having difficulty swallowing this morning. Bedside swallow done by RN and pt made NPO. Speech was able to come by this afternoon and see pt. Still unsure of swallow so put on puree diet and moderately thick liquids. Crush meds. CT re-done to make sure nothing has changed since admission.   Goal Outcome Evaluation:

## 2022-04-15 NOTE — PROGRESS NOTES
[facility] MEDICINE PROGRESS NOTE      Identification/Summary:   89-year-old female with recent fall presented complaining of diffuse pain.  She had multiple imaging studies and was found to have an L1 compression fracture.  She was admitted.  She has since developed an acute encephalopathy and now difficulty with speech of unclear etiology thought to likely be med related.      Rhabdomyolysis/moderate dehydration  Improving  Continue fluids decreased rate  Recheck in a.m.     Speech difficulties/acute metabolic encephalopathy/hyponatremia/agitation  Encephalopathy likely due to hyponatremia  Patient insistent on leaving the hospital but more cooperative 4/14  Is holdable if she attempts to leave at this time  Has agreed to stay with sons assistance  Gentle IV fluids  Oral fluid restriction  Schedule Zyprexa 2.5 mg at night  As needed IV Ativan  As needed Zyprexa twice daily  Should improve as sodium corrects  Added low-dose oral sodium replacement  Received Haldol which may be contributing  Repeat CT head unremarkable  Seems to be improving and Haldol has been discontinued     Restless leg syndrome  No formal diagnosis noted in the chart  Patient has classic symptoms of waking with a sensation of needing to ambulate  And having episodes even during the day where she needs to get up and move  Schedule some gabapentin 100 mg at bedtime     Hypokalemia  Likely related to chlorthalidone  Hold this medication  Gentle IV fluids  Potassium replacement protocol  Improved     Hypomagnesemia  Protocol     Persistent atrial fibrillation  Continue flecainide and metoprolol and Eliquis     Essential hypertension  Blood pressures are up with holding chlorthalidone  Will order as needed hydralazine     Hypothyroidism  Recheck TSH in a.m.         Diet: Fluid restriction 1200 ML FLUID  Combination Diet Regular Diet Adult; Pureed Diet (level 4); Liquidized/Moderately Thick (level 3)  DVT Prophylaxis:   DOAC  Code Status: No CPR- Do NOT Intubate    Anticipated possible discharge in 1 to 2 days if encephalopathy cleared    Disposition Plan   Expected Discharge: 04/18/2022     Anticipated discharge location: inpatient rehabilitation facility (TCU)        The patient's care was discussed with the Bedside Nurse, Care Coordinator/ and Patient.    Emeterio Perez MD  [facility]  [facility]  Phone: #[phone]    Interval History/Subjective:  Feeling okay.  Sleeping but easily arousable.  Making sense and answering questions appropriately.  Knows where she is and why she is here.  She does states she is having some trouble articulating and clearly she does have some trouble vocalizing although she is quite understandable.  Her neurologic exam is otherwise unremarkable.  Repeat CT scan of the head unremarkable.  Symptoms seem to be improving and suspect medication related.  Discontinued Haldol and did decrease gabapentin dose.    Physical Exam/Objective:  Temp:  [97.7  F (36.5  C)-98.1  F (36.7  C)] 98.1  F (36.7  C)  Pulse:  [] 64  Resp:  [16-20] 16  BP: (116-225)/(68-96) 198/80  SpO2:  [92 %-97 %] 97 %  Body mass index is 24.62 kg/m .    GENERAL:  Alert, appears comfortable, in no acute distress, appears stated age   HEAD:  Normocephalic, without obvious abnormality, atraumatic   EYES:  PERRL, conjunctiva/corneas clear, no scleral icterus, EOM's intact   NOSE: Nares normal, septum midline, mucosa normal, no drainage   THROAT: Lips, mucosa, and tongue normal; teeth and gums normal, mouth dry   NECK: Supple, symmetrical, trachea midline   BACK:   Symmetric, no curvature, ROM normal   LUNGS:   Clear to auscultation bilaterally, no rales, rhonchi, or wheezing, symmetric chest rise on inhalation, respirations unlabored   CHEST WALL:  No tenderness or deformity   HEART:  Regular rate and rhythm, S1 and S2 normal, no murmur, rub, or gallop    ABDOMEN:   Soft,  non-tender, bowel sounds active all four quadrants, no masses, no organomegaly, no rebound or guarding   EXTREMITIES: Extremities normal, atraumatic, no cyanosis or edema    SKIN: Dry to touch, no exanthems in the visualized areas   NEURO: Alert, oriented x3, moves all four extremities freely   PSYCH: Cooperative, behavior is appropriate      Data reviewed today: I personally reviewed all new medications, labs, imaging/diagnostics reports over the past 24 hours. Pertinent findings include:    Imaging:   Recent Results (from the past 24 hour(s))   CT Head w/o Contrast    Narrative    EXAM: CT HEAD W/O CONTRAST  LOCATION: [facility]  DATE/TIME: 4/15/2022 2:58 PM    INDICATION: Mental status change, unknown cause  COMPARISON: 04/13/2022 head CT.  TECHNIQUE: Routine CT Head without IV contrast. Multiplanar reformats. Dose reduction techniques were used.    FINDINGS:  INTRACRANIAL CONTENTS: No intracranial hemorrhage, extraaxial collection, or mass effect.  No CT evidence of acute infarct. Mild presumed chronic small vessel ischemic changes. Small chronic lacunar infarct in the left internal capsule. Mild diffuse   parenchymal volume loss. Nondilated ventricles.     VISUALIZED ORBITS/SINUSES/MASTOIDS: Prior bilateral cataract surgery. Visualized portions of the orbits are otherwise unremarkable. No paranasal sinus mucosal disease. No middle ear or mastoid effusion.    BONES/SOFT TISSUES: No acute abnormality.      Impression    IMPRESSION:  1.  Mild age-related changes as above with no acute intracranial abnormality.       Labs:  Most Recent 3 CBC's:Recent Labs   Lab Test 04/15/22  0446 04/14/22  0646 04/13/22  1022   WBC 13.6* 11.8* 15.5*   HGB 12.6 12.8 13.7   MCV 90 93 91    362 314     Most Recent 3 BMP's:Recent Labs   Lab Test 04/15/22  1002 04/15/22  0446 04/14/22  0814 04/13/22  1758 04/13/22  1022   NA  --  128* 121*  --  120*   POTASSIUM 3.3* 3.1* 3.8   < > 2.7*   CHLORIDE  --   93* 88*  --  78*   CO2  --  22 21*  --  24   BUN  --  7* 8  --  10   CR  --  0.59* 0.63  --  0.68   ANIONGAP  --  13 12  --  18   MAIRA  --  8.7 8.7  --  9.6   GLC  --  109 112  --  166*    < > = values in this interval not displayed.     Most Recent 2 LFT's:Recent Labs   Lab Test 04/14/22  0814 01/04/22  1049   AST 77* 23   ALT 25 16   ALKPHOS 80 84   BILITOTAL 0.8 0.5     Most Recent 3 INR's:No lab results found.    Medications:   Personally Reviewed.  Medications     - MEDICATION INSTRUCTIONS -       sodium chloride 125 mL/hr at 04/15/22 0629       acetaminophen  1,000 mg Oral Q8H     amLODIPine  5 mg Oral Daily     apixaban ANTICOAGULANT  5 mg Oral BID     atorvastatin  20 mg Oral Daily     diphenhydrAMINE  25 mg Oral At Bedtime     flecainide  50 mg Oral BID     gabapentin  300 mg Oral At Bedtime     levothyroxine  75 mcg Oral Daily     lidocaine   Transdermal Q8H     losartan  100 mg Oral Daily     metoprolol tartrate  12.5 mg Oral BID     OLANZapine  2.5 mg Oral At Bedtime     sodium chloride (PF)  3 mL Intracatheter Q8H     sodium chloride   Intravenous Once     sodium chloride  0.5 g Oral BID w/meals

## 2022-04-15 NOTE — PROVIDER NOTIFICATION
Expiratory wheezes noted in upper lung lobes. RR and O2 WDL. In no apparent distress. MD paged requesting inhaler, new order received. Prn albuterol administered, expiratory wheezes resolved after administration.

## 2022-04-15 NOTE — PROGRESS NOTES
Care Management Follow Up    Length of Stay (days): 2    Expected Discharge Date: 04/18/2022     Concerns to be Addressed: labs and TCU      Patient plan of care discussed at interdisciplinary rounds: Yes    Anticipated Discharge Disposition: TCU    Anticipated Discharge Services: None    Anticipated Discharge DME: None    Patient/family educated on Medicare website which has current facility and service quality ratings: yes    Education Provided on the Discharge Plan:      Patient/Family in Agreement with the Plan: pending    Referrals Placed by CM/SW: Post Acute Facilities (Per family request.)        Additional Information:  Chart reviewed. CM met with patient but patient was confused and unable to give choices for TCU. Spoke with son via phone and he would like his [facility] since she already lives in one of their facilities. TCU choices pending. CM will continue to follow.       Carin Echols RN          Additional referrals per family request  sent 2:13 PM   [facility] recommendations: Transitional Care Facility  OT recommendations: Transitional Care Facility

## 2022-04-16 ENCOUNTER — APPOINTMENT (OUTPATIENT)
Dept: PHYSICAL THERAPY | Facility: CLINIC | Age: 87
DRG: 551 | End: 2022-04-16
Payer: MEDICARE

## 2022-04-16 ENCOUNTER — APPOINTMENT (OUTPATIENT)
Dept: SPEECH THERAPY | Facility: CLINIC | Age: 87
DRG: 551 | End: 2022-04-16
Payer: MEDICARE

## 2022-04-16 ENCOUNTER — APPOINTMENT (OUTPATIENT)
Dept: OCCUPATIONAL THERAPY | Facility: CLINIC | Age: 87
DRG: 551 | End: 2022-04-16
Payer: MEDICARE

## 2022-04-16 LAB
ALBUMIN UR-MCNC: 50 MG/DL
ANION GAP SERPL CALCULATED.3IONS-SCNC: 12 MMOL/L (ref 5–18)
APPEARANCE UR: ABNORMAL
BACTERIA #/AREA URNS HPF: ABNORMAL /HPF
BILIRUB UR QL STRIP: NEGATIVE
BUN SERPL-MCNC: 8 MG/DL (ref 8–28)
C REACTIVE PROTEIN LHE: 7.4 MG/DL (ref 0–0.8)
CALCIUM SERPL-MCNC: 8.9 MG/DL (ref 8.5–10.5)
CHLORIDE BLD-SCNC: 99 MMOL/L (ref 98–107)
CK SERPL-CCNC: 505 U/L (ref 30–190)
CO2 SERPL-SCNC: 23 MMOL/L (ref 22–31)
COLOR UR AUTO: ABNORMAL
CREAT SERPL-MCNC: 0.63 MG/DL (ref 0.6–1.1)
ERYTHROCYTE [DISTWIDTH] IN BLOOD BY AUTOMATED COUNT: 12.7 % (ref 10–15)
GFR SERPL CREATININE-BSD FRML MDRD: 84 ML/MIN/1.73M2
GLUCOSE BLD-MCNC: 115 MG/DL (ref 70–125)
GLUCOSE UR STRIP-MCNC: NEGATIVE MG/DL
HCT VFR BLD AUTO: 36.5 % (ref 35–47)
HGB BLD-MCNC: 12.6 G/DL (ref 11.7–15.7)
HGB UR QL STRIP: ABNORMAL
KETONES UR STRIP-MCNC: ABNORMAL MG/DL
LEUKOCYTE ESTERASE UR QL STRIP: ABNORMAL
MAGNESIUM SERPL-MCNC: 1.8 MG/DL (ref 1.8–2.6)
MCH RBC QN AUTO: 31.3 PG (ref 26.5–33)
MCHC RBC AUTO-ENTMCNC: 34.5 G/DL (ref 31.5–36.5)
MCV RBC AUTO: 91 FL (ref 78–100)
NITRATE UR QL: NEGATIVE
PH UR STRIP: 6 [PH] (ref 5–7)
PLATELET # BLD AUTO: 335 10E3/UL (ref 150–450)
POTASSIUM BLD-SCNC: 3.3 MMOL/L (ref 3.5–5)
POTASSIUM BLD-SCNC: 3.8 MMOL/L (ref 3.5–5)
RBC # BLD AUTO: 4.02 10E6/UL (ref 3.8–5.2)
RBC URINE: 13 /HPF
SODIUM SERPL-SCNC: 134 MMOL/L (ref 136–145)
SP GR UR STRIP: 1.01 (ref 1–1.03)
SQUAMOUS EPITHELIAL: 6 /HPF
UROBILINOGEN UR STRIP-MCNC: <2 MG/DL
WBC # BLD AUTO: 8.2 10E3/UL (ref 4–11)
WBC CLUMPS #/AREA URNS HPF: PRESENT /HPF
WBC URINE: >182 /HPF

## 2022-04-16 PROCEDURE — 85041 AUTOMATED RBC COUNT: CPT | Performed by: FAMILY MEDICINE

## 2022-04-16 PROCEDURE — 120N000001 HC R&B MED SURG/OB

## 2022-04-16 PROCEDURE — 36415 COLL VENOUS BLD VENIPUNCTURE: CPT | Performed by: FAMILY MEDICINE

## 2022-04-16 PROCEDURE — 87086 URINE CULTURE/COLONY COUNT: CPT | Performed by: FAMILY MEDICINE

## 2022-04-16 PROCEDURE — 81001 URINALYSIS AUTO W/SCOPE: CPT | Performed by: FAMILY MEDICINE

## 2022-04-16 PROCEDURE — 97116 GAIT TRAINING THERAPY: CPT | Mod: GP

## 2022-04-16 PROCEDURE — 92526 ORAL FUNCTION THERAPY: CPT | Mod: GN | Performed by: SPEECH-LANGUAGE PATHOLOGIST

## 2022-04-16 PROCEDURE — 99233 SBSQ HOSP IP/OBS HIGH 50: CPT | Performed by: FAMILY MEDICINE

## 2022-04-16 PROCEDURE — 250N000011 HC RX IP 250 OP 636: Performed by: FAMILY MEDICINE

## 2022-04-16 PROCEDURE — 86140 C-REACTIVE PROTEIN: CPT | Performed by: FAMILY MEDICINE

## 2022-04-16 PROCEDURE — 258N000003 HC RX IP 258 OP 636: Performed by: FAMILY MEDICINE

## 2022-04-16 PROCEDURE — 97530 THERAPEUTIC ACTIVITIES: CPT | Mod: GP

## 2022-04-16 PROCEDURE — 97535 SELF CARE MNGMENT TRAINING: CPT | Mod: GO

## 2022-04-16 PROCEDURE — 82550 ASSAY OF CK (CPK): CPT | Performed by: FAMILY MEDICINE

## 2022-04-16 PROCEDURE — 250N000013 HC RX MED GY IP 250 OP 250 PS 637: Performed by: FAMILY MEDICINE

## 2022-04-16 PROCEDURE — 80048 BASIC METABOLIC PNL TOTAL CA: CPT | Performed by: FAMILY MEDICINE

## 2022-04-16 PROCEDURE — 84132 ASSAY OF SERUM POTASSIUM: CPT | Performed by: FAMILY MEDICINE

## 2022-04-16 PROCEDURE — 83735 ASSAY OF MAGNESIUM: CPT | Performed by: FAMILY MEDICINE

## 2022-04-16 RX ORDER — POTASSIUM CHLORIDE 1500 MG/1
20 TABLET, EXTENDED RELEASE ORAL ONCE
Status: COMPLETED | OUTPATIENT
Start: 2022-04-16 | End: 2022-04-16

## 2022-04-16 RX ORDER — POTASSIUM CHLORIDE 1500 MG/1
40 TABLET, EXTENDED RELEASE ORAL ONCE
Status: COMPLETED | OUTPATIENT
Start: 2022-04-16 | End: 2022-04-16

## 2022-04-16 RX ADMIN — FLECAINIDE ACETATE 50 MG: 50 TABLET ORAL at 21:32

## 2022-04-16 RX ADMIN — FLECAINIDE ACETATE 50 MG: 50 TABLET ORAL at 10:08

## 2022-04-16 RX ADMIN — METOPROLOL TARTRATE 12.5 MG: 25 TABLET, FILM COATED ORAL at 21:32

## 2022-04-16 RX ADMIN — SODIUM CHLORIDE TAB 1 GM 0.5 G: 1 TAB at 17:22

## 2022-04-16 RX ADMIN — SODIUM CHLORIDE: 9 INJECTION, SOLUTION INTRAVENOUS at 19:03

## 2022-04-16 RX ADMIN — LOSARTAN POTASSIUM 100 MG: 50 TABLET, FILM COATED ORAL at 10:08

## 2022-04-16 RX ADMIN — ALBUTEROL SULFATE 2 PUFF: 90 AEROSOL, METERED RESPIRATORY (INHALATION) at 21:40

## 2022-04-16 RX ADMIN — SODIUM CHLORIDE TAB 1 GM 0.5 G: 1 TAB at 10:08

## 2022-04-16 RX ADMIN — ACETAMINOPHEN 1000 MG: 500 TABLET, FILM COATED ORAL at 14:55

## 2022-04-16 RX ADMIN — ACETAMINOPHEN 1000 MG: 500 TABLET, FILM COATED ORAL at 21:33

## 2022-04-16 RX ADMIN — APIXABAN 5 MG: 5 TABLET, FILM COATED ORAL at 10:08

## 2022-04-16 RX ADMIN — POTASSIUM CHLORIDE 20 MEQ: 20 TABLET, EXTENDED RELEASE ORAL at 19:01

## 2022-04-16 RX ADMIN — LEVOTHYROXINE SODIUM 75 MCG: 75 TABLET ORAL at 10:08

## 2022-04-16 RX ADMIN — GABAPENTIN 100 MG: 100 CAPSULE ORAL at 21:32

## 2022-04-16 RX ADMIN — AMLODIPINE BESYLATE 5 MG: 5 TABLET ORAL at 10:08

## 2022-04-16 RX ADMIN — ALBUTEROL SULFATE 2 PUFF: 90 AEROSOL, METERED RESPIRATORY (INHALATION) at 13:25

## 2022-04-16 RX ADMIN — OLANZAPINE 2.5 MG: 2.5 TABLET, FILM COATED ORAL at 21:33

## 2022-04-16 RX ADMIN — APIXABAN 5 MG: 5 TABLET, FILM COATED ORAL at 21:32

## 2022-04-16 RX ADMIN — ALBUTEROL SULFATE 2 PUFF: 90 AEROSOL, METERED RESPIRATORY (INHALATION) at 05:36

## 2022-04-16 RX ADMIN — DIPHENHYDRAMINE HYDROCHLORIDE 25 MG: 25 CAPSULE ORAL at 21:32

## 2022-04-16 RX ADMIN — POTASSIUM CHLORIDE 40 MEQ: 20 TABLET, EXTENDED RELEASE ORAL at 10:08

## 2022-04-16 RX ADMIN — ATORVASTATIN CALCIUM 20 MG: 10 TABLET, FILM COATED ORAL at 10:08

## 2022-04-16 RX ADMIN — HYDRALAZINE HYDROCHLORIDE 5 MG: 20 INJECTION INTRAMUSCULAR; INTRAVENOUS at 04:50

## 2022-04-16 RX ADMIN — POTASSIUM CHLORIDE 20 MEQ: 1500 TABLET, EXTENDED RELEASE ORAL at 14:55

## 2022-04-16 RX ADMIN — ACETAMINOPHEN 1000 MG: 500 TABLET, FILM COATED ORAL at 04:49

## 2022-04-16 RX ADMIN — METOPROLOL TARTRATE 12.5 MG: 25 TABLET, FILM COATED ORAL at 10:08

## 2022-04-16 ASSESSMENT — ACTIVITIES OF DAILY LIVING (ADL)
ADLS_ACUITY_SCORE: 18
ADLS_ACUITY_SCORE: 18
ADLS_ACUITY_SCORE: 16
ADLS_ACUITY_SCORE: 18
ADLS_ACUITY_SCORE: 16
ADLS_ACUITY_SCORE: 16
ADLS_ACUITY_SCORE: 24
ADLS_ACUITY_SCORE: 24
ADLS_ACUITY_SCORE: 20
ADLS_ACUITY_SCORE: 16
ADLS_ACUITY_SCORE: 18
ADLS_ACUITY_SCORE: 16
ADLS_ACUITY_SCORE: 16
ADLS_ACUITY_SCORE: 18

## 2022-04-16 NOTE — PROGRESS NOTES
Care Management Follow Up    Length of Stay (days): 3        Patient plan of care discussed at interdisciplinary rounds: Yes     Expected Discharge Date:   4/18/22       Concerns to be Addressed / Barriers to Discharge: medical management, TCU bed     Anticipated Discharge Disposition: TCU      Patient/family educated on Medicare website which has current facility and service quality ratings:  Previously done    Education Provided on the Discharge Plan:     Patient/Family in Agreement with the Plan:       Referrals Placed by CM/SW: yes  Private pay costs discussed: Not applicable     Additional Information:  4/16/2022 [facility] admissions- left message. Discussed discharge planning with patient- she would like to return home. Therapy still recommending TCU. Will continue to follow.   Per chart review,  Referrals sent Fri 4/15 to skilled nursing facilities (?TCU) @ [facility]  Also sent referrals to other [facility] TCUs.    Return call from [facility] with [facility] admissions. Declined due to full on TCU until possibly Wed 4/20. Verified alternate [facility] TCUs: [facility], [facility], and [facility].    CM following.

## 2022-04-16 NOTE — PROGRESS NOTES
[facility] MEDICINE PROGRESS NOTE      Identification/Summary: Katie Mar is a 89 year old female with a past medical history of recent falls, persistent atrial fibrillation, essential hypertension presented to the hospital after a fall at home.  Patient was intermittently confused and quite insistent on not getting admitted to the hospital.  Ultimately agreed to admission.  She has had issues with encephalopathy and delirium during this hospitalization.    Assessment and Plan:    Rhabdomyolysis/moderate dehydration  Improving  Continue fluids decreased rate       Speech difficulties/acute metabolic encephalopathy/hyponatremia/agitation  Encephalopathy likely due to hyponatremia  Patient insistent on leaving the hospital but more cooperative 4/14  Is holdable if she attempts to leave at this time  Has agreed to stay with sons assistance  Gentle IV fluids  Oral fluid restriction  Schedule Zyprexa 2.5 mg at night  As needed IV Ativan  As needed Zyprexa twice daily  Should improve as sodium corrects  Added low-dose oral sodium replacement  Received Haldol which may be contributing  Repeat CT head unremarkable  Seems to be improving and Haldol has been discontinued  Much improved 4/16     Restless leg syndrome  No formal diagnosis noted in the chart  Patient has classic symptoms of waking with a sensation of needing to ambulate  And having episodes even during the day where she needs to get up and move  Schedule some gabapentin 100 mg at bedtime     Hypokalemia  Likely related to chlorthalidone  Hold this medication  Gentle IV fluids  Potassium replacement protocol  Improved     Hypomagnesemia  Protocol     Persistent atrial fibrillation  Continue flecainide and metoprolol and Eliquis     Essential hypertension  Blood pressures are up with holding chlorthalidone  Will order as needed hydralazine     Hypothyroidism  Recheck TSH in a.m.    Recurrent falls  Recommending TCU           #  "Overweight: Estimated body mass index is 25.38 kg/m  as calculated from the following:    Height as of this encounter: 1.6 m (5' 3\").    Weight as of this encounter: 65 kg (143 lb 4.8 oz).      Diet: Fluid restriction 1200 ML FLUID  Combination Diet Regular Diet Adult; Pureed Diet (level 4); Liquidized/Moderately Thick (level 3)  DVT Prophylaxis: Direct oral anticoagulation  Code Status: No CPR- Do NOT Intubate    Anticipated possible discharge as soon is TCU available    Disposition Plan   Expected Discharge: 04/18/2022     Anticipated discharge location: inpatient rehabilitation facility (TCU)      The patient's care was discussed with the Bedside Nurse, Care Coordinator/, Patient and Patient's Family.    Emeterio Perez MD  [facility]  Phone: #[phone]    Interval History/Subjective:  Patient is feeling better today.  Oriented to person and place.  Remembers why she is here.  Denies any headache nausea vomiting etc.  Minimal pain.  Feels like she is speaking and swallowing better.    Physical Exam/Objective:  Temp:  [97.1  F (36.2  C)-98.6  F (37  C)] 98.1  F (36.7  C)  Pulse:  [64-92] 65  Resp:  [16-20] 16  BP: (143-225)/(63-96) 143/65  SpO2:  [93 %-97 %] 95 %  Body mass index is 25.38 kg/m .    GENERAL:  Alert, appears comfortable, in no acute distress, appears stated age   HEAD:  Normocephalic, without obvious abnormality, atraumatic   EYES:  PERRL, conjunctiva/corneas clear, no scleral icterus, EOM's intact   NECK: Supple, symmetrical, trachea midline   BACK:   Symmetric, no curvature, ROM normal   LUNGS:   Clear to auscultation bilaterally, no rales, rhonchi, or wheezing, symmetric chest rise on inhalation, respirations unlabored   CHEST WALL:  No tenderness or deformity   HEART:  Regular rate and rhythm, S1 and S2 normal, no murmur, rub, or gallop    ABDOMEN:   Soft, non-tender, bowel sounds active all four quadrants, no masses, no " organomegaly, no rebound or guarding   EXTREMITIES: Extremities normal, atraumatic, no cyanosis or edema    SKIN: Dry to touch, no exanthems in the visualized areas   NEURO: Alert, oriented x3, moves all four extremities freely   PSYCH: Cooperative, behavior is appropriate      Data reviewed today: I personally reviewed all new medications, labs, imaging/diagnostics reports over the past 24 hours. Pertinent findings include:    Imaging:   Recent Results (from the past 24 hour(s))   CT Head w/o Contrast    Narrative    EXAM: CT HEAD W/O CONTRAST  LOCATION: [facility]  DATE/TIME: 4/15/2022 2:58 PM    INDICATION: Mental status change, unknown cause  COMPARISON: 04/13/2022 head CT.  TECHNIQUE: Routine CT Head without IV contrast. Multiplanar reformats. Dose reduction techniques were used.    FINDINGS:  INTRACRANIAL CONTENTS: No intracranial hemorrhage, extraaxial collection, or mass effect.  No CT evidence of acute infarct. Mild presumed chronic small vessel ischemic changes. Small chronic lacunar infarct in the left internal capsule. Mild diffuse   parenchymal volume loss. Nondilated ventricles.     VISUALIZED ORBITS/SINUSES/MASTOIDS: Prior bilateral cataract surgery. Visualized portions of the orbits are otherwise unremarkable. No paranasal sinus mucosal disease. No middle ear or mastoid effusion.    BONES/SOFT TISSUES: No acute abnormality.      Impression    IMPRESSION:  1.  Mild age-related changes as above with no acute intracranial abnormality.       Labs:  Most Recent 3 CBC's:Recent Labs   Lab Test 04/16/22  0611 04/15/22  0446 04/14/22  0646   WBC 8.2 13.6* 11.8*   HGB 12.6 12.6 12.8   MCV 91 90 93    329 362     Most Recent 3 BMP's:Recent Labs   Lab Test 04/16/22  0611 04/15/22  1727 04/15/22  1002 04/15/22  0446 04/14/22  0814   *  --   --  128* 121*   POTASSIUM 3.3* 3.7 3.3* 3.1* 3.8   CHLORIDE 99  --   --  93* 88*   CO2 23  --   --  22 21*   BUN 8  --   --  7* 8   CR  0.63  --   --  0.59* 0.63   ANIONGAP 12  --   --  13 12   MAIRA 8.9  --   --  8.7 8.7     --   --  109 112     Most Recent 2 LFT's:Recent Labs   Lab Test 04/14/22  0814 01/04/22  1049   AST 77* 23   ALT 25 16   ALKPHOS 80 84   BILITOTAL 0.8 0.5     Most Recent 3 INR's:No lab results found.    Medications:   Personally Reviewed    Medications     - MEDICATION INSTRUCTIONS -       sodium chloride 75 mL/hr at 04/15/22 1805       acetaminophen  1,000 mg Oral Q8H     amLODIPine  5 mg Oral Daily     apixaban ANTICOAGULANT  5 mg Oral BID     atorvastatin  20 mg Oral Daily     diphenhydrAMINE  25 mg Oral At Bedtime     flecainide  50 mg Oral BID     gabapentin  100 mg Oral At Bedtime     levothyroxine  75 mcg Oral Daily     lidocaine   Transdermal Q8H     losartan  100 mg Oral Daily     metoprolol tartrate  12.5 mg Oral BID     OLANZapine  2.5 mg Oral At Bedtime     sodium chloride (PF)  3 mL Intracatheter Q8H     sodium chloride   Intravenous Once     sodium chloride  0.5 g Oral BID w/meals     Total time 35 minutes with greater than 50% spent in coordination of care contacting the patient's family and discussing goals and plan.

## 2022-04-16 NOTE — PLAN OF CARE
Problem: Plan of Care - These are the overarching goals to be used throughout the patient stay.    Goal: Optimal Comfort and Wellbeing  Outcome: Ongoing, Progressing  Pt denied pain this shift. No non-verbal signs of pain noted.    Problem: Plan of Care - These are the overarching goals to be used throughout the patient stay.    Goal: Readiness for Transition of Care  Outcome: Ongoing, Progressing   Pt more alert and oriented today then yesterday. Moving well with assist of 1 and walker. Tolerating puree texture well. Still has some coughing with liquids. Enc pt to take a drink and then take a breath in order to give herself a rest. This seemed to help coughing some. Seen again by speech therapy. They will cont to see. UA sent   Goal Outcome Evaluation:

## 2022-04-16 NOTE — PLAN OF CARE
Problem: Fall Injury Risk  Goal: Absence of Fall and Fall-Related Injury  Outcome: Ongoing, Progressing  Intervention: Identify and Manage Contributors  Recent Flowsheet Documentation  Taken 4/16/2022 0129 by Neha Craft, RN  Medication Review/Management: medications reviewed  Intervention: Promote Injury-Free Environment  Recent Flowsheet Documentation  Taken 4/16/2022 0129 by Neha Craft, RN  Safety Promotion/Fall Prevention:    activity supervised    bed alarm on   Goal Outcome Evaluation:      Pt Alert to self overnight- redirectable, Garbled speech. Skin very fragile blotchy with bruising noted all over body. A1 with GB and walker- pivot to commode. Voiding without difficulty- urine cloudy and malodorous.  Hypertensive this AM- hydralazine given x1. Pureed diet, 1200ml FR.      Plan- TCU at discharge.

## 2022-04-17 ENCOUNTER — APPOINTMENT (OUTPATIENT)
Dept: SPEECH THERAPY | Facility: CLINIC | Age: 87
DRG: 551 | End: 2022-04-17
Payer: MEDICARE

## 2022-04-17 LAB
HOLD SPECIMEN: NORMAL
MAGNESIUM SERPL-MCNC: 1.7 MG/DL (ref 1.8–2.6)
POTASSIUM BLD-SCNC: 3.9 MMOL/L (ref 3.5–5)

## 2022-04-17 PROCEDURE — 258N000003 HC RX IP 258 OP 636: Performed by: FAMILY MEDICINE

## 2022-04-17 PROCEDURE — 36415 COLL VENOUS BLD VENIPUNCTURE: CPT | Performed by: FAMILY MEDICINE

## 2022-04-17 PROCEDURE — 120N000001 HC R&B MED SURG/OB

## 2022-04-17 PROCEDURE — 84132 ASSAY OF SERUM POTASSIUM: CPT | Performed by: FAMILY MEDICINE

## 2022-04-17 PROCEDURE — 83735 ASSAY OF MAGNESIUM: CPT | Performed by: FAMILY MEDICINE

## 2022-04-17 PROCEDURE — 250N000011 HC RX IP 250 OP 636: Performed by: FAMILY MEDICINE

## 2022-04-17 PROCEDURE — 250N000013 HC RX MED GY IP 250 OP 250 PS 637: Performed by: FAMILY MEDICINE

## 2022-04-17 PROCEDURE — 99232 SBSQ HOSP IP/OBS MODERATE 35: CPT | Performed by: FAMILY MEDICINE

## 2022-04-17 PROCEDURE — 92526 ORAL FUNCTION THERAPY: CPT | Mod: GN

## 2022-04-17 RX ORDER — CEFTRIAXONE 1 G/1
1 INJECTION, POWDER, FOR SOLUTION INTRAMUSCULAR; INTRAVENOUS EVERY 24 HOURS
Status: DISCONTINUED | OUTPATIENT
Start: 2022-04-17 | End: 2022-04-18

## 2022-04-17 RX ADMIN — APIXABAN 5 MG: 5 TABLET, FILM COATED ORAL at 08:42

## 2022-04-17 RX ADMIN — FLECAINIDE ACETATE 50 MG: 50 TABLET ORAL at 08:46

## 2022-04-17 RX ADMIN — APIXABAN 5 MG: 5 TABLET, FILM COATED ORAL at 19:37

## 2022-04-17 RX ADMIN — FLECAINIDE ACETATE 50 MG: 50 TABLET ORAL at 19:37

## 2022-04-17 RX ADMIN — OLANZAPINE 2.5 MG: 2.5 TABLET, FILM COATED ORAL at 13:20

## 2022-04-17 RX ADMIN — METOPROLOL TARTRATE 12.5 MG: 25 TABLET, FILM COATED ORAL at 19:37

## 2022-04-17 RX ADMIN — AMLODIPINE BESYLATE 5 MG: 5 TABLET ORAL at 08:43

## 2022-04-17 RX ADMIN — OLANZAPINE 2.5 MG: 2.5 TABLET, FILM COATED ORAL at 20:58

## 2022-04-17 RX ADMIN — ACETAMINOPHEN 1000 MG: 500 TABLET, FILM COATED ORAL at 20:59

## 2022-04-17 RX ADMIN — METOPROLOL TARTRATE 12.5 MG: 25 TABLET, FILM COATED ORAL at 08:42

## 2022-04-17 RX ADMIN — SODIUM CHLORIDE: 9 INJECTION, SOLUTION INTRAVENOUS at 00:48

## 2022-04-17 RX ADMIN — SODIUM CHLORIDE TAB 1 GM 0.5 G: 1 TAB at 08:46

## 2022-04-17 RX ADMIN — DIPHENHYDRAMINE HYDROCHLORIDE 25 MG: 25 CAPSULE ORAL at 20:59

## 2022-04-17 RX ADMIN — OLANZAPINE 2.5 MG: 2.5 TABLET, FILM COATED ORAL at 14:51

## 2022-04-17 RX ADMIN — ACETAMINOPHEN 1000 MG: 500 TABLET, FILM COATED ORAL at 13:21

## 2022-04-17 RX ADMIN — GABAPENTIN 100 MG: 100 CAPSULE ORAL at 20:59

## 2022-04-17 RX ADMIN — ACETAMINOPHEN 1000 MG: 500 TABLET, FILM COATED ORAL at 06:27

## 2022-04-17 RX ADMIN — CEFTRIAXONE 1 G: 1 INJECTION, POWDER, FOR SOLUTION INTRAMUSCULAR; INTRAVENOUS at 08:42

## 2022-04-17 RX ADMIN — ATORVASTATIN CALCIUM 20 MG: 10 TABLET, FILM COATED ORAL at 08:42

## 2022-04-17 RX ADMIN — LOSARTAN POTASSIUM 100 MG: 50 TABLET, FILM COATED ORAL at 08:43

## 2022-04-17 RX ADMIN — LEVOTHYROXINE SODIUM 75 MCG: 75 TABLET ORAL at 08:42

## 2022-04-17 ASSESSMENT — ACTIVITIES OF DAILY LIVING (ADL)
ADLS_ACUITY_SCORE: 18
ADLS_ACUITY_SCORE: 16
ADLS_ACUITY_SCORE: 18
ADLS_ACUITY_SCORE: 16
ADLS_ACUITY_SCORE: 18

## 2022-04-17 NOTE — PROGRESS NOTES
[facility] MEDICINE PROGRESS NOTE      Identification/Summary:   Katie Mar is a 89 year old female with a past medical history of recent falls, persistent atrial fibrillation, essential hypertension presented to the hospital after a fall at home.  Patient was intermittently confused and quite insistent on not getting admitted to the hospital.  Ultimately agreed to admission.  She has had issues with encephalopathy and delirium during this hospitalization.     Assessment and Plan:   Rhabdomyolysis/moderate dehydration  Improving  Continue fluids decreased rate        Speech difficulties/acute metabolic encephalopathy/hyponatremia/agitation  Encephalopathy likely due to hyponatremia  Patient insistent on leaving the hospital but more cooperative 4/14  Is holdable if she attempts to leave at this time  Has agreed to stay with sons assistance  Gentle IV fluids  Oral fluid restriction  Schedule Zyprexa 2.5 mg at night  As needed IV Ativan  As needed Zyprexa twice daily  Should improve as sodium corrects  Added low-dose oral sodium replacement  Received Haldol which may be contributing  Repeat CT head unremarkable  Seems to be improving and Haldol has been discontinued  Much improved 4/16, and near baseline 4/17     Restless leg syndrome  No formal diagnosis noted in the chart  Patient has classic symptoms of waking with a sensation of needing to ambulate  And having episodes even during the day where she needs to get up and move  Schedule some gabapentin 100 mg at bedtime     Hypokalemia  Likely related to chlorthalidone  Hold this medication  Gentle IV fluids  Potassium replacement protocol  Improved     Hypomagnesemia  Protocol     Persistent atrial fibrillation  Continue flecainide and metoprolol and Eliquis     Essential hypertension  Blood pressures are up with holding chlorthalidone  Will order as needed hydralazine     Hypothyroidism  Recheck TSH in a.m.     Recurrent  "falls  Recommending TCU            # Overweight: Estimated body mass index is 25.38 kg/m  as calculated from the following:    Height as of this encounter: 1.6 m (5' 3\").    Weight as of this encounter: 65 kg (143 lb 4.8 oz).            Diet: Fluid restriction 1200 ML FLUID  Combination Diet Regular Diet Adult; Pureed Diet (level 4); Liquidized/Moderately Thick (level 3)  DVT Prophylaxis: Eliquis  Code Status: No CPR- Do NOT Intubate    Anticipated possible discharge hopefully to TCU 4/18  Disposition Plan   Expected Discharge: 04/18/2022     Anticipated discharge location: inpatient rehabilitation facility (TCU)         The patient's care was discussed with the Bedside Nurse, Care Coordinator/, Patient and Patient's Family.    Emeterio Perez MD  [facility]  Phone: #[phone]    Interval History/Subjective:  Patient is doing okay.  Alert and talking to her son this morning.  Eating breakfast.  No nausea or vomiting.  No chest pain.  No shortness of breath.    Physical Exam/Objective:  Temp:  [97  F (36.1  C)-98.1  F (36.7  C)] 98  F (36.7  C)  Pulse:  [65-83] 76  Resp:  [16-20] 18  BP: (143-179)/(65-76) 172/76  SpO2:  [95 %-97 %] 96 %  Body mass index is 24.84 kg/m .    GENERAL:  Alert, appears comfortable, in no acute distress, appears stated age   HEAD:  Normocephalic, without obvious abnormality, atraumatic   EYES:  PERRL, conjunctiva/corneas clear, no scleral icterus, EOM's intact   NECK: Supple, symmetrical, trachea midline   BACK:   Symmetric, no curvature, ROM normal   LUNGS:   Clear to auscultation bilaterally, no rales, rhonchi, or wheezing, symmetric chest rise on inhalation, respirations unlabored   CHEST WALL:  No tenderness or deformity   HEART:  Regular rate and rhythm, S1 and S2 normal, no murmur, rub, or gallop    ABDOMEN:   Soft, non-tender, bowel sounds active all four quadrants, no masses, no organomegaly, no rebound or " guarding   EXTREMITIES: Extremities normal, atraumatic, no cyanosis or edema    SKIN: Dry to touch, no exanthems in the visualized areas   NEURO: Alert, oriented x3, moves all four extremities freely   PSYCH: Cooperative, behavior is appropriate      Data reviewed today: I personally reviewed all new medications, labs, imaging/diagnostics reports over the past 24 hours. Pertinent findings include:    Imaging:   No results found for this or any previous visit (from the past 24 hour(s)).    Labs:  Most Recent 3 CBC's:Recent Labs   Lab Test 04/16/22  0611 04/15/22  0446 04/14/22  0646   WBC 8.2 13.6* 11.8*   HGB 12.6 12.6 12.8   MCV 91 90 93    329 362     Most Recent 3 BMP's:Recent Labs   Lab Test 04/17/22  0501 04/16/22  1346 04/16/22  0611 04/15/22  1002 04/15/22  0446 04/14/22  0814   NA  --   --  134*  --  128* 121*   POTASSIUM 3.9 3.8 3.3*   < > 3.1* 3.8   CHLORIDE  --   --  99  --  93* 88*   CO2  --   --  23  --  22 21*   BUN  --   --  8  --  7* 8   CR  --   --  0.63  --  0.59* 0.63   ANIONGAP  --   --  12  --  13 12   MAIRA  --   --  8.9  --  8.7 8.7   GLC  --   --  115  --  109 112    < > = values in this interval not displayed.     Most Recent 2 LFT's:Recent Labs   Lab Test 04/14/22  0814 01/04/22  1049   AST 77* 23   ALT 25 16   ALKPHOS 80 84   BILITOTAL 0.8 0.5       Medications:   Personally Reviewed.  Medications     - MEDICATION INSTRUCTIONS -       sodium chloride 75 mL/hr at 04/17/22 0227       acetaminophen  1,000 mg Oral Q8H     amLODIPine  5 mg Oral Daily     apixaban ANTICOAGULANT  5 mg Oral BID     atorvastatin  20 mg Oral Daily     cefTRIAXone  1 g Intravenous Q24H     diphenhydrAMINE  25 mg Oral At Bedtime     flecainide  50 mg Oral BID     gabapentin  100 mg Oral At Bedtime     levothyroxine  75 mcg Oral Daily     lidocaine   Transdermal Q8H     losartan  100 mg Oral Daily     metoprolol tartrate  12.5 mg Oral BID     OLANZapine  2.5 mg Oral At Bedtime     sodium chloride (PF)  3 mL  Intracatheter Q8H     sodium chloride   Intravenous Once     sodium chloride  0.5 g Oral BID w/meals

## 2022-04-17 NOTE — PLAN OF CARE
Problem: Risk for Delirium  Goal: Improved Behavioral Control  Outcome: Ongoing, Progressing    Patient has periods of pleasant and alert with mild confusion and then becomes labile - hostile.  Prn zyprexa given for increasing agitation, anger, yelling at staff when being redirected.   Besides medical intervention increased interaction with patient, long wheel chair rides in hallway and sitting at desk with staff helped improve behavior.

## 2022-04-17 NOTE — PLAN OF CARE
Goal Outcome Evaluation:      Problem: Fall Injury Risk  Goal: Absence of Fall and Fall-Related Injury  Outcome: Ongoing, Progressing    Problem: Pain Acute  Goal: Acceptable Pain Control and Functional Ability  Outcome: Ongoing, Progressing    Patient denies pain.  Patient is forgetful overnight, but is redirectable.   BP is elevated in the 160's systolic.  Bed alarm is active to maintain safety.  Patient is up with assist of 1 and walker to bathroom.

## 2022-04-17 NOTE — PROGRESS NOTES
Care Management Follow Up    Length of Stay (days): 4    Expected Discharge Date: 04/18/2022     Concerns to be Addressed: medical management, TCU bed   discharge planning,         Patient plan of care discussed at interdisciplinary rounds: Yes    Anticipated Discharge Disposition: TCU    Anticipated Discharge Services: TBD    Anticipated Discharge DME: TBD    Patient/family educated on Medicare website which has current facility and service quality ratings: yes    Education Provided on the Discharge Plan:  AVS per bedside RN.    Patient/Family in Agreement with the Plan:  Family requesting TCU in [facility] system     Referrals Placed by CM/SW: Post Acute Facilities (Per family request.)    Additional Information:  Chart reviewed. CM met with patient and son (bedside). Patient lives at [facility]. Family requests that referrals for TCU be sent to facilities in the [facility] sytem. Referrals have been sent and pending. CM will follow.    Referrals sent-   [facility]      Carin Echols RN

## 2022-04-18 ENCOUNTER — TELEPHONE (OUTPATIENT)
Dept: FAMILY MEDICINE | Facility: CLINIC | Age: 87
End: 2022-04-18
Payer: MEDICARE

## 2022-04-18 ENCOUNTER — APPOINTMENT (OUTPATIENT)
Dept: SPEECH THERAPY | Facility: CLINIC | Age: 87
DRG: 551 | End: 2022-04-18
Payer: MEDICARE

## 2022-04-18 ENCOUNTER — APPOINTMENT (OUTPATIENT)
Dept: MRI IMAGING | Facility: CLINIC | Age: 87
DRG: 551 | End: 2022-04-18
Attending: FAMILY MEDICINE
Payer: MEDICARE

## 2022-04-18 ENCOUNTER — APPOINTMENT (OUTPATIENT)
Dept: RADIOLOGY | Facility: CLINIC | Age: 87
DRG: 551 | End: 2022-04-18
Attending: FAMILY MEDICINE
Payer: MEDICARE

## 2022-04-18 ENCOUNTER — APPOINTMENT (OUTPATIENT)
Dept: OCCUPATIONAL THERAPY | Facility: CLINIC | Age: 87
DRG: 551 | End: 2022-04-18
Payer: MEDICARE

## 2022-04-18 LAB
ANION GAP SERPL CALCULATED.3IONS-SCNC: 12 MMOL/L (ref 5–18)
ATRIAL RATE - MUSE: 92 BPM
BACTERIA UR CULT: NORMAL
BUN SERPL-MCNC: 13 MG/DL (ref 8–28)
CALCIUM SERPL-MCNC: 9.3 MG/DL (ref 8.5–10.5)
CHLORIDE BLD-SCNC: 98 MMOL/L (ref 98–107)
CO2 SERPL-SCNC: 26 MMOL/L (ref 22–31)
CREAT SERPL-MCNC: 0.64 MG/DL (ref 0.6–1.1)
DIASTOLIC BLOOD PRESSURE - MUSE: NORMAL MMHG
GFR SERPL CREATININE-BSD FRML MDRD: 83 ML/MIN/1.73M2
GLUCOSE BLD-MCNC: 106 MG/DL (ref 70–125)
INTERPRETATION ECG - MUSE: NORMAL
MAGNESIUM SERPL-MCNC: 1.7 MG/DL (ref 1.8–2.6)
P AXIS - MUSE: 84 DEGREES
POTASSIUM BLD-SCNC: 3.6 MMOL/L (ref 3.5–5)
POTASSIUM BLD-SCNC: 3.8 MMOL/L (ref 3.5–5)
PR INTERVAL - MUSE: 246 MS
QRS DURATION - MUSE: 98 MS
QT - MUSE: 366 MS
QTC - MUSE: 452 MS
R AXIS - MUSE: 56 DEGREES
SODIUM SERPL-SCNC: 136 MMOL/L (ref 136–145)
SYSTOLIC BLOOD PRESSURE - MUSE: NORMAL MMHG
T AXIS - MUSE: 31 DEGREES
VENTRICULAR RATE- MUSE: 92 BPM

## 2022-04-18 PROCEDURE — 92526 ORAL FUNCTION THERAPY: CPT | Mod: GN

## 2022-04-18 PROCEDURE — 97535 SELF CARE MNGMENT TRAINING: CPT | Mod: GO

## 2022-04-18 PROCEDURE — 250N000013 HC RX MED GY IP 250 OP 250 PS 637: Performed by: FAMILY MEDICINE

## 2022-04-18 PROCEDURE — 99232 SBSQ HOSP IP/OBS MODERATE 35: CPT | Performed by: FAMILY MEDICINE

## 2022-04-18 PROCEDURE — 83735 ASSAY OF MAGNESIUM: CPT | Performed by: FAMILY MEDICINE

## 2022-04-18 PROCEDURE — 84132 ASSAY OF SERUM POTASSIUM: CPT | Performed by: FAMILY MEDICINE

## 2022-04-18 PROCEDURE — 36415 COLL VENOUS BLD VENIPUNCTURE: CPT | Performed by: FAMILY MEDICINE

## 2022-04-18 PROCEDURE — 70551 MRI BRAIN STEM W/O DYE: CPT

## 2022-04-18 PROCEDURE — 80048 BASIC METABOLIC PNL TOTAL CA: CPT | Performed by: FAMILY MEDICINE

## 2022-04-18 PROCEDURE — 92611 MOTION FLUOROSCOPY/SWALLOW: CPT | Mod: GN

## 2022-04-18 PROCEDURE — 120N000001 HC R&B MED SURG/OB

## 2022-04-18 PROCEDURE — 74230 X-RAY XM SWLNG FUNCJ C+: CPT

## 2022-04-18 RX ORDER — AMLODIPINE BESYLATE 10 MG/1
10 TABLET ORAL DAILY
Status: DISCONTINUED | OUTPATIENT
Start: 2022-04-19 | End: 2022-04-21 | Stop reason: HOSPADM

## 2022-04-18 RX ORDER — CEFDINIR 300 MG/1
300 CAPSULE ORAL EVERY 12 HOURS SCHEDULED
Status: COMPLETED | OUTPATIENT
Start: 2022-04-18 | End: 2022-04-20

## 2022-04-18 RX ORDER — AMLODIPINE BESYLATE 5 MG/1
5 TABLET ORAL ONCE
Status: DISCONTINUED | OUTPATIENT
Start: 2022-04-18 | End: 2022-04-20

## 2022-04-18 RX ORDER — BARIUM SULFATE 400 MG/ML
SUSPENSION ORAL ONCE
Status: COMPLETED | OUTPATIENT
Start: 2022-04-18 | End: 2022-04-18

## 2022-04-18 RX ORDER — POTASSIUM CHLORIDE 20MEQ/15ML
10 LIQUID (ML) ORAL ONCE
Status: COMPLETED | OUTPATIENT
Start: 2022-04-18 | End: 2022-04-18

## 2022-04-18 RX ORDER — METOPROLOL TARTRATE 25 MG/1
25 TABLET, FILM COATED ORAL 2 TIMES DAILY
Status: DISCONTINUED | OUTPATIENT
Start: 2022-04-18 | End: 2022-04-21 | Stop reason: HOSPADM

## 2022-04-18 RX ADMIN — SODIUM CHLORIDE TAB 1 GM 0.5 G: 1 TAB at 17:33

## 2022-04-18 RX ADMIN — DIPHENHYDRAMINE HYDROCHLORIDE 25 MG: 25 CAPSULE ORAL at 21:11

## 2022-04-18 RX ADMIN — ACETAMINOPHEN 1000 MG: 500 TABLET, FILM COATED ORAL at 21:11

## 2022-04-18 RX ADMIN — FLECAINIDE ACETATE 50 MG: 50 TABLET ORAL at 08:32

## 2022-04-18 RX ADMIN — ATORVASTATIN CALCIUM 20 MG: 10 TABLET, FILM COATED ORAL at 08:34

## 2022-04-18 RX ADMIN — ACETAMINOPHEN 1000 MG: 500 TABLET, FILM COATED ORAL at 08:32

## 2022-04-18 RX ADMIN — APIXABAN 5 MG: 5 TABLET, FILM COATED ORAL at 08:34

## 2022-04-18 RX ADMIN — ACETAMINOPHEN 1000 MG: 500 TABLET, FILM COATED ORAL at 14:31

## 2022-04-18 RX ADMIN — METOPROLOL TARTRATE 12.5 MG: 25 TABLET, FILM COATED ORAL at 08:33

## 2022-04-18 RX ADMIN — METOPROLOL TARTRATE 25 MG: 25 TABLET, FILM COATED ORAL at 19:44

## 2022-04-18 RX ADMIN — AMLODIPINE BESYLATE 5 MG: 5 TABLET ORAL at 08:34

## 2022-04-18 RX ADMIN — POTASSIUM CHLORIDE 10 MEQ: 20 SOLUTION ORAL at 13:00

## 2022-04-18 RX ADMIN — APIXABAN 5 MG: 5 TABLET, FILM COATED ORAL at 19:45

## 2022-04-18 RX ADMIN — SODIUM CHLORIDE TAB 1 GM 0.5 G: 1 TAB at 08:32

## 2022-04-18 RX ADMIN — LOSARTAN POTASSIUM 100 MG: 50 TABLET, FILM COATED ORAL at 08:34

## 2022-04-18 RX ADMIN — BARIUM SULFATE 30 ML: 400 SUSPENSION ORAL at 11:21

## 2022-04-18 RX ADMIN — GABAPENTIN 100 MG: 100 CAPSULE ORAL at 21:11

## 2022-04-18 RX ADMIN — CEFDINIR 300 MG: 300 CAPSULE ORAL at 19:45

## 2022-04-18 RX ADMIN — FLECAINIDE ACETATE 50 MG: 50 TABLET ORAL at 19:45

## 2022-04-18 RX ADMIN — LEVOTHYROXINE SODIUM 75 MCG: 75 TABLET ORAL at 08:33

## 2022-04-18 RX ADMIN — CEFDINIR 300 MG: 300 CAPSULE ORAL at 13:00

## 2022-04-18 ASSESSMENT — ACTIVITIES OF DAILY LIVING (ADL)
ADLS_ACUITY_SCORE: 18

## 2022-04-18 NOTE — PLAN OF CARE
Problem: Electrolyte Imbalance  Goal: Electrolyte Balance  Outcome: Ongoing, Progressing     Problem: Pain Acute  Goal: Acceptable Pain Control and Functional Ability  Outcome: Ongoing, Progressing   Goal Outcome Evaluation:    No complaints of pain.  Pt is getting oral K+ and will get checked again around 1700.  Pt is an assist of 1 with a walker and gait belt.  Pt is alert to self only.  She continues to be on thickened liquids and needs to be fed per speech therapy because she needs to switch between food and liquid.  Will continue to monitor.

## 2022-04-18 NOTE — PROGRESS NOTES
"VIDEO SWALLOW STUDY WITH SPEECH PATHOLOGY       04/18/22 1100   General Information   Onset of Illness/Injury or Date of Surgery 04/13/22   Referring Physician Dr. Perez   Pertinent History of Current Problem Per Dr. Perez's progress note dated 4/17/22, \"Katie Mar is a 89 year old female with a past medical history of recent falls, persistent atrial fibrillation, essential hypertension presented to the hospital after a fall at home.  Patient was intermittently confused and quite insistent on not getting admitted to the hospital.  Ultimately agreed to admission.  She has had issues with encephalopathy and delirium during this hospitalization.\"   General Observations Patient confused but cooperative.   Past History of Dysphagia Patient has presented with signs & symptoms of aspiration with thin liquids during this admission. She has also complained of globus sensation with both solids and liquids.   Type of Evaluation   Type of Evaluation Swallow Evaluation   General Swallowing Observations   Respiratory Support (General Swallowing Observations) none   Current Diet/Method of Nutritional Intake (General Swallowing Observations, NIS) soft & bite-sized (level 6);mildly thick liquids (level 2)   Swallowing Evaluation Videofluoroscopic swallow study (VFSS)   VFSS Evaluation   Radiologist Dr. Triplett   Views Taken left lateral   Physical Location of Procedure [facility]   VFSS Textures Trialed thin liquids;mildly thick liquids;pureed   VFSS Eval: Thin Liquid Texture Trial   Mode of Presentation, Thin Liquid cup;self-fed   Order of Presentation Trials 1 and 2   Preparatory Phase Other (see comments)  (Reduced bolus control)   Oral Phase, Thin Liquid Premature pharyngeal entry;other (see comments)  (Reduced tongue base retraction)   Pharyngeal Phase, Thin Liquid Delayed swallow reflex;other (see comments)  (Pourover to the pyriform sinuses & into the laryngeal vestibule; reduced " hyolaryngeal elevation & excursion; incomplete epiglottic inversion)   Rosenbek's Penetration Aspiration Scale: Thin Liquid Trial Results 8 - contrast passes glottis, visible subglottic residue remains, absent patient response (aspiration)   Response to Aspiration absent response, silent aspiration  (Patient had immediate cough with initial episode of deep laryngeal penetration to the vocal folds. No cough with episode of aspiration.)   Diagnostic Statement Swallow response was delayed resulting in pourover past the pyriform sinuses and into the laryngeal vestibule. This lead to deep laryngeal penetration and aspiration with small volumes of thin liquid. Patient had an inconsistent cough reflex at/below the vocal folds.   VFSS Eval: Mildly Thick Liquids   Mode of Presentation cup;spoon;straw;self-fed;fed by clinician  (Self-fed with sips by cup and straw; clinician gave patient sip by spoon x1)   Order of Presentation Trials 3, 5, 7, and 8  (Trial 8 consisted of three consecutive sips taken by straw)   Preparatory Phase Other (see comments)  (Reduced bolus control)   Oral Phase Premature pharyngeal entry;other (see comments)  (Reduced tongue base retraction)   Pharyngeal Phase Delayed swallow reflex;Residue in valleculae;Residue in pyriform sinus  (Pourover to pyriform sinuses; reduced hyolaryngeal elevation & excursion; incomplete epiglottic inversion)   Rosenbek's Penetration Aspiration Scale 1 - no aspiration, contrast does not enter airway   Diagnostic Statement Delayed swallow response resulting in consistent pourover to the pyriform sinuses, but no aspiration or laryngeal penetration.   VFSS Evaluation: Puree Solid Texture Trial   Mode of Presentation, Puree spoon;fed by clinician   Order of Presentation Trials 4 and 6   Preparatory Phase Other (see comments)  (Tongue pumping was used to initiate posterior bolus transit; piecemeal deglutition noted)   Oral Phase, Puree Delayed AP movement;Effortful AP  movement;Residue in oral cavity   Pharyngeal Phase, Puree Residue in valleculae;Delayed swallow reflex   Rosenbek's Penetration Aspiration Scale: Puree Food Trial Results 1 - no aspiration, contrast does not enter airway   Diagnostic Statement Delayed and effortful bolus transit noted. Reduced tongue base retraction and hyolaryngeal elevation and excursion. Epiglottic inversion was incomplete (to/past horizontal position). Moderate-severe vallecular stasis noted after swallows. This cleared only minimally with cued dry swallow followed by liquid wash.   Esophageal Phase of Swallow   Patient reports or presents with symptoms of esophageal dysphagia Yes   Esophageal sweep performed during today s vidofluoroscopic exam  No   Esophageal comments Patient did not report globus sensation during this evaluation, but has previously done so during PO trials at bedside. This may be indicative of esophageal dysmotility issue(s).   Swallowing Recommendations   Diet Consistency Recommendations soft & bite-sized (level 6);mildly thick liquids (level 2)   Supervision Level for Intake 1:1 supervision needed  (To ensure adherence to swallowing strategies)   Mode of Delivery Recommendations bolus size, small;slow rate of intake   Swallowing Maneuver Recommendations alternate food and liquid intake   Monitoring/Assistance Required (Eating/Swallowing) stop eating activities when fatigue is present;monitor for cough or change in vocal quality with intake   Recommended Feeding/Eating Techniques (Swallow Eval) maintain upright posture during/after eating for 30 minutes;provide assist with feeding  (Assist with feeding as needed)   Medication Administration Recommendations, Swallowing (SLP) Continue to give pills crushed and mixed with applesauce or pudding. Follow bite of pills with sip of liquid.   General Therapy Interventions   Planned Therapy Interventions Dysphagia Treatment   Dysphagia treatment Modified diet education;Instruction of  safe swallow strategies;Compensatory strategies for swallowing;Oropharyngeal exercise training   Intervention Comments Trial of pharyngeal exercises to determine patient's ability to complete these consistently and independently.   Clinical Impression   Criteria for Skilled Therapeutic Interventions Met (SLP Eval) Yes, treatment indicated   SLP Diagnosis Dysphagia   Functional Limitations Related to Problem List (SLP) Need for modified diet textures and thickened liquids   Risks & Benefits of therapy have been explained evaluation/treatment results reviewed;risks/benefits reviewed;participants voiced agreement with care plan;participants included;patient;care plan/treatment goals reviewed  (Question patient's true comprehension due to current cognitive status)   Clinical Impression Comments Video swallow study completed. Patient presents with mild to moderate oral dysphagia and moderate pharyngeal dysphagia. She is at high risk for aspiration with thin liquids and was noted to have an inconsistent cough response with aspiration during this evaluation. Current diet of Soft & Bite Sized textures and mildly thick liquids appears to be patient's safest and least restrictive diet at this time. Recommend 1:1 supervision and/or assistance with meals to ensure consistent adherence to the swallowing strategies listed above.   SLP Discharge Planning   SLP Discharge Recommendation Transitional Care Facility   SLP Rationale for DC Rec Swallow function is below baseline.   SLP Brief overview of current status  Continue current diet of Soft & Bite-Sized textures and mildly thick liquids.    Total Evaluation Time   Total Evaluation Time (Minutes) 15   SLP Goals   SLP Goals Swallow   SLP: Safely tolerate diet without signs/symptoms of aspiration Soft & bite sized diet;Mildly thick liquids;With use of swallow precautions;With use of compensatory swallow strategies;With assistance/supervision

## 2022-04-18 NOTE — PROGRESS NOTES
[facility] MEDICINE PROGRESS NOTE      Identification/Summary: Katie Mar is a 89 year old female with a past medical history of recent falls, persistent atrial fibrillation, essential hypertension presented to the hospital after a fall at home.  Patient was intermittently confused and quite insistent on not getting admitted to the hospital.  Ultimately agreed to admission. She has had issues with encephalopathy and delirium during this hospitalization.    4/18: Stable BMP. Swallow Study today. TCU pending     Assessment and Plan:  Rhabdomyolysis/moderate dehydration  Improving  IVF discontinued  Continue Statin as CK significantly improved.     Speech difficulties/acute metabolic encephalopathy/hyponatremia/agitation  Encephalopathy likely due to hyponatremia - improving with sodium correction  Patient insistent on leaving the hospital but more cooperative 4/14  Is holdable if she attempts to leave at this time  Has agreed to stay with sons assistance  Gentle IV fluids - discontinued   Oral fluid restriction  As needed IV Ativan  As needed Zyprexa twice daily  Added low-dose oral sodium replacement  Received Haldol which may be contributing  Repeat CT head unremarkable  Seems to be improving and Haldol has been discontinued  Much improved 4/16, and near baseline 4/17, calm and cooperative on 4/18.  Swallow study 4/18 showing silent aspiration of thin liquids    UTI   UA on 4/16 with bacteria, WBC, and + Leukocyte esterase  UC positive for mixed urogenital phong - requested lab speciate for abx coverage  Cefdinir      Restless leg syndrome  No formal diagnosis noted in the chart  Patient has classic symptoms of waking with a sensation of needing to ambulate  And having episodes even during the day where she needs to get up and move  Schedule some gabapentin 100 mg at bedtime     Hypokalemia  Likely related to chlorthalidone - discontinue this medication  Gentle IV fluids -  completed   Potassium replacement protocol  Improved     Hypomagnesemia  Protocol     Persistent atrial fibrillation  Continue flecainide and metoprolol and Eliquis  Metoprolol increased to 25 mg     Essential hypertension  Blood pressures are up with holding chlorthalidone  Will order as needed hydralazine  Losartan 100 mg  Increase Amlodipine to 10mg     Hypothyroidism  TSH therapeutic - continue Euthyrox      Recurrent falls  Recommending TCU    Clinically Significant Risk Factors Present on Admission                    Diet: Fluid restriction 1200 ML FLUID  Combination Diet Regular Diet Adult; Soft and Bite Sized Diet (level 6); Mildly Thick (level 2)  DVT Prophylaxis:  DOAC  Code Status: No CPR- Do NOT Intubate    Disposition Plan   Expected Discharge: 04/18/2022     Anticipated discharge location: inpatient rehabilitation facility (TCU)    Delays:      Pending TCU placement      The patient's care was discussed with the Attending Physician, Dr. Perez and patient     Rachel CARMELITA TROY [facility]  [facility]  Phone: #[phone]    Interval History/Subjective:  Patient is pleasant and calm on assessment. Alert to place and reason for admission. States that she is doing well and eating breakfast. Reports some dysuria. Denies headaches, dizziness, chest pain, shortness of breath, nausea, abdominal pain, or paresthesias.     Physical Exam/Objective:  Temp:  [97  F (36.1  C)-97.8  F (36.6  C)] 97.8  F (36.6  C)  Pulse:  [71-91] 82  Resp:  [16-18] 16  BP: (153-196)/(67-84) 196/84  SpO2:  [94 %-97 %] 97 %  Body mass index is 24.89 kg/m .    GENERAL:  Calm and resting, appears comfortable, in no acute distress, appears stated age   HEAD:  Normocephalic, without obvious abnormality, atraumatic   BACK:   Ecchymosis to right posterior shoulder    LUNGS:   Respirations unlabored. Fine expiratory wheeze in upper lobes.    HEART:  Regular rate and rhythm, S1 and S2 normal, no  murmur, rub, or gallop    ABDOMEN:   Soft, non-tender. No suprapubic tenderness. no masses, no organomegaly, no rebound or guarding   EXTREMITIES: Trace bilateral lower extremity edema    SKIN: Dry to touch, no exanthems in the visualized areas   NEURO: Alert, oriented to place and situation. Moves all 4 extremities freely. Symmetric facial movements. Clear speech via 1-2 word answers.   PSYCH: Cooperative, behavior is appropriate. Minimal insight into medical situation      Data reviewed today: I personally reviewed all new medications, labs, imaging/diagnostics reports over the past 24 hours. Pertinent findings include:    Imaging:   Recent Results (from the past 24 hour(s))   XR Video Swallow with SLP or OT    Narrative    EXAM: XR VIDEO SWALLOW WITH SLP OR OT  LOCATION: [facility]  DATE/TIME: 4/18/2022 11:06 AM    INDICATION: Difficulty swallowing.  COMPARISON: None.    TECHNIQUE: Routine swallow study with speech pathology using multiple barium thicknesses.    FINDINGS:   FLUOROSCOPIC TIME: 3.3 minutes  NUMBER OF IMAGES: 0    Swallow study with Speech Pathology using multiple barium thicknesses.     Silent aspiration with thin liquids. Moderate vallecular and minimal piriformis residue. Fairly consistent absence of epiglottic inversion. When there is movement of the epiglottis it is minimal reaching horizontal contrast.      Impression    IMPRESSION:  1.  Silent aspiration with thin liquids.  2.  Fairly consistent absence of epiglottic inversion.  3.  Moderate vallecular residue.    See speech pathology report for additional details and recommendations.          Labs:  Most Recent 3 CBC's:Recent Labs   Lab Test 04/16/22  0611 04/15/22  0446 04/14/22  0646   WBC 8.2 13.6* 11.8*   HGB 12.6 12.6 12.8   MCV 91 90 93    329 362     Most Recent 3 BMP's:Recent Labs   Lab Test 04/18/22  0603 04/17/22  0501 04/16/22  1346 04/16/22  0611 04/15/22  1002 04/15/22  0446     --   --   134*  --  128*   POTASSIUM 3.6 3.9 3.8 3.3*   < > 3.1*   CHLORIDE 98  --   --  99  --  93*   CO2 26  --   --  23  --  22   BUN 13  --   --  8  --  7*   CR 0.64  --   --  0.63  --  0.59*   ANIONGAP 12  --   --  12  --  13   MAIRA 9.3  --   --  8.9  --  8.7     --   --  115  --  109    < > = values in this interval not displayed.     Most Recent Urinalysis:Recent Labs   Lab Test 04/16/22  1018   COLOR Dark Yellow*   APPEARANCE Cloudy*   URINEGLC Negative   URINEBILI Negative   URINEKETONE Trace*   SG 1.006   UBLD 0.1 mg/dL*   URINEPH 6.0   PROTEIN 50 *   NITRITE Negative   LEUKEST 500 Stacey/uL*   RBCU 13*   WBCU >182*     Most Recent CPK:Recent Labs   Lab Test 04/16/22  0611 04/15/22  0446 04/14/22  0814   * 1,148* 1,635*       Medications:   Personally Reviewed.  Medications     - MEDICATION INSTRUCTIONS -         acetaminophen  1,000 mg Oral Q8H     [START ON 4/19/2022] amLODIPine  10 mg Oral Daily     amLODIPine  5 mg Oral Once     apixaban ANTICOAGULANT  5 mg Oral BID     atorvastatin  20 mg Oral Daily     cefdinir  300 mg Oral Q12H MARILU     diphenhydrAMINE  25 mg Oral At Bedtime     flecainide  50 mg Oral BID     gabapentin  100 mg Oral At Bedtime     levothyroxine  75 mcg Oral Daily     losartan  100 mg Oral Daily     metoprolol tartrate  25 mg Oral BID     potassium chloride  10 mEq Oral or Feeding Tube Once     sodium chloride (PF)  3 mL Intracatheter Q8H     sodium chloride   Intravenous Once     sodium chloride  0.5 g Oral BID w/meals     4/18/2022   W[facility] :Faculty Attestation   I have seen and examined the patient.   I have discussed the case with the PA student Rachel Perkins.  I agree with the findings, assessment and plan.   Emeterio Perez MD

## 2022-04-18 NOTE — PROGRESS NOTES
Care Management Follow Up    Length of Stay (days): 5    Expected Discharge Date: 04/18/2022     Concerns to be Addressed: medical progression, discharge disposition  Patient plan of care discussed at interdisciplinary rounds: Yes    Anticipated Discharge Disposition:TCU     Anticipated Discharge Services: TCU  Anticipated Discharge DME: None    Patient/family educated on Medicare website which has current facility and service quality ratings: yes  Education Provided on the Discharge Plan:  yes  Patient/Family in Agreement with the Plan: other (see comments) (Family plan. [place]    Referrals Placed by CM/SW: Post Acute Facilities (Per family request.)  Private pay costs discussed: not applicable at this time    Additional Information:  SW reviewed chart, Pt needs TCU.  Pt lives at [facility].  [facility] is currently not taking TCU admissions.  [facility] admissions is also closed.  No beds available at [facility] at this time.     SABINE called Pt's son, Bi, to discuss additional referral choices.  Bi reports he is on his way to the hospital and requests TCU list.      1:04 PM  SABINE spoke with Bi who requests referrals are sent to:  1. [facility]  2. [facility]  3. [facility]  4. [facility]  5. [facility]     SABINE faxed referrals.     3:42 PM  SABINE left voice mails for pending facilities requesting calls back.    MARCEL Blackman

## 2022-04-18 NOTE — TELEPHONE ENCOUNTER
Forms Request  Name of form/paperwork: [facility]  Have you been seen for this request: N/A  Do we have the form: yes, in providers inbox  When is form needed by: when done  How would you like the form returned: fax  Patient Notified form requests are processed in 3-5 business days: n/a    Okay to leave a detailed message? n/a

## 2022-04-18 NOTE — PLAN OF CARE
Patient only alert and oriented to self.  Patient was very restless and irritable at the start of the shift. Patient did take her night time meds with applesauce, but has to be reminded not to chew them.  Patient rested well over-night. Alarms remain on due to patient not putting her call light on appropriately.  Patient's Bps were also higher, in the 170s sytolically.

## 2022-04-19 ENCOUNTER — APPOINTMENT (OUTPATIENT)
Dept: SPEECH THERAPY | Facility: CLINIC | Age: 87
DRG: 551 | End: 2022-04-19
Payer: MEDICARE

## 2022-04-19 ENCOUNTER — MEDICAL CORRESPONDENCE (OUTPATIENT)
Dept: HEALTH INFORMATION MANAGEMENT | Facility: CLINIC | Age: 87
End: 2022-04-19

## 2022-04-19 LAB
ATRIAL RATE - MUSE: 89 BPM
DIASTOLIC BLOOD PRESSURE - MUSE: NORMAL MMHG
GLUCOSE BLDC GLUCOMTR-MCNC: 148 MG/DL (ref 70–99)
HOLD SPECIMEN: NORMAL
INTERPRETATION ECG - MUSE: NORMAL
MAGNESIUM SERPL-MCNC: 1.8 MG/DL (ref 1.8–2.6)
P AXIS - MUSE: 72 DEGREES
POTASSIUM BLD-SCNC: 3.5 MMOL/L (ref 3.5–5)
PR INTERVAL - MUSE: 250 MS
QRS DURATION - MUSE: 94 MS
QT - MUSE: 362 MS
QTC - MUSE: 440 MS
R AXIS - MUSE: 57 DEGREES
SYSTOLIC BLOOD PRESSURE - MUSE: NORMAL MMHG
T AXIS - MUSE: 60 DEGREES
VENTRICULAR RATE- MUSE: 89 BPM

## 2022-04-19 PROCEDURE — 92526 ORAL FUNCTION THERAPY: CPT | Mod: GN

## 2022-04-19 PROCEDURE — 83735 ASSAY OF MAGNESIUM: CPT | Performed by: FAMILY MEDICINE

## 2022-04-19 PROCEDURE — 120N000001 HC R&B MED SURG/OB

## 2022-04-19 PROCEDURE — 99232 SBSQ HOSP IP/OBS MODERATE 35: CPT | Performed by: FAMILY MEDICINE

## 2022-04-19 PROCEDURE — 84132 ASSAY OF SERUM POTASSIUM: CPT | Performed by: FAMILY MEDICINE

## 2022-04-19 PROCEDURE — 250N000013 HC RX MED GY IP 250 OP 250 PS 637: Performed by: FAMILY MEDICINE

## 2022-04-19 PROCEDURE — 36415 COLL VENOUS BLD VENIPUNCTURE: CPT | Performed by: FAMILY MEDICINE

## 2022-04-19 RX ORDER — POTASSIUM CHLORIDE 20MEQ/15ML
10 LIQUID (ML) ORAL ONCE
Status: COMPLETED | OUTPATIENT
Start: 2022-04-19 | End: 2022-04-19

## 2022-04-19 RX ADMIN — SODIUM CHLORIDE TAB 1 GM 0.5 G: 1 TAB at 08:46

## 2022-04-19 RX ADMIN — LOSARTAN POTASSIUM 100 MG: 50 TABLET, FILM COATED ORAL at 08:48

## 2022-04-19 RX ADMIN — CEFDINIR 300 MG: 300 CAPSULE ORAL at 08:46

## 2022-04-19 RX ADMIN — ACETAMINOPHEN 1000 MG: 500 TABLET, FILM COATED ORAL at 21:22

## 2022-04-19 RX ADMIN — DIPHENHYDRAMINE HYDROCHLORIDE 25 MG: 25 CAPSULE ORAL at 21:22

## 2022-04-19 RX ADMIN — APIXABAN 5 MG: 5 TABLET, FILM COATED ORAL at 08:46

## 2022-04-19 RX ADMIN — POTASSIUM CHLORIDE 10 MEQ: 20 SOLUTION ORAL at 10:13

## 2022-04-19 RX ADMIN — GABAPENTIN 100 MG: 100 CAPSULE ORAL at 21:21

## 2022-04-19 RX ADMIN — FLECAINIDE ACETATE 50 MG: 50 TABLET ORAL at 08:46

## 2022-04-19 RX ADMIN — METOPROLOL TARTRATE 25 MG: 25 TABLET, FILM COATED ORAL at 21:22

## 2022-04-19 RX ADMIN — APIXABAN 5 MG: 5 TABLET, FILM COATED ORAL at 21:22

## 2022-04-19 RX ADMIN — FLECAINIDE ACETATE 50 MG: 50 TABLET ORAL at 21:21

## 2022-04-19 RX ADMIN — ACETAMINOPHEN 1000 MG: 500 TABLET, FILM COATED ORAL at 14:04

## 2022-04-19 RX ADMIN — METOPROLOL TARTRATE 25 MG: 25 TABLET, FILM COATED ORAL at 08:46

## 2022-04-19 RX ADMIN — ATORVASTATIN CALCIUM 20 MG: 10 TABLET, FILM COATED ORAL at 08:47

## 2022-04-19 RX ADMIN — ACETAMINOPHEN 1000 MG: 500 TABLET, FILM COATED ORAL at 08:47

## 2022-04-19 RX ADMIN — AMLODIPINE BESYLATE 10 MG: 10 TABLET ORAL at 08:48

## 2022-04-19 RX ADMIN — LEVOTHYROXINE SODIUM 75 MCG: 75 TABLET ORAL at 08:48

## 2022-04-19 RX ADMIN — CEFDINIR 300 MG: 300 CAPSULE ORAL at 21:22

## 2022-04-19 ASSESSMENT — ACTIVITIES OF DAILY LIVING (ADL)
ADLS_ACUITY_SCORE: 18

## 2022-04-19 NOTE — PLAN OF CARE
Problem: Plan of Care - These are the overarching goals to be used throughout the patient stay.    Goal: Optimal Comfort and Wellbeing  Outcome: Ongoing, Progressing   Goal Outcome Evaluation:    Problem: Risk for Delirium  Goal: Improved Attention and Thought Clarity  Outcome: Ongoing, Progressing              Patient denies any pain. Disorganized thought but oriented to self. Sometimes disoriented to time and place.  at 7:00. Amlodipine, Losartan, and Metoprolol tartrate given. BP then 113/63 at 9:00. Feeding assist of 1 to ensure patient is drinking and taking small bites to prevent aspiration. Fluids thickened. 1200 fluid restriction.

## 2022-04-19 NOTE — TELEPHONE ENCOUNTER
I did get my part completed.  There is additional things that need to be printed off and added to this for [facility].  Paperwork given to Rubina to finish off and fax.    Aguila Lal, CNP

## 2022-04-19 NOTE — PLAN OF CARE
Problem: Fall Injury Risk  Goal: Absence of Fall and Fall-Related Injury  Intervention: Promote Injury-Free Environment  Recent Flowsheet Documentation  Taken 4/18/2022 1728 by Bailey Braden RN  Safety Promotion/Fall Prevention: activity supervised     Problem: Fall Injury Risk  Goal: Absence of Fall and Fall-Related Injury  Intervention: Identify and Manage Contributors  Recent Flowsheet Documentation  Taken 4/18/2022 1728 by Bailey Braden RN  Medication Review/Management: medications reviewed     Problem: Electrolyte Imbalance  Goal: Electrolyte Balance  Outcome: Ongoing, Progressing   Goal Outcome Evaluation:  Patient is alert and oriented to self, was calm this shift with no noted agitation, complained of pain indicating lower back and was given scheduled tylenol, patient sleeping and appears comfortable.

## 2022-04-19 NOTE — PLAN OF CARE
Problem: Pain Acute  Goal: Acceptable Pain Control and Functional Ability  Outcome: Ongoing, Progressing     Problem: Electrolyte Imbalance  Goal: Electrolyte Balance  Outcome: Ongoing, Progressing   Goal Outcome Evaluation:    No complaints of pain.  Pt is an assist of 1 to 2.  Sometimes she does well with just 1 and other times she needs 2 people.  She needs to be watched while she is eating, to make sure, she takes a bite of food and then a drink.  She takes her pills crushed in applesauce.  She is on alarms.  Will continue to monitor.

## 2022-04-19 NOTE — PROGRESS NOTES
[facility] MEDICINE PROGRESS NOTE      Identification/Summary:  Katie Mar is a 89 year old female with a past medical history of recent falls, persistent atrial fibrillation, essential hypertension presented to the hospital after a fall at home.  Patient was intermittently confused and quite insistent on not getting admitted to the hospital. Ultimately agreed to admission. She has had issues with encephalopathy and delirium during this hospitalization.    4/19: Potassium and Mg stable. Stable for discharge pending TCU placement.     Assessment and Plan:  Rhabdomyolysis/moderate dehydration  Improving  IVF discontinued  Continue Statin as CK significantly improved.     Speech difficulties/acute metabolic encephalopathy/hyponatremia/agitation  Encephalopathy likely due to hyponatremia - improving with sodium correction  Patient insistent on leaving the hospital but more cooperative 4/14  Is holdable if she attempts to leave at this time  Has agreed to stay with sons assistance  Received Haldol which may be contributing   Seems to be improving and Haldol has been discontinued  Gentle IV fluids - discontinued   Oral fluid restriction  As needed IV Ativan  As needed Zyprexa twice daily   Added low-dose oral sodium replacement  Repeat CT head unremarkable  Much improved 4/16, and near baseline 4/17, calm and cooperative on 4/18 and 4/19.  Swallow study 4/18 showing silent aspiration of thin liquids and absence of epiglottic inversion - appreciate speech therapy recommendations  MRI 4/18 without evidence of acute ischemia or hemorrhage      UTI   UA on 4/16 with bacteria, WBC, and + Leukocyte esterase/ with urinary tract symptoms  UC positive for mixed urogenital phong - presumed contamination  Despite above Cefdinir for 3 days  Repeat UA      Restless leg syndrome  No formal diagnosis noted in the chart  Patient has classic symptoms of waking with a sensation of needing to  ambulate  And having episodes even during the day where she needs to get up and move  Schedule some gabapentin 100 mg at bedtime     Hypokalemia  Likely related to chlorthalidone - discontinue this medication  Gentle IV fluids - completed   Potassium replacement protocol  Improved     Hypomagnesemia  Protocol  Stable     Persistent atrial fibrillation  Continue flecainide and metoprolol and Eliquis  Metoprolol increased to 25 mg     Essential hypertension  Blood pressures are up with holding chlorthalidone - improved with adjustment in amlodipine and metoprolol  Will order as needed hydralazine  Losartan 100 mg  Increase Amlodipine to 10mg     Hypothyroidism  TSH therapeutic - continue Euthyrox      Recurrent falls  Recommending TCU    Clinically Significant Risk Factors Present on Admission                    Diet: Fluid restriction 1200 ML FLUID  Combination Diet Regular Diet Adult; Soft and Bite Sized Diet (level 6); Mildly Thick (level 2)  DVT Prophylaxis:  DOAC - Eliquis   Code Status: No CPR- Do NOT Intubate    Disposition Plan   Expected Discharge: 04/19/2022     Anticipated discharge location: inpatient rehabilitation facility (TCU)    Delays:     Placement - TCU       Medically stable pending TCU placement      The patient's care was discussed with the Attending Physician, Dr. Perez and patient     Rachel TROY [facility]  [facility]  Phone: #[phone]    Interval History/Subjective:  Patient is bright and conversant this morning. Is not oriented to time or place. States that she is doing well and is eating breakfast with staff supervision. She is speaking clearly and endorses feeling much improved. Optimistic about medical improvements and is agreeable to TCU following discharge. Denies headaches, dizziness, chest pain, shortness of breath, myalgias, pain, abdominal pain, constipation, dysuria, or chills.     Physical Exam/Objective:  Temp:  [97.3  F  (36.3  C)-97.9  F (36.6  C)] 97.3  F (36.3  C)  Pulse:  [60-86] 60  Resp:  [10-18] 10  BP: (113-189)/(63-81) 113/63  SpO2:  [95 %] 95 %  Body mass index is 24.88 kg/m .    GENERAL:  Alert, appears comfortable, in no acute distress, appears stated age   HEAD:  Normocephalic, without obvious abnormality, atraumatic   LUNGS:   Clear to auscultation bilaterally, no rales, rhonchi, or wheezing, symmetric chest rise on inhalation, respirations unlabored   CHEST WALL:  No tenderness or deformity   HEART:  Regular rate and rhythm, S1 and S2 normal, no murmur, rub, or gallop    ABDOMEN:   Soft, non-tender. No suprapubic tenderness. no masses, no organomegaly, no rebound or guarding   EXTREMITIES: Trace bilateral lower extremity edema. 2+ DP pulses bilaterally.    SKIN: Dry to touch, no exanthems in the visualized areas   NEURO: Alert, oriented to person. Moves all 4 extremities freely. Symmetric facial movements. Clear speech via sentences that is occasionally garbled.   PSYCH: Cooperative, bright, behavior is appropriate. Minimal insight into medical situation      Data reviewed today: I personally reviewed all new medications, labs, imaging/diagnostics reports over the past 24 hours. Pertinent findings include:    Imaging:   Recent Results (from the past 24 hour(s))   XR Video Swallow with SLP or OT    Narrative    EXAM: XR VIDEO SWALLOW WITH SLP OR OT  LOCATION: [facility]  DATE/TIME: 4/18/2022 11:06 AM    INDICATION: Difficulty swallowing.  COMPARISON: None.    TECHNIQUE: Routine swallow study with speech pathology using multiple barium thicknesses.    FINDINGS:   FLUOROSCOPIC TIME: 3.3 minutes  NUMBER OF IMAGES: 0    Swallow study with Speech Pathology using multiple barium thicknesses.     Silent aspiration with thin liquids. Moderate vallecular and minimal piriformis residue. Fairly consistent absence of epiglottic inversion. When there is movement of the epiglottis it is minimal reaching  horizontal contrast.      Impression    IMPRESSION:  1.  Silent aspiration with thin liquids.  2.  Fairly consistent absence of epiglottic inversion.  3.  Moderate vallecular residue.    See speech pathology report for additional details and recommendations.      MR Brain w/o Contrast    Narrative    EXAM: MR BRAIN W/O CONTRAST  LOCATION: [facility]  DATE/TIME: 4/18/2022 7:46 PM    INDICATION: Neuro deficit, acute, stroke suspected.  COMPARISON: Head CT 04/15/2022.  TECHNIQUE: Routine multiplanar multisequence head MRI without intravenous contrast.    FINDINGS:  INTRACRANIAL CONTENTS: No acute or subacute infarct. No mass, acute hemorrhage, or extra-axial fluid collections. Scattered nonspecific T2/FLAIR hyperintensities within the cerebral white matter most consistent with mild chronic microvascular ischemic   change. Chronic nonspecific microhemorrhage involving the right parietal lobe. Questionable chronic microhemorrhage involving the right precentral gyrus. Mild generalized cerebral atrophy. No hydrocephalus. Mild cerebellar atrophy.     SELLA: No abnormality accounting for technique.    OSSEOUS STRUCTURES/SOFT TISSUES: Normal marrow signal. The major intracranial vascular flow voids are maintained.     ORBITS: No abnormality accounting for technique.     SINUSES/MASTOIDS: No paranasal sinus mucosal disease. No middle ear or mastoid effusion.       Impression    IMPRESSION:  1.  No evidence of acute ischemia or hemorrhage.  2.  Volume loss and presumed microvascular ischemic changes as detailed above.       Labs:  Most Recent 3 CBC's:Recent Labs   Lab Test 04/16/22  0611 04/15/22  0446 04/14/22  0646   WBC 8.2 13.6* 11.8*   HGB 12.6 12.6 12.8   MCV 91 90 93    329 362     Most Recent 3 BMP's:Recent Labs   Lab Test 04/19/22  0755 04/18/22  1812 04/18/22  0603 04/16/22  1346 04/16/22  0611 04/15/22  1002 04/15/22  0446   NA  --   --  136  --  134*  --  128*   POTASSIUM 3.5 3.8 3.6    < > 3.3*   < > 3.1*   CHLORIDE  --   --  98  --  99  --  93*   CO2  --   --  26  --  23  --  22   BUN  --   --  13  --  8  --  7*   CR  --   --  0.64  --  0.63  --  0.59*   ANIONGAP  --   --  12  --  12  --  13   MAIRA  --   --  9.3  --  8.9  --  8.7   GLC  --   --  106  --  115  --  109    < > = values in this interval not displayed.     Most Recent 6 glucoses:Recent Labs   Lab Test 04/18/22  0603 04/16/22  0611 04/15/22  0446 04/14/22  0814 04/13/22  1022 01/04/22  1049    115 109 112 166* 108     Most Recent Urinalysis:Recent Labs   Lab Test 04/16/22  1018   COLOR Dark Yellow*   APPEARANCE Cloudy*   URINEGLC Negative   URINEBILI Negative   URINEKETONE Trace*   SG 1.006   UBLD 0.1 mg/dL*   URINEPH 6.0   PROTEIN 50 *   NITRITE Negative   LEUKEST 500 Stacey/uL*   RBCU 13*   WBCU >182*       Medications:   Personally Reviewed.  Medications     - MEDICATION INSTRUCTIONS -         acetaminophen  1,000 mg Oral Q8H     amLODIPine  10 mg Oral Daily     amLODIPine  5 mg Oral Once     apixaban ANTICOAGULANT  5 mg Oral BID     atorvastatin  20 mg Oral Daily     cefdinir  300 mg Oral Q12H MARILU     diphenhydrAMINE  25 mg Oral At Bedtime     flecainide  50 mg Oral BID     gabapentin  100 mg Oral At Bedtime     levothyroxine  75 mcg Oral Daily     losartan  100 mg Oral Daily     metoprolol tartrate  25 mg Oral BID     sodium chloride  0.5 g Oral BID w/meals     4/19/2022   W[facility] :Faculty Attestation   I have seen and examined the patient.   I have discussed the case with PA student Rachel Perkins.  I agree with the findings, assessment and plan.   Emeterio Perez MD

## 2022-04-19 NOTE — PLAN OF CARE
Goal Outcome Evaluation:      Pt denies pain, but is agitated and restless at start of shift. Reassurance provided, thoughts and feelings acknowledged. Pt then started to rest around [phone]

## 2022-04-19 NOTE — PROGRESS NOTES
Care Management Follow Up    Length of Stay (days): 6    Expected Discharge Date: 04/19/2022     Concerns to be Addressed: care coordination/care conferences, discharge planning, other (see comments) (Transfer to a safe living environment.)     Patient plan of care discussed at interdisciplinary rounds: Yes    Anticipated Discharge Disposition: Assisted Living, Home Care, Transitional Care, Skilled Nursing Facility, Other (Comments) (TBD further.)     Anticipated Discharge Services: None  Anticipated Discharge DME: None    Patient/family educated on Medicare website which has current facility and service quality ratings: yes  Education Provided on the Discharge Plan:    Patient/Family in Agreement with the Plan: other (see comments) ([place] plan. [place]    Referrals Placed by CM/SW: Post Acute Facilities (Per family request.)  Private pay costs discussed: Not applicable    Additional Information:  9:27 AM  SABINE followed up with TCU referrals:  [facility] - VM left  [facility] - VM left  [facility] - refaxed referral  [facility] - VM left  [facility] - VM full, unable to leave message    2:02 PM  SW spoke with son Bi via phone.  SW requested further TCU choices.  Son in agreement to sending referrals to [facility], [facility] and [facility].  SW sent referrals.    HENRY Carpenter

## 2022-04-20 ENCOUNTER — APPOINTMENT (OUTPATIENT)
Dept: PHYSICAL THERAPY | Facility: CLINIC | Age: 87
DRG: 551 | End: 2022-04-20
Payer: MEDICARE

## 2022-04-20 ENCOUNTER — APPOINTMENT (OUTPATIENT)
Dept: SPEECH THERAPY | Facility: CLINIC | Age: 87
DRG: 551 | End: 2022-04-20
Payer: MEDICARE

## 2022-04-20 LAB
ALBUMIN UR-MCNC: NEGATIVE MG/DL
ANION GAP SERPL CALCULATED.3IONS-SCNC: 12 MMOL/L (ref 5–18)
APPEARANCE UR: CLEAR
BACTERIA #/AREA URNS HPF: ABNORMAL /HPF
BILIRUB UR QL STRIP: NEGATIVE
BNP SERPL-MCNC: 156 PG/ML (ref 0–167)
BUN SERPL-MCNC: 15 MG/DL (ref 8–28)
CALCIUM SERPL-MCNC: 9.2 MG/DL (ref 8.5–10.5)
CHLORIDE BLD-SCNC: 97 MMOL/L (ref 98–107)
CO2 SERPL-SCNC: 27 MMOL/L (ref 22–31)
COLOR UR AUTO: YELLOW
CREAT SERPL-MCNC: 0.68 MG/DL (ref 0.6–1.1)
GFR SERPL CREATININE-BSD FRML MDRD: 82 ML/MIN/1.73M2
GLUCOSE BLD-MCNC: 106 MG/DL (ref 70–125)
GLUCOSE UR STRIP-MCNC: NEGATIVE MG/DL
HGB UR QL STRIP: NEGATIVE
KETONES UR STRIP-MCNC: NEGATIVE MG/DL
LEUKOCYTE ESTERASE UR QL STRIP: ABNORMAL
MAGNESIUM SERPL-MCNC: 1.8 MG/DL (ref 1.8–2.6)
NITRATE UR QL: NEGATIVE
PH UR STRIP: 7 [PH] (ref 5–7)
POTASSIUM BLD-SCNC: 3.6 MMOL/L (ref 3.5–5)
RBC URINE: <=2 /HPF
SODIUM SERPL-SCNC: 136 MMOL/L (ref 136–145)
SP GR UR STRIP: 1.01 (ref 1–1.03)
SQUAMOUS EPITHELIAL: 4 /HPF
UROBILINOGEN UR STRIP-MCNC: NORMAL MG/DL
WBC CLUMPS #/AREA URNS HPF: PRESENT /HPF
WBC URINE: 60 /HPF

## 2022-04-20 PROCEDURE — 82310 ASSAY OF CALCIUM: CPT | Performed by: FAMILY MEDICINE

## 2022-04-20 PROCEDURE — 92526 ORAL FUNCTION THERAPY: CPT | Mod: GN

## 2022-04-20 PROCEDURE — 97530 THERAPEUTIC ACTIVITIES: CPT | Mod: GP

## 2022-04-20 PROCEDURE — 99233 SBSQ HOSP IP/OBS HIGH 50: CPT | Performed by: FAMILY MEDICINE

## 2022-04-20 PROCEDURE — 83735 ASSAY OF MAGNESIUM: CPT | Performed by: FAMILY MEDICINE

## 2022-04-20 PROCEDURE — 250N000013 HC RX MED GY IP 250 OP 250 PS 637: Performed by: FAMILY MEDICINE

## 2022-04-20 PROCEDURE — 83880 ASSAY OF NATRIURETIC PEPTIDE: CPT | Performed by: FAMILY MEDICINE

## 2022-04-20 PROCEDURE — 97116 GAIT TRAINING THERAPY: CPT | Mod: GP

## 2022-04-20 PROCEDURE — 81001 URINALYSIS AUTO W/SCOPE: CPT | Performed by: FAMILY MEDICINE

## 2022-04-20 PROCEDURE — 87088 URINE BACTERIA CULTURE: CPT | Performed by: FAMILY MEDICINE

## 2022-04-20 PROCEDURE — 36415 COLL VENOUS BLD VENIPUNCTURE: CPT | Performed by: FAMILY MEDICINE

## 2022-04-20 PROCEDURE — 120N000001 HC R&B MED SURG/OB

## 2022-04-20 RX ORDER — HYDRALAZINE HYDROCHLORIDE 20 MG/ML
10 INJECTION INTRAMUSCULAR; INTRAVENOUS EVERY 6 HOURS PRN
Status: DISCONTINUED | OUTPATIENT
Start: 2022-04-20 | End: 2022-04-21 | Stop reason: HOSPADM

## 2022-04-20 RX ORDER — HYDROCHLOROTHIAZIDE 12.5 MG/1
12.5 CAPSULE ORAL DAILY
Status: DISCONTINUED | OUTPATIENT
Start: 2022-04-20 | End: 2022-04-20

## 2022-04-20 RX ORDER — POTASSIUM CHLORIDE 20MEQ/15ML
10 LIQUID (ML) ORAL ONCE
Status: COMPLETED | OUTPATIENT
Start: 2022-04-20 | End: 2022-04-20

## 2022-04-20 RX ADMIN — FLECAINIDE ACETATE 50 MG: 50 TABLET ORAL at 21:50

## 2022-04-20 RX ADMIN — FLECAINIDE ACETATE 50 MG: 50 TABLET ORAL at 09:41

## 2022-04-20 RX ADMIN — CEFDINIR 300 MG: 300 CAPSULE ORAL at 09:41

## 2022-04-20 RX ADMIN — APIXABAN 5 MG: 5 TABLET, FILM COATED ORAL at 09:38

## 2022-04-20 RX ADMIN — METOPROLOL TARTRATE 25 MG: 25 TABLET, FILM COATED ORAL at 21:49

## 2022-04-20 RX ADMIN — LEVOTHYROXINE SODIUM 75 MCG: 75 TABLET ORAL at 09:30

## 2022-04-20 RX ADMIN — DIPHENHYDRAMINE HYDROCHLORIDE 25 MG: 25 CAPSULE ORAL at 21:49

## 2022-04-20 RX ADMIN — OLANZAPINE 2.5 MG: 2.5 TABLET, FILM COATED ORAL at 21:49

## 2022-04-20 RX ADMIN — ATORVASTATIN CALCIUM 20 MG: 10 TABLET, FILM COATED ORAL at 09:38

## 2022-04-20 RX ADMIN — CEFDINIR 300 MG: 300 CAPSULE ORAL at 21:49

## 2022-04-20 RX ADMIN — POTASSIUM CHLORIDE 10 MEQ: 20 SOLUTION ORAL at 11:00

## 2022-04-20 RX ADMIN — LOSARTAN POTASSIUM 100 MG: 50 TABLET, FILM COATED ORAL at 09:30

## 2022-04-20 RX ADMIN — AMLODIPINE BESYLATE 10 MG: 10 TABLET ORAL at 09:31

## 2022-04-20 RX ADMIN — ACETAMINOPHEN 1000 MG: 500 TABLET, FILM COATED ORAL at 05:08

## 2022-04-20 RX ADMIN — APIXABAN 5 MG: 5 TABLET, FILM COATED ORAL at 21:49

## 2022-04-20 RX ADMIN — METOPROLOL TARTRATE 25 MG: 25 TABLET, FILM COATED ORAL at 09:30

## 2022-04-20 RX ADMIN — GABAPENTIN 100 MG: 100 CAPSULE ORAL at 21:49

## 2022-04-20 RX ADMIN — ACETAMINOPHEN 1000 MG: 500 TABLET, FILM COATED ORAL at 21:49

## 2022-04-20 ASSESSMENT — ACTIVITIES OF DAILY LIVING (ADL)
ADLS_ACUITY_SCORE: 18

## 2022-04-20 NOTE — PLAN OF CARE
Goal Outcome Evaluation:      Generally doing well today. BP elevated this AM but came into normal limits with scheduled AM antihypertensives - no PRN medication required. Had visitors this morning and early afternoon so awake most of day. Showered this afternoon with 1-2 person assist. Gait remains unsteady and it seems Safia gets a bit nervous/fearful with walking but especially with turning and sitting down. Has denied pain today. PO intake remains fairly poor, requires prompting to order meals and drink fluids. Urine sample collected and sent to lab this morning.

## 2022-04-20 NOTE — PROGRESS NOTES
[facility] MEDICINE PROGRESS NOTE      Identification/Summary: Katie Mar is a 89 year old female with a past medical history of persistent atrial fibrillation, chronic anticoagulation treatment,  essential hypertension, hypothyroidism, restless leg syndrome, came after a fall at home.  Found to have rhabdomyolysis and moderate dehydration which are corrected.  She has intermittent confusion and metabolic encephalopathy without focal deficit.  MRI scan of brain without evidence of acute ischemia.  Has intermittent slurred speech.  Started on antibiotic for UTI.  Urine culture have mixture of urogenital phong.  Awaiting for TCU placement for more rehabilitation    Assessment and Plan:  Fall, mechanism unclear  Generalized weakness  L1 compression fracture  CT--Mild superior endplate deformity of L1 with haziness in the surrounding tissues likely an acute fracture.  Back pain is stable. May consider TLSO brace  Came from independent living facility  Awaiting for TCU placement  Continue PT and OT    Rhabdomyolysis  Moderate dehydration  Corrected with IV hydration  Statin restarted his CK is significantly improved    Acute metabolic encephalopathy  Speech difficulties  Head MRI 4/18 without evidence of acute ischemia or hemorrhage  Swallow evaluation 4/18showing silent aspiration of thin liquids and absence of epiglottic inversion - appreciate speech therapy recommendations    Presumed UTI  Urine culture has mixture of urogenital phong likely contamination  Cefdinir for 3 days    Restless leg syndrome   Gabapentin 100 mg daily    Hypokalemia, replaced  Hypomagnesemia, replaced    Persistent atrial fibrillation  Heart rate is stable  Flecainide 50 mg twice a day  Metoprolol 25 mg twice a day  On chronic anticoagulation treatment with Eliquis     Essential hypertension, uncontrolled  Losartan 100 mg daily  Metoprolol 25 mg twice a day, dose increased  Norvasc 10 mg  daily  Chlorthalidone 25 mg daily, on hold due to hyponatremia  IV hydralazine 10 mg every 6 hours as needed    Dyslipidemia  Lipitor 20 mg daily    Hypothyroidism  Levothyroxine 75 mcg daily    Code DNR  DVT prophylaxis  On chronic anticoagulation treatment  Barrier to discharge  Awaiting for TCU placement.  Anticipated discharge in 1 day    Lamar Ochoa MD  [facility] Medicine  [facility]  Phone: #[phone]    Interval History/Subjective:  Resting comfortably.  No new concern.  Blood pressure is on higher side.  Denies headache, chest pain, breathing difficulty, palpitation, nausea, vomiting, abdominal pain or urinary symptoms.  Started with slurred speech and then was able to talk normally.    Physical Exam/Objective:  Temp:  [97.2  F (36.2  C)-97.6  F (36.4  C)] 97.6  F (36.4  C)  Pulse:  [62-76] 64  Resp:  [14-20] 16  BP: (111-179)/(63-98) 179/81  SpO2:  [90 %-95 %] 94 %  Body mass index is 24.88 kg/m .    GENERAL:  Alert, appears comfortable, in no acute distress, appears stated age   HEAD:  Normocephalic, without obvious abnormality, atraumatic   EYES:  PERRL, conjunctiva clear,  EOM's intact   NOSE: Nares normal,   mucosa normal, no drainage   THROAT: Lips, mucosa,  gums normal, mouth moist   NECK: Supple, symmetrical, trachea midline   BACK:   Symmetric, no curvature, ROM normal   LUNGS:   Clear to auscultation bilaterally, no rales, rhonchi, or wheezing, symmetric chest rise on inhalation, respirations unlabored   CHEST WALL:  No tenderness or deformity   HEART:  Regular rate and rhythm, S1 and S2 normal, no murmur     ABDOMEN:   Soft, non-tender, bowel sounds active , no masses, no organomegaly, no rebound or guarding   EXTREMITIES: 1+  edema    SKIN: No rashes   NEURO: Alert, not completely oriented x3, moves all four extremities freely, intermittent confusion   PSYCH: Cooperative, behavior is appropriate      Data reviewed today: I personally reviewed all new  medications, labs, imaging/diagnostics reports over the past 24 hours. Pertinent findings include:    Imaging:   Brain MRI  1.  No evidence of acute ischemia or hemorrhage.  2.  Volume loss and presumed microvascular ischemic changes as detailed above.    Chest and abdomen ct  1.  Thoracic and abdominal aorta atheromatous calcifications but no acute aortopathy or thoracic or abdominal hemorrhage.   2.  Mild superior endplate deformity of L1 with haziness in the surrounding tissues likely an acute fracture.  3.  Focal edema within the soft tissues of the right upper chest posterolaterally but no related acute fracture in the chest.    Labs:  Most Recent 3 CBC's:Recent Labs   Lab Test 04/16/22  0611 04/15/22  0446 04/14/22  0646   WBC 8.2 13.6* 11.8*   HGB 12.6 12.6 12.8   MCV 91 90 93    329 362     Most Recent 3 BMP's:Recent Labs   Lab Test 04/20/22  0610 04/19/22  1402 04/19/22  0755 04/18/22  1812 04/18/22  0603 04/16/22  1346 04/16/22  0611     --   --   --  136  --  134*   POTASSIUM 3.6  --  3.5 3.8 3.6   < > 3.3*   CHLORIDE 97*  --   --   --  98  --  99   CO2 27  --   --   --  26  --  23   BUN 15  --   --   --  13  --  8   CR 0.68  --   --   --  0.64  --  0.63   ANIONGAP 12  --   --   --  12  --  12   MAIRA 9.2  --   --   --  9.3  --  8.9    148*  --   --  106  --  115    < > = values in this interval not displayed.       Medications:   Personally Reviewed.  Medications     - MEDICATION INSTRUCTIONS -         acetaminophen  1,000 mg Oral Q8H     amLODIPine  10 mg Oral Daily     apixaban ANTICOAGULANT  5 mg Oral BID     atorvastatin  20 mg Oral Daily     cefdinir  300 mg Oral Q12H MARILU     diphenhydrAMINE  25 mg Oral At Bedtime     flecainide  50 mg Oral BID     gabapentin  100 mg Oral At Bedtime     levothyroxine  75 mcg Oral Daily     losartan  100 mg Oral Daily     metoprolol tartrate  25 mg Oral BID

## 2022-04-20 NOTE — PROGRESS NOTES
Care Management Follow Up    Length of Stay (days): 7    Expected Discharge Date: 04/21/2022     Concerns to be Addressed: care coordination/care conferences, discharge planning, other (see comments) (Transfer to a safe living environment.)     Patient plan of care discussed at interdisciplinary rounds: Yes    Anticipated Discharge Disposition: Assisted Living, Home Care, Transitional Care, Skilled Nursing Facility, Other (Comments) (TBD further.)     Anticipated Discharge Services: None  Anticipated Discharge DME: None    Patient/family educated on Medicare website which has current facility and service quality ratings: yes  Education Provided on the Discharge Plan:    Patient/Family in Agreement with the Plan: other (see comments) ([place] plan[place]    Referrals Placed by CM/SW: Post Acute Facilities (Per family request.)  Private pay costs discussed: transportation costs    Additional Information:  3:15 PM  SABINE spoke with Ruiz at [facility].  She accepted pt for tomorrow 4/21.  She is requesting 2:30 admit.  SABINE spoke with pt/son via bedside RN who was in pt's room.  They are in agreement to [facility] and are requesting [facility] transport.  SABINE called and scheduled ride at 2pm.        HENRY Carpenter

## 2022-04-20 NOTE — PROGRESS NOTES
"Alert, oriented to person, pleasantly confused. Up A1 FWW gb. Incontinent overnight, cares and repositioning completed. Pills crushed in applesauce. Denies pain. Thickened liquids. Alarms in place for safety. Discharge pending TCU placement. Hourly rounding completed, continuing to monitor.    Blood pressure (!) 165/72, pulse 62, temperature 97.2  F (36.2  C), temperature source Oral, resp. rate 18, height 1.6 m (5' 3\"), weight 63.7 kg (140 lb 6.9 oz), SpO2 94 %, not currently breastfeeding.    Nava Farnsworth RN  "

## 2022-04-21 ENCOUNTER — APPOINTMENT (OUTPATIENT)
Dept: SPEECH THERAPY | Facility: CLINIC | Age: 87
DRG: 551 | End: 2022-04-21
Payer: MEDICARE

## 2022-04-21 VITALS
TEMPERATURE: 97.8 F | RESPIRATION RATE: 14 BRPM | DIASTOLIC BLOOD PRESSURE: 68 MMHG | SYSTOLIC BLOOD PRESSURE: 151 MMHG | WEIGHT: 135.3 LBS | HEIGHT: 63 IN | OXYGEN SATURATION: 94 % | BODY MASS INDEX: 23.97 KG/M2 | HEART RATE: 56 BPM

## 2022-04-21 LAB
MAGNESIUM SERPL-MCNC: 1.9 MG/DL (ref 1.8–2.6)
POTASSIUM BLD-SCNC: 3.7 MMOL/L (ref 3.5–5)
SARS-COV-2 RNA RESP QL NAA+PROBE: NEGATIVE

## 2022-04-21 PROCEDURE — 36415 COLL VENOUS BLD VENIPUNCTURE: CPT | Performed by: FAMILY MEDICINE

## 2022-04-21 PROCEDURE — 87635 SARS-COV-2 COVID-19 AMP PRB: CPT | Performed by: HOSPITALIST

## 2022-04-21 PROCEDURE — 99239 HOSP IP/OBS DSCHRG MGMT >30: CPT | Performed by: HOSPITALIST

## 2022-04-21 PROCEDURE — 83735 ASSAY OF MAGNESIUM: CPT | Performed by: FAMILY MEDICINE

## 2022-04-21 PROCEDURE — 92526 ORAL FUNCTION THERAPY: CPT | Mod: GN | Performed by: SPEECH-LANGUAGE PATHOLOGIST

## 2022-04-21 PROCEDURE — 84132 ASSAY OF SERUM POTASSIUM: CPT | Performed by: FAMILY MEDICINE

## 2022-04-21 PROCEDURE — 250N000013 HC RX MED GY IP 250 OP 250 PS 637: Performed by: FAMILY MEDICINE

## 2022-04-21 RX ORDER — OLANZAPINE 2.5 MG/1
2.5 TABLET, FILM COATED ORAL 2 TIMES DAILY PRN
Start: 2022-04-21 | End: 2022-04-28

## 2022-04-21 RX ORDER — AMLODIPINE BESYLATE 2.5 MG/1
10 TABLET ORAL DAILY
Qty: 180 TABLET | Refills: 2
Start: 2022-04-21

## 2022-04-21 RX ORDER — METOPROLOL TARTRATE 25 MG/1
25 TABLET, FILM COATED ORAL 2 TIMES DAILY
Qty: 90 TABLET | Refills: 3
Start: 2022-04-21 | End: 2022-05-26

## 2022-04-21 RX ADMIN — ATORVASTATIN CALCIUM 20 MG: 10 TABLET, FILM COATED ORAL at 09:14

## 2022-04-21 RX ADMIN — FLECAINIDE ACETATE 50 MG: 50 TABLET ORAL at 09:31

## 2022-04-21 RX ADMIN — APIXABAN 5 MG: 5 TABLET, FILM COATED ORAL at 09:14

## 2022-04-21 RX ADMIN — LEVOTHYROXINE SODIUM 75 MCG: 75 TABLET ORAL at 09:14

## 2022-04-21 RX ADMIN — OLANZAPINE 2.5 MG: 2.5 TABLET, FILM COATED ORAL at 12:03

## 2022-04-21 RX ADMIN — LOSARTAN POTASSIUM 100 MG: 50 TABLET, FILM COATED ORAL at 09:14

## 2022-04-21 RX ADMIN — AMLODIPINE BESYLATE 10 MG: 10 TABLET ORAL at 09:14

## 2022-04-21 RX ADMIN — ACETAMINOPHEN 1000 MG: 500 TABLET, FILM COATED ORAL at 06:21

## 2022-04-21 RX ADMIN — METOPROLOL TARTRATE 25 MG: 25 TABLET, FILM COATED ORAL at 09:14

## 2022-04-21 ASSESSMENT — ACTIVITIES OF DAILY LIVING (ADL)
ADLS_ACUITY_SCORE: 18

## 2022-04-21 NOTE — PROGRESS NOTES
Physical Therapy Discharge Summary    Reason for therapy discharge:    Discharged to transitional care facility.    Progress towards therapy goal(s). See goals on Care Plan in [facility] electronic health record for goal details.  Goals partially met.  Barriers to achieving goals:   limited tolerance for therapy.    Therapy recommendation(s):    Continued therapy is recommended.  Rationale/Recommendations:  TCU.

## 2022-04-21 NOTE — PLAN OF CARE
Occupational Therapy Discharge Summary    Reason for therapy discharge:    Discharged to transitional care facility.    Progress towards therapy goal(s). See goals on Care Plan in [facility] electronic health record for goal details.  Goals not met.  Barriers to achieving goals:   discharge from facility.    Therapy recommendation(s):    Continued therapy is recommended.  Rationale/Recommendations:  TCU.

## 2022-04-21 NOTE — PLAN OF CARE
Problem: Plan of Care - These are the overarching goals to be used throughout the patient stay.    Goal: Plan of Care Review/Shift Note  Description: The Plan of Care Review/Shift note should be completed every shift.  The Outcome Evaluation is a brief statement about your assessment that the patient is improving, declining, or no change.  This information will be displayed automatically on your shift note.  Outcome: Adequate for Care Transition   Goal Outcome Evaluation:  VSS. BP elevated, morning BP meds given. Some intermittent back pain with movement, but no prns given. Pt was getting restless this AM, prn olanzapine given with good results.     Pt discharged with transport to TCU, nurse to nurse report given to grace approx 2:04 PM.

## 2022-04-21 NOTE — PROGRESS NOTES
"Alert, oriented to person. Up A1 FWW gb. BRVing. Turn/reposition, check for incontinence in bed q2h. Confused and agitated at HS, given PRN zyprexa x 1. C/o RLE extremity muscle spasms, massage given with good relief. Pills crushed in applesauce, thickened liquids. Alarms in place for patient safety. Plan is for discharge to TCU today, ride at 2:00 PM. Hourly rounding completed, continuing to monitor.    Blood pressure (!) 152/68, pulse 60, temperature 97.4  F (36.3  C), temperature source Oral, resp. rate 16, height 1.6 m (5' 3\"), weight 61.4 kg (135 lb 4.8 oz), SpO2 94 %, not currently breastfeeding.    Nava Farnsworth RN  "

## 2022-04-21 NOTE — PLAN OF CARE
Speech Language Therapy Discharge Summary    Reason for therapy discharge:    Discharged to transitional care facility.    Progress towards therapy goal(s). See goals on Care Plan in [facility] electronic health record for goal details.  Goals met    Therapy recommendation(s):    Continued therapy is recommended.  Rationale/Recommendations:  Ensure consistent use of swallow strategies and diagnostic treatment for safest, least restrictive diet. Patient discharges on recommended diet of regular textures/easy to chew and mildly thick liquids. Video swallow study completed on 4/18/22 with the following results:   Video swallow study completed. Patient presents with mild to moderate oral dysphagia and moderate pharyngeal dysphagia. She is at high risk for aspiration with thin liquids and was noted to have an inconsistent cough response with aspiration during this evaluation. Current diet of Soft & Bite Sized textures and mildly thick liquids appears to be patient's safest and least restrictive diet at this time. Recommend 1:1 supervision and/or assistance with meals to ensure consistent adherence to the swallowing strategies listed above.

## 2022-04-21 NOTE — DISCHARGE SUMMARY
[facility] MEDICINE  DISCHARGE SUMMARY     Primary Care Physician: Aguila Lal  Admission Date: 4/13/2022   Discharge Provider: Alejandro Chandler DO Discharge Date: 4/21/2022   Diet:   Active Diet and Nourishment Order   Procedures     Combination Diet Regular Diet Adult; Easy to Chew (level 7); Mildly Thick (level 2)     Diet       Code Status: No CPR- Do NOT Intubate   Activity: Per therapy recommendations        Condition at Discharge: Stable     REASON FOR PRESENTATION(See Admission Note for Details)   Fall at home  PRINCIPAL & ACTIVE DISCHARGE DIAGNOSES   Fall  Rhabdomyolysis  L1 compression fracture  Acute metabolic encephalopathy  Possible urinary tract infection  Chronic atrial fibrillation  PENDING LABS     Unresulted Labs Ordered in the Past 30 Days of this Admission     Date and Time Order Name Status Description    4/20/2022 12:53 PM Urine Culture In process         PROCEDURES ( this hospitalization only)    None  RECOMMENDATIONS TO OUTPATIENT PROVIDER FOR F/U VISIT   Follow-up Appointments     Follow Up and recommended labs and tests      Follow up with alf physician.  The following labs/tests are   recommended: CBC, basic metabolic panel in 2 to 3 days..           Make sure that she is improving with physical and Occupational Therapy  Review appropriateness of current diet  DISPOSITION   Skilled Nursing Facility  SUMMARY OF HOSPITAL COURSE:    89-year-old female who lives in independent living was brought to the emergency department after falling on the evening of 4/12 but not being found till the morning of 4/13.    Evaluation here was remarkable for rhabdomyolysis, normal renal function, hypokalemia, significant hyponatremia with sodium of 120, COVID-negative.  She was treated with IV fluids and symptomatically improved.  She did have an initial urinalysis that does seem to be uninfected but repeat urinalysis was consistent with infection with lots of  white blood cells.  She was treated with antibiotics, urine culture grew multiple organisms.  Not continued on antibiotics at discharge.    89 discharge she is feeling well physically but wants to see her son.  She denies chest pain, shortness of breath, nausea, vomiting, urinary complaints.  Nursing notes indicate that she did have some agitation the night before and was given a dose of Zyprexa.  Discharge Medications with Med changes:     Current Discharge Medication List      START taking these medications    Details   OLANZapine (ZYPREXA) 2.5 MG tablet Take 1 tablet (2.5 mg) by mouth 2 times daily as needed (agitation)    Associated Diagnoses: Agitation         CONTINUE these medications which have CHANGED    Details   amLODIPine (NORVASC) 2.5 MG tablet Take 4 tablets (10 mg) by mouth daily  Qty: 180 tablet, Refills: 2    Associated Diagnoses: Essential hypertension      metoprolol tartrate (LOPRESSOR) 25 MG tablet Take 1 tablet (25 mg) by mouth 2 times daily Take 1/2 (one-half) tablet by mouth twice daily  Qty: 90 tablet, Refills: 3    Associated Diagnoses: Atrial fibrillation, unspecified type (H)         CONTINUE these medications which have NOT CHANGED    Details   acetaminophen (TYLENOL) 500 MG tablet [ACETAMINOPHEN (TYLENOL) 500 MG TABLET] Take 500 mg by mouth at bedtime.      apixaban ANTICOAGULANT (ELIQUIS ANTICOAGULANT) 5 MG tablet Take 1 tablet (5 mg) by mouth 2 times daily  Qty: 180 tablet, Refills: 3    Associated Diagnoses: Atrial fibrillation, unspecified type (H)      atorvastatin (LIPITOR) 20 MG tablet Take 1 tablet by mouth once daily  Qty: 90 tablet, Refills: 1    Associated Diagnoses: Hypercholesteremia      diphenhydrAMINE (ANTIHISTAMINE) 25 mg tablet [DIPHENHYDRAMINE (ANTIHISTAMINE) 25 MG TABLET] Take 25 mg by mouth at bedtime.      EUTHYROX 75 MCG tablet Take 1 tablet (75 mcg) by mouth daily  Qty: 90 tablet, Refills: 3    Associated Diagnoses: Hypothyroidism, unspecified type       flecainide (TAMBOCOR) 50 MG tablet Take 1 tablet (50 mg) by mouth 2 times daily  Qty: 180 tablet, Refills: 3    Associated Diagnoses: Persistent atrial fibrillation (H)      losartan (COZAAR) 100 MG tablet Take 1 tablet by mouth once daily  Qty: 90 tablet, Refills: 1    Associated Diagnoses: Essential hypertension         STOP taking these medications       chlorthalidone (HYGROTON) 25 MG tablet Comments:   Reason for Stopping:         LORazepam (ATIVAN) 0.5 MG tablet Comments:   Reason for Stopping:             Rationale for medication changes:    Discontinue chlorthalidone due to hyponatremia  Discontinued lorazepam due to cephalopathy  Increase metoprolol due to hypertension  Increased amlodipine due to hypertension  Consults   None  Anticoagulation Information    Continues on Eliquis due to atrial fibrillation history  SIGNIFICANT IMAGING FINDINGS     Results for orders placed or performed during the hospital encounter of 04/13/22   Head CT w/o contrast    Impression    IMPRESSION:  HEAD CT:  1.  No acute intracranial abnormality.  2.  Mild age-related changes.    CERVICAL SPINE CT:  1.  No CT evidence for acute fracture or post traumatic subluxation.   Cervical spine CT w/o contrast    Impression    IMPRESSION:  HEAD CT:  1.  No acute intracranial abnormality.  2.  Mild age-related changes.    CERVICAL SPINE CT:  1.  No CT evidence for acute fracture or post traumatic subluxation.   CT Chest/Abdomen/Pelvis w Contrast    Impression    IMPRESSION:    1.  Thoracic and abdominal aorta atheromatous calcifications but no acute aortopathy or thoracic or abdominal hemorrhage.   2.  Mild superior endplate deformity of L1 with haziness in the surrounding tissues likely an acute fracture.  3.  Focal edema within the soft tissues of the right upper chest posterolaterally but no related acute fracture in the chest.   CT Head w/o Contrast    Impression    IMPRESSION:  1.  Mild age-related changes as above with no acute  intracranial abnormality.   XR Video Swallow with SLP or OT    Impression    IMPRESSION:  1.  Silent aspiration with thin liquids.  2.  Fairly consistent absence of epiglottic inversion.  3.  Moderate vallecular residue.    See speech pathology report for additional details and recommendations.      MR Brain w/o Contrast    Impression    IMPRESSION:  1.  No evidence of acute ischemia or hemorrhage.  2.  Volume loss and presumed microvascular ischemic changes as detailed above.     SIGNIFICANT LABORATORY FINDINGS   Please see EMR  Discharge Orders        General info for SNF    Length of Stay Estimate: Short Term Care: Estimated # of Days <30  Condition at Discharge: Stable  Level of care:skilled   Rehabilitation Potential: Fair  Admission H&P remains valid and up-to-date: Yes  Recent Chemotherapy: N/A  Use Nursing Home Standing Orders: Yes     Mantoux instructions    Give two-step Mantoux (PPD) Per Facility Policy Yes     Follow Up and recommended labs and tests    Follow up with [facility] physician.  The following labs/tests are recommended: CBC, basic metabolic panel in 2 to 3 days..     Reason for your hospital stay    Fall at home     Activity - Up with nursing assistance     Physical Therapy Adult Consult    Evaluate and treat as clinically indicated.    Reason: Fall, deconditioning     Occupational Therapy Adult Consult    Evaluate and treat as clinically indicated.    Reason: Fall, deconditioning, difficulty with ADLs.     Speech Language Path Adult Consult    Evaluate and treat as clinically indicated.    Reason: Trouble swallowing     Fall precautions     Diet    Follow this diet upon discharge: Orders Placed This Encounter      Combination Diet Regular Diet Adult; Easy to Chew (level 7); Mildly Thick (level 2)     Examination   Physical Exam   Temp:  [97.4  F (36.3  C)-97.8  F (36.6  C)] 97.8  F (36.6  C)  Pulse:  [56-64] 56  Resp:  [14-16] 14  BP: (121-159)/(59-68) 151/68  Cuff Mean (mmHg):  [88]  88  SpO2:  [94 %] 94 %  Wt Readings from Last 1 Encounters:   04/21/22 61.4 kg (135 lb 4.8 oz)       Subjective: Says that she feels lousy this morning.  When asked why she says that she wants her son to be here.  She denies chest pain, shortness of breath, abdominal pain, nausea, vomiting, edema, cough.    Physical Exam:    Gen: no acute distress, comfortable, sitting in chair, breakfast is in front of her but she is normally eating.  She seems a little bit drowsy.  She is interactive and answers questions appropriately.  ENT: no scleral icterus  Pulm: Scattered crackles, no wheezing, breathing comfortably in room air at rest  CV: regular rate and rhythm, no significant lower extremity edema  GI: abdomen is soft, non-tender, non-distended with active bowel sounds.  Psych: Interactive but seems to be a little bit confused, crabby.      Please see EMR for more detailed significant labs, imaging, consultant notes etc.    IAlejandro DO, personally saw the patient today and spent greater than 30 minutes discharging this patient.    Alejandro Chandler DO  [facility]    CC:Aguila Lal

## 2022-04-21 NOTE — PROGRESS NOTES
Care Management Discharge Note    Discharge Date: 04/21/2022       Discharge Disposition: Assisted Living, Home Care, Transitional Care, Skilled Nursing Facility, Other (Comments) (TBD further.)    Discharge Services: None    Discharge DME: None    Discharge Transportation: family or friend will provide, agency (Son Bi and wife state it is acceptable to use transport service if not appropriate for them to provide transport.)      PAS Confirmation Code:  Not needed for this TCU.     Patient/family educated on Medicare website which has current facility and service quality ratings: yes    Patient/Family in Agreement with the Plan: other (see comments) (Family plan. Pt wishes for return to [place].)      Additional Information:  CM called and spoke with Ruiz at TCU who comfrims the discharge plan for 2PM as needed via transport.     12:59 PM  CM called TCU to verify that orders were recidied as needed. No PAS needed as this TCU does not need this for admit.         MARCEL Luis

## 2022-04-22 ENCOUNTER — TRANSITIONAL CARE UNIT VISIT (OUTPATIENT)
Dept: GERIATRICS | Facility: CLINIC | Age: 87
End: 2022-04-22
Payer: MEDICARE

## 2022-04-22 VITALS
BODY MASS INDEX: 23.92 KG/M2 | HEIGHT: 63 IN | HEART RATE: 64 BPM | TEMPERATURE: 97.5 F | SYSTOLIC BLOOD PRESSURE: 138 MMHG | WEIGHT: 135 LBS | DIASTOLIC BLOOD PRESSURE: 68 MMHG | OXYGEN SATURATION: 96 % | RESPIRATION RATE: 16 BRPM

## 2022-04-22 DIAGNOSIS — Z71.89 OTHER SPECIFIED COUNSELING: ICD-10-CM

## 2022-04-22 DIAGNOSIS — I48.20 CHRONIC ATRIAL FIBRILLATION (H): ICD-10-CM

## 2022-04-22 DIAGNOSIS — G93.41 ACUTE METABOLIC ENCEPHALOPATHY: ICD-10-CM

## 2022-04-22 DIAGNOSIS — T79.6XXD TRAUMATIC RHABDOMYOLYSIS, SUBSEQUENT ENCOUNTER: Primary | ICD-10-CM

## 2022-04-22 DIAGNOSIS — N30.00 ACUTE CYSTITIS WITHOUT HEMATURIA: ICD-10-CM

## 2022-04-22 DIAGNOSIS — E87.1 HYPONATREMIA: ICD-10-CM

## 2022-04-22 PROCEDURE — 99305 1ST NF CARE MODERATE MDM 35: CPT | Performed by: FAMILY MEDICINE

## 2022-04-22 RX ORDER — CIPROFLOXACIN 250 MG/1
250 TABLET, FILM COATED ORAL 2 TIMES DAILY
Qty: 6 TABLET | Refills: 0
Start: 2022-04-22 | End: 2022-04-26

## 2022-04-22 NOTE — PROGRESS NOTES
[facility] GERIATRIC SERVICES    Facility:  [facility] ([facility]) [[id number]]  Code Status: DNR/DNI      CHIEF COMPLAINT/REASON FOR VISIT:  Chief Complaint   Patient presents with     Hospital F/U       HPI:   Katie is a 89 year old female who recently admitted to the hospital 2022.  She reportedly did have a fall at home and does live in independent living.  She is brought to the hospital for falling in the evening of 412 but not being found to 413.  She is brought to the hospital elevated creatinine kinase and rhabdomyolysis and also had hyponatremia 120.  She did receive IV fluids at this time.  And was suspected to have a urinary tract infection was started on ciprofloxacin.  Urine culture grew out multiple organisms.  Overall her condition did improve and then she was discharged on 2022.    She did some agitation in the hospital given a dose of Zyprexa at this time.  She continues to be on this.    Patient is sitting in her chair comfortably.  She says she is doing fine and only has a little bit of back pain but it is likely from the bed.  There is no pain rating down her leg and she has no other issues at this time.    Past Medical History:  Past Medical History:   Diagnosis Date     Anxiety state      Essential hypertension      Persistent atrial fibrillation (H) 2018     Pure hypercholesterolemia            Surgical History:  Past Surgical History:   Procedure Laterality Date     CARDIOVERSION  2018     CATARACT EXTRACTION, BILATERAL Bilateral      DILATION AND CURETTAGE       RELEASE CARPAL TUNNEL Right        Family History:   Family History   Problem Relation Age of Onset     Ovarian Cancer Paternal Aunt      Heart Failure Mother 76.00         at home     Coronary Artery Disease Father 49.00        and a second at age 54     Liver Cancer Father      Coronary Artery Disease Brother 49.00        and  of the second at 62     CABG Brother 53.00     No Known Problems Son       Alcoholism Brother      Cirrhosis Brother      No Known Problems Son         Lives in [place]     No Known Problems Son        Social History:    Social History     Socioeconomic History     Marital status:      Number of children: 3     Years of education: 16   Tobacco Use     Smoking status: Never Smoker     Smokeless tobacco: Never Used   Substance and Sexual Activity     Alcohol use: Yes     Alcohol/week: 7.0 standard drinks     Drug use: No     Sexual activity: Not Currently     Partners: Male   Social History Narrative    Lives alone since her  passed in 2013. She lives in [place]. She still drives in 2018. She walks on her own. She enjoys playing bridge and going out to lunch.    She has 3 sons: Bi lives in [place] and does check in on her.  Another son lives in [place] and travels a lot.  The other son lives in [place] and is retired.  She has 2 grandchildren.    She has advanced directives and Bi is in charge of her affairs.       Post Discharge Medication Reconciliation Status: discharge medications reconciled, continue medications without change    Current Outpatient Medications   Medication Sig     acetaminophen (TYLENOL) 500 MG tablet [ACETAMINOPHEN (TYLENOL) 500 MG TABLET] Take 500 mg by mouth at bedtime.     amLODIPine (NORVASC) 2.5 MG tablet Take 4 tablets (10 mg) by mouth daily     apixaban ANTICOAGULANT (ELIQUIS ANTICOAGULANT) 5 MG tablet Take 1 tablet (5 mg) by mouth 2 times daily     atorvastatin (LIPITOR) 20 MG tablet Take 1 tablet by mouth once daily     diphenhydrAMINE (ANTIHISTAMINE) 25 mg tablet [DIPHENHYDRAMINE (ANTIHISTAMINE) 25 MG TABLET] Take 25 mg by mouth at bedtime.     EUTHYROX 75 MCG tablet Take 1 tablet (75 mcg) by mouth daily     flecainide (TAMBOCOR) 50 MG tablet Take 1 tablet (50 mg) by mouth 2 times daily     losartan (COZAAR) 100 MG tablet Take 1 tablet by mouth once daily     metoprolol tartrate  (LOPRESSOR) 25 MG tablet Take 1 tablet (25 mg) by mouth 2 times daily Take 1/2 (one-half) tablet by mouth twice daily     OLANZapine (ZYPREXA) 2.5 MG tablet Take 1 tablet (2.5 mg) by mouth 2 times daily as needed (agitation)     ciprofloxacin (CIPRO) 250 MG tablet Take 1 tablet (250 mg) by mouth 2 times daily for 3 days     No current facility-administered medications for this visit.       REVIEW OF SYSTEM: Review of systems positive for low back pain without radiation and denies any fevers chills nausea vomit diarrhea change in vision hearing taste or smell weakness one-sided chest pain shortness of breath.  She denies any urgency frequency or burning with urination she is moving her bowels well and the main review of systems is negative.      PHYSICAL EXAM: Patient is alert very pleasant does not appear to be in acute distress head is normocephalic and atraumatic sclera conjunctive is clear oromucosa was moist nose no discharge.  Heart sounds were irregularly irregular with adequate rate control lungs were clear to station abdomen soft nontender bowel sounds are positive.  Extremities show trace edema bilateral neurologic seems nonfocal and affect was pleasant.        LABS: Vitals; blood pressure 138/68    Pulse 64    Temperature is 97.5    Respiration 16    O2 sats 96%.    Hospital labs are as follows sodium was 120 in the hospital potassium was 2.7.  Imaging did show a probable acute compression fracture of the L1.  And creatinine kinase CK was elevated and liver function test within normal limits.  Creatinine was 0.68, anion gap was 18, and BUN was 10.  CO2 is 24.      ASSESSMENT:    Encounter Diagnoses   Name Primary?     Traumatic rhabdomyolysis, subsequent encounter Yes     Hyponatremia      Acute metabolic encephalopathy      Chronic atrial fibrillation (H)      Acute cystitis without hematuria         PLAN: Plan at this time creatinine kinase to be done Monday as well as basic metabolic profile.  We will  continue to monitor above medical problems and no other changes to care plan at this time.  No signs of any infection at this time and consider stopping the Zyprexa at some point as well as stopping the antibiotics.    I will continue to monitor above medical problems and no other changes to care plan at this time.        Electronically signed by: ANALIA GALINDO DO

## 2022-04-22 NOTE — PROGRESS NOTES
Hospitalist follow up note:    Reviewed Ucx, pos for enterococcus. Called TCU and rx'd ciprofloxacin 250mg bid for 3 days.     RN at facility [phone].    Alejandro Chandler DO   Pager #: [phone]

## 2022-04-22 NOTE — LETTER
2022        RE: Katie Mar  [address]  [facility] [address]        [facility] GERIATRIC SERVICES    Facility:  [facility] ([facility]) [[id number]]  Code Status: DNR/DNI      CHIEF COMPLAINT/REASON FOR VISIT:  Chief Complaint   Patient presents with     Hospital F/U       HPI:   Katie is a 89 year old female who recently admitted to the hospital 2022.  She reportedly did have a fall at home and does live in independent living.  She is brought to the hospital for falling in the evening of 412 but not being found to 413.  She is brought to the hospital elevated creatinine kinase and rhabdomyolysis and also had hyponatremia 120.  She did receive IV fluids at this time.  And was suspected to have a urinary tract infection was started on ciprofloxacin.  Urine culture grew out multiple organisms.  Overall her condition did improve and then she was discharged on 2022.    She did some agitation in the hospital given a dose of Zyprexa at this time.  She continues to be on this.    Patient is sitting in her chair comfortably.  She says she is doing fine and only has a little bit of back pain but it is likely from the bed.  There is no pain rating down her leg and she has no other issues at this time.    Past Medical History:  Past Medical History:   Diagnosis Date     Anxiety state      Essential hypertension      Persistent atrial fibrillation (H) 2018     Pure hypercholesterolemia            Surgical History:  Past Surgical History:   Procedure Laterality Date     CARDIOVERSION  2018     CATARACT EXTRACTION, BILATERAL Bilateral      DILATION AND CURETTAGE       RELEASE CARPAL TUNNEL Right        Family History:   Family History   Problem Relation Age of Onset     Ovarian Cancer Paternal Aunt      Heart Failure Mother 76.00         at home     Coronary Artery Disease Father 49.00        and a second at age 54     Liver Cancer Father      Coronary Artery Disease  Brother 49.00        and  of the second at 62     CABG Brother 53.00     No Known Problems Son      Alcoholism Brother      Cirrhosis Brother      No Known Problems Son         Lives in [place]     No Known Problems Son        Social History:    Social History     Socioeconomic History     Marital status:      Number of children: 3     Years of education: 16   Tobacco Use     Smoking status: Never Smoker     Smokeless tobacco: Never Used   Substance and Sexual Activity     Alcohol use: Yes     Alcohol/week: 7.0 standard drinks     Drug use: No     Sexual activity: Not Currently     Partners: Male   Social History Narrative    Lives alone since her  passed in . She lives in [facility] [place] [facility]. She still drives in 2018. She walks on her own. She enjoys playing bridge and going out to lunch.    She has 3 sons: Bi lives in [place] and does check in on her.  Another son lives in [facility] and travels a lot.  The other son lives in [facility] and is retired.  She has 2 grandchildren.    She has advanced directives and Bi is in charge of her affairs.       Post Discharge Medication Reconciliation Status: discharge medications reconciled, continue medications without change    Current Outpatient Medications   Medication Sig     acetaminophen (TYLENOL) 500 MG tablet [ACETAMINOPHEN (TYLENOL) 500 MG TABLET] Take 500 mg by mouth at bedtime.     amLODIPine (NORVASC) 2.5 MG tablet Take 4 tablets (10 mg) by mouth daily     apixaban ANTICOAGULANT (ELIQUIS ANTICOAGULANT) 5 MG tablet Take 1 tablet (5 mg) by mouth 2 times daily     atorvastatin (LIPITOR) 20 MG tablet Take 1 tablet by mouth once daily     diphenhydrAMINE (ANTIHISTAMINE) 25 mg tablet [DIPHENHYDRAMINE (ANTIHISTAMINE) 25 MG TABLET] Take 25 mg by mouth at bedtime.     EUTHYROX 75 MCG tablet Take 1 tablet (75 mcg) by mouth daily     flecainide (TAMBOCOR) 50 MG tablet Take 1 tablet (50 mg) by mouth 2  times daily     losartan (COZAAR) 100 MG tablet Take 1 tablet by mouth once daily     metoprolol tartrate (LOPRESSOR) 25 MG tablet Take 1 tablet (25 mg) by mouth 2 times daily Take 1/2 (one-half) tablet by mouth twice daily     OLANZapine (ZYPREXA) 2.5 MG tablet Take 1 tablet (2.5 mg) by mouth 2 times daily as needed (agitation)     ciprofloxacin (CIPRO) 250 MG tablet Take 1 tablet (250 mg) by mouth 2 times daily for 3 days     No current facility-administered medications for this visit.       REVIEW OF SYSTEM: Review of systems positive for low back pain without radiation and denies any fevers chills nausea vomit diarrhea change in vision hearing taste or smell weakness one-sided chest pain shortness of breath.  She denies any urgency frequency or burning with urination she is moving her bowels well and the main review of systems is negative.      PHYSICAL EXAM: Patient is alert very pleasant does not appear to be in acute distress head is normocephalic and atraumatic sclera conjunctive is clear oromucosa was moist nose no discharge.  Heart sounds were irregularly irregular with adequate rate control lungs were clear to station abdomen soft nontender bowel sounds are positive.  Extremities show trace edema bilateral neurologic seems nonfocal and affect was pleasant.        LABS: Vitals; blood pressure 138/68    Pulse 64    Temperature is 97.5    Respiration 16    O2 sats 96%.    Hospital labs are as follows sodium was 120 in the hospital potassium was 2.7.  Imaging did show a probable acute compression fracture of the L1.  And creatinine kinase CK was elevated and liver function test within normal limits.  Creatinine was 0.68, anion gap was 18, and BUN was 10.  CO2 is 24.      ASSESSMENT:    Encounter Diagnoses   Name Primary?     Traumatic rhabdomyolysis, subsequent encounter Yes     Hyponatremia      Acute metabolic encephalopathy      Chronic atrial fibrillation (H)      Acute cystitis without hematuria          PLAN: Plan at this time creatinine kinase to be done Monday as well as basic metabolic profile.  We will continue to monitor above medical problems and no other changes to care plan at this time.  No signs of any infection at this time and consider stopping the Zyprexa at some point as well as stopping the antibiotics.    I will continue to monitor above medical problems and no other changes to care plan at this time.        Electronically signed by: ANALIA GALINDO DO          Sincerely,        ANALIA GALINDO DO

## 2022-04-23 LAB
BACTERIA UR CULT: ABNORMAL
BACTERIA UR CULT: ABNORMAL

## 2022-04-23 NOTE — PROGRESS NOTES
Urine culture has E. coli.  Was treated with cefdinir in the hospital.  Called the transitional care on 4/23 at 12:12 for new prescription Macrobid 100 mg twice a day for 1 week.  She is allergic to penicillin.

## 2022-04-24 ENCOUNTER — LAB REQUISITION (OUTPATIENT)
Dept: LAB | Facility: CLINIC | Age: 87
End: 2022-04-24
Payer: COMMERCIAL

## 2022-04-24 DIAGNOSIS — G93.41 METABOLIC ENCEPHALOPATHY: ICD-10-CM

## 2022-04-24 DIAGNOSIS — I10 ESSENTIAL (PRIMARY) HYPERTENSION: ICD-10-CM

## 2022-04-25 ENCOUNTER — PATIENT OUTREACH (OUTPATIENT)
Dept: CARE COORDINATION | Facility: CLINIC | Age: 87
End: 2022-04-25

## 2022-04-25 VITALS
RESPIRATION RATE: 18 BRPM | HEART RATE: 60 BPM | TEMPERATURE: 97.3 F | WEIGHT: 135 LBS | SYSTOLIC BLOOD PRESSURE: 126 MMHG | OXYGEN SATURATION: 94 % | HEIGHT: 63 IN | DIASTOLIC BLOOD PRESSURE: 68 MMHG | BODY MASS INDEX: 23.92 KG/M2

## 2022-04-25 LAB
ANION GAP SERPL CALCULATED.3IONS-SCNC: 11 MMOL/L (ref 5–18)
BUN SERPL-MCNC: 18 MG/DL (ref 8–28)
CALCIUM SERPL-MCNC: 8.8 MG/DL (ref 8.5–10.5)
CHLORIDE BLD-SCNC: 99 MMOL/L (ref 98–107)
CK SERPL-CCNC: 65 U/L (ref 30–190)
CO2 SERPL-SCNC: 26 MMOL/L (ref 22–31)
CREAT SERPL-MCNC: 0.74 MG/DL (ref 0.6–1.1)
ERYTHROCYTE [DISTWIDTH] IN BLOOD BY AUTOMATED COUNT: 13.4 % (ref 10–15)
GFR SERPL CREATININE-BSD FRML MDRD: 76 ML/MIN/1.73M2
GLUCOSE BLD-MCNC: 95 MG/DL (ref 70–125)
HCT VFR BLD AUTO: 38.2 % (ref 35–47)
HGB BLD-MCNC: 12.4 G/DL (ref 11.7–15.7)
MCH RBC QN AUTO: 31.5 PG (ref 26.5–33)
MCHC RBC AUTO-ENTMCNC: 32.5 G/DL (ref 31.5–36.5)
MCV RBC AUTO: 97 FL (ref 78–100)
PLATELET # BLD AUTO: 338 10E3/UL (ref 150–450)
POTASSIUM BLD-SCNC: 3.6 MMOL/L (ref 3.5–5)
RBC # BLD AUTO: 3.94 10E6/UL (ref 3.8–5.2)
SODIUM SERPL-SCNC: 136 MMOL/L (ref 136–145)
WBC # BLD AUTO: 8.5 10E3/UL (ref 4–11)

## 2022-04-25 PROCEDURE — 80048 BASIC METABOLIC PNL TOTAL CA: CPT | Performed by: FAMILY MEDICINE

## 2022-04-25 PROCEDURE — P9604 ONE-WAY ALLOW PRORATED TRIP: HCPCS | Performed by: FAMILY MEDICINE

## 2022-04-25 PROCEDURE — 85027 COMPLETE CBC AUTOMATED: CPT | Performed by: FAMILY MEDICINE

## 2022-04-25 PROCEDURE — 82550 ASSAY OF CK (CPK): CPT | Performed by: FAMILY MEDICINE

## 2022-04-25 PROCEDURE — 36415 COLL VENOUS BLD VENIPUNCTURE: CPT | Performed by: FAMILY MEDICINE

## 2022-04-25 RX ORDER — LIDOCAINE 40 MG/G
CREAM TOPICAL DAILY
COMMUNITY

## 2022-04-25 RX ORDER — NITROFURANTOIN MACROCRYSTAL 100 MG
100 CAPSULE ORAL 2 TIMES DAILY
COMMUNITY
Start: 2022-04-25 | End: 2022-04-28

## 2022-04-25 NOTE — PROGRESS NOTES
Clinic Care Coordination Contact  Care Coordination Transition Communication         Clinical Data: Patient was hospitalized at [facility]    dmission Date: 4/13/2022    Discharge Provider: Alejandro Chandler DO Discharge Date: 4/21/2022     Condition at Discharge: Stable     REASON FOR PRESENTATION(See Admission Note for Details)   Fall at home  PRINCIPAL & ACTIVE DISCHARGE DIAGNOSES   Fall  Rhabdomyolysis  L1 compression fracture  Acute metabolic encephalopathy  Possible urinary tract infection  Chronic atrial fibrillation    Transition to Facility:              Facility Name: [facility]              Contact name and phone number/fax: [phone]    Plan: RN/SW Care Coordinator will await notification from facility staff informing RN/SW Care Coordinator of patient's discharge plans/needs. RN/SW Care Coordinator will review chart and outreach to facility staff every 4 weeks and as needed.     Nicky Daugherty,   [facility]  [phone]

## 2022-04-25 NOTE — PROGRESS NOTES
[facility] GERIATRIC SERVICES    Facility:  [facility] ([facility]) [[id number]]  Code Status: DNR/DNI      CHIEF COMPLAINT/REASON FOR VISIT:  Chief Complaint   Patient presents with     Hospital F/U       HISTORY:      HPI: Katie is a 89 year old female who resides here at [facility] recent hospitalization was on 2022 and was for a fall she sustained.  She was on the floor for some time and was brought to the hospital was diagnosed with hyponatremia which she did receive IV fluids and also had elevated creatinine kinase indicated rhabdomyolysis.  She was treated with ciprofloxacin which is done at this time and your urine culture grew out multiple organisms.  This could have been asymptomatic bacteriuria but however she was somewhat dehydrated.    In the hospital she did have some agitation given his dose of Zyprexa and according the nursing notes she has not taken this as a as needed medication so it will be stopped.    Patient claims she is doing well at this time and pain she is getting close and improving.  She is not back to her normal self at this time as far as walking and ambulating at this time so more work is needed.  Otherwise she is not showing any signs of infection.        Past Medical History:   Diagnosis Date     Anxiety state      Essential hypertension      Persistent atrial fibrillation (H) 2018     Pure hypercholesterolemia              Family History   Problem Relation Age of Onset     Ovarian Cancer Paternal Aunt      Heart Failure Mother 76.00         at home     Coronary Artery Disease Father 49.00        and a second at age 54     Liver Cancer Father      Coronary Artery Disease Brother 49.00        and  of the second at 62     CABG Brother 53.00     No Known Problems Son      Alcoholism Brother      Cirrhosis Brother      No Known Problems Son         Lives in [place]     No Known Problems Son      Social History     Socioeconomic History     Marital  status:      Number of children: 3     Years of education: 16   Tobacco Use     Smoking status: Never Smoker     Smokeless tobacco: Never Used   Substance and Sexual Activity     Alcohol use: Yes     Alcohol/week: 7.0 standard drinks     Drug use: No     Sexual activity: Not Currently     Partners: Male   Social History Narrative    Lives alone since her  passed in 2013. She lives in [place]. She still drives in 2018. She walks on her own. She enjoys playing bridge and going out to lunch.    She has 3 sons: Bi lives in [place] and does check in on her.  Another son lives in [place] [facility] and travels a lot.  The other son lives in [facility] [place] [facility] and is retired.  She has 2 grandchildren.    She has advanced directives and Bi is in charge of her affairs.         REVIEW OF SYSTEM: Patient denies any pain fever chills nausea vomit diarrhea change in vision hearing to smell weakness one-sided chest pain shortness of breath.  The remainder the review of systems negative.      PHYSICAL EXAM: Patient is alert pleasant does not appear to be in acute distress has no cephalic and atraumatic sclera conjunctive is clear mucosas moist nasal discharge.  Heart sounds were irregularly irregular with adequate rate control lungs were clear to auscultation.  Neurologic seems nonfocal and affect was pleasant.        LABS: On 4/25/2022 white count was 8.5, hemoglobin was 12.4, hematocrit was 38.2, platelets were 338,000.    Basic metabolic profile; sodium was 136, potassium 3.6, chloride was 99, CO2 is 26, anion gap was 11, BUN was 18, creatinine 0.74, calcium was 8.8, GFR 76, creatinine kinase was 65.    Vital; blood pressure 121/70    Pulse 68    Temperature is 97    Respirations 18    O2 sats 95%.      ASSESSMENT:   Encounter Diagnoses   Name Primary?     Traumatic rhabdomyolysis, subsequent encounter Yes     Hyponatremia      Acute metabolic encephalopathy      Chronic  atrial fibrillation (H)      Acute cystitis without hematuria         PLAN: Today's plan is to DC the Zyprexa she has not used it at this time and continue with physical and Occupational Therapy.  I did review her labs today rhabdomyolysis is fine at this time and she is known so showing no signs of infection.    I will continue to monitor above medical problems and no other changes to care plan at this time.  Care plan was reviewed and is appropriate.        Electronically signed by: ANALIA GALINDO DO

## 2022-04-26 ENCOUNTER — TRANSITIONAL CARE UNIT VISIT (OUTPATIENT)
Dept: GERIATRICS | Facility: CLINIC | Age: 87
End: 2022-04-26
Payer: MEDICARE

## 2022-04-26 DIAGNOSIS — T79.6XXD TRAUMATIC RHABDOMYOLYSIS, SUBSEQUENT ENCOUNTER: Primary | ICD-10-CM

## 2022-04-26 DIAGNOSIS — G93.41 ACUTE METABOLIC ENCEPHALOPATHY: ICD-10-CM

## 2022-04-26 DIAGNOSIS — N30.00 ACUTE CYSTITIS WITHOUT HEMATURIA: ICD-10-CM

## 2022-04-26 DIAGNOSIS — I48.20 CHRONIC ATRIAL FIBRILLATION (H): ICD-10-CM

## 2022-04-26 DIAGNOSIS — E87.1 HYPONATREMIA: ICD-10-CM

## 2022-04-26 PROCEDURE — 99309 SBSQ NF CARE MODERATE MDM 30: CPT | Performed by: FAMILY MEDICINE

## 2022-04-26 NOTE — LETTER
2022        RE: Katie Mar  [address]  [facility] [address]        [facility]    Facility:  [facility] ([facility]) [[id number]]  Code Status: DNR/DNI      CHIEF COMPLAINT/REASON FOR VISIT:  Chief Complaint   Patient presents with     Hospital F/U       HISTORY:      HPI: Katie is a 89 year old female who resides here at [facility]note 3] recent hospitalization was on 2022 and was for a fall she sustained.  She was on the floor for some time and was brought to the hospital was diagnosed with hyponatremia which she did receive IV fluids and also had elevated creatinine kinase indicated rhabdomyolysis.  She was treated with ciprofloxacin which is done at this time and your urine culture grew out multiple organisms.  This could have been asymptomatic bacteriuria but however she was somewhat dehydrated.    In the hospital she did have some agitation given his dose of Zyprexa and according the nursing notes she has not taken this as a as needed medication so it will be stopped.    Patient claims she is doing well at this time and pain she is getting close and improving.  She is not back to her normal self at this time as far as walking and ambulating at this time so more work is needed.  Otherwise she is not showing any signs of infection.        Past Medical History:   Diagnosis Date     Anxiety state      Essential hypertension      Persistent atrial fibrillation (H) 2018     Pure hypercholesterolemia              Family History   Problem Relation Age of Onset     Ovarian Cancer Paternal Aunt      Heart Failure Mother 76.00         at home     Coronary Artery Disease Father 49.00        and a second at age 54     Liver Cancer Father      Coronary Artery Disease Brother 49.00        and  of the second at 62     CABG Brother 53.00     No Known Problems Son      Alcoholism Brother      Cirrhosis Brother      No Known Problems Son         Lives in  [place]     No Known Problems Son      Social History     Socioeconomic History     Marital status:      Number of children: 3     Years of education: 16   Tobacco Use     Smoking status: Never Smoker     Smokeless tobacco: Never Used   Substance and Sexual Activity     Alcohol use: Yes     Alcohol/week: 7.0 standard drinks     Drug use: No     Sexual activity: Not Currently     Partners: Male   Social History Narrative    Lives alone since her  passed in 2013. She lives in [facility]. She still drives in 2018. She walks on her own. She enjoys playing bridge and going out to lunch.    She has 3 sons: Bi lives in [place] [place] [facility] and does check in on her.  Another son lives in [facility] [place] [facility] and travels a lot.  The other son lives in [facility] and is retired.  She has 2 grandchildren.    She has advanced directives and Bi is in charge of her affairs.         REVIEW OF SYSTEM: Patient denies any pain fever chills nausea vomit diarrhea change in vision hearing to smell weakness one-sided chest pain shortness of breath.  The remainder the review of systems negative.      PHYSICAL EXAM: Patient is alert pleasant does not appear to be in acute distress has no cephalic and atraumatic sclera conjunctive is clear mucosas moist nasal discharge.  Heart sounds were irregularly irregular with adequate rate control lungs were clear to auscultation.  Neurologic seems nonfocal and affect was pleasant.        LABS: On 4/25/2022 white count was 8.5, hemoglobin was 12.4, hematocrit was 38.2, platelets were 338,000.    Basic metabolic profile; sodium was 136, potassium 3.6, chloride was 99, CO2 is 26, anion gap was 11, BUN was 18, creatinine 0.74, calcium was 8.8, GFR 76, creatinine kinase was 65.    Vital; blood pressure 121/70    Pulse 68    Temperature is 97    Respirations 18    O2 sats 95%.      ASSESSMENT:   Encounter Diagnoses   Name Primary?     Traumatic rhabdomyolysis,  subsequent encounter Yes     Hyponatremia      Acute metabolic encephalopathy      Chronic atrial fibrillation (H)      Acute cystitis without hematuria         PLAN: Today's plan is to DC the Zyprexa she has not used it at this time and continue with physical and Occupational Therapy.  I did review her labs today rhabdomyolysis is fine at this time and she is known so showing no signs of infection.    I will continue to monitor above medical problems and no other changes to care plan at this time.  Care plan was reviewed and is appropriate.        Electronically signed by: ANALIA GALINDO DO          Sincerely,        ANALIA GALINDO DO

## 2022-04-28 ENCOUNTER — TRANSITIONAL CARE UNIT VISIT (OUTPATIENT)
Dept: GERIATRICS | Facility: CLINIC | Age: 87
End: 2022-04-28
Payer: MEDICARE

## 2022-04-28 VITALS
DIASTOLIC BLOOD PRESSURE: 52 MMHG | RESPIRATION RATE: 20 BRPM | BODY MASS INDEX: 23.92 KG/M2 | WEIGHT: 135 LBS | HEART RATE: 54 BPM | TEMPERATURE: 97.2 F | OXYGEN SATURATION: 97 % | HEIGHT: 63 IN | SYSTOLIC BLOOD PRESSURE: 104 MMHG

## 2022-04-28 DIAGNOSIS — E87.1 HYPONATREMIA: ICD-10-CM

## 2022-04-28 DIAGNOSIS — I48.20 CHRONIC ATRIAL FIBRILLATION (H): ICD-10-CM

## 2022-04-28 DIAGNOSIS — T79.6XXD TRAUMATIC RHABDOMYOLYSIS, SUBSEQUENT ENCOUNTER: Primary | ICD-10-CM

## 2022-04-28 DIAGNOSIS — N30.00 ACUTE CYSTITIS WITHOUT HEMATURIA: ICD-10-CM

## 2022-04-28 PROCEDURE — 99309 SBSQ NF CARE MODERATE MDM 30: CPT | Performed by: FAMILY MEDICINE

## 2022-04-28 NOTE — PROGRESS NOTES
[facility] GERIATRIC SERVICES    Facility:  [facility] ([facility]) [[id number]]  Code Status: DNR/DNI      CHIEF COMPLAINT/REASON FOR VISIT:  Chief Complaint   Patient presents with     RECHECK       HISTORY:      HPI: Katie is a 89 year old female I am seeing today in the TCU follow-up for multiple medical problems recent hospitalization on 2022 when she did have rhabdomyolysis after being found on the floor.  IV fluids were given at this time and her creatinine kinase did return to normal limits she also did have some hyponatremia however her last sodium was within normal limits.  A UTI was also treated in the hospital with ciprofloxacin.  And she did have some agitation in the hospital and was put on Zyprexa which I did stop here in the TCU.  No further signs of agitation.    Patient is doing well at this time she is sitting in her chair comfortably says she feels a little sleepy but no other issues and slept well last night.  Appetite is good at this time and she has no issues.    Past Medical History:   Diagnosis Date     Anxiety state      Essential hypertension      Persistent atrial fibrillation (H) 2018     Pure hypercholesterolemia              Family History   Problem Relation Age of Onset     Ovarian Cancer Paternal Aunt      Heart Failure Mother 76.00         at home     Coronary Artery Disease Father 49.00        and a second at age 54     Liver Cancer Father      Coronary Artery Disease Brother 49.00        and  of the second at 62     CABG Brother 53.00     No Known Problems Son      Alcoholism Brother      Cirrhosis Brother      No Known Problems Son         Lives in [place]     No Known Problems Son      Social History     Socioeconomic History     Marital status:      Number of children: 3     Years of education: 16   Tobacco Use     Smoking status: Never Smoker     Smokeless tobacco: Never Used   Substance and Sexual Activity     Alcohol use: Yes     Alcohol/week: 7.0  standard drinks     Drug use: No     Sexual activity: Not Currently     Partners: Male   Social History Narrative    Lives alone since her  passed in 2013. She lives in [place]. She still drives in 2018. She walks on her own. She enjoys playing bridge and going out to lunch.    She has 3 sons: Bi lives in [facility] and does check in on her.  Another son lives in [facility] [place] and travels a lot.  The other son lives in [facility] and is retired.  She has 2 grandchildren.    She has advanced directives and Bi is in charge of her affairs.         REVIEW OF SYSTEM: Patient denies any pain fevers chills nausea vomit diarrhea change in vision hearing taste or smell weakness one-sided the chest pain shortness of breath.  She denies any current shortness stool polyphagia polydipsia polyuria depression or anxiety and the remainder the review of systems is negative.      PHYSICAL EXAM: Patient is alert pleasant does not appear to be in acute distress head is normocephalic and atraumatic sclera conjunctiva is clear oromucosa was moist nasal discharge.  Heart sounds were irregularly irregular with adequate rate control lungs were clear to auscultation abdomen soft nontender extremities only show trace edema bilateral.  Neurologically was nonfocal and affect was pleasant.        LABS: Creatinine kinase returned to normal at approximately 65 basic metabolic profile was within normal limits and white count hemoglobin and platelets were all normal.    Vitals; from yesterday blood pressure 104/52    Pulse 54    Temperature is 97.2    Respirations 20    O2 sats 97%.      ASSESSMENT:   Encounter Diagnoses   Name Primary?     Traumatic rhabdomyolysis, subsequent encounter Yes     Hyponatremia      Chronic atrial fibrillation (H)      Acute cystitis without hematuria         PLAN: Plan at this time will to continue to monitor above medical problems and no other changes to care  plan at this time.  I did med reconciliation and took off the doxycycline which she is done with at this time and also I took her off the Zyprexa which was put on in the hospital which she was not using here and did not need it.    Is no signs of any infection and patient is progressing as anticipated with physical and Occupational Therapy.        Electronically signed by: ANALIA GALINDO DO

## 2022-04-28 NOTE — LETTER
2022        RE: Katie Mar  [address]  [facility] [address]        [facility] GERIATRIC SERVICES    Facility:  [facility] ([facility]) [[id number]]  Code Status: DNR/DNI      CHIEF COMPLAINT/REASON FOR VISIT:  Chief Complaint   Patient presents with     RECHECK       HISTORY:      HPI: Katie is a 89 year old female I am seeing today in the TCU follow-up for multiple medical problems recent hospitalization on 2022 when she did have rhabdomyolysis after being found on the floor.  IV fluids were given at this time and her creatinine kinase did return to normal limits she also did have some hyponatremia however her last sodium was within normal limits.  A UTI was also treated in the hospital with ciprofloxacin.  And she did have some agitation in the hospital and was put on Zyprexa which I did stop here in the TCU.  No further signs of agitation.    Patient is doing well at this time she is sitting in her chair comfortably says she feels a little sleepy but no other issues and slept well last night.  Appetite is good at this time and she has no issues.    Past Medical History:   Diagnosis Date     Anxiety state      Essential hypertension      Persistent atrial fibrillation (H) 2018     Pure hypercholesterolemia              Family History   Problem Relation Age of Onset     Ovarian Cancer Paternal Aunt      Heart Failure Mother 76.00         at home     Coronary Artery Disease Father 49.00        and a second at age 54     Liver Cancer Father      Coronary Artery Disease Brother 49.00        and  of the second at 62     CABG Brother 53.00     No Known Problems Son      Alcoholism Brother      Cirrhosis Brother      No Known Problems Son         Lives in [place]     No Known Problems Son      Social History     Socioeconomic History     Marital status:      Number of children: 3     Years of education: 16   Tobacco Use     Smoking status: Never Smoker      Smokeless tobacco: Never Used   Substance and Sexual Activity     Alcohol use: Yes     Alcohol/week: 7.0 standard drinks     Drug use: No     Sexual activity: Not Currently     Partners: Male   Social History Narrative    Lives alone since her  passed in 2013. She lives in [facility]. She still drives in 2018. She walks on her own. She enjoys playing bridge and going out to lunch.    She has 3 sons: Bi lives in [facility] [place] and does check in on her.  Another son lives in [facility] [facility] and travels a lot.  The other son lives in [facility] and is retired.  She has 2 grandchildren.    She has advanced directives and Bi is in charge of her affairs.         REVIEW OF SYSTEM: Patient denies any pain fevers chills nausea vomit diarrhea change in vision hearing taste or smell weakness one-sided the chest pain shortness of breath.  She denies any current shortness stool polyphagia polydipsia polyuria depression or anxiety and the remainder the review of systems is negative.      PHYSICAL EXAM: Patient is alert pleasant does not appear to be in acute distress head is normocephalic and atraumatic sclera conjunctiva is clear oromucosa was moist nasal discharge.  Heart sounds were irregularly irregular with adequate rate control lungs were clear to auscultation abdomen soft nontender extremities only show trace edema bilateral.  Neurologically was nonfocal and affect was pleasant.        LABS: Creatinine kinase returned to normal at approximately 65 basic metabolic profile was within normal limits and white count hemoglobin and platelets were all normal.    Vitals; from yesterday blood pressure 104/52    Pulse 54    Temperature is 97.2    Respirations 20    O2 sats 97%.      ASSESSMENT:   Encounter Diagnoses   Name Primary?     Traumatic rhabdomyolysis, subsequent encounter Yes     Hyponatremia      Chronic atrial fibrillation (H)      Acute cystitis without hematuria          PLAN: Plan at this time will to continue to monitor above medical problems and no other changes to care plan at this time.  I did med reconciliation and took off the doxycycline which she is done with at this time and also I took her off the Zyprexa which was put on in the hospital which she was not using here and did not need it.    Is no signs of any infection and patient is progressing as anticipated with physical and Occupational Therapy.        Electronically signed by: ANALIA GALINDO DO          Sincerely,        ANALIA GALINDO DO

## 2022-05-03 ENCOUNTER — TRANSITIONAL CARE UNIT VISIT (OUTPATIENT)
Dept: GERIATRICS | Facility: CLINIC | Age: 87
End: 2022-05-03
Payer: MEDICARE

## 2022-05-03 VITALS
TEMPERATURE: 97.5 F | RESPIRATION RATE: 18 BRPM | HEIGHT: 63 IN | OXYGEN SATURATION: 95 % | HEART RATE: 68 BPM | SYSTOLIC BLOOD PRESSURE: 149 MMHG | BODY MASS INDEX: 23.91 KG/M2 | DIASTOLIC BLOOD PRESSURE: 56 MMHG

## 2022-05-03 DIAGNOSIS — N30.00 ACUTE CYSTITIS WITHOUT HEMATURIA: ICD-10-CM

## 2022-05-03 DIAGNOSIS — E87.1 HYPONATREMIA: ICD-10-CM

## 2022-05-03 DIAGNOSIS — T79.6XXD TRAUMATIC RHABDOMYOLYSIS, SUBSEQUENT ENCOUNTER: Primary | ICD-10-CM

## 2022-05-03 DIAGNOSIS — G93.41 ACUTE METABOLIC ENCEPHALOPATHY: ICD-10-CM

## 2022-05-03 DIAGNOSIS — I48.20 CHRONIC ATRIAL FIBRILLATION (H): ICD-10-CM

## 2022-05-03 PROCEDURE — 99309 SBSQ NF CARE MODERATE MDM 30: CPT | Performed by: FAMILY MEDICINE

## 2022-05-03 NOTE — LETTER
5/3/2022        RE: Katie Mar  [address]  [facility] [address]        [facility]    Facility:  [facility] ([facility]) [[id number]]  Code Status: DNR/DNI      CHIEF COMPLAINT/REASON FOR VISIT:  Chief Complaint   Patient presents with     RECHECK       HISTORY:      HPI: Katie is a 89 year old female [facility] recent hospitalization on 2022 traumatic rhabdomyolysis was diagnosis that she in the hospital she did receive IV fluids.  Her creatinine kinase I did recheck here and it did return to normal limits and her sodium was pretty much within normal limits.  She is also treated for UTI and she does some agitation in the hospital was 1 Zyprexa however I did stop that recently.    Patient is seen in chair comfortably she is improving on thickened liquids at this time but a trial of thin liquids she did have some coughing.  Her lungs have been clear at this time and she has had no chest pain or shortness of breath.  She is progressing as anticipated with physical and Occupational Therapy.    Past Medical History:   Diagnosis Date     Anxiety state      Essential hypertension      Persistent atrial fibrillation (H) 2018     Pure hypercholesterolemia              Family History   Problem Relation Age of Onset     Ovarian Cancer Paternal Aunt      Heart Failure Mother 76.00         at home     Coronary Artery Disease Father 49.00        and a second at age 54     Liver Cancer Father      Coronary Artery Disease Brother 49.00        and  of the second at 62     CABG Brother 53.00     No Known Problems Son      Alcoholism Brother      Cirrhosis Brother      No Known Problems Son         Lives in [place]     No Known Problems Son      Social History     Socioeconomic History     Marital status:      Number of children: 3     Years of education: 16   Tobacco Use     Smoking status: Never Smoker     Smokeless  tobacco: Never Used   Substance and Sexual Activity     Alcohol use: Yes     Alcohol/week: 7.0 standard drinks     Drug use: No     Sexual activity: Not Currently     Partners: Male   Social History Narrative    Lives alone since her  passed in 2013. She lives in [facility]. She still drives in 2018. She walks on her own. She enjoys playing bridge and going out to lunch.    She has 3 sons: Bi lives in [facility] and does check in on her.  Another son lives in [facility] and travels a lot.  The other son lives in [facility] and is retired.  She has 2 grandchildren.    She has advanced directives and Bi is in charge of her affairs.         REVIEW OF SYSTEM: Patient denies any pain fevers chills nausea vomit diarrhea change in vision hearing taste or smell weakness one-sided chest pain shortness of breath.  The remainder review of systems is negative.    She is urinating well and moving her bowels without difficulty.      PHYSICAL EXAM: Patient is alert pleasant does not appear to be in acute distress head is normocephalic and atraumatic sclera conjunctiva was clear mucosas moist nasal discharge heart sounds were irregularly irregular with adequate rate control lungs were clear to auscultation abdomen soft nontender neurologic Willie was nonfocal and affect was pleasant.        LABS: On 4/25/2022 white count was 8.5, hemoglobin is 12.4, platelets were 338,000.    Basic metabolic profile; sodium is 136, potassium 3.6, chloride 99, CO2 is 26, anion gap was 11, BUN was 18, creatinine 0.74, calcium was 8.8, glucose was 95, GFR was 76, creatinine kinase was 65 within normal limits.    Vital; blood pressure 149/56    Pulse 68    Temperature is 97.5    Respirations 18    O2 sats 95%.      ASSESSMENT:   Encounter Diagnoses   Name Primary?     Traumatic rhabdomyolysis, subsequent encounter Yes     Hyponatremia      Acute metabolic encephalopathy      Acute cystitis without  hematuria      Chronic atrial fibrillation (H)         PLAN: Plan at this time we will continue to monitor above medical problems and no other changes to care plan at this time.  Care plan was reviewed and is appropriate.        Electronically signed by: ANALIA GALINDO DO          Sincerely,        ANALIA GALINDO DO

## 2022-05-03 NOTE — PROGRESS NOTES
[facility] GERIATRIC SERVICES    Facility:  [facility] ([facility]) [[id number]]  Code Status: DNR/DNI      CHIEF COMPLAINT/REASON FOR VISIT:  Chief Complaint   Patient presents with     RECHECK       HISTORY:      HPI: Katie is a 89 year old female who resides here at the [facility] TCU after recent hospitalization on 2022 traumatic rhabdomyolysis was diagnosis that she in the hospital she did receive IV fluids.  Her creatinine kinase I did recheck here and it did return to normal limits and her sodium was pretty much within normal limits.  She is also treated for UTI and she does some agitation in the hospital was 1 Zyprexa however I did stop that recently.    Patient is seen in chair comfortably she is improving on thickened liquids at this time but a trial of thin liquids she did have some coughing.  Her lungs have been clear at this time and she has had no chest pain or shortness of breath.  She is progressing as anticipated with physical and Occupational Therapy.    Past Medical History:   Diagnosis Date     Anxiety state      Essential hypertension      Persistent atrial fibrillation (H) 2018     Pure hypercholesterolemia              Family History   Problem Relation Age of Onset     Ovarian Cancer Paternal Aunt      Heart Failure Mother 76.00         at home     Coronary Artery Disease Father 49.00        and a second at age 54     Liver Cancer Father      Coronary Artery Disease Brother 49.00        and  of the second at 62     CABG Brother 53.00     No Known Problems Son      Alcoholism Brother      Cirrhosis Brother      No Known Problems Son         Lives in [place]     No Known Problems Son      Social History     Socioeconomic History     Marital status:      Number of children: 3     Years of education: 16   Tobacco Use     Smoking status: Never Smoker     Smokeless tobacco: Never Used   Substance and Sexual Activity     Alcohol use: Yes     Alcohol/week: 7.0 standard  drinks     Drug use: No     Sexual activity: Not Currently     Partners: Male   Social History Narrative    Lives alone since her  passed in 2013. She lives in [facility] [place] [facility]. She still drives in 2018. She walks on her own. She enjoys playing bridge and going out to lunch.    She has 3 sons: Bi lives in [facility] and does check in on her.  Another son lives in [facility] and travels a lot.  The other son lives in [facility] and is retired.  She has 2 grandchildren.    She has advanced directives and Bi is in charge of her affairs.         REVIEW OF SYSTEM: Patient denies any pain fevers chills nausea vomit diarrhea change in vision hearing taste or smell weakness one-sided chest pain shortness of breath.  The remainder review of systems is negative.    She is urinating well and moving her bowels without difficulty.      PHYSICAL EXAM: Patient is alert pleasant does not appear to be in acute distress head is normocephalic and atraumatic sclera conjunctiva was clear mucosas moist nasal discharge heart sounds were irregularly irregular with adequate rate control lungs were clear to auscultation abdomen soft nontender neurologic Willie was nonfocal and affect was pleasant.        LABS: On 4/25/2022 white count was 8.5, hemoglobin is 12.4, platelets were 338,000.    Basic metabolic profile; sodium is 136, potassium 3.6, chloride 99, CO2 is 26, anion gap was 11, BUN was 18, creatinine 0.74, calcium was 8.8, glucose was 95, GFR was 76, creatinine kinase was 65 within normal limits.    Vital; blood pressure 149/56    Pulse 68    Temperature is 97.5    Respirations 18    O2 sats 95%.      ASSESSMENT:   Encounter Diagnoses   Name Primary?     Traumatic rhabdomyolysis, subsequent encounter Yes     Hyponatremia      Acute metabolic encephalopathy      Acute cystitis without hematuria      Chronic atrial fibrillation (H)         PLAN: Plan at this time we will continue to  monitor above medical problems and no other changes to care plan at this time.  Care plan was reviewed and is appropriate.        Electronically signed by: ANALIA GALINDO DO

## 2022-05-05 ENCOUNTER — TRANSITIONAL CARE UNIT VISIT (OUTPATIENT)
Dept: GERIATRICS | Facility: CLINIC | Age: 87
End: 2022-05-05
Payer: MEDICARE

## 2022-05-05 VITALS
TEMPERATURE: 97.8 F | RESPIRATION RATE: 17 BRPM | HEART RATE: 78 BPM | DIASTOLIC BLOOD PRESSURE: 63 MMHG | SYSTOLIC BLOOD PRESSURE: 131 MMHG | BODY MASS INDEX: 23.92 KG/M2 | OXYGEN SATURATION: 95 % | HEIGHT: 63 IN | WEIGHT: 135 LBS

## 2022-05-05 DIAGNOSIS — T79.6XXD TRAUMATIC RHABDOMYOLYSIS, SUBSEQUENT ENCOUNTER: Primary | ICD-10-CM

## 2022-05-05 DIAGNOSIS — I48.20 CHRONIC ATRIAL FIBRILLATION (H): ICD-10-CM

## 2022-05-05 DIAGNOSIS — E87.1 HYPONATREMIA: ICD-10-CM

## 2022-05-05 DIAGNOSIS — G93.41 ACUTE METABOLIC ENCEPHALOPATHY: ICD-10-CM

## 2022-05-05 DIAGNOSIS — N30.00 ACUTE CYSTITIS WITHOUT HEMATURIA: ICD-10-CM

## 2022-05-05 PROCEDURE — 99309 SBSQ NF CARE MODERATE MDM 30: CPT | Performed by: FAMILY MEDICINE

## 2022-05-05 NOTE — LETTER
2022        RE: Katie Mar  [address]  [facility] [address]        [facility]    Facility:  [facility] ([facility]) [[id number]]  Code Status: DNR/DNI      CHIEF COMPLAINT/REASON FOR VISIT:  Chief Complaint   Patient presents with     RECHECK       HISTORY:      HPI: Katie is a 89 [facility]  Her recent hospitalization was for rhabdomyolysis and that was on 2022 creatinine kinase did return to normal and she is also treated for a UTI.  He did have some agitation in the hospital was placed on Zyprexa which I have stopped.  She has not needed this at this time and she has done well without it.  She is progressing as anticipated with physical and Occupational Therapy.    Patient is seen in chair comfortably she says she is doing fine at this time she is okay she is progressing as anticipated with physical occupational therapy has no real pain issues.  No signs of any infection at this time.  Her cognition appears to be about baseline.    Past Medical History:   Diagnosis Date     Anxiety state      Essential hypertension      Persistent atrial fibrillation (H) 2018     Pure hypercholesterolemia              Family History   Problem Relation Age of Onset     Ovarian Cancer Paternal Aunt      Heart Failure Mother 76.00         at home     Coronary Artery Disease Father 49.00        and a second at age 54     Liver Cancer Father      Coronary Artery Disease Brother 49.00        and  of the second at 62     CABG Brother 53.00     No Known Problems Son      Alcoholism Brother      Cirrhosis Brother      No Known Problems Son         Lives in [place]     No Known Problems Son      Social History     Socioeconomic History     Marital status:      Number of children: 3     Years of education: 16   Tobacco Use     Smoking status: Never Smoker     Smokeless tobacco: Never Used   Substance and Sexual Activity      Alcohol use: Yes     Alcohol/week: 7.0 standard drinks     Drug use: No     Sexual activity: Not Currently     Partners: Male   Social History Narrative    Lives alone since her  passed in 2013. She lives in [place]. She still drives in 2018. She walks on her own. She enjoys playing bridge and going out to lunch.    She has 3 sons: Bi lives in [facility] and does check in on her.  Another son lives in [facility] and travels a lot.  The other son lives in [facility] and is retired.  She has 2 grandchildren.    She has advanced directives and Bi is in charge of her affairs.         REVIEW OF SYSTEM: Patient denies any pain fevers chills nausea vomit diarrhea change in vision hearing taste or smell weakness one-sided other chest pain shortness of breath.  Denies any current shortness stool polyphagia polydipsia polyuria depression or anxiety in the remainder the review of systems is negative.      PHYSICAL EXAM: Patient alert pleasant does not appear to be in acute distress head is no swelling and atraumatic sclera conjunctive is clear mucosas moist nasal discharge.  Heart sounds are irregularly irregular with adequate rate control lungs are clear to auscultation without any crackles rales or wheezes.  Neurologic Willie was baseline and affect was pleasant.        LABS: Labs from 4/25/2022 CBC was all within normal limits and basic metabolic profile was within normal limits and creatinine kinase was 65.    Vital; blood pressure 131/63    Pulse 78    Temperature is 97.8    Respirations 17    O2 sats 95%.      ASSESSMENT:   Encounter Diagnoses   Name Primary?     Traumatic rhabdomyolysis, subsequent encounter Yes     Acute metabolic encephalopathy      Chronic atrial fibrillation (H)      Hyponatremia      Acute cystitis without hematuria         PLAN: Plan at this time we will continue to monitor above medical problems and no new changes.  Care plan was reviewed  and is appropriate at this time.    She will likely from a medical standpoint be okay to go home soon I will check with therapies to where she stands with progressing with therapies and when she will be ready to be discharged.    No other changes to care plan at this time.        Electronically signed by: ANALIA GALINDO DO          Sincerely,        ANALIA GALINDO DO

## 2022-05-05 NOTE — PROGRESS NOTES
[facility] GERIATRIC SERVICES    Facility:  [facility] ([facility]) [[id number]]  Code Status: DNR/DNI      CHIEF COMPLAINT/REASON FOR VISIT:  Chief Complaint   Patient presents with     RECHECK       HISTORY:      HPI: Katie is a 89 year old female Zeitz at the [facility] TCU.  Her recent hospitalization was for rhabdomyolysis and that was on 2022 creatinine kinase did return to normal and she is also treated for a UTI.  He did have some agitation in the hospital was placed on Zyprexa which I have stopped.  She has not needed this at this time and she has done well without it.  She is progressing as anticipated with physical and Occupational Therapy.    Patient is seen in chair comfortably she says she is doing fine at this time she is okay she is progressing as anticipated with physical occupational therapy has no real pain issues.  No signs of any infection at this time.  Her cognition appears to be about baseline.    Past Medical History:   Diagnosis Date     Anxiety state      Essential hypertension      Persistent atrial fibrillation (H) 2018     Pure hypercholesterolemia              Family History   Problem Relation Age of Onset     Ovarian Cancer Paternal Aunt      Heart Failure Mother 76.00         at home     Coronary Artery Disease Father 49.00        and a second at age 54     Liver Cancer Father      Coronary Artery Disease Brother 49.00        and  of the second at 62     CABG Brother 53.00     No Known Problems Son      Alcoholism Brother      Cirrhosis Brother      No Known Problems Son         Lives in [place]     No Known Problems Son      Social History     Socioeconomic History     Marital status:      Number of children: 3     Years of education: 16   Tobacco Use     Smoking status: Never Smoker     Smokeless tobacco: Never Used   Substance and Sexual Activity     Alcohol use: Yes     Alcohol/week: 7.0 standard drinks     Drug use: No     Sexual activity: Not  Currently     Partners: Male   Social History Narrative    Lives alone since her  passed in 2013. She lives in [facility]. She still drives in 2018. She walks on her own. She enjoys playing bridge and going out to lunch.    She has 3 sons: Bi lives in [facility] and does check in on her.  Another son lives in [facility] and travels a lot.  The other son lives in [facility] and is retired.  She has 2 grandchildren.    She has advanced directives and Bi is in charge of her affairs.         REVIEW OF SYSTEM: Patient denies any pain fevers chills nausea vomit diarrhea change in vision hearing taste or smell weakness one-sided other chest pain shortness of breath.  Denies any current shortness stool polyphagia polydipsia polyuria depression or anxiety in the remainder the review of systems is negative.      PHYSICAL EXAM: Patient alert pleasant does not appear to be in acute distress head is no swelling and atraumatic sclera conjunctive is clear mucosas moist nasal discharge.  Heart sounds are irregularly irregular with adequate rate control lungs are clear to auscultation without any crackles rales or wheezes.  Neurologic Willie was baseline and affect was pleasant.        LABS: Labs from 4/25/2022 CBC was all within normal limits and basic metabolic profile was within normal limits and creatinine kinase was 65.    Vital; blood pressure 131/63    Pulse 78    Temperature is 97.8    Respirations 17    O2 sats 95%.      ASSESSMENT:   Encounter Diagnoses   Name Primary?     Traumatic rhabdomyolysis, subsequent encounter Yes     Acute metabolic encephalopathy      Chronic atrial fibrillation (H)      Hyponatremia      Acute cystitis without hematuria         PLAN: Plan at this time we will continue to monitor above medical problems and no new changes.  Care plan was reviewed and is appropriate at this time.    She will likely from a medical standpoint be okay to go home  soon I will check with therapies to where she stands with progressing with therapies and when she will be ready to be discharged.    No other changes to care plan at this time.        Electronically signed by: ANALIA GALINDO DO

## 2022-05-09 ENCOUNTER — TRANSITIONAL CARE UNIT VISIT (OUTPATIENT)
Dept: GERIATRICS | Facility: CLINIC | Age: 87
End: 2022-05-09
Payer: MEDICARE

## 2022-05-09 VITALS
DIASTOLIC BLOOD PRESSURE: 57 MMHG | OXYGEN SATURATION: 96 % | SYSTOLIC BLOOD PRESSURE: 138 MMHG | RESPIRATION RATE: 20 BRPM | HEIGHT: 63 IN | TEMPERATURE: 97.2 F | BODY MASS INDEX: 24.03 KG/M2 | HEART RATE: 61 BPM | WEIGHT: 135.6 LBS

## 2022-05-09 DIAGNOSIS — G93.41 ACUTE METABOLIC ENCEPHALOPATHY: ICD-10-CM

## 2022-05-09 DIAGNOSIS — I48.20 CHRONIC ATRIAL FIBRILLATION (H): ICD-10-CM

## 2022-05-09 DIAGNOSIS — N30.00 ACUTE CYSTITIS WITHOUT HEMATURIA: ICD-10-CM

## 2022-05-09 DIAGNOSIS — E87.1 HYPONATREMIA: ICD-10-CM

## 2022-05-09 DIAGNOSIS — T79.6XXD TRAUMATIC RHABDOMYOLYSIS, SUBSEQUENT ENCOUNTER: Primary | ICD-10-CM

## 2022-05-09 PROCEDURE — 99309 SBSQ NF CARE MODERATE MDM 30: CPT | Performed by: FAMILY MEDICINE

## 2022-05-09 NOTE — LETTER
2022        RE: Katie Mar  [address]  [facility] [address]        [facility]    Facility:  [facility] ([facility]) [[id number]]  Code Status: DNR/DNI      CHIEF COMPLAINT/REASON FOR VISIT:  Chief Complaint   Patient presents with     RECHECK       HISTORY:      HPI: Katie is a 89 year old female who is currently undergoing physical occupational therapy at the [facility] secondary to recent hospitalization rhabdomyolysis and creatinine kinase has returned to normal.  She also was treated for UTI in the hospital and does have some agitation also started on Zyprexa however there have been none here and I have stopped her Zyprexa.  She is progressing with physical and occupational therapy at this time.    Patient has been doing well without any concerns today.    Past Medical History:   Diagnosis Date     Anxiety state      Essential hypertension      Persistent atrial fibrillation (H) 2018     Pure hypercholesterolemia              Family History   Problem Relation Age of Onset     Ovarian Cancer Paternal Aunt      Heart Failure Mother 76.00         at home     Coronary Artery Disease Father 49.00        and a second at age 54     Liver Cancer Father      Coronary Artery Disease Brother 49.00        and  of the second at 62     CABG Brother 53.00     No Known Problems Son      Alcoholism Brother      Cirrhosis Brother      No Known Problems Son         Lives in [place]     No Known Problems Son      Social History     Socioeconomic History     Marital status:      Number of children: 3     Years of education: 16   Tobacco Use     Smoking status: Never Smoker     Smokeless tobacco: Never Used   Substance and Sexual Activity     Alcohol use: Yes     Alcohol/week: 7.0 standard drinks     Drug use: No     Sexual activity: Not Currently     Partners: Male   Social History Narrative    Lives alone since her  passed in . She  lives in [facility] [place]. She still drives in 2018. She walks on her own. She enjoys playing bridge and going out to lunch.    She has 3 sons: Bi lives in [facility] and does check in on her.  Another son lives in [place] and travels a lot.  The other son lives in [facility] [place] and is retired.  She has 2 grandchildren.    She has advanced directives and Bi is in charge of her affairs.         REVIEW OF SYSTEM: Patient denies any pain fevers chills nausea vomit diarrhea change in vision hearing taste or smell weakness one-sided chest pain shortness of breath.  Denies any current shortness stool polyphagia polydipsia polyuria depression or anxiety and the remainder review of systems is negative.      PHYSICAL EXAM: Patient is alert pleasant does not appear to be in acute distress head is normocephalic and atraumatic heart sounds are irregularly irregular with adequate rate control lungs are clear to auscultation abdomen was soft nontender extremities showed no cyanosis or edema.  Neurologic exam was baseline and affect was pleasant.        LABS: On 4/25/2022 white count was 8.5, hemoglobin was 12.4, platelets were 338,000.  Basic metabolic profile was all within normal limits and creatinine kinase was 65.    Weight is stayed stable here at 135 pounds.    Vitals; blood pressure 130/57    Pulse 61    Temperature is 97.2    Respirations 20    O2 sats are 96%.      ASSESSMENT:   Encounter Diagnoses   Name Primary?     Traumatic rhabdomyolysis, subsequent encounter Yes     Acute metabolic encephalopathy      Chronic atrial fibrillation (H)      Hyponatremia      Acute cystitis without hematuria         PLAN: Plan at this time we will continue to monitor above medical problems and no other changes to care plan at this time from medical standpoint she is okay to be discharged home I believe that we will go Friday if okay with therapies.    I will continue to monitor above  medical problems.        Electronically signed by: ANALIA GALINDO DO          Sincerely,        ANALIA GALINDO DO

## 2022-05-09 NOTE — PROGRESS NOTES
[facility] GERIATRIC SERVICES    Facility:  [facility] ([facility]) [[id number]]  Code Status: DNR/DNI      CHIEF COMPLAINT/REASON FOR VISIT:  Chief Complaint   Patient presents with     RECHECK       HISTORY:      HPI: Katie is a 89 year old female who is currently undergoing physical occupational therapy at the [facility] secondary to recent hospitalization rhabdomyolysis and creatinine kinase has returned to normal.  She also was treated for UTI in the hospital and does have some agitation also started on Zyprexa however there have been none here and I have stopped her Zyprexa.  She is progressing with physical and occupational therapy at this time.    Patient has been doing well without any concerns today.    Past Medical History:   Diagnosis Date     Anxiety state      Essential hypertension      Persistent atrial fibrillation (H) 2018     Pure hypercholesterolemia              Family History   Problem Relation Age of Onset     Ovarian Cancer Paternal Aunt      Heart Failure Mother 76.00         at home     Coronary Artery Disease Father 49.00        and a second at age 54     Liver Cancer Father      Coronary Artery Disease Brother 49.00        and  of the second at 62     CABG Brother 53.00     No Known Problems Son      Alcoholism Brother      Cirrhosis Brother      No Known Problems Son         Lives in [place]     No Known Problems Son      Social History     Socioeconomic History     Marital status:      Number of children: 3     Years of education: 16   Tobacco Use     Smoking status: Never Smoker     Smokeless tobacco: Never Used   Substance and Sexual Activity     Alcohol use: Yes     Alcohol/week: 7.0 standard drinks     Drug use: No     Sexual activity: Not Currently     Partners: Male   Social History Narrative    Lives alone since her  passed in . She lives in [place]. She still drives in 2018. She walks  on her own. She enjoys playing bridge and going out to lunch.    She has 3 sons: Bi lives in [facility] [place] [facility] and does check in on her.  Another son lives in [facility] [facility] and travels a lot.  The other son lives in [facility] and is retired.  She has 2 grandchildren.    She has advanced directives and Bi is in charge of her affairs.         REVIEW OF SYSTEM: Patient denies any pain fevers chills nausea vomit diarrhea change in vision hearing taste or smell weakness one-sided chest pain shortness of breath.  Denies any current shortness stool polyphagia polydipsia polyuria depression or anxiety and the remainder review of systems is negative.      PHYSICAL EXAM: Patient is alert pleasant does not appear to be in acute distress head is normocephalic and atraumatic heart sounds are irregularly irregular with adequate rate control lungs are clear to auscultation abdomen was soft nontender extremities showed no cyanosis or edema.  Neurologic exam was baseline and affect was pleasant.        LABS: On 4/25/2022 white count was 8.5, hemoglobin was 12.4, platelets were 338,000.  Basic metabolic profile was all within normal limits and creatinine kinase was 65.    Weight is stayed stable here at 135 pounds.    Vitals; blood pressure 130/57    Pulse 61    Temperature is 97.2    Respirations 20    O2 sats are 96%.      ASSESSMENT:   Encounter Diagnoses   Name Primary?     Traumatic rhabdomyolysis, subsequent encounter Yes     Acute metabolic encephalopathy      Chronic atrial fibrillation (H)      Hyponatremia      Acute cystitis without hematuria         PLAN: Plan at this time we will continue to monitor above medical problems and no other changes to care plan at this time from medical standpoint she is okay to be discharged home I believe that we will go Friday if okay with therapies.    I will continue to monitor above medical problems.        Electronically signed by: ANALIA GALINDO, DO

## 2022-05-11 ENCOUNTER — TELEPHONE (OUTPATIENT)
Dept: FAMILY MEDICINE | Facility: CLINIC | Age: 87
End: 2022-05-11
Payer: MEDICARE

## 2022-05-11 NOTE — TELEPHONE ENCOUNTER
Forms Request  Name of form/paperwork: [facility]  Have you been seen for this request: N/A  Do we have the form: Yes, in providers inbox  When is form needed by: When completed  How would you like the form returned: fax  Patient Notified form requests are processed in 3-5 business days: N/A    Okay to leave a detailed message? N/A    \

## 2022-05-12 ENCOUNTER — TRANSITIONAL CARE UNIT VISIT (OUTPATIENT)
Dept: GERIATRICS | Facility: CLINIC | Age: 87
End: 2022-05-12
Payer: MEDICARE

## 2022-05-12 VITALS
HEART RATE: 62 BPM | BODY MASS INDEX: 24.03 KG/M2 | RESPIRATION RATE: 20 BRPM | TEMPERATURE: 97.5 F | DIASTOLIC BLOOD PRESSURE: 63 MMHG | HEIGHT: 63 IN | OXYGEN SATURATION: 94 % | SYSTOLIC BLOOD PRESSURE: 148 MMHG | WEIGHT: 135.6 LBS

## 2022-05-12 DIAGNOSIS — N30.00 ACUTE CYSTITIS WITHOUT HEMATURIA: ICD-10-CM

## 2022-05-12 DIAGNOSIS — E87.1 HYPONATREMIA: ICD-10-CM

## 2022-05-12 DIAGNOSIS — T79.6XXD TRAUMATIC RHABDOMYOLYSIS, SUBSEQUENT ENCOUNTER: Primary | ICD-10-CM

## 2022-05-12 DIAGNOSIS — G93.41 ACUTE METABOLIC ENCEPHALOPATHY: ICD-10-CM

## 2022-05-12 DIAGNOSIS — I48.20 CHRONIC ATRIAL FIBRILLATION (H): ICD-10-CM

## 2022-05-12 PROCEDURE — 99309 SBSQ NF CARE MODERATE MDM 30: CPT | Performed by: FAMILY MEDICINE

## 2022-05-12 NOTE — TELEPHONE ENCOUNTER
No answer on pt's phone.   Sp[facility] with [email or web address].   Needs visit. No POA paperwork on file here.     Disregard paperwork. Pt is going to be establish with in house provider tomorrow 5/13/22.

## 2022-05-12 NOTE — PROGRESS NOTES
[facility] GERIATRIC SERVICES    Facility:  [facility] ([facility]) [[id number]]  Code Status: DNR/DNI      CHIEF COMPLAINT/REASON FOR VISIT:  Chief Complaint   Patient presents with     RECHECK       HISTORY:      HPI: Katie is a 89 year old female who currently resides here in the TCU undergoing physical occupational therapy medical monitoring second hospitalization on 2022 after being found on the floor.  She was dehydrated did have rhabdomyolysis did receive IV fluids her creatinine kinase did return to normal limits.  Also her hyponatremia did normalize and she also was treated for UTI.  She was then transferred here to the TCU in stable condition.    She has been doing well she is progressed as anticipated with physical and Occupational Therapy and no real issues have arisen since she has been here.  Her CBC and basic metabolic profile and CK have all been normal here and she has progressed with therapy.    Patient is going home tomorrow and she feels fine at this time.  She says she recovered and is enjoyed her stay here.    She denies any other problems at this time and she is going to assisted living she is quite content with that.  And there were no issues today.    Past Medical History:   Diagnosis Date     Anxiety state      Essential hypertension      Persistent atrial fibrillation (H) 2018     Pure hypercholesterolemia              Family History   Problem Relation Age of Onset     Ovarian Cancer Paternal Aunt      Heart Failure Mother 76.00         at home     Coronary Artery Disease Father 49.00        and a second at age 54     Liver Cancer Father      Coronary Artery Disease Brother 49.00        and  of the second at 62     CABG Brother 53.00     No Known Problems Son      Alcoholism Brother      Cirrhosis Brother      No Known Problems Son         Lives in [place]     No Known Problems Son      Social History     Socioeconomic History     Marital status:      Number of  children: 3     Years of education: 16   Tobacco Use     Smoking status: Never Smoker     Smokeless tobacco: Never Used   Substance and Sexual Activity     Alcohol use: Yes     Alcohol/week: 7.0 standard drinks     Drug use: No     Sexual activity: Not Currently     Partners: Male   Social History Narrative    Lives alone since her  passed in 2013. She lives in [place]. She still drives in 2018. She walks on her own. She enjoys playing bridge and going out to lunch.    She has 3 sons: Bi lives in [facility] and does check in on her.  Another son lives in [facility] [place] [facility] and travels a lot.  The other son lives in [facility] [place] [place] [facility] and is retired.  She has 2 grandchildren.    She has advanced directives and Bi is in charge of her affairs.         REVIEW OF SYSTEM: Patient denies any pain fevers chills nausea vomit diarrhea change in vision hearing taste or smell weakness one-sided the chest pain shortness of breath.  Denies any current shortness stool polyphasia polydipsia polyuria depression or anxiety in the remainder the review of systems is negative.      PHYSICAL EXAM: Patient is alert pleasant does not appear to be in acute distress head is normocephalic and atraumatic sclera conjunctive is clear mucosas moist nasal discharge.  Heart sounds were irregularly irregular with adequate rate control lungs were clear to auscultation abdomen was soft nontender bowel sounds are positive extremities show trace edema bilateral.  Neurologic exam was baseline and affect was pleasant.        LABS: On 4/25/2022 white count was 8.5, hemoglobin was 12.4, platelets were 338,000.    Basic metabolic profile; sodium is 136, potassium 3.6, chloride 99, CO2 was 26, anion gap was 11, BUN was 18, creatinine 0.74, calcium 8.8, glucose 95, GFR was 76, creatinine kinase was 65.    Vitals; blood pressure 148/63    Pulse 62    Temperature is 97.5    Respirations 20    O2 sats  94%.      ASSESSMENT:   Encounter Diagnoses   Name Primary?     Traumatic rhabdomyolysis, subsequent encounter Yes     Acute metabolic encephalopathy      Chronic atrial fibrillation (H)      Hyponatremia      Acute cystitis without hematuria         PLAN: Plan at this time okay to discharge tomorrow to assisted living with current meds and services.  Please see orders for services rendered.    She will need to follow-up with primary care doctor be seen within a week and I will continue to monitor above medical problems.  No other changes to care plan at this time.    Patient did come in on Zyprexa I did stop that here I did not think that was necessary at this time.  She has not had a problem without it and she has had no signs of encephalopathy.  She currently continues on apixaban 5 mg twice daily for anticoagulation and that should continue at this time.    I would recommend atorvastatin may be not necessary at this time and she is on diphenhydramine which is beers listed but she is tolerating that pretty well.  Blood pressures well controlled with losartan at this time and she will need to follow-up with cardiology.  Also on amlodipine for her blood pressure.        Electronically signed by: ANALIA GALINDO DO

## 2022-05-12 NOTE — LETTER
2022        RE: Katie Mar  [address]  [facility] [address]        [facility]    Facility:  [facility] ([facility]) [[id number]]  Code Status: DNR/DNI      CHIEF COMPLAINT/REASON FOR VISIT:  Chief Complaint   Patient presents with     RECHECK       HISTORY:      HPI: Katie is a 89 year old female who currently resides here in the TCU undergoing physical occupational therapy medical monitoring second hospitalization on 2022 after being found on the floor.  She was dehydrated did have rhabdomyolysis did receive IV fluids her creatinine kinase did return to normal limits.  Also her hyponatremia did normalize and she also was treated for UTI.  She was then transferred here to the TCU in stable condition.    She has been doing well she is progressed as anticipated with physical and Occupational Therapy and no real issues have arisen since she has been here.  Her CBC and basic metabolic profile and CK have all been normal here and she has progressed with therapy.    Patient is going home tomorrow and she feels fine at this time.  She says she recovered and is enjoyed her stay here.    She denies any other problems at this time and she is going to assisted living she is quite content with that.  And there were no issues today.    Past Medical History:   Diagnosis Date     Anxiety state      Essential hypertension      Persistent atrial fibrillation (H) 2018     Pure hypercholesterolemia              Family History   Problem Relation Age of Onset     Ovarian Cancer Paternal Aunt      Heart Failure Mother 76.00         at home     Coronary Artery Disease Father 49.00        and a second at age 54     Liver Cancer Father      Coronary Artery Disease Brother 49.00        and  of the second at 62     CABG Brother 53.00     No Known Problems Son      Alcoholism Brother      Cirrhosis Brother      No Known Problems Son         Lives in [place]     No Known  Problems Son      Social History     Socioeconomic History     Marital status:      Number of children: 3     Years of education: 16   Tobacco Use     Smoking status: Never Smoker     Smokeless tobacco: Never Used   Substance and Sexual Activity     Alcohol use: Yes     Alcohol/week: 7.0 standard drinks     Drug use: No     Sexual activity: Not Currently     Partners: Male   Social History Narrative    Lives alone since her  passed in 2013. She lives in [facility] [place]. She still drives in 2018. She walks on her own. She enjoys playing bridge and going out to lunch.    She has 3 sons: Bi lives in [facility] [facility] and does check in on her.  Another son lives in [place] and travels a lot.  The other son lives in [place] and is retired.  She has 2 grandchildren.    She has advanced directives and Bi is in charge of her affairs.         REVIEW OF SYSTEM: Patient denies any pain fevers chills nausea vomit diarrhea change in vision hearing taste or smell weakness one-sided the chest pain shortness of breath.  Denies any current shortness stool polyphasia polydipsia polyuria depression or anxiety in the remainder the review of systems is negative.      PHYSICAL EXAM: Patient is alert pleasant does not appear to be in acute distress head is normocephalic and atraumatic sclera conjunctive is clear mucosas moist nasal discharge.  Heart sounds were irregularly irregular with adequate rate control lungs were clear to auscultation abdomen was soft nontender bowel sounds are positive extremities show trace edema bilateral.  Neurologic exam was baseline and affect was pleasant.        LABS: On 4/25/2022 white count was 8.5, hemoglobin was 12.4, platelets were 338,000.    Basic metabolic profile; sodium is 136, potassium 3.6, chloride 99, CO2 was 26, anion gap was 11, BUN was 18, creatinine 0.74, calcium 8.8, glucose 95, GFR was 76, creatinine kinase was 65.    Vitals;  blood pressure 148/63    Pulse 62    Temperature is 97.5    Respirations 20    O2 sats 94%.      ASSESSMENT:   Encounter Diagnoses   Name Primary?     Traumatic rhabdomyolysis, subsequent encounter Yes     Acute metabolic encephalopathy      Chronic atrial fibrillation (H)      Hyponatremia      Acute cystitis without hematuria         PLAN: Plan at this time okay to discharge tomorrow to assisted living with current meds and services.  Please see orders for services rendered.    She will need to follow-up with primary care doctor be seen within a week and I will continue to monitor above medical problems.  No other changes to care plan at this time.    Patient did come in on Zyprexa I did stop that here I did not think that was necessary at this time.  She has not had a problem without it and she has had no signs of encephalopathy.  She currently continues on apixaban 5 mg twice daily for anticoagulation and that should continue at this time.    I would recommend atorvastatin may be not necessary at this time and she is on diphenhydramine which is beers listed but she is tolerating that pretty well.  Blood pressures well controlled with losartan at this time and she will need to follow-up with cardiology.  Also on amlodipine for her blood pressure.        Electronically signed by: ANALIA GALINDO DO          Sincerely,        ANALIA GALINDO, DO

## 2022-05-12 NOTE — TELEPHONE ENCOUNTER
This paperwork is for a POLST form.  Prior to signing a POLST form I do need to discuss with the patient that she understands what it means and is in agreement.  This can be a phone visit time and feel free to take one of the same-day slots.    Aguila Lal, CNP

## 2022-05-17 ENCOUNTER — TELEPHONE (OUTPATIENT)
Dept: FAMILY MEDICINE | Facility: CLINIC | Age: 87
End: 2022-05-17

## 2022-05-17 NOTE — TELEPHONE ENCOUNTER
Forms Request  Name of form/paperwork: [facility]  Have you been seen for this request: N/A  Do we have the form: yes, in providers inbox  When is form needed by: when done  How would you like the form returned: fax  Patient Notified form requests are processed in 3-5 business days: n/a    Okay to leave a detailed message? n/a

## 2022-05-18 ENCOUNTER — MEDICAL CORRESPONDENCE (OUTPATIENT)
Dept: HEALTH INFORMATION MANAGEMENT | Facility: CLINIC | Age: 87
End: 2022-05-18
Payer: MEDICARE

## 2022-05-25 ENCOUNTER — LAB REQUISITION (OUTPATIENT)
Dept: LAB | Facility: CLINIC | Age: 87
End: 2022-05-25
Payer: MEDICARE

## 2022-05-25 ENCOUNTER — PATIENT OUTREACH (OUTPATIENT)
Dept: CARE COORDINATION | Facility: CLINIC | Age: 87
End: 2022-05-25
Payer: MEDICARE

## 2022-05-25 DIAGNOSIS — I10 ESSENTIAL (PRIMARY) HYPERTENSION: ICD-10-CM

## 2022-05-25 LAB
ANION GAP SERPL CALCULATED.3IONS-SCNC: 12 MMOL/L (ref 5–18)
BASOPHILS # BLD AUTO: 0 10E3/UL (ref 0–0.2)
BASOPHILS NFR BLD AUTO: 0 %
BUN SERPL-MCNC: 17 MG/DL (ref 8–28)
CALCIUM SERPL-MCNC: 9.5 MG/DL (ref 8.5–10.5)
CHLORIDE BLD-SCNC: 93 MMOL/L (ref 98–107)
CO2 SERPL-SCNC: 26 MMOL/L (ref 22–31)
CREAT SERPL-MCNC: 0.77 MG/DL (ref 0.6–1.1)
EOSINOPHIL # BLD AUTO: 0 10E3/UL (ref 0–0.7)
EOSINOPHIL NFR BLD AUTO: 0 %
ERYTHROCYTE [DISTWIDTH] IN BLOOD BY AUTOMATED COUNT: 14.6 % (ref 10–15)
GFR SERPL CREATININE-BSD FRML MDRD: 73 ML/MIN/1.73M2
GLUCOSE BLD-MCNC: 108 MG/DL (ref 70–125)
HCT VFR BLD AUTO: 35.3 % (ref 35–47)
HGB BLD-MCNC: 11.3 G/DL (ref 11.7–15.7)
IMM GRANULOCYTES # BLD: 0.1 10E3/UL
IMM GRANULOCYTES NFR BLD: 1 %
LYMPHOCYTES # BLD AUTO: 0.8 10E3/UL (ref 0.8–5.3)
LYMPHOCYTES NFR BLD AUTO: 6 %
MCH RBC QN AUTO: 30.9 PG (ref 26.5–33)
MCHC RBC AUTO-ENTMCNC: 32 G/DL (ref 31.5–36.5)
MCV RBC AUTO: 96 FL (ref 78–100)
MONOCYTES # BLD AUTO: 0.9 10E3/UL (ref 0–1.3)
MONOCYTES NFR BLD AUTO: 7 %
NEUTROPHILS # BLD AUTO: 10.9 10E3/UL (ref 1.6–8.3)
NEUTROPHILS NFR BLD AUTO: 86 %
NRBC # BLD AUTO: 0 10E3/UL
NRBC BLD AUTO-RTO: 0 /100
PLATELET # BLD AUTO: 222 10E3/UL (ref 150–450)
POTASSIUM BLD-SCNC: 3.8 MMOL/L (ref 3.5–5)
RBC # BLD AUTO: 3.66 10E6/UL (ref 3.8–5.2)
SODIUM SERPL-SCNC: 131 MMOL/L (ref 136–145)
WBC # BLD AUTO: 12.7 10E3/UL (ref 4–11)

## 2022-05-25 PROCEDURE — 80048 BASIC METABOLIC PNL TOTAL CA: CPT | Mod: ORL | Performed by: NURSE PRACTITIONER

## 2022-05-25 PROCEDURE — 85025 COMPLETE CBC W/AUTO DIFF WBC: CPT | Mod: ORL | Performed by: NURSE PRACTITIONER

## 2022-05-25 NOTE — PROGRESS NOTES
Contact   Chart Review     Situation: Patient chart reviewed by .    Background: following for discharge from TCU to contact to assess needs when discharged.     Assessment: Chart notes indicate that she discharged to assisted living.     Plan/Recommendations: No further outreach needed.    Nicky Daugherty,   [facility]  [phone]

## 2022-05-26 ENCOUNTER — HOSPITAL ENCOUNTER (EMERGENCY)
Facility: CLINIC | Age: 87
Discharge: LEFT BEFORE DISCHARGE | End: 2022-05-26
Attending: EMERGENCY MEDICINE | Admitting: EMERGENCY MEDICINE
Payer: MEDICARE

## 2022-05-26 ENCOUNTER — APPOINTMENT (OUTPATIENT)
Dept: RADIOLOGY | Facility: CLINIC | Age: 87
End: 2022-05-26
Attending: EMERGENCY MEDICINE
Payer: MEDICARE

## 2022-05-26 VITALS
BODY MASS INDEX: 23.91 KG/M2 | OXYGEN SATURATION: 96 % | WEIGHT: 135 LBS | HEART RATE: 80 BPM | SYSTOLIC BLOOD PRESSURE: 132 MMHG | RESPIRATION RATE: 34 BRPM | TEMPERATURE: 98.1 F | DIASTOLIC BLOOD PRESSURE: 75 MMHG

## 2022-05-26 DIAGNOSIS — I48.92 PAROXYSMAL ATRIAL FLUTTER (H): ICD-10-CM

## 2022-05-26 DIAGNOSIS — J18.9 PNEUMONIA OF RIGHT LUNG DUE TO INFECTIOUS ORGANISM, UNSPECIFIED PART OF LUNG: ICD-10-CM

## 2022-05-26 LAB
ALBUMIN SERPL-MCNC: 2.5 G/DL (ref 3.5–5)
ALBUMIN UR-MCNC: 10 MG/DL
ALP SERPL-CCNC: 105 U/L (ref 45–120)
ALT SERPL W P-5'-P-CCNC: 34 U/L (ref 0–45)
ANION GAP SERPL CALCULATED.3IONS-SCNC: 10 MMOL/L (ref 5–18)
APPEARANCE UR: CLEAR
AST SERPL W P-5'-P-CCNC: 42 U/L (ref 0–40)
ATRIAL RATE - MUSE: 300 BPM
BACTERIA #/AREA URNS HPF: ABNORMAL /HPF
BASOPHILS # BLD AUTO: 0 10E3/UL (ref 0–0.2)
BASOPHILS NFR BLD AUTO: 0 %
BILIRUB DIRECT SERPL-MCNC: 0.2 MG/DL
BILIRUB SERPL-MCNC: 0.5 MG/DL (ref 0–1)
BILIRUB UR QL STRIP: NEGATIVE
BNP SERPL-MCNC: 686 PG/ML (ref 0–167)
BUN SERPL-MCNC: 17 MG/DL (ref 8–28)
CALCIUM SERPL-MCNC: 9.2 MG/DL (ref 8.5–10.5)
CHLORIDE BLD-SCNC: 97 MMOL/L (ref 98–107)
CO2 SERPL-SCNC: 27 MMOL/L (ref 22–31)
COLOR UR AUTO: ABNORMAL
CREAT SERPL-MCNC: 0.72 MG/DL (ref 0.6–1.1)
DIASTOLIC BLOOD PRESSURE - MUSE: 63 MMHG
EOSINOPHIL # BLD AUTO: 0 10E3/UL (ref 0–0.7)
EOSINOPHIL NFR BLD AUTO: 0 %
ERYTHROCYTE [DISTWIDTH] IN BLOOD BY AUTOMATED COUNT: 14.7 % (ref 10–15)
FLUAV RNA SPEC QL NAA+PROBE: NEGATIVE
FLUBV RNA RESP QL NAA+PROBE: NEGATIVE
GFR SERPL CREATININE-BSD FRML MDRD: 79 ML/MIN/1.73M2
GLUCOSE BLD-MCNC: 105 MG/DL (ref 70–125)
GLUCOSE UR STRIP-MCNC: NEGATIVE MG/DL
HCT VFR BLD AUTO: 31 % (ref 35–47)
HGB BLD-MCNC: 10.4 G/DL (ref 11.7–15.7)
HGB UR QL STRIP: NEGATIVE
IMM GRANULOCYTES # BLD: 0.1 10E3/UL
IMM GRANULOCYTES NFR BLD: 1 %
INTERPRETATION ECG - MUSE: NORMAL
KETONES UR STRIP-MCNC: NEGATIVE MG/DL
LEUKOCYTE ESTERASE UR QL STRIP: NEGATIVE
LYMPHOCYTES # BLD AUTO: 0.9 10E3/UL (ref 0.8–5.3)
LYMPHOCYTES NFR BLD AUTO: 8 %
MAGNESIUM SERPL-MCNC: 2.1 MG/DL (ref 1.8–2.6)
MCH RBC QN AUTO: 31.1 PG (ref 26.5–33)
MCHC RBC AUTO-ENTMCNC: 33.5 G/DL (ref 31.5–36.5)
MCV RBC AUTO: 93 FL (ref 78–100)
MONOCYTES # BLD AUTO: 1 10E3/UL (ref 0–1.3)
MONOCYTES NFR BLD AUTO: 8 %
NEUTROPHILS # BLD AUTO: 10.4 10E3/UL (ref 1.6–8.3)
NEUTROPHILS NFR BLD AUTO: 83 %
NITRATE UR QL: NEGATIVE
NRBC # BLD AUTO: 0 10E3/UL
NRBC BLD AUTO-RTO: 0 /100
P AXIS - MUSE: NORMAL DEGREES
PH UR STRIP: 6.5 [PH] (ref 5–7)
PLATELET # BLD AUTO: 361 10E3/UL (ref 150–450)
POTASSIUM BLD-SCNC: 4 MMOL/L (ref 3.5–5)
PR INTERVAL - MUSE: NORMAL MS
PROT SERPL-MCNC: 6.5 G/DL (ref 6–8)
QRS DURATION - MUSE: 88 MS
QT - MUSE: 410 MS
QTC - MUSE: 395 MS
R AXIS - MUSE: 62 DEGREES
RBC # BLD AUTO: 3.34 10E6/UL (ref 3.8–5.2)
RBC URINE: 1 /HPF
RSV RNA SPEC NAA+PROBE: NEGATIVE
SARS-COV-2 RNA RESP QL NAA+PROBE: NEGATIVE
SODIUM SERPL-SCNC: 134 MMOL/L (ref 136–145)
SP GR UR STRIP: 1.01 (ref 1–1.03)
SQUAMOUS EPITHELIAL: 2 /HPF
SYSTOLIC BLOOD PRESSURE - MUSE: 118 MMHG
T AXIS - MUSE: 68 DEGREES
TROPONIN I SERPL-MCNC: <0.01 NG/ML (ref 0–0.29)
UROBILINOGEN UR STRIP-MCNC: <2 MG/DL
VENTRICULAR RATE- MUSE: 56 BPM
WBC # BLD AUTO: 12.6 10E3/UL (ref 4–11)
WBC URINE: 1 /HPF

## 2022-05-26 PROCEDURE — C9803 HOPD COVID-19 SPEC COLLECT: HCPCS

## 2022-05-26 PROCEDURE — 71045 X-RAY EXAM CHEST 1 VIEW: CPT

## 2022-05-26 PROCEDURE — 80053 COMPREHEN METABOLIC PANEL: CPT | Performed by: EMERGENCY MEDICINE

## 2022-05-26 PROCEDURE — 82248 BILIRUBIN DIRECT: CPT | Performed by: EMERGENCY MEDICINE

## 2022-05-26 PROCEDURE — 258N000003 HC RX IP 258 OP 636: Performed by: EMERGENCY MEDICINE

## 2022-05-26 PROCEDURE — 81001 URINALYSIS AUTO W/SCOPE: CPT | Performed by: EMERGENCY MEDICINE

## 2022-05-26 PROCEDURE — 36415 COLL VENOUS BLD VENIPUNCTURE: CPT | Performed by: EMERGENCY MEDICINE

## 2022-05-26 PROCEDURE — 96366 THER/PROPH/DIAG IV INF ADDON: CPT

## 2022-05-26 PROCEDURE — 99285 EMERGENCY DEPT VISIT HI MDM: CPT | Mod: CS,25

## 2022-05-26 PROCEDURE — 83735 ASSAY OF MAGNESIUM: CPT | Performed by: EMERGENCY MEDICINE

## 2022-05-26 PROCEDURE — 96365 THER/PROPH/DIAG IV INF INIT: CPT

## 2022-05-26 PROCEDURE — 93005 ELECTROCARDIOGRAM TRACING: CPT | Performed by: EMERGENCY MEDICINE

## 2022-05-26 PROCEDURE — 87637 SARSCOV2&INF A&B&RSV AMP PRB: CPT | Performed by: EMERGENCY MEDICINE

## 2022-05-26 PROCEDURE — 96368 THER/DIAG CONCURRENT INF: CPT

## 2022-05-26 PROCEDURE — 250N000011 HC RX IP 250 OP 636: Performed by: EMERGENCY MEDICINE

## 2022-05-26 PROCEDURE — 85025 COMPLETE CBC W/AUTO DIFF WBC: CPT | Performed by: EMERGENCY MEDICINE

## 2022-05-26 PROCEDURE — 83880 ASSAY OF NATRIURETIC PEPTIDE: CPT | Performed by: EMERGENCY MEDICINE

## 2022-05-26 PROCEDURE — 84484 ASSAY OF TROPONIN QUANT: CPT | Performed by: EMERGENCY MEDICINE

## 2022-05-26 RX ORDER — AZITHROMYCIN 500 MG/5ML
500 INJECTION, POWDER, LYOPHILIZED, FOR SOLUTION INTRAVENOUS ONCE
Status: COMPLETED | OUTPATIENT
Start: 2022-05-26 | End: 2022-05-26

## 2022-05-26 RX ORDER — CEFDINIR 300 MG/1
300 CAPSULE ORAL 2 TIMES DAILY
Qty: 14 CAPSULE | Refills: 0 | Status: SHIPPED | OUTPATIENT
Start: 2022-05-26 | End: 2022-06-02

## 2022-05-26 RX ORDER — AZITHROMYCIN 250 MG/1
TABLET, FILM COATED ORAL
Qty: 6 TABLET | Refills: 0 | Status: SHIPPED | OUTPATIENT
Start: 2022-05-26 | End: 2022-05-31

## 2022-05-26 RX ORDER — CEFTRIAXONE 2 G/1
2 INJECTION, POWDER, FOR SOLUTION INTRAMUSCULAR; INTRAVENOUS ONCE
Status: COMPLETED | OUTPATIENT
Start: 2022-05-26 | End: 2022-05-26

## 2022-05-26 RX ORDER — LANOLIN ALCOHOL/MO/W.PET/CERES
1 CREAM (GRAM) TOPICAL AT BEDTIME
COMMUNITY

## 2022-05-26 RX ORDER — METOPROLOL TARTRATE 25 MG/1
37.5 TABLET, FILM COATED ORAL 2 TIMES DAILY
COMMUNITY

## 2022-05-26 RX ORDER — LEVOTHYROXINE SODIUM 75 UG/1
75 TABLET ORAL DAILY
COMMUNITY

## 2022-05-26 RX ADMIN — CEFTRIAXONE SODIUM 2 G: 2 INJECTION, POWDER, FOR SOLUTION INTRAMUSCULAR; INTRAVENOUS at 14:00

## 2022-05-26 RX ADMIN — AZITHROMYCIN MONOHYDRATE 500 MG: 500 INJECTION, POWDER, LYOPHILIZED, FOR SOLUTION INTRAVENOUS at 14:40

## 2022-05-26 ASSESSMENT — ENCOUNTER SYMPTOMS
JOINT SWELLING: 0
NAUSEA: 0
HEMATURIA: 0
CHILLS: 0
APPETITE CHANGE: 1
ABDOMINAL PAIN: 0
DYSURIA: 0
VOMITING: 0
FEVER: 0
DIARRHEA: 1
CONFUSION: 0
LIGHT-HEADEDNESS: 0
SHORTNESS OF BREATH: 1
COUGH: 1
DIZZINESS: 0
SORE THROAT: 0

## 2022-05-26 NOTE — ED TRIAGE NOTES
Patient is here with shortness of breath and cough that started two days ago. Hx of a fib/flutter. She is in a flutter rate controlled and reported being on a blood thinner. She lives at [facility]. She also notes that her eyes are watering and diarrhea x1 this morning.       Triage Assessment     Row Name 05/26/22 2656       Triage Assessment (Adult)    Airway WDL WDL       Respiratory WDL    Respiratory WDL X;cough    Cough Frequency infrequent    Cough Type congested       Skin Circulation/Temperature WDL    Skin Circulation/Temperature WDL WDL  pale       Cardiac WDL    Cardiac WDL X  a futter       Cognitive/Neuro/Behavioral WDL    Cognitive/Neuro/Behavioral WDL WDL       James Coma Scale    Best Eye Response 4-->(E4) spontaneous    Best Motor Response 6-->(M6) obeys commands    Best Verbal Response 5-->(V5) oriented    James Coma Scale Score 15

## 2022-05-26 NOTE — PHARMACY-ADMISSION MEDICATION HISTORY
Pharmacy Note - Admission Medication History    Pertinent Provider Information:      ______________________________________________________________________    Prior To Admission (PTA) med list completed and updated in EMR.       PTA Med List   Medication Sig Last Dose     acetaminophen (TYLENOL) 500 MG tablet [ACETAMINOPHEN (TYLENOL) 500 MG TABLET] Take 500 mg by mouth at bedtime. 5/25/2022 at PM     amLODIPine (NORVASC) 2.5 MG tablet Take 4 tablets (10 mg) by mouth daily 5/26/2022 at AM     apixaban ANTICOAGULANT (ELIQUIS ANTICOAGULANT) 5 MG tablet Take 1 tablet (5 mg) by mouth 2 times daily 5/26/2022 at 1X     atorvastatin (LIPITOR) 20 MG tablet Take 1 tablet by mouth once daily 5/25/2022 at PM     azithromycin (ZITHROMAX Z-TEQUILA) 250 MG tablet Two tablets on the first day, then one tablet daily for the next 4 days      cefdinir (OMNICEF) 300 MG capsule Take 1 capsule (300 mg) by mouth 2 times daily for 7 days      diphenhydrAMINE (ANTIHISTAMINE) 25 mg tablet [DIPHENHYDRAMINE (ANTIHISTAMINE) 25 MG TABLET] Take 25 mg by mouth at bedtime. 5/25/2022 at PM     flecainide (TAMBOCOR) 50 MG tablet Take 1 tablet (50 mg) by mouth 2 times daily 5/26/2022 at 1X     levothyroxine (SYNTHROID/LEVOTHROID) 75 MCG tablet Take 75 mcg by mouth daily 5/26/2022 at AM     lidocaine (LMX4) 4 % external cream Apply topically daily 5/26/2022 at AM     losartan (COZAAR) 100 MG tablet Take 1 tablet by mouth once daily 5/26/2022 at AM     melatonin 3 MG tablet Take 1 mg by mouth At Bedtime 5/25/2022 at PM     metoprolol tartrate (LOPRESSOR) 25 MG tablet Take 37.5 mg by mouth 2 times daily 5/26/2022 at 1X       Information source(s): Facility ([facility] medication list/MAR and CareEverywhere/SureScripts  Method of interview communication: phone    Summary of Changes to PTA Med List  New: Melatonin  Discontinued: n/a  Changed: n/a    Patient was asked about OTC/herbal products specifically.  PTA med list reflects this.    In the past week,  patient estimated taking medication this percent of the time:  greater than 90%.    Allergies were reviewed, assessed, and updated with the patient.      Patient does not use any multi-dose medications prior to admission.    The information provided in this note is only as accurate as the sources available at the time of the update(s).    Thank you for the opportunity to participate in the care of this patient.    Joya Perez  5/26/2022 1:45 PM

## 2022-05-26 NOTE — DISCHARGE INSTRUCTIONS
Your oxygen levels dropped when we got you up and walked you around.  Your chest xray suggest you have a pneumonia on the right that we are treating you with antibiotics.     You are leaving against medical advice and refuse to return to the ER, but you are always welcome back if you change your mind.  You received a dose of IV antibiotics in the ER, but need to continue oral antibiotics as directed until gone.

## 2022-05-26 NOTE — ED PROVIDER NOTES
Emergency Department Encounter     Evaluation Date & Time:   5/26/2022 11:00 AM    CHIEF COMPLAINT:  Cough and Shortness of Breath      Triage Note:              ED COURSE & MEDICAL DECISION MAKING:     ED Course as of 05/26/22 1511   Thu May 26, 2022   1216 Flu and Covid negative.  Trp negative, BNP at 686.   1244 CXR consistent with right sided pneumonia.   1340 Pt dropped O2 sats when ambulating to 85%. She states she's not walking and this isn't accurate. Recommended hospitalization for suspected PNA, reasons for this and concerns I have. She refuses to stay in hospital and understands potential risks/concerns. She states at her age she does not want to be here.  She does not want me to talk to her son about it as she's her own guardian and he would be in agreement with her.      Patient is requesting to leave the hospital against medical advice.  The patient is clinically sober, free from distracting injury, appears to have intact insight and judgment and reason, and in my opinion has the capacity to make decisions.  We had a lengthy discussion regarding their presenting signs and symptoms, including my concerns regarding them with the need for further evaluation and management.  They have verbalized understanding of these concerns.  I discussed in detail with the patient that if they leave, they could significantly worsen, become critically ill, and even possibly become disabled or die.  Despite offering alternative pathways to care, I am unable to convince the patient to stay.  I have asked them to return to the emergency department at any time if they change their mind, and we will be happy to resume their care.  I answered all additional questions and encouraged close follow up with a primary care provider if they choose not return to our ED.  The patient and family have no further questions at this time and understand they are always welcome back.   Did agree to dose of IV antibx here, will take antibx  at assisted living facility. She states he's 90 and can make her own decisions, does not want to be hospitalized no matter what.  She is her own guardian, [id number].       Pt with a history of Afib/flutter on Eliquis presenting from assisted living facility for 3 days of cough, dyspnea, congestion and decreased appetite.  Pt had a negative covid test today there, so they sent her in for further evaluation. She otherwise states she's ok with no chest pain, fevers, or known sick contacts.  She is fully vaccinated for covid with both boosters.  Pt denies CHF history and has mild b/l LE edema, which is fairly baseline per the pt. She does report that she was recently hospitalized several weeks ago for a fall with compression fractures, was in short term nursing facility for 3 weeks, recently returned to her assisted living 1-2 weeks ago.  Pt is not hypoxic, no distress here.  She is in Aflutter, which is baseline for her.  Will get labs, CXR, flu/covid testing and reassess.    11:09 AM I met with the patient for the initial interview and physical examination. Discussed plan for treatment and workup in the ED. PPE: Provider wore gloves, N95 mask, and eye protection.     1:37 PM I rechecked and updated the patient. The patient does not wish to stay in the hospital at this time. I explained to her that I suspect that she has pneumonia. She agrees to have IV antibiotics here in the ED but after that would like to leave, so I will sign her out AMA at that time.      At the conclusion of the encounter I discussed the results of all the tests and the disposition. The questions were answered. The patient or family acknowledged understanding and was agreeable with the care plan.      MEDICATIONS GIVEN IN THE EMERGENCY DEPARTMENT:  Medications   azithromycin 500 mg (ZITHROMAX) in 0.9% NaCl 250 mL intermittent infusion 500 mg (500 mg Intravenous New Bag 5/26/22 6890)   cefTRIAXone (ROCEPHIN) 2 g vial to attach to  ml bag for  ADULTS or NS 50 ml bag for PEDS (0 g Intravenous Stopped 5/26/22 4532)       NEW PRESCRIPTIONS STARTED AT TODAY'S ED VISIT:  New Prescriptions    AZITHROMYCIN (ZITHROMAX Z-TEQUILA) 250 MG TABLET    Two tablets on the first day, then one tablet daily for the next 4 days    CEFDINIR (OMNICEF) 300 MG CAPSULE    Take 1 capsule (300 mg) by mouth 2 times daily for 7 days       HPI     Katie Mar is a 89 year old female with a pertinent history of anxiety, hypertension, and atrial fibrillation who presents to this ED via EMS for evaluation of cough and shortness of breath.     The patient reports the onset of cough and shortness of breath three days ago (5/23). She endorses congestion, decreased appetite, and one episode of diarrhea today. She was tested negative for COVID-19 at her assisted living facility today. She is fully vaccinated for COVID-19 and has received two boosters. She denies any sick contacts. The patient denies nausea, vomiting, abdominal pain, chest pain, lightheadedness, syncope, and any other symptoms or complaints at this time.       REVIEW OF SYSTEMS:  Review of Systems   Constitutional: Positive for appetite change (decrease). Negative for chills and fever.   HENT: Positive for congestion. Negative for sore throat.    Eyes: Negative for visual disturbance.   Respiratory: Positive for cough and shortness of breath.    Cardiovascular: Negative for chest pain.   Gastrointestinal: Positive for diarrhea (one episode). Negative for abdominal pain, nausea and vomiting.   Endocrine: Negative for polyuria.   Genitourinary: Negative for dysuria and hematuria.        - urinary changes   Musculoskeletal: Negative for joint swelling.   Skin: Negative for rash.   Neurological: Negative for dizziness, syncope and light-headedness.   Psychiatric/Behavioral: Negative for confusion.   All other systems reviewed and are negative.        Medical History     Past Medical History:   Diagnosis Date     Anxiety state       Essential hypertension      Persistent atrial fibrillation (H) 2018     Pure hypercholesterolemia        Past Surgical History:   Procedure Laterality Date     CARDIOVERSION  2018     CATARACT EXTRACTION, BILATERAL Bilateral      DILATION AND CURETTAGE       RELEASE CARPAL TUNNEL Right        Family History   Problem Relation Age of Onset     Ovarian Cancer Paternal Aunt      Heart Failure Mother 76.00         at home     Coronary Artery Disease Father 49.00        and a second at age 54     Liver Cancer Father      Coronary Artery Disease Brother 49.00        and  of the second at 62     CABG Brother 53.00     No Known Problems Son      Alcoholism Brother      Cirrhosis Brother      No Known Problems Son         Lives in [place]     No Known Problems Son        Social History     Tobacco Use     Smoking status: Never Smoker     Smokeless tobacco: Never Used   Substance Use Topics     Alcohol use: Yes     Alcohol/week: 7.0 standard drinks     Drug use: No       acetaminophen (TYLENOL) 500 MG tablet  amLODIPine (NORVASC) 2.5 MG tablet  apixaban ANTICOAGULANT (ELIQUIS ANTICOAGULANT) 5 MG tablet  atorvastatin (LIPITOR) 20 MG tablet  azithromycin (ZITHROMAX Z-TEQUILA) 250 MG tablet  cefdinir (OMNICEF) 300 MG capsule  diphenhydrAMINE (ANTIHISTAMINE) 25 mg tablet  flecainide (TAMBOCOR) 50 MG tablet  levothyroxine (SYNTHROID/LEVOTHROID) 75 MCG tablet  lidocaine (LMX4) 4 % external cream  losartan (COZAAR) 100 MG tablet  melatonin 3 MG tablet  metoprolol tartrate (LOPRESSOR) 25 MG tablet        Physical Exam     Vitals:  /62   Pulse 85   Temp 98.1  F (36.7  C) (Oral)   Resp 28   Wt 61.2 kg (135 lb)   LMP  (LMP Unknown)   SpO2 98%   BMI 23.91 kg/m      PHYSICAL EXAM:   Physical Exam  Vitals and nursing note reviewed.   Constitutional:       General: She is not in acute distress.     Appearance: Normal appearance.   HENT:      Head: Normocephalic and atraumatic.      Nose: Nose normal.       Mouth/Throat:      Mouth: Mucous membranes are moist.   Eyes:      Pupils: Pupils are equal, round, and reactive to light.   Neck:      Vascular: No JVD.   Cardiovascular:      Rate and Rhythm: Normal rate. Rhythm irregularly irregular.      Pulses: Normal pulses.           Radial pulses are 2+ on the right side and 2+ on the left side.        Dorsalis pedis pulses are 2+ on the right side and 2+ on the left side.   Pulmonary:      Effort: Pulmonary effort is normal. No respiratory distress.      Breath sounds: Normal breath sounds.   Abdominal:      Palpations: Abdomen is soft.      Tenderness: There is no abdominal tenderness.   Musculoskeletal:      Cervical back: Full passive range of motion without pain and neck supple.      Right lower le+ Edema present.      Left lower le+ Edema present.      Comments: No calf tenderness or swelling b/l   Skin:     General: Skin is warm.      Findings: No rash.   Neurological:      General: No focal deficit present.      Mental Status: She is alert. Mental status is at baseline.      Comments: Fluent speech, no acute lateralizing deficits   Psychiatric:         Mood and Affect: Mood normal.         Behavior: Behavior normal.         Results     LAB:  All pertinent labs reviewed and interpreted  Labs Ordered and Resulted from Time of ED Arrival to Time of ED Departure   BASIC METABOLIC PANEL - Abnormal       Result Value    Sodium 134 (*)     Potassium 4.0      Chloride 97 (*)     Carbon Dioxide (CO2) 27      Anion Gap 10      Urea Nitrogen 17      Creatinine 0.72      Calcium 9.2      Glucose 105      GFR Estimate 79     B-TYPE NATRIURETIC PEPTIDE ( EAST ONLY) - Abnormal     (*)    ROUTINE UA WITH MICROSCOPIC REFLEX TO CULTURE - Abnormal    Color Urine Light Yellow      Appearance Urine Clear      Glucose Urine Negative      Bilirubin Urine Negative      Ketones Urine Negative      Specific Gravity Urine 1.009      Blood Urine Negative      pH Urine 6.5       Protein Albumin Urine 10  (*)     Urobilinogen Urine <2.0      Nitrite Urine Negative      Leukocyte Esterase Urine Negative      Bacteria Urine Few (*)     RBC Urine 1      WBC Urine 1      Squamous Epithelials Urine 2 (*)    HEPATIC FUNCTION PANEL - Abnormal    Bilirubin Total 0.5      Bilirubin Direct 0.2      Protein Total 6.5      Albumin 2.5 (*)     Alkaline Phosphatase 105      AST 42 (*)     ALT 34     CBC WITH PLATELETS AND DIFFERENTIAL - Abnormal    WBC Count 12.6 (*)     RBC Count 3.34 (*)     Hemoglobin 10.4 (*)     Hematocrit 31.0 (*)     MCV 93      MCH 31.1      MCHC 33.5      RDW 14.7      Platelet Count 361      % Neutrophils 83      % Lymphocytes 8      % Monocytes 8      % Eosinophils 0      % Basophils 0      % Immature Granulocytes 1      NRBCs per 100 WBC 0      Absolute Neutrophils 10.4 (*)     Absolute Lymphocytes 0.9      Absolute Monocytes 1.0      Absolute Eosinophils 0.0      Absolute Basophils 0.0      Absolute Immature Granulocytes 0.1      Absolute NRBCs 0.0     MAGNESIUM - Normal    Magnesium 2.1     TROPONIN I - Normal    Troponin I <0.01     INFLUENZA A/B & SARS-COV2 PCR MULTIPLEX - Normal    Influenza A PCR Negative      Influenza B PCR Negative      RSV PCR Negative      SARS CoV2 PCR Negative         RADIOLOGY:  XR Chest Port 1 View   Final Result   IMPRESSION:       Apical pleural-parenchymal scarring with calcification. Upper lungs are otherwise clear. A subsegmental opacity is present in the right base laterally with adjacent septal thickening.      The left ventricular apex is silhouetted however this is due to adjacent anterior mediastinal fat on the comparison CT. No definite left lung airspace opacities.      No menisci to indicate pleural effusion.      Arch and descending aorta moderate atheromatous calcification. Vascular pedicle width is normal.                   ECG:  Aflutter with variable block, rate 50s, no acute ischemia    I have independently reviewed and  interpreted the EKG(s) documented above     PROCEDURES:  Procedures:  None.       FINAL IMPRESSION:    ICD-10-CM    1. Pneumonia of right lung due to infectious organism, unspecified part of lung  J18.9    2. Paroxysmal atrial flutter (H)  I48.92        0 minutes of critical care time      I, Evelin Vazquez, am serving as a scribe to document services personally performed by Dr. Onofre Sanon, based on my observations and the provider's statements to me. I, Onofre Sanon, DO attest that Evelin Vazquez is acting in a scribe capacity, has observed my performance of the services and has documented them in accordance with my direction.      Onofre Sanon DO  Emergency Medicine  [facility] EMERGENCY ROOM  5/26/2022  11:03 AM         Onofre Sanon MD  05/26/22 1516

## 2022-05-31 ENCOUNTER — DOCUMENTATION ONLY (OUTPATIENT)
Dept: OTHER | Facility: CLINIC | Age: 87
End: 2022-05-31
Payer: MEDICARE

## 2022-05-31 ENCOUNTER — LAB REQUISITION (OUTPATIENT)
Dept: LAB | Facility: CLINIC | Age: 87
End: 2022-05-31
Payer: MEDICARE

## 2022-05-31 DIAGNOSIS — I10 ESSENTIAL (PRIMARY) HYPERTENSION: ICD-10-CM

## 2022-06-01 LAB
ANION GAP SERPL CALCULATED.3IONS-SCNC: 11 MMOL/L (ref 5–18)
BASOPHILS # BLD AUTO: 0.1 10E3/UL (ref 0–0.2)
BASOPHILS NFR BLD AUTO: 1 %
BNP SERPL-MCNC: 177 PG/ML (ref 0–167)
BUN SERPL-MCNC: 11 MG/DL (ref 8–28)
CALCIUM SERPL-MCNC: 9.3 MG/DL (ref 8.5–10.5)
CHLORIDE BLD-SCNC: 96 MMOL/L (ref 98–107)
CO2 SERPL-SCNC: 27 MMOL/L (ref 22–31)
CREAT SERPL-MCNC: 0.74 MG/DL (ref 0.6–1.1)
EOSINOPHIL # BLD AUTO: 0.1 10E3/UL (ref 0–0.7)
EOSINOPHIL NFR BLD AUTO: 1 %
ERYTHROCYTE [DISTWIDTH] IN BLOOD BY AUTOMATED COUNT: 14.6 % (ref 10–15)
GFR SERPL CREATININE-BSD FRML MDRD: 76 ML/MIN/1.73M2
GLUCOSE BLD-MCNC: 102 MG/DL (ref 70–125)
HCT VFR BLD AUTO: 37.1 % (ref 35–47)
HGB BLD-MCNC: 11.7 G/DL (ref 11.7–15.7)
IMM GRANULOCYTES # BLD: 0.2 10E3/UL
IMM GRANULOCYTES NFR BLD: 2 %
LYMPHOCYTES # BLD AUTO: 1.1 10E3/UL (ref 0.8–5.3)
LYMPHOCYTES NFR BLD AUTO: 12 %
MCH RBC QN AUTO: 30.6 PG (ref 26.5–33)
MCHC RBC AUTO-ENTMCNC: 31.5 G/DL (ref 31.5–36.5)
MCV RBC AUTO: 97 FL (ref 78–100)
MONOCYTES # BLD AUTO: 0.5 10E3/UL (ref 0–1.3)
MONOCYTES NFR BLD AUTO: 5 %
NEUTROPHILS # BLD AUTO: 7.2 10E3/UL (ref 1.6–8.3)
NEUTROPHILS NFR BLD AUTO: 79 %
NRBC # BLD AUTO: 0 10E3/UL
NRBC BLD AUTO-RTO: 0 /100
PLATELET # BLD AUTO: 549 10E3/UL (ref 150–450)
POTASSIUM BLD-SCNC: 3.8 MMOL/L (ref 3.5–5)
RBC # BLD AUTO: 3.82 10E6/UL (ref 3.8–5.2)
SODIUM SERPL-SCNC: 134 MMOL/L (ref 136–145)
WBC # BLD AUTO: 9.1 10E3/UL (ref 4–11)

## 2022-06-01 PROCEDURE — 83880 ASSAY OF NATRIURETIC PEPTIDE: CPT | Mod: ORL | Performed by: NURSE PRACTITIONER

## 2022-06-01 PROCEDURE — 80048 BASIC METABOLIC PNL TOTAL CA: CPT | Mod: ORL | Performed by: NURSE PRACTITIONER

## 2022-06-01 PROCEDURE — 85025 COMPLETE CBC W/AUTO DIFF WBC: CPT | Mod: ORL | Performed by: NURSE PRACTITIONER

## 2022-06-01 PROCEDURE — 36415 COLL VENOUS BLD VENIPUNCTURE: CPT | Mod: ORL | Performed by: NURSE PRACTITIONER

## 2022-09-25 ENCOUNTER — HEALTH MAINTENANCE LETTER (OUTPATIENT)
Age: 87
End: 2022-09-25

## 2023-01-09 ENCOUNTER — LAB REQUISITION (OUTPATIENT)
Dept: LAB | Facility: CLINIC | Age: 88
End: 2023-01-09
Payer: MEDICARE

## 2023-01-09 DIAGNOSIS — E09.311: ICD-10-CM

## 2023-01-09 DIAGNOSIS — I10 ESSENTIAL (PRIMARY) HYPERTENSION: ICD-10-CM

## 2023-01-12 LAB
ANION GAP SERPL CALCULATED.3IONS-SCNC: 14 MMOL/L (ref 7–15)
BUN SERPL-MCNC: 22.6 MG/DL (ref 8–23)
CALCIUM SERPL-MCNC: 9.6 MG/DL (ref 8.2–9.6)
CHLORIDE SERPL-SCNC: 100 MMOL/L (ref 98–107)
CREAT SERPL-MCNC: 0.97 MG/DL (ref 0.51–0.95)
DEPRECATED HCO3 PLAS-SCNC: 25 MMOL/L (ref 22–29)
ERYTHROCYTE [DISTWIDTH] IN BLOOD BY AUTOMATED COUNT: 15.4 % (ref 10–15)
GFR SERPL CREATININE-BSD FRML MDRD: 55 ML/MIN/1.73M2
GLUCOSE SERPL-MCNC: 70 MG/DL (ref 70–99)
HCT VFR BLD AUTO: 39.2 % (ref 35–47)
HGB BLD-MCNC: 12 G/DL (ref 11.7–15.7)
MCH RBC QN AUTO: 31.3 PG (ref 26.5–33)
MCHC RBC AUTO-ENTMCNC: 30.6 G/DL (ref 31.5–36.5)
MCV RBC AUTO: 102 FL (ref 78–100)
PLATELET # BLD AUTO: 264 10E3/UL (ref 150–450)
POTASSIUM SERPL-SCNC: 4.4 MMOL/L (ref 3.4–5.3)
RBC # BLD AUTO: 3.83 10E6/UL (ref 3.8–5.2)
SODIUM SERPL-SCNC: 139 MMOL/L (ref 136–145)
TSH SERPL DL<=0.005 MIU/L-ACNC: 1.96 UIU/ML (ref 0.3–4.2)
WBC # BLD AUTO: 6.3 10E3/UL (ref 4–11)

## 2023-01-12 PROCEDURE — P9603 ONE-WAY ALLOW PRORATED MILES: HCPCS | Mod: ORL | Performed by: NURSE PRACTITIONER

## 2023-01-12 PROCEDURE — 85027 COMPLETE CBC AUTOMATED: CPT | Mod: ORL | Performed by: NURSE PRACTITIONER

## 2023-01-12 PROCEDURE — 84443 ASSAY THYROID STIM HORMONE: CPT | Mod: ORL | Performed by: NURSE PRACTITIONER

## 2023-01-12 PROCEDURE — 36415 COLL VENOUS BLD VENIPUNCTURE: CPT | Mod: ORL | Performed by: NURSE PRACTITIONER

## 2023-01-12 PROCEDURE — 80048 BASIC METABOLIC PNL TOTAL CA: CPT | Mod: ORL | Performed by: NURSE PRACTITIONER

## 2023-02-04 ENCOUNTER — HEALTH MAINTENANCE LETTER (OUTPATIENT)
Age: 88
End: 2023-02-04

## 2023-06-15 ENCOUNTER — TELEPHONE (OUTPATIENT)
Dept: FAMILY MEDICINE | Facility: CLINIC | Age: 88
End: 2023-06-15

## 2023-06-15 NOTE — TELEPHONE ENCOUNTER
Patient Quality Outreach    Patient is due for the following:   Physical Annual Wellness Visit    Next Steps:   Schedule a Annual Wellness Visit    Type of outreach:    Sent [employer] message.      Questions for provider review:    None           Rubina Zamora  Chart routed to MA.

## 2023-08-29 ENCOUNTER — LAB REQUISITION (OUTPATIENT)
Dept: LAB | Facility: CLINIC | Age: 88
End: 2023-08-29
Payer: MEDICARE

## 2023-08-29 DIAGNOSIS — E55.9 VITAMIN D DEFICIENCY, UNSPECIFIED: ICD-10-CM

## 2023-08-29 DIAGNOSIS — I10 ESSENTIAL (PRIMARY) HYPERTENSION: ICD-10-CM

## 2023-08-29 DIAGNOSIS — E03.9 HYPOTHYROIDISM, UNSPECIFIED: ICD-10-CM

## 2023-08-29 DIAGNOSIS — D64.9 ANEMIA, UNSPECIFIED: ICD-10-CM

## 2023-08-30 LAB
ANION GAP SERPL CALCULATED.3IONS-SCNC: 12 MMOL/L (ref 7–15)
BUN SERPL-MCNC: 19.5 MG/DL (ref 8–23)
CALCIUM SERPL-MCNC: 9.7 MG/DL (ref 8.2–9.6)
CHLORIDE SERPL-SCNC: 98 MMOL/L (ref 98–107)
CREAT SERPL-MCNC: 0.77 MG/DL (ref 0.51–0.95)
DEPRECATED CALCIDIOL+CALCIFEROL SERPL-MC: 37 UG/L (ref 20–75)
DEPRECATED HCO3 PLAS-SCNC: 27 MMOL/L (ref 22–29)
ERYTHROCYTE [DISTWIDTH] IN BLOOD BY AUTOMATED COUNT: 13.4 % (ref 10–15)
GFR SERPL CREATININE-BSD FRML MDRD: 72 ML/MIN/1.73M2
GLUCOSE SERPL-MCNC: 124 MG/DL (ref 70–99)
HCT VFR BLD AUTO: 42.3 % (ref 35–47)
HGB BLD-MCNC: 12.9 G/DL (ref 11.7–15.7)
MCH RBC QN AUTO: 32.1 PG (ref 26.5–33)
MCHC RBC AUTO-ENTMCNC: 30.5 G/DL (ref 31.5–36.5)
MCV RBC AUTO: 105 FL (ref 78–100)
PLATELET # BLD AUTO: 237 10E3/UL (ref 150–450)
POTASSIUM SERPL-SCNC: 4.5 MMOL/L (ref 3.4–5.3)
RBC # BLD AUTO: 4.02 10E6/UL (ref 3.8–5.2)
SODIUM SERPL-SCNC: 137 MMOL/L (ref 136–145)
TSH SERPL DL<=0.005 MIU/L-ACNC: 0.69 UIU/ML (ref 0.3–4.2)
WBC # BLD AUTO: 5.8 10E3/UL (ref 4–11)

## 2023-08-30 PROCEDURE — 36415 COLL VENOUS BLD VENIPUNCTURE: CPT | Mod: ORL | Performed by: NURSE PRACTITIONER

## 2023-08-30 PROCEDURE — 85027 COMPLETE CBC AUTOMATED: CPT | Mod: ORL | Performed by: NURSE PRACTITIONER

## 2023-08-30 PROCEDURE — 84443 ASSAY THYROID STIM HORMONE: CPT | Mod: ORL | Performed by: NURSE PRACTITIONER

## 2023-08-30 PROCEDURE — 80048 BASIC METABOLIC PNL TOTAL CA: CPT | Mod: ORL | Performed by: NURSE PRACTITIONER

## 2023-08-30 PROCEDURE — 82306 VITAMIN D 25 HYDROXY: CPT | Mod: ORL | Performed by: NURSE PRACTITIONER

## 2023-08-30 PROCEDURE — P9604 ONE-WAY ALLOW PRORATED TRIP: HCPCS | Mod: ORL | Performed by: NURSE PRACTITIONER

## 2024-03-02 ENCOUNTER — HEALTH MAINTENANCE LETTER (OUTPATIENT)
Age: 89
End: 2024-03-02

## 2024-04-10 ENCOUNTER — LAB REQUISITION (OUTPATIENT)
Dept: LAB | Facility: CLINIC | Age: 89
End: 2024-04-10
Payer: MEDICARE

## 2024-04-10 DIAGNOSIS — I10 ESSENTIAL (PRIMARY) HYPERTENSION: ICD-10-CM

## 2024-04-10 DIAGNOSIS — D64.9 ANEMIA, UNSPECIFIED: ICD-10-CM

## 2024-04-10 DIAGNOSIS — E03.9 HYPOTHYROIDISM, UNSPECIFIED: ICD-10-CM

## 2024-04-22 ENCOUNTER — LAB REQUISITION (OUTPATIENT)
Dept: LAB | Facility: CLINIC | Age: 89
End: 2024-04-22
Payer: MEDICARE

## 2024-04-22 DIAGNOSIS — D64.9 ANEMIA, UNSPECIFIED: ICD-10-CM

## 2024-04-22 DIAGNOSIS — E03.9 HYPOTHYROIDISM, UNSPECIFIED: ICD-10-CM

## 2024-04-22 DIAGNOSIS — I10 ESSENTIAL (PRIMARY) HYPERTENSION: ICD-10-CM

## 2024-04-24 LAB
ERYTHROCYTE [DISTWIDTH] IN BLOOD BY AUTOMATED COUNT: 13.4 % (ref 10–15)
HCT VFR BLD AUTO: 38.5 % (ref 35–47)
HGB BLD-MCNC: 12.2 G/DL (ref 11.7–15.7)
MCH RBC QN AUTO: 33 PG (ref 26.5–33)
MCHC RBC AUTO-ENTMCNC: 31.7 G/DL (ref 31.5–36.5)
MCV RBC AUTO: 104 FL (ref 78–100)
PLATELET # BLD AUTO: 251 10E3/UL (ref 150–450)
RBC # BLD AUTO: 3.7 10E6/UL (ref 3.8–5.2)
WBC # BLD AUTO: 7.1 10E3/UL (ref 4–11)

## 2024-04-24 PROCEDURE — 85027 COMPLETE CBC AUTOMATED: CPT | Mod: ORL | Performed by: NURSE PRACTITIONER

## 2024-04-24 PROCEDURE — 84443 ASSAY THYROID STIM HORMONE: CPT | Mod: ORL | Performed by: NURSE PRACTITIONER

## 2024-04-24 PROCEDURE — 80048 BASIC METABOLIC PNL TOTAL CA: CPT | Mod: ORL | Performed by: NURSE PRACTITIONER

## 2024-04-24 PROCEDURE — P9603 ONE-WAY ALLOW PRORATED MILES: HCPCS | Mod: ORL | Performed by: NURSE PRACTITIONER

## 2024-04-24 PROCEDURE — 36415 COLL VENOUS BLD VENIPUNCTURE: CPT | Mod: ORL | Performed by: NURSE PRACTITIONER

## 2024-04-25 LAB
ANION GAP SERPL CALCULATED.3IONS-SCNC: 12 MMOL/L (ref 7–15)
BUN SERPL-MCNC: 26.3 MG/DL (ref 8–23)
CALCIUM SERPL-MCNC: 9.7 MG/DL (ref 8.2–9.6)
CHLORIDE SERPL-SCNC: 98 MMOL/L (ref 98–107)
CREAT SERPL-MCNC: 0.88 MG/DL (ref 0.51–0.95)
DEPRECATED HCO3 PLAS-SCNC: 27 MMOL/L (ref 22–29)
EGFRCR SERPLBLD CKD-EPI 2021: 61 ML/MIN/1.73M2
GLUCOSE SERPL-MCNC: 99 MG/DL (ref 70–99)
POTASSIUM SERPL-SCNC: 4.8 MMOL/L (ref 3.4–5.3)
SODIUM SERPL-SCNC: 137 MMOL/L (ref 135–145)
TSH SERPL DL<=0.005 MIU/L-ACNC: 1.24 UIU/ML (ref 0.3–4.2)

## 2024-12-16 ENCOUNTER — LAB REQUISITION (OUTPATIENT)
Dept: LAB | Facility: CLINIC | Age: 89
End: 2024-12-16
Payer: MEDICARE

## 2024-12-16 DIAGNOSIS — D64.9 ANEMIA, UNSPECIFIED: ICD-10-CM

## 2024-12-16 DIAGNOSIS — I10 ESSENTIAL (PRIMARY) HYPERTENSION: ICD-10-CM

## 2024-12-16 DIAGNOSIS — E03.9 HYPOTHYROIDISM, UNSPECIFIED: ICD-10-CM

## 2024-12-18 LAB
ANION GAP SERPL CALCULATED.3IONS-SCNC: 12 MMOL/L (ref 7–15)
BUN SERPL-MCNC: 26.1 MG/DL (ref 8–23)
CALCIUM SERPL-MCNC: 9.7 MG/DL (ref 8.8–10.4)
CHLORIDE SERPL-SCNC: 97 MMOL/L (ref 98–107)
CREAT SERPL-MCNC: 1.01 MG/DL (ref 0.51–0.95)
EGFRCR SERPLBLD CKD-EPI 2021: 52 ML/MIN/1.73M2
ERYTHROCYTE [DISTWIDTH] IN BLOOD BY AUTOMATED COUNT: 13.7 % (ref 10–15)
GLUCOSE SERPL-MCNC: 86 MG/DL (ref 70–99)
HCO3 SERPL-SCNC: 28 MMOL/L (ref 22–29)
HCT VFR BLD AUTO: 39.8 % (ref 35–47)
HGB BLD-MCNC: 12.7 G/DL (ref 11.7–15.7)
MCH RBC QN AUTO: 33.6 PG (ref 26.5–33)
MCHC RBC AUTO-ENTMCNC: 31.9 G/DL (ref 31.5–36.5)
MCV RBC AUTO: 105 FL (ref 78–100)
PLATELET # BLD AUTO: 252 10E3/UL (ref 150–450)
POTASSIUM SERPL-SCNC: 4.9 MMOL/L (ref 3.4–5.3)
RBC # BLD AUTO: 3.78 10E6/UL (ref 3.8–5.2)
SODIUM SERPL-SCNC: 137 MMOL/L (ref 135–145)
TSH SERPL DL<=0.005 MIU/L-ACNC: 7.07 UIU/ML (ref 0.3–4.2)
WBC # BLD AUTO: 7.1 10E3/UL (ref 4–11)

## 2024-12-18 PROCEDURE — 84443 ASSAY THYROID STIM HORMONE: CPT | Mod: ORL

## 2024-12-18 PROCEDURE — 85027 COMPLETE CBC AUTOMATED: CPT | Mod: ORL

## 2024-12-18 PROCEDURE — P9603 ONE-WAY ALLOW PRORATED MILES: HCPCS | Mod: ORL

## 2024-12-18 PROCEDURE — 36415 COLL VENOUS BLD VENIPUNCTURE: CPT | Mod: ORL

## 2024-12-18 PROCEDURE — 80048 BASIC METABOLIC PNL TOTAL CA: CPT | Mod: ORL

## 2025-01-05 ENCOUNTER — LAB REQUISITION (OUTPATIENT)
Dept: LAB | Facility: CLINIC | Age: OVER 89
End: 2025-01-05
Payer: MEDICARE

## 2025-01-05 DIAGNOSIS — E03.9 HYPOTHYROIDISM, UNSPECIFIED: ICD-10-CM

## 2025-01-05 DIAGNOSIS — D41.9 NEOPLASM OF UNCERTAIN BEHAVIOR OF UNSPECIFIED URINARY ORGAN: ICD-10-CM

## 2025-01-05 DIAGNOSIS — I10 ESSENTIAL (PRIMARY) HYPERTENSION: ICD-10-CM

## 2025-01-05 DIAGNOSIS — D64.9 ANEMIA, UNSPECIFIED: ICD-10-CM

## 2025-01-08 LAB
ANION GAP SERPL CALCULATED.3IONS-SCNC: 11 MMOL/L (ref 7–15)
BUN SERPL-MCNC: 28.1 MG/DL (ref 8–23)
CALCIUM SERPL-MCNC: 9.8 MG/DL (ref 8.8–10.4)
CHLORIDE SERPL-SCNC: 93 MMOL/L (ref 98–107)
CREAT SERPL-MCNC: 0.96 MG/DL (ref 0.51–0.95)
EGFRCR SERPLBLD CKD-EPI 2021: 55 ML/MIN/1.73M2
ERYTHROCYTE [DISTWIDTH] IN BLOOD BY AUTOMATED COUNT: 13.5 % (ref 10–15)
GLUCOSE SERPL-MCNC: 82 MG/DL (ref 70–99)
HCO3 SERPL-SCNC: 28 MMOL/L (ref 22–29)
HCT VFR BLD AUTO: 38.8 % (ref 35–47)
HGB BLD-MCNC: 12.3 G/DL (ref 11.7–15.7)
MCH RBC QN AUTO: 32.9 PG (ref 26.5–33)
MCHC RBC AUTO-ENTMCNC: 31.7 G/DL (ref 31.5–36.5)
MCV RBC AUTO: 104 FL (ref 78–100)
PLATELET # BLD AUTO: 244 10E3/UL (ref 150–450)
POTASSIUM SERPL-SCNC: 4.7 MMOL/L (ref 3.4–5.3)
RBC # BLD AUTO: 3.74 10E6/UL (ref 3.8–5.2)
SODIUM SERPL-SCNC: 132 MMOL/L (ref 135–145)
TSH SERPL DL<=0.005 MIU/L-ACNC: 10 UIU/ML (ref 0.3–4.2)
WBC # BLD AUTO: 7 10E3/UL (ref 4–11)

## 2025-01-08 PROCEDURE — 80048 BASIC METABOLIC PNL TOTAL CA: CPT | Mod: ORL

## 2025-01-08 PROCEDURE — 85027 COMPLETE CBC AUTOMATED: CPT | Mod: ORL

## 2025-01-08 PROCEDURE — 36415 COLL VENOUS BLD VENIPUNCTURE: CPT | Mod: ORL

## 2025-01-08 PROCEDURE — 84443 ASSAY THYROID STIM HORMONE: CPT | Mod: ORL

## 2025-01-08 PROCEDURE — P9603 ONE-WAY ALLOW PRORATED MILES: HCPCS | Mod: ORL

## 2025-01-13 ENCOUNTER — LAB REQUISITION (OUTPATIENT)
Dept: LAB | Facility: CLINIC | Age: OVER 89
End: 2025-01-13
Payer: MEDICARE

## 2025-01-13 DIAGNOSIS — E03.9 HYPOTHYROIDISM, UNSPECIFIED: ICD-10-CM

## 2025-01-13 DIAGNOSIS — I10 ESSENTIAL (PRIMARY) HYPERTENSION: ICD-10-CM

## 2025-01-14 ENCOUNTER — LAB REQUISITION (OUTPATIENT)
Dept: LAB | Facility: CLINIC | Age: OVER 89
End: 2025-01-14
Payer: MEDICARE

## 2025-01-14 DIAGNOSIS — E03.9 HYPOTHYROIDISM, UNSPECIFIED: ICD-10-CM

## 2025-01-14 DIAGNOSIS — I10 ESSENTIAL (PRIMARY) HYPERTENSION: ICD-10-CM

## 2025-01-29 LAB
ANION GAP SERPL CALCULATED.3IONS-SCNC: 9 MMOL/L (ref 7–15)
BUN SERPL-MCNC: 18 MG/DL (ref 8–23)
CALCIUM SERPL-MCNC: 10 MG/DL (ref 8.8–10.4)
CHLORIDE SERPL-SCNC: 95 MMOL/L (ref 98–107)
CREAT SERPL-MCNC: 0.84 MG/DL (ref 0.51–0.95)
EGFRCR SERPLBLD CKD-EPI 2021: 64 ML/MIN/1.73M2
GLUCOSE SERPL-MCNC: 86 MG/DL (ref 70–99)
HCO3 SERPL-SCNC: 29 MMOL/L (ref 22–29)
POTASSIUM SERPL-SCNC: 5 MMOL/L (ref 3.4–5.3)
SODIUM SERPL-SCNC: 133 MMOL/L (ref 135–145)
T4 FREE SERPL-MCNC: 1.05 NG/DL (ref 0.9–1.7)
TSH SERPL DL<=0.005 MIU/L-ACNC: 15.9 UIU/ML (ref 0.3–4.2)

## 2025-01-29 PROCEDURE — 84443 ASSAY THYROID STIM HORMONE: CPT | Mod: ORL | Performed by: NURSE PRACTITIONER

## 2025-01-29 PROCEDURE — P9604 ONE-WAY ALLOW PRORATED TRIP: HCPCS | Mod: ORL | Performed by: NURSE PRACTITIONER

## 2025-01-29 PROCEDURE — 80048 BASIC METABOLIC PNL TOTAL CA: CPT | Mod: ORL | Performed by: NURSE PRACTITIONER

## 2025-01-29 PROCEDURE — 36415 COLL VENOUS BLD VENIPUNCTURE: CPT | Mod: ORL | Performed by: NURSE PRACTITIONER

## 2025-01-29 PROCEDURE — 84439 ASSAY OF FREE THYROXINE: CPT | Mod: ORL | Performed by: NURSE PRACTITIONER

## 2025-02-10 ENCOUNTER — LAB REQUISITION (OUTPATIENT)
Dept: LAB | Facility: CLINIC | Age: OVER 89
End: 2025-02-10
Payer: MEDICARE

## 2025-02-10 DIAGNOSIS — E03.9 HYPOTHYROIDISM, UNSPECIFIED: ICD-10-CM

## 2025-02-12 LAB — TSH SERPL DL<=0.005 MIU/L-ACNC: 5.72 UIU/ML (ref 0.3–4.2)

## 2025-02-12 PROCEDURE — 36415 COLL VENOUS BLD VENIPUNCTURE: CPT | Mod: ORL

## 2025-02-12 PROCEDURE — P9603 ONE-WAY ALLOW PRORATED MILES: HCPCS | Mod: ORL

## 2025-02-12 PROCEDURE — 84443 ASSAY THYROID STIM HORMONE: CPT | Mod: ORL

## 2025-03-15 ENCOUNTER — HEALTH MAINTENANCE LETTER (OUTPATIENT)
Age: OVER 89
End: 2025-03-15

## 2025-03-24 ENCOUNTER — LAB REQUISITION (OUTPATIENT)
Dept: LAB | Facility: CLINIC | Age: OVER 89
End: 2025-03-24
Payer: MEDICARE

## 2025-03-24 DIAGNOSIS — E03.9 HYPOTHYROIDISM, UNSPECIFIED: ICD-10-CM

## 2025-03-26 LAB — TSH SERPL DL<=0.005 MIU/L-ACNC: 1.01 UIU/ML (ref 0.3–4.2)

## 2025-03-26 PROCEDURE — 84443 ASSAY THYROID STIM HORMONE: CPT | Mod: ORL | Performed by: NURSE PRACTITIONER

## 2025-03-26 PROCEDURE — 36415 COLL VENOUS BLD VENIPUNCTURE: CPT | Mod: ORL | Performed by: NURSE PRACTITIONER

## 2025-03-26 PROCEDURE — P9603 ONE-WAY ALLOW PRORATED MILES: HCPCS | Mod: ORL | Performed by: NURSE PRACTITIONER

## 2025-04-10 ENCOUNTER — HOSPITAL ENCOUNTER (EMERGENCY)
Facility: CLINIC | Age: OVER 89
Discharge: HOME | End: 2025-04-10
Attending: EMERGENCY MEDICINE
Payer: MEDICARE

## 2025-04-10 ENCOUNTER — MEDICAL CORRESPONDENCE (OUTPATIENT)
Dept: HEALTH INFORMATION MANAGEMENT | Facility: CLINIC | Age: OVER 89
End: 2025-04-10

## 2025-04-10 ENCOUNTER — APPOINTMENT (OUTPATIENT)
Dept: RADIOLOGY | Facility: CLINIC | Age: OVER 89
End: 2025-04-10
Attending: EMERGENCY MEDICINE
Payer: MEDICARE

## 2025-04-10 ENCOUNTER — APPOINTMENT (OUTPATIENT)
Dept: CT IMAGING | Facility: CLINIC | Age: OVER 89
End: 2025-04-10
Attending: EMERGENCY MEDICINE
Payer: MEDICARE

## 2025-04-10 VITALS
TEMPERATURE: 97.6 F | HEART RATE: 56 BPM | DIASTOLIC BLOOD PRESSURE: 80 MMHG | RESPIRATION RATE: 21 BRPM | SYSTOLIC BLOOD PRESSURE: 191 MMHG | OXYGEN SATURATION: 92 %

## 2025-04-10 DIAGNOSIS — T07.XXXA MULTIPLE CONTUSIONS: ICD-10-CM
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
## 2025-04-10 LAB
ANION GAP SERPL CALCULATED.3IONS-SCNC: 12 MMOL/L (ref 7–15)
APTT PPP: 33 SECONDS (ref 22–38)
ATRIAL RATE - MUSE: 62 BPM
BASOPHILS # BLD AUTO: 0 10E3/UL (ref 0–0.2)
BASOPHILS NFR BLD AUTO: 1 %
BUN SERPL-MCNC: 14.5 MG/DL (ref 8–23)
CALCIUM SERPL-MCNC: 9.8 MG/DL (ref 8.8–10.4)
CHLORIDE SERPL-SCNC: 95 MMOL/L (ref 98–107)
CREAT SERPL-MCNC: 0.62 MG/DL (ref 0.51–0.95)
DIASTOLIC BLOOD PRESSURE - MUSE: 99 MMHG
EGFRCR SERPLBLD CKD-EPI 2021: 83 ML/MIN/1.73M2
EOSINOPHIL # BLD AUTO: 0.3 10E3/UL (ref 0–0.7)
EOSINOPHIL NFR BLD AUTO: 5 %
ERYTHROCYTE [DISTWIDTH] IN BLOOD BY AUTOMATED COUNT: 13.2 % (ref 10–15)
GLUCOSE SERPL-MCNC: 124 MG/DL (ref 70–99)
HCO3 SERPL-SCNC: 28 MMOL/L (ref 22–29)
HCT VFR BLD AUTO: 37.9 % (ref 35–47)
HGB BLD-MCNC: 12.7 G/DL (ref 11.7–15.7)
IMM GRANULOCYTES # BLD: 0.1 10E3/UL
IMM GRANULOCYTES NFR BLD: 1 %
INR PPP: 1.37 (ref 0.85–1.15)
INTERPRETATION ECG - MUSE: NORMAL
LYMPHOCYTES # BLD AUTO: 0.8 10E3/UL (ref 0.8–5.3)
LYMPHOCYTES NFR BLD AUTO: 13 %
MCH RBC QN AUTO: 33.3 PG (ref 26.5–33)
MCHC RBC AUTO-ENTMCNC: 33.5 G/DL (ref 31.5–36.5)
MCV RBC AUTO: 100 FL (ref 78–100)
MONOCYTES # BLD AUTO: 0.6 10E3/UL (ref 0–1.3)
MONOCYTES NFR BLD AUTO: 10 %
NEUTROPHILS # BLD AUTO: 4.1 10E3/UL (ref 1.6–8.3)
NEUTROPHILS NFR BLD AUTO: 70 %
NRBC # BLD AUTO: 0 10E3/UL
NRBC BLD AUTO-RTO: 0 /100
P AXIS - MUSE: 83 DEGREES
PLATELET # BLD AUTO: 242 10E3/UL (ref 150–450)
POTASSIUM SERPL-SCNC: 4.1 MMOL/L (ref 3.4–5.3)
PR INTERVAL - MUSE: 328 MS
QRS DURATION - MUSE: 114 MS
QT - MUSE: 428 MS
QTC - MUSE: 434 MS
R AXIS - MUSE: 56 DEGREES
RBC # BLD AUTO: 3.81 10E6/UL (ref 3.8–5.2)
SODIUM SERPL-SCNC: 135 MMOL/L (ref 135–145)
SYSTOLIC BLOOD PRESSURE - MUSE: 188 MMHG
T AXIS - MUSE: 64 DEGREES
VENTRICULAR RATE- MUSE: 62 BPM
WBC # BLD AUTO: 5.9 10E3/UL (ref 4–11)

## 2025-04-10 PROCEDURE — 85730 THROMBOPLASTIN TIME PARTIAL: CPT | Performed by: EMERGENCY MEDICINE

## 2025-04-10 PROCEDURE — 85025 COMPLETE CBC W/AUTO DIFF WBC: CPT | Performed by: EMERGENCY MEDICINE

## 2025-04-10 PROCEDURE — 250N000013 HC RX MED GY IP 250 OP 250 PS 637: Performed by: EMERGENCY MEDICINE

## 2025-04-10 PROCEDURE — 36415 COLL VENOUS BLD VENIPUNCTURE: CPT | Performed by: EMERGENCY MEDICINE

## 2025-04-10 PROCEDURE — 72070 X-RAY EXAM THORAC SPINE 2VWS: CPT

## 2025-04-10 PROCEDURE — 85610 PROTHROMBIN TIME: CPT | Performed by: EMERGENCY MEDICINE

## 2025-04-10 PROCEDURE — 70450 CT HEAD/BRAIN W/O DYE: CPT

## 2025-04-10 PROCEDURE — 72100 X-RAY EXAM L-S SPINE 2/3 VWS: CPT

## 2025-04-10 PROCEDURE — 99285 EMERGENCY DEPT VISIT HI MDM: CPT | Mod: 25

## 2025-04-10 PROCEDURE — 72125 CT NECK SPINE W/O DYE: CPT

## 2025-04-10 PROCEDURE — 80048 BASIC METABOLIC PNL TOTAL CA: CPT | Performed by: EMERGENCY MEDICINE

## 2025-04-10 PROCEDURE — 93005 ELECTROCARDIOGRAM TRACING: CPT | Performed by: EMERGENCY MEDICINE

## 2025-04-10 RX ORDER — ACETAMINOPHEN 325 MG/1
650 TABLET ORAL ONCE
Status: COMPLETED | OUTPATIENT
Start: 2025-04-10 | End: 2025-04-10

## 2025-04-10 RX ADMIN — ACETAMINOPHEN 650 MG: 325 TABLET, FILM COATED ORAL at 10:50

## 2025-04-10 ASSESSMENT — ENCOUNTER SYMPTOMS
NECK PAIN: 1
BACK PAIN: 1

## 2025-04-10 ASSESSMENT — ACTIVITIES OF DAILY LIVING (ADL)
ADLS_ACUITY_SCORE: 54

## 2025-04-10 NOTE — DISCHARGE INSTRUCTIONS
Ice off-and-on to sore areas whenever hurting.  Tylenol every 6 hours as needed for pain.  Follow-up with your clinic next week.  See them sooner if worse or problems or concerns.  Continue to use your walker.

## 2025-04-10 NOTE — ED PROVIDER NOTES
EMERGENCY DEPARTMENT ENCOUNTER      NAME: Katie Mar  AGE: 89 year old female  YOB: 1931  MRN: [id number]  EVALUATION DATE & TIME: 4/10/2025  8:28 AM    PCP: System, Provider Not In    ED PROVIDER: Onofre Way M.D.      Chief Complaint   Patient presents with    Fall         FINAL IMPRESSION:  1.  Acute fall.  2.  Acute multiple contusions.  3.  Chronic L1 compression fracture.    ED COURSE & MEDICAL DECISION MAKIN:37 AM I met with the patient to gather history and to perform my initial exam. We discussed plans for the ED course, including diagnostic testing and treatment. PPE worn: cloth mask.  Patient in the shower and fell backwards.  Patient complaining of some back of the head pain, neck pain, mid back pain, low back pain.  Patient on Eliquis for atrial fibrillation.  No loss conscious.  No neurological deficits.  10:05 AM.  EKG unremarkable.  Head CT without acute findings.  C-spine CT with general degenerative changes but no acute findings.  T-spine L-spine x-ray showed the old L1 compression fracture but no new fractures.  Extensive degenerative disease.  Laboratory work unremarkable.  Results communicated to the patient and family.  Patient will be discharged to home.  They are in agreement.      Pertinent Labs & Imaging studies reviewed. (See chart for details)  89 year old female presents to the Emergency Department for evaluation of a fall.    At the conclusion of the encounter I discussed the results of all of the tests and the disposition. The questions were answered. The patient or family acknowledged understanding and was agreeable with the care plan.              Medical Decision Making  Facility records were reviewed including the patient POLST.  Inpatient and outpatient [facility] records were reviewed.  EMS report was reviewed.  Patient history reviewed.  Pending negative evaluation, anticipate discharge home.  If admitted, will discuss with hospitalist service and  possibly neurosurgery.    MIPS (CTPE, Dental pain, Vanegas, Sinusitis, Asthma/COPD, Head Trauma): Not Applicable    SEPSIS: None          MEDICATIONS GIVEN IN THE EMERGENCY:  Medications - No data to display    NEW PRESCRIPTIONS STARTED AT TODAY'S ER VISIT  New Prescriptions    No medications on file          =================================================================    HPI    Patient information was obtained from: EMS, the patient    Use of : N/A        Katie Mar is a 89 year old female with a pertinent history of chronic atrial fibrillation on Eliquis, HTN, hypothyroidism and lumbar vertebra fracture, who presents to this ED via ambulance for evaluation of back pain and injuries following a fall.    Per EMS report, the patient fell while she was in the shower this morning. The patient reports that she has back pain and head pain following this fall. She is on Eliquis for her history of persistent, chronic A-Fib.    The patient reports that she fell backwards while she was in the shower this morning today (04/10/2025). She states that she had lower back pain after this fall, but indicates that she does not feel this pain that much now. Notes that she has head pain whenever she moves her head or tries to lift her head, and she feels this pain radiate down her spine. She endorses mid back pain with palpation, as well as slight neck pain. Denies any posterior head pain. She rates her back pain as very mild when she is not moving much and lying flat in the hospital bed. She denies having any pain in her bilateral arms, both lower extremities, chest or her abdomen.    Per RN report, the patient is satting at 91% on RA, and this appears to be her baseline.    She does not identify any waxing or waning symptoms otherwise, exacerbating or alleviating features, associated symptoms except as mentioned. Otherwise, she denies any additional pain related complaints at this time.      REVIEW OF SYSTEMS    Review of Systems   Constitutional:         Positive for head pain with any movement   Musculoskeletal:  Positive for back pain (mid and lower back pain) and neck pain (mild).   All other systems reviewed and are negative.       PAST MEDICAL HISTORY:  Past Medical History:   Diagnosis Date    Anxiety state     Essential hypertension     Persistent atrial fibrillation (H) 2018    Pure hypercholesterolemia        PAST SURGICAL HISTORY:  Past Surgical History:   Procedure Laterality Date    CARDIOVERSION  2018    CATARACT EXTRACTION, BILATERAL Bilateral     DILATION AND CURETTAGE      RELEASE CARPAL TUNNEL Right            CURRENT MEDICATIONS:    acetaminophen (TYLENOL) 500 MG tablet  amLODIPine (NORVASC) 2.5 MG tablet  apixaban ANTICOAGULANT (ELIQUIS ANTICOAGULANT) 5 MG tablet  atorvastatin (LIPITOR) 20 MG tablet  diphenhydrAMINE (ANTIHISTAMINE) 25 mg tablet  flecainide (TAMBOCOR) 50 MG tablet  levothyroxine (SYNTHROID/LEVOTHROID) 75 MCG tablet  lidocaine (LMX4) 4 % external cream  losartan (COZAAR) 100 MG tablet  melatonin 3 MG tablet  metoprolol tartrate (LOPRESSOR) 25 MG tablet        ALLERGIES:  Allergies   Allergen Reactions    Clindamycin Hives    Doxycycline Hyclate [Doxycycline] Hives    Penicillins Hives    Tetanus Toxoid Unknown     Site reaction. Hard area of redness and pain.       FAMILY HISTORY:  Family History   Problem Relation Age of Onset    Ovarian Cancer Paternal Aunt     Heart Failure Mother 76.00         at home    Coronary Artery Disease Father 49.00        and a second at age 54    Liver Cancer Father     Coronary Artery Disease Brother 49.00        and  of the second at 62    CABG Brother 53.00    No Known Problems Son     Alcoholism Brother     Cirrhosis Brother     No Known Problems Son         Lives in [place]    No Known Problems Son        SOCIAL HISTORY:   Social History     Socioeconomic History    Marital status:     Number of children: 3    Years of  education: 16   Tobacco Use    Smoking status: Never    Smokeless tobacco: Never   Substance and Sexual Activity    Alcohol use: Yes     Alcohol/week: 7.0 standard drinks of alcohol    Drug use: No    Sexual activity: Not Currently     Partners: Male   Social History Narrative    Lives alone since her  passed in 2013. She lives in [facility]. She still drives in 2018. She walks on her own. She enjoys playing bridge and going out to lunch.    She has 3 sons: Bi lives in [facility] and does check in on her.  Another son lives in [facility] [place] [facility] and travels a lot.  The other son lives in [facility] and is retired.  She has 2 grandchildren.    She has advanced directives and Bi is in charge of her affairs.     No drugs or tobacco.  Occasional alcohol.    VITALS:  BP (!) 159/61   Pulse 57   Temp 97.6  F (36.4  C) (Oral)   Resp 20   LMP  (LMP Unknown)   SpO2 92%     PHYSICAL EXAM    Vital Signs:  BP (!) 159/61   Pulse 57   Temp 97.6  F (36.4  C) (Oral)   Resp 20   LMP  (LMP Unknown)   SpO2 92%   General:  On entering the room she is in no apparent distress.    Neck:  Neck supple with full range of motion and tender in the right and left paracervical muscles.  No spinal tenderness.  Back:  Back and spine are tender in the thoracic and cervical spine.  No palpable fracture.  No costovertebral angle tenderness.    HEENT:  Oropharynx clear with moist mucous membranes.  HEENT unremarkable.  Mild tenderness posterior head.  No palpable fracture, crepitance, step-off or swelling.  Pulmonary:  Chest clear to auscultation without rhonchi rales or wheezing.    Cardiovascular:  Cardiac regular rate and rhythm without murmurs rubs or gallops.    Abdomen:  Abdomen soft nontender.  There is no rebound or guarding.    Muskuloskeletal:  She moves all 4 without any difficulty and has normal neurovascular exams.  Extremities without clubbing, cyanosis, or edema.  Legs and  calves are nontender.    Neuro:  She is alert and oriented ×3 and moves all extremities symmetrically.  Strength and sensation intact and normal in all 4 extremities.  Pupils reactive to light.  Extraocular movements intact.  Psych:  Normal affect.    Skin:  Unremarkable and warm and dry.       LAB:  All pertinent labs reviewed and interpreted.  Labs Ordered and Resulted from Time of ED Arrival to Time of ED Departure   INR - Abnormal       Result Value    INR 1.37 (*)    BASIC METABOLIC PANEL - Abnormal    Sodium 135      Potassium 4.1      Chloride 95 (*)     Carbon Dioxide (CO2) 28      Anion Gap 12      Urea Nitrogen 14.5      Creatinine 0.62      GFR Estimate 83      Calcium 9.8      Glucose 124 (*)    CBC WITH PLATELETS AND DIFFERENTIAL - Abnormal    WBC Count 5.9      RBC Count 3.81      Hemoglobin 12.7      Hematocrit 37.9            MCH 33.3 (*)     MCHC 33.5      RDW 13.2      Platelet Count 242      % Neutrophils 70      % Lymphocytes 13      % Monocytes 10      % Eosinophils 5      % Basophils 1      % Immature Granulocytes 1      NRBCs per 100 WBC 0      Absolute Neutrophils 4.1      Absolute Lymphocytes 0.8      Absolute Monocytes 0.6      Absolute Eosinophils 0.3      Absolute Basophils 0.0      Absolute Immature Granulocytes 0.1      Absolute NRBCs 0.0     PARTIAL THROMBOPLASTIN TIME - Normal    aPTT 33         RADIOLOGY:  Reviewed all pertinent imaging. Please see official radiology report.  Lumbar spine XR, 2-3 views   Final Result   IMPRESSION: Suboptimal study due to superimposed soft tissues and tubular densities. Age indeterminant compression deformity of L1. Recommend CT if there is concern for acute fracture. S-shaped curvature, right convex in lower thoracic and left convex in    upper lumbar spine. Multilevel disc height loss, osteophyte formation and facet arthropathy. Mild anterolisthesis at L5-S1. Calcified aortic atherosclerotic plaques.       XR Thoracic Spine 2 Views   Final  Result   IMPRESSION: Suboptimal study due to superimposed soft tissues and tubular densities. Age indeterminant compression deformity of L1. Recommend CT if there is concern for acute fracture. S-shaped curvature, right convex in lower thoracic and left convex in    upper lumbar spine. Multilevel disc height loss, osteophyte formation and facet arthropathy. Mild anterolisthesis at L5-S1. Calcified aortic atherosclerotic plaques.       Head CT w/o contrast   Final Result   IMPRESSION:   1.  No CT evidence for acute intracranial process.   2.  Brain atrophy and presumed chronic microvascular ischemic changes as above.         CT Cervical Spine w/o Contrast   Final Result   IMPRESSION: Mild degenerative anterolisthesis of C3 upon C4 and C4 upon C5. Minimal degenerative retrolisthesis of C5 upon C6. Alignment is otherwise normal; however, there is straightening of normal cervical lordosis. Vertebral body heights normal. No    fractures. There is loss of disc space height and degenerative endplate spurring at C4-C5, C5-C6 and C6-C7. Facet arthropathy throughout the cervical spine. No high-grade spinal canal stenosis. No prevertebral soft tissue swelling.                 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  Compared to EKG of May 26, 2022 patient is now in sinus rhythm and was in atrial flutter at that time.    I have independently reviewed and interpreted the EKG(s) documented above.    PROCEDURES:         I, Mansi Amezquita, am serving as a scribe to document services personally performed by Dr. Way based on my observation and the provider's statements to me. I, Onofre Way MD attest that Mansi Amezquita is acting in a scribe capacity, has observed my performance of the services and has documented them in accordance with my direction.    Onofre Way M.D.  Emergency Medicine  [facility]  EMERGENCY ROOM  [address] [place] [id number]  [phone]  Dept: [phone]      Onofre Way MD  04/10/25 1006

## 2025-04-10 NOTE — ED TRIAGE NOTES
"Pt fell in shower, states she \"lost her balance\" denies LOC, staff from facility C/O back pain with movement. CMS intact all extremities.        "

## 2025-04-10 NOTE — ED NOTES
Bed: WWED-14  Expected date: 4/10/25  Expected time: 8:20 AM  Means of arrival:   Comments:  [facility]

## 2025-05-23 ENCOUNTER — LAB REQUISITION (OUTPATIENT)
Dept: LAB | Facility: CLINIC | Age: OVER 89
End: 2025-05-23
Payer: MEDICARE

## 2025-05-23 DIAGNOSIS — E03.9 HYPOTHYROIDISM, UNSPECIFIED: ICD-10-CM

## 2025-05-28 LAB — TSH SERPL DL<=0.005 MIU/L-ACNC: 2.39 UIU/ML (ref 0.3–4.2)

## 2025-05-28 PROCEDURE — P9604 ONE-WAY ALLOW PRORATED TRIP: HCPCS | Mod: ORL | Performed by: NURSE PRACTITIONER

## 2025-05-28 PROCEDURE — 84443 ASSAY THYROID STIM HORMONE: CPT | Mod: ORL | Performed by: NURSE PRACTITIONER

## 2025-05-28 PROCEDURE — 36415 COLL VENOUS BLD VENIPUNCTURE: CPT | Mod: ORL | Performed by: NURSE PRACTITIONER

## 2025-06-03 ENCOUNTER — LAB REQUISITION (OUTPATIENT)
Dept: LAB | Facility: CLINIC | Age: OVER 89
End: 2025-06-03
Payer: MEDICARE

## 2025-06-03 DIAGNOSIS — I50.9 HEART FAILURE, UNSPECIFIED (H): ICD-10-CM

## 2025-06-03 DIAGNOSIS — I10 ESSENTIAL (PRIMARY) HYPERTENSION: ICD-10-CM

## 2025-06-03 DIAGNOSIS — E03.9 HYPOTHYROIDISM, UNSPECIFIED: ICD-10-CM

## 2025-06-03 DIAGNOSIS — D64.9 ANEMIA, UNSPECIFIED: ICD-10-CM

## 2025-06-04 LAB
ANION GAP SERPL CALCULATED.3IONS-SCNC: 9 MMOL/L (ref 7–15)
BUN SERPL-MCNC: 18.7 MG/DL (ref 8–23)
CALCIUM SERPL-MCNC: 9.6 MG/DL (ref 8.8–10.4)
CHLORIDE SERPL-SCNC: 94 MMOL/L (ref 98–107)
CREAT SERPL-MCNC: 0.84 MG/DL (ref 0.51–0.95)
EGFRCR SERPLBLD CKD-EPI 2021: 64 ML/MIN/1.73M2
ERYTHROCYTE [DISTWIDTH] IN BLOOD BY AUTOMATED COUNT: 14.1 % (ref 10–15)
GLUCOSE SERPL-MCNC: 80 MG/DL (ref 70–99)
HCO3 SERPL-SCNC: 30 MMOL/L (ref 22–29)
HCT VFR BLD AUTO: 37.9 % (ref 35–47)
HGB BLD-MCNC: 11.9 G/DL (ref 11.7–15.7)
MCH RBC QN AUTO: 31.8 PG (ref 26.5–33)
MCHC RBC AUTO-ENTMCNC: 31.4 G/DL (ref 31.5–36.5)
MCV RBC AUTO: 101 FL (ref 78–100)
NT-PROBNP SERPL-MCNC: 1059 PG/ML (ref 0–624)
PLATELET # BLD AUTO: 261 10E3/UL (ref 150–450)
POTASSIUM SERPL-SCNC: 4.9 MMOL/L (ref 3.4–5.3)
RBC # BLD AUTO: 3.74 10E6/UL (ref 3.8–5.2)
SODIUM SERPL-SCNC: 133 MMOL/L (ref 135–145)
TSH SERPL DL<=0.005 MIU/L-ACNC: 1.86 UIU/ML (ref 0.3–4.2)
WBC # BLD AUTO: 7.1 10E3/UL (ref 4–11)

## 2025-06-04 PROCEDURE — 85027 COMPLETE CBC AUTOMATED: CPT | Mod: ORL | Performed by: NURSE PRACTITIONER

## 2025-06-04 PROCEDURE — 84443 ASSAY THYROID STIM HORMONE: CPT | Mod: ORL | Performed by: NURSE PRACTITIONER

## 2025-06-04 PROCEDURE — 36415 COLL VENOUS BLD VENIPUNCTURE: CPT | Mod: ORL | Performed by: NURSE PRACTITIONER

## 2025-06-04 PROCEDURE — 83880 ASSAY OF NATRIURETIC PEPTIDE: CPT | Mod: ORL | Performed by: NURSE PRACTITIONER

## 2025-06-04 PROCEDURE — P9603 ONE-WAY ALLOW PRORATED MILES: HCPCS | Mod: ORL | Performed by: NURSE PRACTITIONER

## 2025-06-04 PROCEDURE — 80048 BASIC METABOLIC PNL TOTAL CA: CPT | Mod: ORL | Performed by: NURSE PRACTITIONER

## 2025-07-10 ENCOUNTER — MEDICAL CORRESPONDENCE (OUTPATIENT)
Dept: HEALTH INFORMATION MANAGEMENT | Facility: CLINIC | Age: OVER 89
End: 2025-07-10

## 2025-07-12 ENCOUNTER — APPOINTMENT (OUTPATIENT)
Dept: CARDIOLOGY | Facility: CLINIC | Age: OVER 89
DRG: 291 | End: 2025-07-12
Attending: HOSPITALIST
Payer: MEDICARE

## 2025-07-12 ENCOUNTER — APPOINTMENT (OUTPATIENT)
Dept: CT IMAGING | Facility: CLINIC | Age: OVER 89
DRG: 291 | End: 2025-07-12
Attending: EMERGENCY MEDICINE
Payer: MEDICARE

## 2025-07-12 ENCOUNTER — HOSPITAL ENCOUNTER (INPATIENT)
Facility: CLINIC | Age: OVER 89
LOS: 4 days | Discharge: NURSING, REHAB OR LONG-TERM CARE | DRG: 291 | End: 2025-07-16
Attending: EMERGENCY MEDICINE | Admitting: HOSPITALIST
Payer: MEDICARE

## 2025-07-12 ENCOUNTER — APPOINTMENT (OUTPATIENT)
Dept: RADIOLOGY | Facility: CLINIC | Age: OVER 89
DRG: 291 | End: 2025-07-12
Attending: EMERGENCY MEDICINE
Payer: MEDICARE

## 2025-07-12 DIAGNOSIS — I50.9 ACUTE CONGESTIVE HEART FAILURE, UNSPECIFIED HEART FAILURE TYPE (H): ICD-10-CM

## 2025-07-12 DIAGNOSIS — R45.1 AGITATION: ICD-10-CM

## 2025-07-12 DIAGNOSIS — S22.009A CLOSED FRACTURE OF THORACIC VERTEBRA, UNSPECIFIED FRACTURE MORPHOLOGY, UNSPECIFIED THORACIC VERTEBRAL LEVEL, INITIAL ENCOUNTER (H): ICD-10-CM
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
## 2025-07-12 DIAGNOSIS — S09.90XA CLOSED HEAD INJURY, INITIAL ENCOUNTER: ICD-10-CM

## 2025-07-12 DIAGNOSIS — I50.33 ACUTE ON CHRONIC DIASTOLIC CONGESTIVE HEART FAILURE (H): ICD-10-CM

## 2025-07-12 DIAGNOSIS — I48.20 CHRONIC ATRIAL FIBRILLATION (H): Primary | ICD-10-CM

## 2025-07-12 LAB
ALBUMIN SERPL BCG-MCNC: 4.1 G/DL (ref 3.5–5.2)
ALBUMIN UR-MCNC: NEGATIVE MG/DL
ALP SERPL-CCNC: 87 U/L (ref 40–150)
ALT SERPL W P-5'-P-CCNC: 27 U/L (ref 0–50)
ANION GAP SERPL CALCULATED.3IONS-SCNC: 11 MMOL/L (ref 7–15)
APPEARANCE UR: CLEAR
APTT PPP: 32 SECONDS (ref 22–38)
AST SERPL W P-5'-P-CCNC: 34 U/L (ref 0–45)
ATRIAL RATE - MUSE: 242 BPM
BASOPHILS # BLD AUTO: 0.1 10E3/UL (ref 0–0.2)
BASOPHILS NFR BLD AUTO: 1 %
BILIRUB SERPL-MCNC: 0.4 MG/DL
BILIRUB UR QL STRIP: NEGATIVE
BUN SERPL-MCNC: 25.2 MG/DL (ref 8–23)
CALCIUM SERPL-MCNC: 9.6 MG/DL (ref 8.8–10.4)
CHLORIDE SERPL-SCNC: 98 MMOL/L (ref 98–107)
CK SERPL-CCNC: 60 U/L (ref 26–192)
COLOR UR AUTO: YELLOW
CREAT SERPL-MCNC: 0.81 MG/DL (ref 0.51–0.95)
DIASTOLIC BLOOD PRESSURE - MUSE: 99 MMHG
EGFRCR SERPLBLD CKD-EPI 2021: 67 ML/MIN/1.73M2
EOSINOPHIL # BLD AUTO: 0.2 10E3/UL (ref 0–0.7)
EOSINOPHIL NFR BLD AUTO: 2 %
ERYTHROCYTE [DISTWIDTH] IN BLOOD BY AUTOMATED COUNT: 14.6 % (ref 10–15)
ETHANOL SERPL-MCNC: <0.01 G/DL
GLUCOSE SERPL-MCNC: 121 MG/DL (ref 70–99)
GLUCOSE UR STRIP-MCNC: NEGATIVE MG/DL
HCO3 SERPL-SCNC: 27 MMOL/L (ref 22–29)
HCT VFR BLD AUTO: 39.7 % (ref 35–47)
HGB BLD-MCNC: 12.9 G/DL (ref 11.7–15.7)
HGB UR QL STRIP: NEGATIVE
IMM GRANULOCYTES # BLD: 0 10E3/UL
IMM GRANULOCYTES NFR BLD: 1 %
INR PPP: 1.16 (ref 0.85–1.15)
INTERPRETATION ECG - MUSE: NORMAL
KETONES UR STRIP-MCNC: NEGATIVE MG/DL
LEUKOCYTE ESTERASE UR QL STRIP: NEGATIVE
LVEF ECHO: NORMAL
LYMPHOCYTES # BLD AUTO: 1.6 10E3/UL (ref 0.8–5.3)
LYMPHOCYTES NFR BLD AUTO: 21 %
MAGNESIUM SERPL-MCNC: 2.2 MG/DL (ref 1.7–2.3)
MCH RBC QN AUTO: 32.3 PG (ref 26.5–33)
MCHC RBC AUTO-ENTMCNC: 32.5 G/DL (ref 31.5–36.5)
MCV RBC AUTO: 99 FL (ref 78–100)
MONOCYTES # BLD AUTO: 0.8 10E3/UL (ref 0–1.3)
MONOCYTES NFR BLD AUTO: 11 %
NEUTROPHILS # BLD AUTO: 4.7 10E3/UL (ref 1.6–8.3)
NEUTROPHILS NFR BLD AUTO: 64 %
NITRATE UR QL: NEGATIVE
NRBC # BLD AUTO: 0 10E3/UL
NRBC BLD AUTO-RTO: 0 /100
NT-PROBNP SERPL-MCNC: 2276 PG/ML (ref 0–624)
P AXIS - MUSE: NORMAL DEGREES
PH UR STRIP: 7 [PH] (ref 5–7)
PLATELET # BLD AUTO: 260 10E3/UL (ref 150–450)
POTASSIUM SERPL-SCNC: 4.2 MMOL/L (ref 3.4–5.3)
PR INTERVAL - MUSE: NORMAL MS
PROT SERPL-MCNC: 6.8 G/DL (ref 6.4–8.3)
PROTHROMBIN TIME: 15 SECONDS (ref 11.8–14.8)
QRS DURATION - MUSE: 114 MS
QT - MUSE: 426 MS
QTC - MUSE: 446 MS
R AXIS - MUSE: 62 DEGREES
RBC # BLD AUTO: 4 10E6/UL (ref 3.8–5.2)
RBC URINE: 0 /HPF
SODIUM SERPL-SCNC: 136 MMOL/L (ref 135–145)
SP GR UR STRIP: 1.01 (ref 1–1.03)
SQUAMOUS EPITHELIAL: <1 /HPF
SYSTOLIC BLOOD PRESSURE - MUSE: 207 MMHG
T AXIS - MUSE: 33 DEGREES
TROPONIN T SERPL HS-MCNC: 18 NG/L
TROPONIN T SERPL HS-MCNC: 19 NG/L
UROBILINOGEN UR STRIP-MCNC: NORMAL MG/DL
VENTRICULAR RATE- MUSE: 66 BPM
WBC # BLD AUTO: 7.3 10E3/UL (ref 4–11)
WBC URINE: 0 /HPF

## 2025-07-12 PROCEDURE — 250N000011 HC RX IP 250 OP 636: Performed by: HOSPITALIST

## 2025-07-12 PROCEDURE — 999N000104 CT LUMBAR SPINE RECONSTRUCTED

## 2025-07-12 PROCEDURE — 99285 EMERGENCY DEPT VISIT HI MDM: CPT | Mod: 25

## 2025-07-12 PROCEDURE — 999N000104 CT THORACIC SPINE RECONSTRUCTED

## 2025-07-12 PROCEDURE — 85730 THROMBOPLASTIN TIME PARTIAL: CPT | Performed by: EMERGENCY MEDICINE

## 2025-07-12 PROCEDURE — 250N000011 HC RX IP 250 OP 636: Performed by: EMERGENCY MEDICINE

## 2025-07-12 PROCEDURE — 36415 COLL VENOUS BLD VENIPUNCTURE: CPT | Performed by: EMERGENCY MEDICINE

## 2025-07-12 PROCEDURE — 99223 1ST HOSP IP/OBS HIGH 75: CPT | Mod: AI | Performed by: HOSPITALIST

## 2025-07-12 PROCEDURE — 93306 TTE W/DOPPLER COMPLETE: CPT | Mod: 26 | Performed by: INTERNAL MEDICINE

## 2025-07-12 PROCEDURE — 74176 CT ABD & PELVIS W/O CONTRAST: CPT

## 2025-07-12 PROCEDURE — 120N000004 HC R&B MS OVERFLOW

## 2025-07-12 PROCEDURE — 93005 ELECTROCARDIOGRAM TRACING: CPT | Performed by: EMERGENCY MEDICINE

## 2025-07-12 PROCEDURE — 85610 PROTHROMBIN TIME: CPT | Performed by: EMERGENCY MEDICINE

## 2025-07-12 PROCEDURE — 250N000013 HC RX MED GY IP 250 OP 250 PS 637: Performed by: HOSPITALIST

## 2025-07-12 PROCEDURE — 73610 X-RAY EXAM OF ANKLE: CPT | Mod: 50

## 2025-07-12 PROCEDURE — 82550 ASSAY OF CK (CPK): CPT | Performed by: EMERGENCY MEDICINE

## 2025-07-12 PROCEDURE — 83735 ASSAY OF MAGNESIUM: CPT | Performed by: HOSPITALIST

## 2025-07-12 PROCEDURE — 73562 X-RAY EXAM OF KNEE 3: CPT | Mod: RT

## 2025-07-12 PROCEDURE — 70450 CT HEAD/BRAIN W/O DYE: CPT

## 2025-07-12 PROCEDURE — 72125 CT NECK SPINE W/O DYE: CPT

## 2025-07-12 PROCEDURE — 250N000013 HC RX MED GY IP 250 OP 250 PS 637: Performed by: EMERGENCY MEDICINE

## 2025-07-12 PROCEDURE — 82077 ASSAY SPEC XCP UR&BREATH IA: CPT | Performed by: EMERGENCY MEDICINE

## 2025-07-12 PROCEDURE — 84155 ASSAY OF PROTEIN SERUM: CPT | Performed by: EMERGENCY MEDICINE

## 2025-07-12 PROCEDURE — 99221 1ST HOSP IP/OBS SF/LOW 40: CPT | Performed by: PHYSICIAN ASSISTANT

## 2025-07-12 PROCEDURE — 96374 THER/PROPH/DIAG INJ IV PUSH: CPT

## 2025-07-12 PROCEDURE — 83880 ASSAY OF NATRIURETIC PEPTIDE: CPT | Performed by: EMERGENCY MEDICINE

## 2025-07-12 PROCEDURE — 85004 AUTOMATED DIFF WBC COUNT: CPT | Performed by: EMERGENCY MEDICINE

## 2025-07-12 PROCEDURE — 81001 URINALYSIS AUTO W/SCOPE: CPT | Performed by: EMERGENCY MEDICINE

## 2025-07-12 PROCEDURE — 73560 X-RAY EXAM OF KNEE 1 OR 2: CPT | Mod: RT

## 2025-07-12 PROCEDURE — 84484 ASSAY OF TROPONIN QUANT: CPT | Performed by: EMERGENCY MEDICINE

## 2025-07-12 PROCEDURE — 93306 TTE W/DOPPLER COMPLETE: CPT

## 2025-07-12 RX ORDER — ACETAMINOPHEN 325 MG/1
975 TABLET ORAL 3 TIMES DAILY
Status: DISCONTINUED | OUTPATIENT
Start: 2025-07-12 | End: 2025-07-16 | Stop reason: HOSPADM

## 2025-07-12 RX ORDER — TRAZODONE HYDROCHLORIDE 50 MG/1
50 TABLET ORAL AT BEDTIME
COMMUNITY
Start: 2025-07-07

## 2025-07-12 RX ORDER — ATORVASTATIN CALCIUM 10 MG/1
20 TABLET, FILM COATED ORAL EVERY EVENING
Status: DISCONTINUED | OUTPATIENT
Start: 2025-07-12 | End: 2025-07-16 | Stop reason: HOSPADM

## 2025-07-12 RX ORDER — LIDOCAINE 40 MG/G
CREAM TOPICAL
Status: DISCONTINUED | OUTPATIENT
Start: 2025-07-12 | End: 2025-07-12

## 2025-07-12 RX ORDER — LOSARTAN POTASSIUM 50 MG/1
100 TABLET ORAL DAILY
Status: DISCONTINUED | OUTPATIENT
Start: 2025-07-12 | End: 2025-07-16 | Stop reason: HOSPADM

## 2025-07-12 RX ORDER — METOPROLOL TARTRATE 25 MG/1
50 TABLET, FILM COATED ORAL 2 TIMES DAILY
Status: DISCONTINUED | OUTPATIENT
Start: 2025-07-12 | End: 2025-07-12

## 2025-07-12 RX ORDER — SODIUM CHLORIDE 0.65 %
2 AEROSOL, SPRAY (ML) NASAL 2 TIMES DAILY
COMMUNITY
Start: 2025-04-22

## 2025-07-12 RX ORDER — FUROSEMIDE 10 MG/ML
60 INJECTION INTRAMUSCULAR; INTRAVENOUS ONCE
Status: COMPLETED | OUTPATIENT
Start: 2025-07-12 | End: 2025-07-12

## 2025-07-12 RX ORDER — LIDOCAINE 40 MG/G
CREAM TOPICAL
Status: DISCONTINUED | OUTPATIENT
Start: 2025-07-12 | End: 2025-07-16 | Stop reason: HOSPADM

## 2025-07-12 RX ORDER — TRAMADOL HYDROCHLORIDE 50 MG/1
50 TABLET ORAL EVERY 6 HOURS PRN
Status: DISCONTINUED | OUTPATIENT
Start: 2025-07-12 | End: 2025-07-16 | Stop reason: HOSPADM

## 2025-07-12 RX ORDER — CALCIUM CARBONATE 500 MG/1
1000 TABLET, CHEWABLE ORAL 4 TIMES DAILY PRN
Status: DISCONTINUED | OUTPATIENT
Start: 2025-07-12 | End: 2025-07-16 | Stop reason: HOSPADM

## 2025-07-12 RX ORDER — LEVOTHYROXINE SODIUM 88 UG/1
88 TABLET ORAL
Status: DISCONTINUED | OUTPATIENT
Start: 2025-07-12 | End: 2025-07-16 | Stop reason: HOSPADM

## 2025-07-12 RX ORDER — ONDANSETRON 2 MG/ML
4 INJECTION INTRAMUSCULAR; INTRAVENOUS EVERY 6 HOURS PRN
Status: DISCONTINUED | OUTPATIENT
Start: 2025-07-12 | End: 2025-07-16 | Stop reason: HOSPADM

## 2025-07-12 RX ORDER — GUAIFENESIN/DEXTROMETHORPHAN 100-10MG/5
10 SYRUP ORAL EVERY 4 HOURS PRN
Status: DISCONTINUED | OUTPATIENT
Start: 2025-07-12 | End: 2025-07-16 | Stop reason: HOSPADM

## 2025-07-12 RX ORDER — FUROSEMIDE 10 MG/ML
40 INJECTION INTRAMUSCULAR; INTRAVENOUS EVERY 12 HOURS
Status: DISCONTINUED | OUTPATIENT
Start: 2025-07-12 | End: 2025-07-13

## 2025-07-12 RX ORDER — OLANZAPINE 2.5 MG/1
2.5 TABLET, FILM COATED ORAL DAILY PRN
Status: DISCONTINUED | OUTPATIENT
Start: 2025-07-12 | End: 2025-07-16 | Stop reason: HOSPADM

## 2025-07-12 RX ORDER — AMOXICILLIN 250 MG
1 CAPSULE ORAL 2 TIMES DAILY PRN
Status: DISCONTINUED | OUTPATIENT
Start: 2025-07-12 | End: 2025-07-16 | Stop reason: HOSPADM

## 2025-07-12 RX ORDER — AMLODIPINE BESYLATE 2.5 MG/1
2.5 TABLET ORAL AT BEDTIME
Status: DISCONTINUED | OUTPATIENT
Start: 2025-07-12 | End: 2025-07-16 | Stop reason: HOSPADM

## 2025-07-12 RX ORDER — AMLODIPINE BESYLATE 2.5 MG/1
2.5 TABLET ORAL AT BEDTIME
COMMUNITY
Start: 2025-04-22

## 2025-07-12 RX ORDER — LIDOCAINE 4 G/G
1 PATCH TOPICAL DAILY PRN
Status: DISCONTINUED | OUTPATIENT
Start: 2025-07-12 | End: 2025-07-16 | Stop reason: HOSPADM

## 2025-07-12 RX ORDER — ACETAMINOPHEN 500 MG
1000 TABLET ORAL 2 TIMES DAILY
Status: ON HOLD | COMMUNITY
Start: 2025-04-22 | End: 2025-07-16

## 2025-07-12 RX ORDER — FLECAINIDE ACETATE 50 MG/1
50 TABLET ORAL 2 TIMES DAILY
Status: DISCONTINUED | OUTPATIENT
Start: 2025-07-12 | End: 2025-07-16 | Stop reason: HOSPADM

## 2025-07-12 RX ORDER — TRAZODONE HYDROCHLORIDE 50 MG/1
50 TABLET ORAL AT BEDTIME
Status: DISCONTINUED | OUTPATIENT
Start: 2025-07-12 | End: 2025-07-16 | Stop reason: HOSPADM

## 2025-07-12 RX ORDER — ONDANSETRON 4 MG/1
4 TABLET, ORALLY DISINTEGRATING ORAL EVERY 6 HOURS PRN
Status: DISCONTINUED | OUTPATIENT
Start: 2025-07-12 | End: 2025-07-16 | Stop reason: HOSPADM

## 2025-07-12 RX ORDER — ESCITALOPRAM OXALATE 5 MG/1
5 TABLET ORAL EVERY MORNING
Status: DISCONTINUED | OUTPATIENT
Start: 2025-07-12 | End: 2025-07-16 | Stop reason: HOSPADM

## 2025-07-12 RX ORDER — AMOXICILLIN 250 MG
2 CAPSULE ORAL 2 TIMES DAILY PRN
Status: DISCONTINUED | OUTPATIENT
Start: 2025-07-12 | End: 2025-07-16 | Stop reason: HOSPADM

## 2025-07-12 RX ORDER — FUROSEMIDE 10 MG/ML
40 INJECTION INTRAMUSCULAR; INTRAVENOUS EVERY 12 HOURS
Status: DISCONTINUED | OUTPATIENT
Start: 2025-07-12 | End: 2025-07-12

## 2025-07-12 RX ORDER — ESCITALOPRAM OXALATE 5 MG/1
5 TABLET ORAL EVERY MORNING
COMMUNITY
Start: 2025-07-07

## 2025-07-12 RX ORDER — DOCUSATE SODIUM 100 MG/1
100 CAPSULE, LIQUID FILLED ORAL 2 TIMES DAILY
Status: DISCONTINUED | OUTPATIENT
Start: 2025-07-12 | End: 2025-07-16 | Stop reason: HOSPADM

## 2025-07-12 RX ORDER — LEVOTHYROXINE SODIUM 88 UG/1
88 TABLET ORAL
COMMUNITY
Start: 2025-07-07

## 2025-07-12 RX ORDER — PROCHLORPERAZINE MALEATE 5 MG/1
5 TABLET ORAL EVERY 6 HOURS PRN
Status: DISCONTINUED | OUTPATIENT
Start: 2025-07-12 | End: 2025-07-16 | Stop reason: HOSPADM

## 2025-07-12 RX ADMIN — FUROSEMIDE 60 MG: 10 INJECTION, SOLUTION INTRAVENOUS at 07:02

## 2025-07-12 RX ADMIN — LOSARTAN POTASSIUM 100 MG: 25 TABLET, FILM COATED ORAL at 08:23

## 2025-07-12 RX ADMIN — DOCUSATE SODIUM 100 MG: 100 CAPSULE, LIQUID FILLED ORAL at 21:38

## 2025-07-12 RX ADMIN — FLECAINIDE ACETATE 50 MG: 50 TABLET ORAL at 21:38

## 2025-07-12 RX ADMIN — ACETAMINOPHEN 975 MG: 325 TABLET ORAL at 21:37

## 2025-07-12 RX ADMIN — FUROSEMIDE 40 MG: 10 INJECTION, SOLUTION INTRAVENOUS at 18:13

## 2025-07-12 RX ADMIN — NITROGLYCERIN 15 MG: 20 OINTMENT TOPICAL at 07:34

## 2025-07-12 RX ADMIN — METOPROLOL TARTRATE 37.5 MG: 25 TABLET, FILM COATED ORAL at 21:37

## 2025-07-12 RX ADMIN — TRAZODONE HYDROCHLORIDE 50 MG: 50 TABLET ORAL at 21:37

## 2025-07-12 RX ADMIN — METOPROLOL TARTRATE 50 MG: 25 TABLET, FILM COATED ORAL at 08:26

## 2025-07-12 RX ADMIN — FLECAINIDE ACETATE 50 MG: 50 TABLET ORAL at 08:28

## 2025-07-12 RX ADMIN — DOCUSATE SODIUM 100 MG: 100 CAPSULE, LIQUID FILLED ORAL at 08:23

## 2025-07-12 RX ADMIN — SALINE NASAL SPRAY 2 SPRAY: 1.5 SOLUTION NASAL at 08:30

## 2025-07-12 RX ADMIN — ESCITALOPRAM 5 MG: 5 TABLET, FILM COATED ORAL at 08:29

## 2025-07-12 RX ADMIN — ACETAMINOPHEN 975 MG: 325 TABLET ORAL at 08:25

## 2025-07-12 RX ADMIN — AMLODIPINE BESYLATE 2.5 MG: 2.5 TABLET ORAL at 21:37

## 2025-07-12 RX ADMIN — ATORVASTATIN CALCIUM 20 MG: 10 TABLET, FILM COATED ORAL at 21:37

## 2025-07-12 RX ADMIN — ACETAMINOPHEN 975 MG: 325 TABLET ORAL at 14:52

## 2025-07-12 RX ADMIN — LEVOTHYROXINE SODIUM 88 MCG: 0.09 TABLET ORAL at 08:29

## 2025-07-12 RX ADMIN — APIXABAN 5 MG: 5 TABLET, FILM COATED ORAL at 08:28

## 2025-07-12 RX ADMIN — OLANZAPINE 2.5 MG: 2.5 TABLET, FILM COATED ORAL at 11:23

## 2025-07-12 ASSESSMENT — ACTIVITIES OF DAILY LIVING (ADL)
ADLS_ACUITY_SCORE: 54
ADLS_ACUITY_SCORE: 54
ADLS_ACUITY_SCORE: 65
ADLS_ACUITY_SCORE: 65
ADLS_ACUITY_SCORE: 59
ADLS_ACUITY_SCORE: 54
ADLS_ACUITY_SCORE: 59
ADLS_ACUITY_SCORE: 59
ADLS_ACUITY_SCORE: 65
ADLS_ACUITY_SCORE: 54
ADLS_ACUITY_SCORE: 63
ADLS_ACUITY_SCORE: 54
ADLS_ACUITY_SCORE: 65
ADLS_ACUITY_SCORE: 54
ADLS_ACUITY_SCORE: 54
ADLS_ACUITY_SCORE: 65
ADLS_ACUITY_SCORE: 64
ADLS_ACUITY_SCORE: 54
ADLS_ACUITY_SCORE: 54

## 2025-07-12 NOTE — PHARMACY-ADMISSION MEDICATION HISTORY
Pharmacy Intern Admission Medication History    Admission medication history is complete. The information provided in this note is only as accurate as the sources available at the time of the update.    Information Source(s): Patient's MAR at [facility] ([phone]) and [facility] via in-person    Pertinent Information: Patient taking APAP schedules and as needed    Changes made to PTA medication list:  Added: Trazodone, Voltaren, Saline nasal, Lexapro  Deleted: diphenhydramine  Changed: APAP, Norvasc, Synthroid, Lidocaine (from cream formulation to patches)    Allergies reviewed with patient and updates made in EHR: yes    Medications available for use during hospital stay: None    Medication History Completed By: Andria Troncoso 7/12/2025 7:37 AM    PTA Med List   Medication Sig Last Dose/Taking    acetaminophen (TYLENOL) 500 MG tablet Take 1,000 mg by mouth 2 times daily. Take 1000 mg twice daily PLUS an additional 1000 mg daily as needed 7/11/2025 at  9:05 PM    acetaminophen (TYLENOL) 500 MG tablet Take 1,000 mg by mouth daily as needed for pain. Take 1000 mg twice daily PLUS an additional 1000 mg daily as needed Unknown    amLODIPine (NORVASC) 2.5 MG tablet Take 2.5 mg by mouth at bedtime. 7/11/2025 at  9:05 PM    apixaban ANTICOAGULANT (ELIQUIS ANTICOAGULANT) 5 MG tablet Take 1 tablet (5 mg) by mouth 2 times daily 7/11/2025 at  9:05 PM    atorvastatin (LIPITOR) 20 MG tablet Take 1 tablet by mouth once daily 7/11/2025 at  9:05 PM    diclofenac (VOLTAREN) 1 % topical gel Apply 2-4 g topically as needed for moderate pain. Unknown    escitalopram (LEXAPRO) 5 MG tablet Take 5 mg by mouth every morning. 7/11/2025 at  7:15 AM    flecainide (TAMBOCOR) 50 MG tablet Take 1 tablet (50 mg) by mouth 2 times daily 7/11/2025 at  9:05 PM    levothyroxine (SYNTHROID/LEVOTHROID) 88 MCG tablet Take 88 mcg by mouth every morning (before breakfast). 7/11/2025 at  6:00 AM    Lidocaine (LIDOCARE) 4 % Patch Apply  topically daily. Apply to lower back for pain for 12 hours on and 12 hours off 7/11/2025 at  7:15 AM    losartan (COZAAR) 100 MG tablet Take 1 tablet by mouth once daily 7/11/2025 at  7:15 AM    melatonin 3 MG tablet Take 1 mg by mouth At Bedtime 7/11/2025 at  9:05 PM    metoprolol tartrate (LOPRESSOR) 25 MG tablet Take 37.5 mg by mouth 2 times daily 7/11/2025 at  9:05 PM    sodium chloride (DEEP SEA NASAL SPRAY) 0.65 % nasal spray Spray 2 sprays in nostril 2 times daily. 7/11/2025 at  9:05 PM    traZODone (DESYREL) 50 MG tablet Take 50 mg by mouth at bedtime. 7/11/2025 at  9:05 PM

## 2025-07-12 NOTE — PROGRESS NOTES
Hospitalist follow up note:    Pt unable to tolerate MRI due to pain. Defer to spine regarding necessity.    Alejandro Chandler DO   Pager #: [phone]

## 2025-07-12 NOTE — CONSULTS
Neurosurgery Consult    HPI    Ms. Mar 89-year-old female who presented after a fall in her assisted living facility.  She was also found to be significantly hypertensive.  Imaging revealed new appearing T3 and T4 and T11 compression fractures.    When seen in the hospital, patient denies pain in her back, denies any lower extremity radicular pain.    She was unable to tolerate obtaining an initial intended MRI, either due to pain, or fear of the MRI machine.    Medical history  hypertension, A-fib, prior vertebral compression fractures, hospital delirium.    Social history  Here with her son      B/P: 172/74[RN Notifyed[, T: 98.1, P: 66, R: 18       Exam    Alert and oriented no acute distress  Bilateral lower extremities with 5/5 strength    Negative straight leg raise bilaterally  Negative ankle clonus negative Babinski bilaterally  Thoracic and lumbar spine nontender to palpation  Sensation intact in bilateral lower extremities    Imaging    IMPRESSION:  VERTEBRA: T3, T4, T11 and L1 compression fractures, which T3 and T4 are new since 4/10/2025. T11 is indeterminate secondary to lack of similar comparison studies. Height loss involving the T3 and T4 superior endplates is minimal. Fracture through the T11 endplate in the anterior cortex and inferior endplate with less than 25% height loss. L1 fracture is chronic.     No posttraumatic subluxation in the thoracic and/or lumbar spine. S-shaped thoracolumbar scoliosis. Preserved vertebral alignment in the lumbar spine. L3 inferior endplate intravertebral disc herniation.      CANAL: No retropulsed fragmentation at any level. No bony encroachment on the spinal canal at any level.     PARASPINAL: No acute extraspinal abnormality.    Assessment    Acute T3 and T4 compression  Age-indeterminate T11 compression    Plan:      Recommend thoracic MRI without contrast.  If patient is unable to perform this, or continues to refuse, would recommend an off-the-shelf TLSO  brace, with upright x-rays to follow.    Will continue to follow

## 2025-07-12 NOTE — PROGRESS NOTES
Consult received    89 year old fall at assisted living with imaging evidence below    Plans:  Recommend thoracic MRI without contrast for further evaluation   Pain control  Consult later thisa david Schaeffer PA-C  [facility]surgery  [address] [facility]  [address] [place] [id number]  Tel [phone]  Fax [phone]  Text page via [employer] Paging/Directory       EXAM: CT LUMBAR SPINE RECONSTRUCTED, CT THORACIC SPINE RECONSTRUCTED  LOCATION: [facility]  DATE: 7/12/2025     INDICATION: spine fracture noted on chest abd pelvis ct  COMPARISON: 4/10/2025.  TECHNIQUE: Dedicated axial, sagittal, and coronal images of the thoracic and lumbar spine were generated utilizing CT chest, abdomen and pelvis source data and are separately reviewed. Dose reduction techniques were used.                                                                       IMPRESSION:  VERTEBRA: T3, T4, T11 and L1 compression fractures, which T3 and T4 are new since 4/10/2025. T11 is indeterminate secondary to lack of similar comparison studies. Height loss involving the T3 and T4 superior endplates is minimal. Fracture through the T11   endplate in the anterior cortex and inferior endplate with less than 25% height loss. L1 fracture is chronic.     No posttraumatic subluxation in the thoracic and/or lumbar spine. S-shaped thoracolumbar scoliosis. Preserved vertebral alignment in the lumbar spine. L3 inferior endplate intravertebral disc herniation.      CANAL: No retropulsed fragmentation at any level. No bony encroachment on the spinal canal at any level.     PARASPINAL: No acute extraspinal abnormality.

## 2025-07-12 NOTE — ED NOTES
Intermittent straight cath performed per MD order to obtain UA for culture.  Pt tolerated well.  Drained bladder, return of 350 mls.   Daughter at bedside.

## 2025-07-12 NOTE — ED NOTES
Pt had a 6 beat run of vtach at 0947, pt denied any symptoms. Her HR has been dipping into the 40s and 50s since her morning medications were given at 0830. Her rhythm appears to be switching between afib and aflutter. Hospitalist Dr Chandler notified.

## 2025-07-12 NOTE — ED TRIAGE NOTES
Patient presented to ED by [facility] from assisted living due to unwitnessed fall walking out from the bathroom. Stated that she's been down for 1 hour and no LOC. Pt's on blood thinners and aware that she hit her head.     Triage Assessment (Adult)       Row Name 07/12/25 0436          Triage Assessment    Airway WDL WDL        Respiratory WDL    Respiratory WDL WDL        Peripheral/Neurovascular WDL    Peripheral Neurovascular WDL WDL                      no

## 2025-07-12 NOTE — ED PROVIDER NOTES
EMERGENCY DEPARTMENT ENCOUnter      NAME: Katie Mar  AGE: 89 year old female  YOB: 1931  MRN: [id number]  EVALUATION DATE & TIME: 7/12/2025  4:31 AM    PCP: System, Provider Not In    ED PROVIDER: Jane Christianson MD      Chief Complaint   Patient presents with    Fall         FINAL IMPRESSION:  1. Acute congestive heart failure, unspecified heart failure type (H)    2. Fall, initial encounter    3. Closed head injury, initial encounter    4. Closed fracture of thoracic vertebra, unspecified fracture morphology, unspecified thoracic vertebral level, initial encounter (H)          ED COURSE & MEDICAL DECISION MAKING:      In summary, the patient is a 89-year-old female that presents to the emergency department for evaluation of injuries from a fall.  The patient has a closed head injury but no intracranial hemorrhage or skull fracture appreciated.  She has multiple thoracic and lumbar fractures which are likely chronic and not acute.    0435-evaluated patient  0630-updated patient and daughter.  Lasix 60 mg IV was administered for diuresis.  0710-nitroglycerin ointment 1 inch applied topically.  0725-discussed case with [facility], Dr. Chandler, and he accepts patient for admission  0755-discussed case with neurosurgery.  They recommended obtaining MRI thoracic spine.  They will see patient later today.  They also recommended bed rest for the patient.    Medical Decision Making  I reviewed the EMR: Outpatient Record: previous clinic notes  Admit.    MIPS (CTPE, Dental pain, Vanegas, Sinusitis, Asthma/COPD, Head Trauma): Adult Minor Head Trauma:Age 65 years or older    SEPSIS: None          At the conclusion of the encounter I discussed the results of all of the tests and the disposition. The questions were answered. The patient or family acknowledged understanding and was agreeable with the care plan.         MEDICATIONS GIVEN IN THE EMERGENCY:  Medications   lidocaine 1 % 0.1-1 mL (has no  Procedures     DATE: January 19th, 2017    TIME: 10:00 am    PROCEDURE:  Mirena insertion    INDICATION: Contraception    PATIENT CONSENT: She was counseled on the risks, benefits, indications, and alternatives to IUD use.  She understands that with insertion there is a risk of bleeding, infection, and uterine perforation.  All questions are answered.  Consents signed.     LABS: UPT is negative.     Cervical cultures were not performed.    ANESTHESIA: NONE    PROCEDURE NOTE:    Time out performed.  The cervix was visualized with a speculum.  A single tooth tenaculum was placed on the anterior lip of the cervix.  The uterus sounds to 7cm using sterile technique.  A Mirena was loaded and placed high in the uterine fundus without difficulty using sterile technique.  The string was was then cut.  The tenaculum and speculum were removed.    COMPLICATIONS: None    PATIENT DISPOSITION: The patient tolerated the procedure well.    Assessment:  1.  Contraceptive management/IUD insertion    Post IUD placement counseling:  Manage post IUD placement pain with NSAIDS, Tylenol or Rx per Medcard.  IUD danger signs and how to check for strings were discussed.  The IUD needs to be removed in 5 years for Mirena and 10 years for Copper IUD.    Follow up:  4 weeks for string check      Solange Aburto MD 01/19/2017 3:56 PM         administration in time range)   nitroGLYcerin (NITRO-BID) 2 % ointment 15 mg (15 mg Transdermal $Patch/Med Applied 7/12/25 2937)   lidocaine 1 % 0.1-1 mL (has no administration in time range)   lidocaine (LMX4) cream (has no administration in time range)   sodium chloride (PF) 0.9% PF flush 3 mL (has no administration in time range)   sodium chloride (PF) 0.9% PF flush 3 mL (has no administration in time range)   senna-docusate (SENOKOT-S/PERICOLACE) 8.6-50 MG per tablet 1 tablet (has no administration in time range)     Or   senna-docusate (SENOKOT-S/PERICOLACE) 8.6-50 MG per tablet 2 tablet (has no administration in time range)   calcium carbonate (TUMS) chewable tablet 1,000 mg (has no administration in time range)   Patient is already receiving anticoagulation with heparin, enoxaparin (LOVENOX), warfarin (COUMADIN)  or other anticoagulant medication (has no administration in time range)   acetaminophen (TYLENOL) tablet 975 mg (has no administration in time range)   docusate sodium (COLACE) capsule 100 mg (has no administration in time range)   melatonin tablet 1 mg (has no administration in time range)   ondansetron (ZOFRAN ODT) ODT tab 4 mg (has no administration in time range)     Or   ondansetron (ZOFRAN) injection 4 mg (has no administration in time range)   prochlorperazine (COMPAZINE) injection 5 mg (has no administration in time range)     Or   prochlorperazine (COMPAZINE) tablet 5 mg (has no administration in time range)   benzocaine-menthol (CEPACOL) 15-3.6 MG lozenge 1 lozenge (has no administration in time range)   guaiFENesin-dextromethorphan (ROBITUSSIN DM) 100-10 MG/5ML syrup 10 mL (has no administration in time range)   Continuing ACE inhibitor/ARB/ARNI from home medication list OR ACE inhibitor/ARB/ARNI order already placed during this visit (has no administration in time range)   Continuing beta blocker from home medication list OR beta blocker order already placed during this visit (has no  administration in time range)   furosemide (LASIX) injection 40 mg (has no administration in time range)   OLANZapine (zyPREXA) tablet 2.5 mg (has no administration in time range)   amLODIPine (NORVASC) tablet 2.5 mg (has no administration in time range)   apixaban ANTICOAGULANT (ELIQUIS) tablet 5 mg (has no administration in time range)   atorvastatin (LIPITOR) tablet 20 mg (has no administration in time range)   diclofenac (VOLTAREN) 1 % topical gel 2-4 g ( Topical Automatically Held 7/15/25 2000)   escitalopram (LEXAPRO) tablet 5 mg (has no administration in time range)   flecainide (TAMBOCOR) tablet 50 mg (has no administration in time range)   levothyroxine (SYNTHROID/LEVOTHROID) tablet 88 mcg (has no administration in time range)   Lidocaine (LIDOCARE) 4 % Patch 1 patch (has no administration in time range)   losartan (COZAAR) tablet 100 mg (has no administration in time range)   sodium chloride (OCEAN) 0.65 % nasal spray 2 spray (has no administration in time range)   traZODone (DESYREL) tablet 50 mg (has no administration in time range)   metoprolol tartrate (LOPRESSOR) tablet 50 mg (has no administration in time range)   furosemide (LASIX) injection 60 mg (60 mg Intravenous $Given 7/12/25 0702)       NEW PRESCRIPTIONS STARTED AT TODAY'S ER VISIT  New Prescriptions    No medications on file          =================================================================    HPI        Katie Mar is a 89 year old female with a pertinent history of anticoagulation for atrial fibrillation and hypertension presents to the emergency department for evaluation of an unwitnessed fall.  The patient is a poor historian and does not know why she fell.  It is thought that she was on the floor for at least 1 hour.  The patient has no complaints.  She does have a bruise on her left upper forehead.      REVIEW OF SYSTEMS     Constitutional:  Denies fever or chills  HENT:  Denies sore throat   Respiratory:  Denies cough or  shortness of breath   Cardiovascular:  Denies chest pain or palpitations  GI:  Denies abdominal pain, nausea, or vomiting  Musculoskeletal:  Denies any new extremity pain   Skin:  Denies rash   Neurologic:  Denies headache, focal weakness or sensory changes    All other systems reviewed and are negative      PAST MEDICAL HISTORY:  Past Medical History:   Diagnosis Date    Anxiety state     Essential hypertension     Persistent atrial fibrillation (H) 2018    Pure hypercholesterolemia        PAST SURGICAL HISTORY:  Past Surgical History:   Procedure Laterality Date    CARDIOVERSION  2018    CATARACT EXTRACTION, BILATERAL Bilateral     DILATION AND CURETTAGE      RELEASE CARPAL TUNNEL Right            CURRENT MEDICATIONS:    acetaminophen (TYLENOL) 500 MG tablet  acetaminophen (TYLENOL) 500 MG tablet  amLODIPine (NORVASC) 2.5 MG tablet  apixaban ANTICOAGULANT (ELIQUIS ANTICOAGULANT) 5 MG tablet  atorvastatin (LIPITOR) 20 MG tablet  diclofenac (VOLTAREN) 1 % topical gel  escitalopram (LEXAPRO) 5 MG tablet  flecainide (TAMBOCOR) 50 MG tablet  levothyroxine (SYNTHROID/LEVOTHROID) 88 MCG tablet  Lidocaine (LIDOCARE) 4 % Patch  losartan (COZAAR) 100 MG tablet  melatonin 3 MG tablet  metoprolol tartrate (LOPRESSOR) 25 MG tablet  sodium chloride (DEEP SEA NASAL SPRAY) 0.65 % nasal spray  traZODone (DESYREL) 50 MG tablet        ALLERGIES:  Allergies   Allergen Reactions    Clindamycin Hives    Doxycycline Hyclate [Doxycycline] Hives    Penicillins Hives    Tetanus Toxoid Unknown     Site reaction. Hard area of redness and pain.       FAMILY HISTORY:  Family History   Problem Relation Age of Onset    Ovarian Cancer Paternal Aunt     Heart Failure Mother 76.00         at home    Coronary Artery Disease Father 49.00        and a second at age 54    Liver Cancer Father     Coronary Artery Disease Brother 49.00        and  of the second at 62    CABG Brother 53.00    No Known Problems Son     Alcoholism Brother   "   Cirrhosis Brother     No Known Problems Son         Lives in [place]    No Known Problems Son        SOCIAL HISTORY:   Social History     Socioeconomic History    Marital status:     Number of children: 3    Years of education: 16   Tobacco Use    Smoking status: Never    Smokeless tobacco: Never   Substance and Sexual Activity    Alcohol use: Yes     Alcohol/week: 7.0 standard drinks of alcohol    Drug use: No    Sexual activity: Not Currently     Partners: Male   Social History Kathrine    Lives alone since her  passed in 2013. She lives in [place]. She still drives in 2018. She walks on her own. She enjoys playing bridge and going out to lunch.    She has 3 sons: Bi lives in [place] and does check in on her.  Another son lives in [place] [facility] and travels a lot.  The other son lives in [place] and is retired.  She has 2 grandchildren.    She has advanced directives and Bi is in charge of her affairs.       VITALS:  Patient Vitals for the past 24 hrs:   BP Temp Temp src Pulse Resp SpO2 Height Weight   07/12/25 0730 (!) 186/79 -- -- 67 27 97 % -- --   07/12/25 0715 (!) 204/91 -- -- 69 (!) 31 (!) 89 % -- --   07/12/25 0700 (!) 218/88 -- -- 79 25 (!) 90 % -- --   07/12/25 0630 (!) 211/93 -- -- 71 30 92 % -- --   07/12/25 0600 (!) 174/77 -- -- 61 27 96 % -- --   07/12/25 0553 -- -- -- -- -- 96 % -- --   07/12/25 0546 (!) 200/82 -- -- 65 28 97 % -- --   07/12/25 0535 -- -- -- 66 -- 97 % -- --   07/12/25 0534 (!) 205/93 -- -- -- -- -- -- --   07/12/25 0507 -- 97.6  F (36.4  C) Oral -- 17 (!) 89 % 1.626 m (5' 4\") 59.4 kg (131 lb)   07/12/25 0501 (!) 223/86 -- -- 59 (!) 31 (!) 89 % -- --   07/12/25 0439 -- -- -- -- -- 92 % -- --   07/12/25 0435 (!) 207/99 -- -- 66 -- -- -- --       PHYSICAL EXAM    Constitutional:  Well developed, Well nourished,  HENT:  Normocephalic, Bilateral external ears normal, Oropharynx moist, Nose normal.   Neck:  Normal " range of motion, No meningismus, No stridor.   Eyes:  EOMI, Conjunctiva normal, No discharge.   Respiratory:  Normal breath sounds, No respiratory distress, No wheezing, No chest tenderness.   Cardiovascular:  Normal heart rate, Normal rhythm, No murmurs  GI:  Soft, No tenderness, No guarding, No CVA tenderness.   Musculoskeletal:  Neurovascularly intact distally, No edema, tenderness right knee and bilateral ankles, No cyanosis, Good range of motion in all major joints.   Integument:  Warm, Dry, No erythema, No rash.  Ecchymosis noted on left forehead and right knee  Lymphatic:  No lymphadenopathy noted.   Neurologic:  drowsy & oriented x 3, Normal motor function, Normal sensory function, No focal deficits noted.   Psychiatric:  Affect normal, Judgment normal, Mood normal.      LAB:  All pertinent labs reviewed and interpreted.  Results for orders placed or performed during the hospital encounter of 07/12/25   Head CT w/o contrast    Impression    IMPRESSION:  1.  No acute intracranial process.     CT Cervical Spine w/o Contrast    Impression    IMPRESSION:  1.  No fracture or posttraumatic subluxation.     CT Chest Abdomen Pelvis w/o Contrast    Impression    IMPRESSION:  1.  Multiple age-indeterminate compression fractures in the thoracic and lumbar spine with old appearing displaced lower sacral fracture. Consider dedicated thoracic and lumbar spine CT for more sensitive evaluation of the spine.  2.  Moderate degree of pulmonary edema with trace pleural effusions and cardiomegaly.       XR Ankle Bilateral G/E 3 Views    Impression    IMPRESSION:   RIGHT ANKLE: Osteopenia. No displaced fracture or dislocation. The ankle mortise is congruent on these images. Achilles and plantar calcaneal spurring.     LEFT ANKLE: Osteopenia. No displaced fracture or dislocation. Plantar calcaneal spurring. The ankle mortise is congruent on these views.   XR Knee Right 1/2 Views    Impression    IMPRESSION: Negative for fracture  or dislocation. Mild degenerative arthritis right knee. No obvious knee effusion. There is some benign chondroid matrix in the distal right femoral shaft probably due to a benign enchondroma and of doubtful significance.   Vascular calcification.     CT Thoracic Spine Reconstructed    Impression    IMPRESSION:  VERTEBRA: T3, T4, T11 and L1 compression fractures, which T3 and T4 are new since 4/10/2025. T11 is indeterminate secondary to lack of similar comparison studies. Height loss involving the T3 and T4 superior endplates is minimal. Fracture through the T11   endplate in the anterior cortex and inferior endplate with less than 25% height loss. L1 fracture is chronic.    No posttraumatic subluxation in the thoracic and/or lumbar spine. S-shaped thoracolumbar scoliosis. Preserved vertebral alignment in the lumbar spine. L3 inferior endplate intravertebral disc herniation.     CANAL: No retropulsed fragmentation at any level. No bony encroachment on the spinal canal at any level.    PARASPINAL: No acute extraspinal abnormality.   CT Lumbar Spine Reconstructed    Impression    IMPRESSION:  VERTEBRA: T3, T4, T11 and L1 compression fractures, which T3 and T4 are new since 4/10/2025. T11 is indeterminate secondary to lack of similar comparison studies. Height loss involving the T3 and T4 superior endplates is minimal. Fracture through the T11   endplate in the anterior cortex and inferior endplate with less than 25% height loss. L1 fracture is chronic.    No posttraumatic subluxation in the thoracic and/or lumbar spine. S-shaped thoracolumbar scoliosis. Preserved vertebral alignment in the lumbar spine. L3 inferior endplate intravertebral disc herniation.     CANAL: No retropulsed fragmentation at any level. No bony encroachment on the spinal canal at any level.    PARASPINAL: No acute extraspinal abnormality.   Comprehensive metabolic panel   Result Value Ref Range    Sodium 136 135 - 145 mmol/L    Potassium 4.2 3.4 -  5.3 mmol/L    Carbon Dioxide (CO2) 27 22 - 29 mmol/L    Anion Gap 11 7 - 15 mmol/L    Urea Nitrogen 25.2 (H) 8.0 - 23.0 mg/dL    Creatinine 0.81 0.51 - 0.95 mg/dL    GFR Estimate 67 >60 mL/min/1.73m2    Calcium 9.6 8.8 - 10.4 mg/dL    Chloride 98 98 - 107 mmol/L    Glucose 121 (H) 70 - 99 mg/dL    Alkaline Phosphatase 87 40 - 150 U/L    AST 34 0 - 45 U/L    ALT 27 0 - 50 U/L    Protein Total 6.8 6.4 - 8.3 g/dL    Albumin 4.1 3.5 - 5.2 g/dL    Bilirubin Total 0.4 <=1.2 mg/dL   INR   Result Value Ref Range    INR 1.16 (H) 0.85 - 1.15    PT 15.0 (H) 11.8 - 14.8 Seconds   Partial thromboplastin time   Result Value Ref Range    aPTT 32 22 - 38 Seconds   Result Value Ref Range    Ethanol Level Blood <0.01 <=0.01 g/dL   Result Value Ref Range    Troponin T, High Sensitivity 18 (H) <=14 ng/L   UA with Microscopic reflex to Culture    Specimen: Urine, Catheter   Result Value Ref Range    Color Urine Yellow Colorless, Straw, Light Yellow, Yellow    Appearance Urine Clear Clear    Glucose Urine Negative Negative mg/dL    Bilirubin Urine Negative Negative    Ketones Urine Negative Negative mg/dL    Specific Gravity Urine 1.009 1.001 - 1.030    Blood Urine Negative Negative    pH Urine 7.0 5.0 - 7.0    Protein Albumin Urine Negative Negative mg/dL    Urobilinogen Urine Normal Normal mg/dL    Nitrite Urine Negative Negative    Leukocyte Esterase Urine Negative Negative    RBC Urine 0 <=2 /HPF    WBC Urine 0 <=5 /HPF    Squamous Epithelials Urine <1 <=1 /HPF   Result Value Ref Range    CK 60 26 - 192 U/L   CBC with platelets and differential   Result Value Ref Range    WBC Count 7.3 4.0 - 11.0 10e3/uL    RBC Count 4.00 3.80 - 5.20 10e6/uL    Hemoglobin 12.9 11.7 - 15.7 g/dL    Hematocrit 39.7 35.0 - 47.0 %    MCV 99 78 - 100 fL    MCH 32.3 26.5 - 33.0 pg    MCHC 32.5 31.5 - 36.5 g/dL    RDW 14.6 10.0 - 15.0 %    Platelet Count 260 150 - 450 10e3/uL    % Neutrophils 64 %    % Lymphocytes 21 %    % Monocytes 11 %    % Eosinophils 2  %    % Basophils 1 %    % Immature Granulocytes 1 %    NRBCs per 100 WBC 0 <1 /100    Absolute Neutrophils 4.7 1.6 - 8.3 10e3/uL    Absolute Lymphocytes 1.6 0.8 - 5.3 10e3/uL    Absolute Monocytes 0.8 0.0 - 1.3 10e3/uL    Absolute Eosinophils 0.2 0.0 - 0.7 10e3/uL    Absolute Basophils 0.1 0.0 - 0.2 10e3/uL    Absolute Immature Granulocytes 0.0 <=0.4 10e3/uL    Absolute NRBCs 0.0 10e3/uL   Result Value Ref Range    Troponin T, High Sensitivity 19 (H) <=14 ng/L   NT-proBNP   Result Value Ref Range    NT-proBNP 2,276 (H) 0 - 624 pg/mL   ECG 12-LEAD WITH MUSE (L[facility])   Result Value Ref Range    Systolic Blood Pressure 207 mmHg    Diastolic Blood Pressure 99 mmHg    Ventricular Rate 66 BPM    Atrial Rate 242 BPM    CO Interval  ms    QRS Duration 114 ms     ms    QTc 446 ms    P Axis  degrees    R AXIS 62 degrees    T Axis 33 degrees    Interpretation ECG       Atrial flutter with variable A-V block  Anterior infarct (cited on or before 14-Apr-2022)  Abnormal ECG  When compared with ECG of 10-Apr-2025 08:33,  Atrial flutter has replaced Sinus rhythm  Confirmed by SEE ED PROVIDER NOTE FOR, ECG INTERPRETATION ([id number]),  Torsten Verde ([id number]) on 7/12/2025 4:49:34 AM         RADIOLOGY:  I have independently reviewed and interpreted the above imaging, pending the final radiology read.  CT Lumbar Spine Reconstructed   Final Result   IMPRESSION:   VERTEBRA: T3, T4, T11 and L1 compression fractures, which T3 and T4 are new since 4/10/2025. T11 is indeterminate secondary to lack of similar comparison studies. Height loss involving the T3 and T4 superior endplates is minimal. Fracture through the T11    endplate in the anterior cortex and inferior endplate with less than 25% height loss. L1 fracture is chronic.      No posttraumatic subluxation in the thoracic and/or lumbar spine. S-shaped thoracolumbar scoliosis. Preserved vertebral alignment in the lumbar spine. L3 inferior endplate intravertebral disc herniation.        CANAL: No retropulsed fragmentation at any level. No bony encroachment on the spinal canal at any level.      PARASPINAL: No acute extraspinal abnormality.      CT Thoracic Spine Reconstructed   Final Result   IMPRESSION:   VERTEBRA: T3, T4, T11 and L1 compression fractures, which T3 and T4 are new since 4/10/2025. T11 is indeterminate secondary to lack of similar comparison studies. Height loss involving the T3 and T4 superior endplates is minimal. Fracture through the T11    endplate in the anterior cortex and inferior endplate with less than 25% height loss. L1 fracture is chronic.      No posttraumatic subluxation in the thoracic and/or lumbar spine. S-shaped thoracolumbar scoliosis. Preserved vertebral alignment in the lumbar spine. L3 inferior endplate intravertebral disc herniation.       CANAL: No retropulsed fragmentation at any level. No bony encroachment on the spinal canal at any level.      PARASPINAL: No acute extraspinal abnormality.      CT Chest Abdomen Pelvis w/o Contrast   Final Result   IMPRESSION:   1.  Multiple age-indeterminate compression fractures in the thoracic and lumbar spine with old appearing displaced lower sacral fracture. Consider dedicated thoracic and lumbar spine CT for more sensitive evaluation of the spine.   2.  Moderate degree of pulmonary edema with trace pleural effusions and cardiomegaly.            CT Cervical Spine w/o Contrast   Final Result   IMPRESSION:   1.  No fracture or posttraumatic subluxation.         Head CT w/o contrast   Final Result   IMPRESSION:   1.  No acute intracranial process.         XR Ankle Bilateral G/E 3 Views   Final Result   IMPRESSION:    RIGHT ANKLE: Osteopenia. No displaced fracture or dislocation. The ankle mortise is congruent on these images. Achilles and plantar calcaneal spurring.       LEFT ANKLE: Osteopenia. No displaced fracture or dislocation. Plantar calcaneal spurring. The ankle mortise is congruent on these views.      XR  Knee Right 1/2 Views   Final Result   IMPRESSION: Negative for fracture or dislocation. Mild degenerative arthritis right knee. No obvious knee effusion. There is some benign chondroid matrix in the distal right femoral shaft probably due to a benign enchondroma and of doubtful significance.    Vascular calcification.         Echocardiogram Complete    (Results Pending)   Thoracic spine MRI w/o contrast    (Results Pending)       EK-rate is 66, atrial flutter, there is no ST segment elevation or depression appreciated.  EKG appears changed from previous EKG in 2025  I have independently reviewed and interpreted this EKG          Jane Christianson MD  Emergency Medicine  [facility] EMERGENCY ROOM  [address] [facility] [address] [phone]  Dept: [phone]       Jane Christianson MD  25 [id number]       Jane Christianson MD  25 0749       Jane Christianson MD  25 [phone]       Jane Christianson MD  25 [phone]

## 2025-07-12 NOTE — ED NOTES
Pt back to unit with MRI staff - they were unable to get any images at MRI as she requested to stop the MRI and be done. Pt was premedicated with PO Zyprexa 30 min before attempting scan.

## 2025-07-12 NOTE — ED TRIAGE NOTES
Triage Assessment (Adult)       Row Name 07/12/25 0436          Triage Assessment    Airway WDL WDL        Respiratory WDL    Respiratory WDL WDL        Peripheral/Neurovascular WDL    Peripheral Neurovascular WDL WDL

## 2025-07-12 NOTE — H&P
[facility] MEDICINE ADMISSION HISTORY AND PHYSICAL     Brief Synopsis:     Katie Mar is a 89 year old female who presented with complaints of falling at assisted living.    Medical history is notable for hypertension, A-fib, prior vertebral compression fractures, hospital delirium.    Initial evaluation revealed markedly elevated blood pressures over 200, normal temperature, low oxygen saturation on room air, creatinine 0.8, normal electrolytes, BNP of 2200, troponin unremarkable, CBC normal, urine normal.    X-ray of the knee negative for effusion or fracture.  X-ray of the ankles negative for fracture.  CT head negative for acute pathology.  CT cervical spine negative for fracture or subluxation.  CT chest abdomen pelvis with multiple age-indeterminate compression fractures in both the thoracic and lumbar spine with old appearing displaced lower sacral fracture, moderate pulmonary edema with trace pleural effusions and cardiomegaly noted.  Dedicated CT of thoracic and lumbar spine recommended and pending.    EKG with atrial flutter, no obvious ischemic changes.    Initial treatment included 60 mg IV Lasix, Nitropatch.    Assessment and Plan:  Acute congestive heart failure, unclear if systolic or diastolic  Acute respiratory failure with hypoxia likely related to CHF  Hypertensive urgency  Getting 60 mg IV Lasix in the emergency department  Continue 40 mg IV Lasix every 12 hours after that  Check echocardiogram  Supplemental oxygen as needed  Blood pressure dropped a lot, will discontinue Nitropaste  Monitor on telemetry    Fall  Acute vertebral compression fractures  Spine surgery contacted in the emergency department  MRI of spine ordered and pending  Consult therapy  Scheduled Tylenol, tramadol as needed  Fall precautions  Spine surgery consult  Appreciate spine recommendation regarding anticoagulation, holding Eliquis.today    Essential hypertension  At home she takes  amlodipine 2.5 at bedtime, losartan 100 mg in the morning, metoprolol 37.5 twice daily  Increase metoprolol to 50 mg twice daily  Continue losartan and amlodipine at usual dose  Adding Lasix, monitor response    Atrial fibrillation/flutter  Continue home flecainide, metoprolol  Monitor on telemetry  Would consult cardiology if unable to control heart rate  Hold Eliquis for now pending MRI results, spine recommendations    Mild cognitive impairment  Previous hospital stay complicated by some delirium  Will order Zyprexa as needed    Clinically Significant Risk Factors Present on Admission                # Drug Induced Coagulation Defect: home medication list includes an anticoagulant medication    # Hypertension: Noted on problem list                        DVTP: Direct Oral Anticagulants   Code Status: Prior  Disposition: Inpatient   Diet: Low-salt  Fluids: None    Disposition Plan      Expected Discharge Date: 07/14/2025               Chief Complaint Fall     HISTORY   Katie Mar is a 89 year old female who presented with complaints of fall, unable to get up for about an hour.    Per ED provider:  Katie Mar is a 89 year old female with a pertinent history of anticoagulation for atrial fibrillation and hypertension presents to the emergency department for evaluation of an unwitnessed fall.  The patient is a poor historian and does not know why she fell.  It is thought that she was on the floor for at least 1 hour.  The patient has no complaints.  She does have a bruise on her left upper forehead.    At the time my encounter she denies any chest pain, she was quite short of breath when she came in but she is feeling much better.  She says that her legs have been quite swollen lately, leaking watery fluids.  Denies abdominal pain, nausea, vomiting, diarrhea, dysuria.  Describes an aching feeling in her back.  Past Medical History     Past Medical History:  No date: Anxiety state  No date: Essential  hypertension  2018: Persistent atrial fibrillation (H)  No date: Pure hypercholesterolemia     Surgical History     Past Surgical History:   Procedure Laterality Date    CARDIOVERSION  2018    CATARACT EXTRACTION, BILATERAL Bilateral     DILATION AND CURETTAGE      RELEASE CARPAL TUNNEL Right      Family History      Family History   Problem Relation Age of Onset    Ovarian Cancer Paternal Aunt     Heart Failure Mother 76.00         at home    Coronary Artery Disease Father 49.00        and a second at age 54    Liver Cancer Father     Coronary Artery Disease Brother 49.00        and  of the second at 62    CABG Brother 53.00    No Known Problems Son     Alcoholism Brother     Cirrhosis Brother     No Known Problems Son         Lives in [place]    No Known Problems Son       Social History      Social History     Tobacco Use    Smoking status: Never    Smokeless tobacco: Never   Substance Use Topics    Alcohol use: Yes     Alcohol/week: 7.0 standard drinks of alcohol    Drug use: No      Allergies     Allergies   Allergen Reactions    Clindamycin Hives    Doxycycline Hyclate [Doxycycline] Hives    Penicillins Hives    Tetanus Toxoid Unknown     Site reaction. Hard area of redness and pain.     Prior to Admission Medications      Prior to Admission Medications   Prescriptions Last Dose Informant Patient Reported? Taking?   acetaminophen (TYLENOL) 500 MG tablet Unknown  Yes Yes   Sig: Take 1,000 mg by mouth daily as needed for pain. Take 1000 mg twice daily PLUS an additional 1000 mg daily as needed   acetaminophen (TYLENOL) 500 MG tablet 2025 at  9:05 PM  Yes Yes   Sig: Take 1,000 mg by mouth 2 times daily. Take 1000 mg twice daily PLUS an additional 1000 mg daily as needed   amLODIPine (NORVASC) 2.5 MG tablet   No No   Sig: Take 4 tablets (10 mg) by mouth daily   apixaban ANTICOAGULANT (ELIQUIS ANTICOAGULANT) 5 MG tablet   No No   Sig: Take 1 tablet (5 mg) by mouth 2 times daily    atorvastatin (LIPITOR) 20 MG tablet   No No   Sig: Take 1 tablet by mouth once daily   diclofenac (VOLTAREN) 1 % topical gel   Yes Yes   Sig: Apply 2-4 g topically as needed for moderate pain.   diphenhydrAMINE (ANTIHISTAMINE) 25 mg tablet   Yes No   Sig: [DIPHENHYDRAMINE (ANTIHISTAMINE) 25 MG TABLET] Take 25 mg by mouth at bedtime.   escitalopram (LEXAPRO) 5 MG tablet   Yes Yes   Sig: Take 5 mg by mouth every morning.   flecainide (TAMBOCOR) 50 MG tablet   No No   Sig: Take 1 tablet (50 mg) by mouth 2 times daily   levothyroxine (SYNTHROID/LEVOTHROID) 75 MCG tablet   Yes No   Sig: Take 75 mcg by mouth daily   levothyroxine (SYNTHROID/LEVOTHROID) 88 MCG tablet   Yes Yes   Sig: Take 88 mcg by mouth every morning (before breakfast).   lidocaine (LMX4) 4 % external cream   Yes No   Sig: Apply topically daily Apply to lower back for pain   losartan (COZAAR) 100 MG tablet   No No   Sig: Take 1 tablet by mouth once daily   melatonin 3 MG tablet   Yes No   Sig: Take 1 mg by mouth At Bedtime   metoprolol tartrate (LOPRESSOR) 25 MG tablet   Yes No   Sig: Take 37.5 mg by mouth 2 times daily   sodium chloride (DEEP SEA NASAL SPRAY) 0.65 % nasal spray   Yes Yes   Sig: Spray 2 sprays in nostril 2 times daily.   traZODone (DESYREL) 50 MG tablet   Yes Yes   Sig: Take 50 mg by mouth at bedtime.      Facility-Administered Medications: None      Review of Systems     A 12 point comprehensive review of systems was negative except as noted above in HPI.    PHYSICAL EXAMINATION     Vitals      Temp:  [97.6  F (36.4  C)] 97.6  F (36.4  C)  Pulse:  [59-79] 69  Resp:  [17-31] 31  BP: (174-223)/(77-99) 204/91  SpO2:  [89 %-97 %] 89 %    Examination   Physical Exam:    Gen: no acute distress, comfortable, alert, pleasant, seems a little bit forgetful  ENT: no scleral icterus, she has got small hematoma in the left frontal scalp  Pulm: lungs are mildly diminished, scattered crackles, no wheezing, breathing comfortably at rest with oxygen  via nasal cannula  CV: Irregular, flutter on telemetry, moderate bilateral lower extremity edema  GI: abdomen is soft, non-tender, non-distended with active bowel sounds.  MSK: no obvious deformities of the extremities  Derm: Not pale, no jaundice  Psych: appropriate affect      Pertinent Radiology     Radiology Results:   Recent Results (from the past 24 hours)   XR Knee Right 1/2 Views    Narrative    EXAM: XR KNEE RIGHT 1/2 VIEWS  LOCATION: [facility]  DATE: 07/12/2025    INDICATION: Trauma; pain.  COMPARISON: None.      Impression    IMPRESSION: Negative for fracture or dislocation. Mild degenerative arthritis right knee. No obvious knee effusion. There is some benign chondroid matrix in the distal right femoral shaft probably due to a benign enchondroma and of doubtful significance.   Vascular calcification.     XR Ankle Bilateral G/E 3 Views    Narrative    EXAM: XR ANKLE BILATERAL G/E 3 VIEWS  LOCATION: [facility]  DATE: 7/12/2025    INDICATION: trauma; pain  COMPARISON: None.      Impression    IMPRESSION:   RIGHT ANKLE: Osteopenia. No displaced fracture or dislocation. The ankle mortise is congruent on these images. Achilles and plantar calcaneal spurring.     LEFT ANKLE: Osteopenia. No displaced fracture or dislocation. Plantar calcaneal spurring. The ankle mortise is congruent on these views.   Head CT w/o contrast    Narrative    EXAM: CT HEAD W/O CONTRAST  LOCATION: [facility]  DATE: 7/12/2025    INDICATION: closed head injury on blood thinners  COMPARISON: April 10, 2025   TECHNIQUE: Routine CT Head without IV contrast. Multiplanar reformats. Dose reduction techniques were used.    FINDINGS:  INTRACRANIAL CONTENTS: No intracranial hemorrhage, extraaxial collection, or mass effect.  No CT evidence of acute infarct. Mild presumed chronic small vessel ischemic changes. Moderate generalized volume loss. No hydrocephalus.      VISUALIZED ORBITS/SINUSES/MASTOIDS: Prior bilateral cataract surgery. Visualized portions of the orbits are otherwise unremarkable. No paranasal sinus mucosal disease. No middle ear or mastoid effusion.    BONES/SOFT TISSUES: No acute abnormality.      Impression    IMPRESSION:  1.  No acute intracranial process.     CT Cervical Spine w/o Contrast    Narrative    EXAM: CT CERVICAL SPINE W/O CONTRAST  LOCATION: [facility]  DATE: 7/12/2025    INDICATION: midline neck pain after fall  COMPARISON: April 10, 2025   TECHNIQUE: Routine CT Cervical Spine without IV contrast. Multiplanar reformats. Dose reduction techniques were used.    FINDINGS:  VERTEBRA: Normal vertebral body heights. Stable alignment. No fracture or posttraumatic subluxation.     CANAL/FORAMINA: No canal or neural foraminal stenosis.    PARASPINAL: Unchanged pleural calcifications.       Impression    IMPRESSION:  1.  No fracture or posttraumatic subluxation.     CT Chest Abdomen Pelvis w/o Contrast    Narrative    EXAM: CT CHEST/ABDOMEN/PELVIS WITHOUT CONTRAST  LOCATION: [facility]  DATE: 07/12/2025    INDICATION: Blunt abdominal and chest injury.  COMPARISON: None.  TECHNIQUE: CT scan of the chest, abdomen, and pelvis was performed without IV contrast. Multiplanar reformats were obtained. Dose reduction techniques were used.   CONTRAST: None.    FINDINGS:   LUNGS AND PLEURA: No pneumothorax or hemothorax. Trace simple density pleural effusions. Moderate degree of interstitial and alveolar pulmonary edema.    MEDIASTINUM/AXILLAE: No mediastinal hemorrhage. No injury to the noncontrast thoracic aorta or enlarged heart. Three-vessel coronary artery disease. No pericardial hemorrhage or effusion. Several mildly enlarged lymph nodes in the superior mediastinum   involving right upper paratracheal and right lower paratracheal lymph nodes.    HEPATOBILIARY: Benign simple cyst measuring roughly 3 cm the  central right hepatic lobe.    PANCREAS: Normal.    SPLEEN: Normal.    ADRENAL GLANDS: Normal.    KIDNEYS/BLADDER: Normal.    BOWEL: No bowel or mesenteric injury.    Diverticulosis coli without diverticulitis.    LYMPH NODES: Normal.    VASCULATURE: Normal.    PELVIC ORGANS: Normal.    MUSCULOSKELETAL: No acute rib or sternal fracture. Multiple age-indeterminate compression fractures in the thoracic and lumbar spine. Recommend dedicated thoracic and lumbar spine CT for more sensitive evaluation of the spine. No acute hip or pelvis   fracture. Old appearing horizontally oriented displaced fracture of the S3-S4 level (tailbone). No body wall contusion or hematoma.      Impression    IMPRESSION:  1.  Multiple age-indeterminate compression fractures in the thoracic and lumbar spine with old appearing displaced lower sacral fracture. Consider dedicated thoracic and lumbar spine CT for more sensitive evaluation of the spine.  2.  Moderate degree of pulmonary edema with trace pleural effusions and cardiomegaly.       CT Thoracic Spine Reconstructed    Narrative    EXAM: CT LUMBAR SPINE RECONSTRUCTED, CT THORACIC SPINE RECONSTRUCTED  LOCATION: [facility]  DATE: 7/12/2025    INDICATION: spine fracture noted on chest abd pelvis ct  COMPARISON: 4/10/2025.  TECHNIQUE: Dedicated axial, sagittal, and coronal images of the thoracic and lumbar spine were generated utilizing CT chest, abdomen and pelvis source data and are separately reviewed. Dose reduction techniques were used.       Impression    IMPRESSION:  VERTEBRA: T3, T4, T11 and L1 compression fractures, which T3 and T4 are new since 4/10/2025. T11 is indeterminate secondary to lack of similar comparison studies. Height loss involving the T3 and T4 superior endplates is minimal. Fracture through the T11   endplate in the anterior cortex and inferior endplate with less than 25% height loss. L1 fracture is chronic.    No posttraumatic subluxation in  the thoracic and/or lumbar spine. S-shaped thoracolumbar scoliosis. Preserved vertebral alignment in the lumbar spine. L3 inferior endplate intravertebral disc herniation.     CANAL: No retropulsed fragmentation at any level. No bony encroachment on the spinal canal at any level.    PARASPINAL: No acute extraspinal abnormality.   CT Lumbar Spine Reconstructed    Narrative    EXAM: CT LUMBAR SPINE RECONSTRUCTED, CT THORACIC SPINE RECONSTRUCTED  LOCATION: [facility]  DATE: 7/12/2025    INDICATION: spine fracture noted on chest abd pelvis ct  COMPARISON: 4/10/2025.  TECHNIQUE: Dedicated axial, sagittal, and coronal images of the thoracic and lumbar spine were generated utilizing CT chest, abdomen and pelvis source data and are separately reviewed. Dose reduction techniques were used.       Impression    IMPRESSION:  VERTEBRA: T3, T4, T11 and L1 compression fractures, which T3 and T4 are new since 4/10/2025. T11 is indeterminate secondary to lack of similar comparison studies. Height loss involving the T3 and T4 superior endplates is minimal. Fracture through the T11   endplate in the anterior cortex and inferior endplate with less than 25% height loss. L1 fracture is chronic.    No posttraumatic subluxation in the thoracic and/or lumbar spine. S-shaped thoracolumbar scoliosis. Preserved vertebral alignment in the lumbar spine. L3 inferior endplate intravertebral disc herniation.     CANAL: No retropulsed fragmentation at any level. No bony encroachment on the spinal canal at any level.    PARASPINAL: No acute extraspinal abnormality.     EKG Results: personally reviewed.  Flutter without obvious ischemic changes    Alejandro Chandler, [facility] Medicine  [facility]   Phone: #[phone]

## 2025-07-12 NOTE — PLAN OF CARE
Patient denies back pain while at rest. Good urine output after lasix. Patient pulled out IV and removed wires and oxygen.  Became disoriented to situation.  1:1 sitter assigned.  Patient asking to go home.  Received call from son who indicated that he expects patient to get more confused during the night.    Problem: Delirium  Goal: Optimal Coping  Outcome: Not Progressing  Intervention: Optimize Psychosocial Adjustment to Delirium  Recent Flowsheet Documentation  Taken 7/12/2025 1349 by Angela Enriquez RN  Family/Support System Care: caregiver stress acknowledged  Goal: Improved Behavioral Control  Outcome: Not Progressing  Intervention: Minimize Safety Risk  Recent Flowsheet Documentation  Taken 7/12/2025 1630 by Angela Enriquez RN  Enhanced Safety Measures:  at bedside  Trust Relationship/Rapport:   care explained   questions encouraged   thoughts/feelings acknowledged  Taken 7/12/2025 1349 by Angela Enriquez RN  Trust Relationship/Rapport:   care explained   questions encouraged   thoughts/feelings acknowledged  Goal: Improved Attention and Thought Clarity  Outcome: Not Progressing  Goal: Improved Sleep  Outcome: Not Progressing     Problem: Risk for Delirium  Goal: Optimal Coping  Outcome: Not Progressing  Intervention: Optimize Psychosocial Adjustment to Delirium  Recent Flowsheet Documentation  Taken 7/12/2025 1349 by Angela Enriquez RN  Family/Support System Care: caregiver stress acknowledged  Goal: Improved Behavioral Control  Outcome: Not Progressing  Intervention: Minimize Safety Risk  Recent Flowsheet Documentation  Taken 7/12/2025 1630 by Angela Enriquez RN  Enhanced Safety Measures:  at bedside  Trust Relationship/Rapport:   care explained   questions encouraged   thoughts/feelings acknowledged  Taken 7/12/2025 1349 by Angela Enriquez RN  Trust Relationship/Rapport:   care explained   questions encouraged   thoughts/feelings acknowledged  Goal: Improved  Attention and Thought Clarity  Outcome: Not Progressing  Goal: Improved Sleep  Outcome: Not Progressing     Problem: Heart Failure  Goal: Stable Heart Rate and Rhythm  Outcome: Progressing  Goal: Fluid and Electrolyte Balance  Outcome: Progressing  Goal: Effective Oxygenation and Ventilation  Outcome: Progressing  Intervention: Optimize Oxygenation and Ventilation  Recent Flowsheet Documentation  Taken 7/12/2025 1630 by Angela Enriquez RN  Airway/Ventilation Management: oxygen therapy provided  Head of Bed (HOB) Positioning: HOB at 60 degrees  Taken 7/12/2025 1349 by Angela Enriquez RN  Airway/Ventilation Management: oxygen therapy provided     Problem: Pain Acute  Goal: Optimal Pain Control and Function  Outcome: Progressing     Problem: Skin Injury Risk Increased  Goal: Skin Health and Integrity  Outcome: Progressing  Intervention: Optimize Skin Protection  Recent Flowsheet Documentation  Taken 7/12/2025 1630 by Angela Enriquez, RN  Head of Bed (HOB) Positioning: HOB at 60 degrees   Goal Outcome Evaluation:

## 2025-07-12 NOTE — ED NOTES
Patient rehooked up to monitor. New external catheter placed. Pericare done. Noted to have non-blanchable redness to inguinal fold. Updated son at bedside of skin redness.

## 2025-07-12 NOTE — ED NOTES
"[facility] ED Handoff Report    ED Chief Complaint: Unwitnessed fall from assisted living facility walking to bathroom    ED Diagnosis:  (I50.9) Acute congestive heart failure, unspecified heart failure type (H)  Comment: O2 sats 88% RA in ER, started on 2L O2 NC, IV lasix given overnight, 1900mL urine output, BNP elevated, imaging showed pulmonary edema, Echo done (results pending)  Plan: continue 2000mL fluid restriction, strict I&O monitoring, daily weights    (W19.XXXA) Fall, initial encounter  Comment: Unwitnessed fall at assisted living facility, on blood thinners for hx of afib, pt unsure of why she fell, no pain at rest but has back pain with movement in bed, CT done.  Plan: Going to MRI, spine consult, continue with fall prevention precautions    (S09.90XA) Closed head injury, initial encounter  Comment: A&O x4, no LOC, hematoma to L forehead, head CT negative  Plan: continue to monitor    (S22.009A) Closed fracture of thoracic vertebra, unspecified fracture morphology, unspecified thoracic vertebral level, initial encounter (H)  Comment: Unsure if it is chronic or acute fractures, MRI pending  Plan: Wait for MRI results       PMH:    Past Medical History:   Diagnosis Date    Anxiety state     Essential hypertension     Persistent atrial fibrillation (H) 1/18/2018    Pure hypercholesterolemia         Code Status:  No CPR- Do NOT Intubate     Falls Risk: Yes Band: Applied    Current Living Situation/Residence: lives in an assisted living facility     Elimination Status: Continent: No - external catheter    Activity Level: 2 assist - has not gotten out of bed in ED and has not walked since fall, would use A2 for out of bed and transfers if pt is able    Patient's Preferred Language:  English     Needed: No    Vital Signs:  BP (!) 156/72   Pulse 58   Temp 97.6  F (36.4  C) (Oral)   Resp 29   Ht 1.626 m (5' 4\")   Wt 59.4 kg (131 lb)   LMP  (LMP Unknown)   SpO2 95%   BMI 22.49 kg/m   "     Cardiac Rhythm: Appears to switch between Afib and Aflutter, did have a 6 beat run of Vtach this AM at 0947. Patient was also bradycardic dipping down to 40s-50s. Patient was asymptomatic with both cardiac rhythm and HR. Provider notified and adjusted metoprolol from 50mg PO to 37.5mg PO BID    Pain Score: 0/10    Is the Patient Confused:  No    Last Food or Drink: 07/12/25 at 0900    Assessment and Plan of Care: Pt arrived for an unwitnessed fall from [facility] on blood thinners. Labs and imaging completed - spinal compression fractures found but unsure if they are new or chronic, MRI results pending.     Tests Performed: Done: Labs and Imaging    Treatments Provided:  AM medications given (see MAR), applied external catheter, pain management    Family Dynamics/Concerns: No    Belongings Checklist Done and Signed by Patient: N/A    Belongings Sent with Patient: Yes    Boarding medications sent with patient: Yes - nasal spray and 1 dose of flecainide    Additional Information: Already seen by Dr. Chandler in ED this AM    RN: Ivonne Fernandez RN  7/12/2025 11:43 AM

## 2025-07-13 ENCOUNTER — APPOINTMENT (OUTPATIENT)
Dept: RADIOLOGY | Facility: CLINIC | Age: OVER 89
DRG: 291 | End: 2025-07-13
Attending: PHYSICIAN ASSISTANT
Payer: MEDICARE

## 2025-07-13 ENCOUNTER — APPOINTMENT (OUTPATIENT)
Dept: PHYSICAL THERAPY | Facility: CLINIC | Age: OVER 89
DRG: 291 | End: 2025-07-13
Attending: HOSPITALIST
Payer: MEDICARE

## 2025-07-13 LAB
ANION GAP SERPL CALCULATED.3IONS-SCNC: 13 MMOL/L (ref 7–15)
BASOPHILS # BLD AUTO: 0 10E3/UL (ref 0–0.2)
BASOPHILS NFR BLD AUTO: 1 %
BUN SERPL-MCNC: 23.3 MG/DL (ref 8–23)
CALCIUM SERPL-MCNC: 9.3 MG/DL (ref 8.8–10.4)
CHLORIDE SERPL-SCNC: 94 MMOL/L (ref 98–107)
CREAT SERPL-MCNC: 0.83 MG/DL (ref 0.51–0.95)
EGFRCR SERPLBLD CKD-EPI 2021: 65 ML/MIN/1.73M2
EOSINOPHIL # BLD AUTO: 0.2 10E3/UL (ref 0–0.7)
EOSINOPHIL NFR BLD AUTO: 3 %
ERYTHROCYTE [DISTWIDTH] IN BLOOD BY AUTOMATED COUNT: 14.4 % (ref 10–15)
GLUCOSE SERPL-MCNC: 103 MG/DL (ref 70–99)
HCO3 SERPL-SCNC: 28 MMOL/L (ref 22–29)
HCT VFR BLD AUTO: 40.3 % (ref 35–47)
HGB BLD-MCNC: 13.3 G/DL (ref 11.7–15.7)
HOLD SPECIMEN: NORMAL
IMM GRANULOCYTES # BLD: 0 10E3/UL
IMM GRANULOCYTES NFR BLD: 0 %
LYMPHOCYTES # BLD AUTO: 1.3 10E3/UL (ref 0.8–5.3)
LYMPHOCYTES NFR BLD AUTO: 17 %
MAGNESIUM SERPL-MCNC: 2.1 MG/DL (ref 1.7–2.3)
MCH RBC QN AUTO: 32 PG (ref 26.5–33)
MCHC RBC AUTO-ENTMCNC: 33 G/DL (ref 31.5–36.5)
MCV RBC AUTO: 97 FL (ref 78–100)
MONOCYTES # BLD AUTO: 0.8 10E3/UL (ref 0–1.3)
MONOCYTES NFR BLD AUTO: 11 %
NEUTROPHILS # BLD AUTO: 5.2 10E3/UL (ref 1.6–8.3)
NEUTROPHILS NFR BLD AUTO: 68 %
NRBC # BLD AUTO: 0 10E3/UL
NRBC BLD AUTO-RTO: 0 /100
PLATELET # BLD AUTO: 248 10E3/UL (ref 150–450)
POTASSIUM SERPL-SCNC: 3.6 MMOL/L (ref 3.4–5.3)
RBC # BLD AUTO: 4.16 10E6/UL (ref 3.8–5.2)
SODIUM SERPL-SCNC: 135 MMOL/L (ref 135–145)
WBC # BLD AUTO: 7.6 10E3/UL (ref 4–11)

## 2025-07-13 PROCEDURE — 120N000004 HC R&B MS OVERFLOW

## 2025-07-13 PROCEDURE — L0456 TLSO FLEX TRNK SJ-SS PRE CST: HCPCS

## 2025-07-13 PROCEDURE — 97530 THERAPEUTIC ACTIVITIES: CPT | Mod: GP

## 2025-07-13 PROCEDURE — 85004 AUTOMATED DIFF WBC COUNT: CPT | Performed by: HOSPITALIST

## 2025-07-13 PROCEDURE — 99231 SBSQ HOSP IP/OBS SF/LOW 25: CPT | Performed by: PHYSICIAN ASSISTANT

## 2025-07-13 PROCEDURE — 80048 BASIC METABOLIC PNL TOTAL CA: CPT | Performed by: HOSPITALIST

## 2025-07-13 PROCEDURE — 72070 X-RAY EXAM THORAC SPINE 2VWS: CPT

## 2025-07-13 PROCEDURE — 83735 ASSAY OF MAGNESIUM: CPT | Performed by: HOSPITALIST

## 2025-07-13 PROCEDURE — 82607 VITAMIN B-12: CPT | Performed by: HOSPITALIST

## 2025-07-13 PROCEDURE — 97161 PT EVAL LOW COMPLEX 20 MIN: CPT | Mod: GP

## 2025-07-13 PROCEDURE — 250N000011 HC RX IP 250 OP 636: Performed by: HOSPITALIST

## 2025-07-13 PROCEDURE — 250N000013 HC RX MED GY IP 250 OP 250 PS 637: Performed by: HOSPITALIST

## 2025-07-13 PROCEDURE — 36415 COLL VENOUS BLD VENIPUNCTURE: CPT | Performed by: HOSPITALIST

## 2025-07-13 PROCEDURE — 99233 SBSQ HOSP IP/OBS HIGH 50: CPT | Performed by: HOSPITALIST

## 2025-07-13 RX ORDER — NALOXONE HYDROCHLORIDE 0.4 MG/ML
0.2 INJECTION, SOLUTION INTRAMUSCULAR; INTRAVENOUS; SUBCUTANEOUS
Status: DISCONTINUED | OUTPATIENT
Start: 2025-07-13 | End: 2025-07-16 | Stop reason: HOSPADM

## 2025-07-13 RX ORDER — FUROSEMIDE 20 MG/1
20 TABLET ORAL
Status: DISCONTINUED | OUTPATIENT
Start: 2025-07-13 | End: 2025-07-14

## 2025-07-13 RX ORDER — NALOXONE HYDROCHLORIDE 0.4 MG/ML
0.4 INJECTION, SOLUTION INTRAMUSCULAR; INTRAVENOUS; SUBCUTANEOUS
Status: DISCONTINUED | OUTPATIENT
Start: 2025-07-13 | End: 2025-07-16 | Stop reason: HOSPADM

## 2025-07-13 RX ADMIN — FUROSEMIDE 20 MG: 20 TABLET ORAL at 16:39

## 2025-07-13 RX ADMIN — OLANZAPINE 2.5 MG: 2.5 TABLET, FILM COATED ORAL at 20:20

## 2025-07-13 RX ADMIN — ACETAMINOPHEN 975 MG: 325 TABLET ORAL at 14:04

## 2025-07-13 RX ADMIN — ATORVASTATIN CALCIUM 20 MG: 10 TABLET, FILM COATED ORAL at 20:20

## 2025-07-13 RX ADMIN — LEVOTHYROXINE SODIUM 88 MCG: 0.09 TABLET ORAL at 07:32

## 2025-07-13 RX ADMIN — FUROSEMIDE 40 MG: 10 INJECTION, SOLUTION INTRAVENOUS at 06:25

## 2025-07-13 RX ADMIN — METOPROLOL TARTRATE 37.5 MG: 25 TABLET, FILM COATED ORAL at 07:32

## 2025-07-13 RX ADMIN — FLECAINIDE ACETATE 50 MG: 50 TABLET ORAL at 07:32

## 2025-07-13 RX ADMIN — DOCUSATE SODIUM 100 MG: 100 CAPSULE, LIQUID FILLED ORAL at 07:32

## 2025-07-13 RX ADMIN — ACETAMINOPHEN 975 MG: 325 TABLET ORAL at 07:33

## 2025-07-13 RX ADMIN — LOSARTAN POTASSIUM 100 MG: 25 TABLET, FILM COATED ORAL at 07:33

## 2025-07-13 RX ADMIN — METOPROLOL TARTRATE 37.5 MG: 25 TABLET, FILM COATED ORAL at 20:20

## 2025-07-13 RX ADMIN — AMLODIPINE BESYLATE 2.5 MG: 2.5 TABLET ORAL at 20:20

## 2025-07-13 RX ADMIN — TRAZODONE HYDROCHLORIDE 50 MG: 50 TABLET ORAL at 20:20

## 2025-07-13 RX ADMIN — APIXABAN 2.5 MG: 2.5 TABLET, FILM COATED ORAL at 20:20

## 2025-07-13 RX ADMIN — ESCITALOPRAM 5 MG: 5 TABLET, FILM COATED ORAL at 07:33

## 2025-07-13 ASSESSMENT — ACTIVITIES OF DAILY LIVING (ADL)
ADLS_ACUITY_SCORE: 65
ADLS_ACUITY_SCORE: 69
ADLS_ACUITY_SCORE: 65
ADLS_ACUITY_SCORE: 64
ADLS_ACUITY_SCORE: 65
ADLS_ACUITY_SCORE: 65
ADLS_ACUITY_SCORE: 69
ADLS_ACUITY_SCORE: 65

## 2025-07-13 NOTE — PROGRESS NOTES
[facility] Medicine PROGRESS NOTE      Identification/Summary:   Katie Mar is a 89 year old female who presented with complaints of falling at assisted living.    Medical history is notable for hypertension, A-fib, prior vertebral compression fractures, hospital delirium.    Initial evaluation revealed markedly elevated blood pressures over 200, normal temperature, low oxygen saturation on room air, creatinine 0.8, normal electrolytes, BNP of 2200, troponin unremarkable, CBC normal, urine normal.     X-ray of the knee negative for effusion or fracture.  X-ray of the ankles negative for fracture.  CT head negative for acute pathology.  CT cervical spine negative for fracture or subluxation.  CT chest abdomen pelvis with multiple age-indeterminate compression fractures in both the thoracic and lumbar spine with old appearing displaced lower sacral fracture, moderate pulmonary edema with trace pleural effusions and cardiomegaly noted.  Dedicated CT of thoracic and lumbar spine showed acute thoracic compression fractures.  MRI recommended but patient unable to tolerate.  Neurosurgery consulted.     EKG with atrial flutter, no obvious ischemic changes.    Initial treatment included 60 mg IV Lasix, Nitropatch.  Echocardiogram with ejection fraction of 55 to 60%, mild LVH, mild aortic stenosis, mild to moderate mitral regurgitation, mild mitral stenosis, elevated RA pressure.    Vital signs notable for elevated blood pressures last night, no on room air, volume status much improved.  CBC normal, creatinine 0.83, BUN elevated 23.    Assessment and Plan:  Acute congestive heart failure, unclear if systolic or diastolic  Acute respiratory failure with hypoxia likely related to CHF  Hypertensive urgency  Essential hypertension  Atrial fibrillation/flutter  Not typically on diuretics at home  Home meds include amlodipine, flecainide, losartan, metoprolol  Changed to oral Lasix 20 mg twice daily for now  Repeat  basic metabolic panel in the morning  Echo with normal EF, mild AS, mild to moderate mitral regurgitation, mild mitral stenosis  Now on room air, continue to follow pulmonary status  Reviewed with cardiology informally, no need to change other home meds at this time, advised against higher doses of flecainide  Should have follow-up with cardiology as an outpatient    Fall  Acute vertebral compression fractures  Appreciate neurosurgery consult  MRI of spine ordered but not tolerated, not sure if it is pain or anxiety but she does not want to have it done  If neurosurgery recommends we will attempt more aggressive sedation to try to complete MRI  PT and OT consults  Scheduled Tylenol, tramadol as needed  Fall precautions  Spine surgery consult  Appreciate spine recommendation regarding anticoagulation, holding Eliquis.today     Essential hypertension  At home she takes amlodipine 2.5 at bedtime, losartan 100 mg in the morning, metoprolol 37.5 twice daily  Increase metoprolol to 50 mg twice daily  Continue losartan and amlodipine at usual dose  Adding Lasix, monitor response     Atrial fibrillation/flutter  Continue home flecainide, metoprolol  Monitor on telemetry  Would consult cardiology if unable to control heart rate  Hold Eliquis for now pending MRI results, spine recommendations     Mild cognitive impairment  Previous hospital stay complicated by some delirium  Definitely has been confused this hospital stay as well  Zyprexa ordered as needed  Continued trazodone at bedtime, escitalopram    Diet: Fluid restriction 2000 ML FLUID (and additional linked orders)  Combination Diet Regular Diet Adult; 2 gm NA Diet  Fluids: None  Pain meds: Scheduled Tylenol, tramadol as needed  Therapy: PT and OT help appreciated  DVT Prophylaxis: Resume Eliquis  Code Status: No CPR- Do NOT Intubate  Medically Ready for Discharge: Anticipated Tomorrow    Interval History/Subjective:  Denies chest pain, shortness of breath, abdominal  pain, nausea to me.  Denies any significant back pain currently.    Physical Exam/Objective:  Gen: no acute distress, comfortable, alert, pleasant, sitting in bed eating breakfast, wide-awake but seems forgetful  ENT: no scleral icterus  Pulm: lungs are clear without wheezing, crackles  CV: Irregular, normal heart rate, lower extremity edema has improved  Derm: Warm, dry, not pale  Psych: appropriate affect, pleasantly confused at the time my encounter    Medications:   Current Facility-Administered Medications   Medication Dose Route Frequency Provider Last Rate Last Admin    acetaminophen (TYLENOL) tablet 975 mg  975 mg Oral TID Alejandro Chandler, DO   975 mg at 07/12/25 2137    amLODIPine (NORVASC) tablet 2.5 mg  2.5 mg Oral At Bedtime Alejandro Chandler, DO   2.5 mg at 07/12/25 2137    [Held by provider] apixaban ANTICOAGULANT (ELIQUIS) tablet 5 mg  5 mg Oral BID Alejandro Chandler, DO   5 mg at 07/12/25 0828    atorvastatin (LIPITOR) tablet 20 mg  20 mg Oral QPM Alejandro Chandler, DO   20 mg at 07/12/25 2137    [Held by provider] diclofenac (VOLTAREN) 1 % topical gel 2-4 g  2-4 g Topical 4x Daily Alejandro Chandler, DO        docusate sodium (COLACE) capsule 100 mg  100 mg Oral BID Alejandro Chandler, DO   100 mg at 07/12/25 2138    escitalopram (LEXAPRO) tablet 5 mg  5 mg Oral QAM Alejandro Chandler, DO   5 mg at 07/12/25 0829    flecainide (TAMBOCOR) tablet 50 mg  50 mg Oral BID Alejandro Chandler, DO   50 mg at 07/12/25 2138    furosemide (LASIX) injection 40 mg  40 mg Intravenous Q12H Alejandro Chandler, DO   40 mg at 07/13/25 0625    levothyroxine (SYNTHROID/LEVOTHROID) tablet 88 mcg  88 mcg Oral QAM AC Alejandro Chandler, DO   88 mcg at 07/12/25 0829    losartan (COZAAR) tablet 100 mg  100 mg Oral Daily Alejandro Chandler, DO   100 mg at 07/12/25 0823    metoprolol tartrate (LOPRESSOR) half-tab 37.5 mg  37.5 mg Oral BID Alejandro Chandler DO   37.5 mg at 07/12/25 2137    sodium chloride (OCEAN) 0.65 % nasal spray 2 spray  2 spray Nasal BID Ramesh  Alejandro CAI DO   2 spray at 07/12/25 0830    sodium chloride (PF) 0.9% PF flush 3 mL  3 mL Intracatheter Q8H MARLIU Alejandro Chandler DO   3 mL at 07/12/25 1453    traZODone (DESYREL) tablet 50 mg  50 mg Oral At Bedtime Alejandro Chandler DO   50 mg at 07/12/25 6802     Clinically Significant Risk Factors Present on Admission          # Hypochloremia: Lowest Cl = 94 mmol/L in last 2 days, will monitor as appropriate       # Drug Induced Coagulation Defect: home medication list includes an anticoagulant medication    # Hypertension: Noted on problem list  # Acute heart failure with preserved ejection fraction: heart failure noted on problem list, last echo with EF >50%, and receiving IV diuretics                        Alejandro Chandler DO   Hospitalist  [facility]

## 2025-07-13 NOTE — PLAN OF CARE
Patient continues with 1:1 sitter at bedside for impulsivity and pulling at lines when left alone. Received TLSO brace today and has been up in chair.  Deferred walking until x-rays seen by orthopedic surgeon. Orientation waxes and wanes and becomes quickly agitated, but is easily redirectable.    Problem: Delirium  Goal: Optimal Coping  Outcome: Not Progressing  Intervention: Optimize Psychosocial Adjustment to Delirium  Recent Flowsheet Documentation  Taken 7/13/2025 1600 by Angela Enriquez RN  Supportive Measures:   active listening utilized   positive reinforcement provided  Taken 7/13/2025 1200 by Angela Enriquez RN  Supportive Measures:   active listening utilized   positive reinforcement provided  Taken 7/13/2025 0800 by Angela Enriquez RN  Supportive Measures:   active listening utilized   positive reinforcement provided  Goal: Improved Behavioral Control  Outcome: Not Progressing  Intervention: Prevent and Manage Agitation  Recent Flowsheet Documentation  Taken 7/13/2025 1600 by Angela Enriquez RN  Environment Familiarity/Consistency: daily routine followed  Taken 7/13/2025 1200 by Angela Enriquez RN  Environment Familiarity/Consistency: daily routine followed  Taken 7/13/2025 0800 by Angela Enriquez RN  Environment Familiarity/Consistency: daily routine followed  Intervention: Minimize Safety Risk  Recent Flowsheet Documentation  Taken 7/13/2025 1600 by Angela Enriquez RN  Enhanced Safety Measures:  at bedside  Trust Relationship/Rapport:   care explained   emotional support provided   questions answered   reassurance provided  Taken 7/13/2025 1200 by Angela Enriquez RN  Enhanced Safety Measures:  at bedside  Trust Relationship/Rapport:   care explained   emotional support provided   questions answered   reassurance provided  Taken 7/13/2025 0800 by Angela Enriquez RN  Enhanced Safety Measures:  at bedside  Trust Relationship/Rapport:   care  explained   emotional support provided   questions answered   reassurance provided  Goal: Improved Attention and Thought Clarity  Outcome: Not Progressing  Goal: Improved Sleep  Outcome: Not Progressing     Problem: Risk for Delirium  Goal: Optimal Coping  Outcome: Not Progressing  Intervention: Optimize Psychosocial Adjustment to Delirium  Recent Flowsheet Documentation  Taken 7/13/2025 1600 by Angela Enriquez RN  Supportive Measures:   active listening utilized   positive reinforcement provided  Taken 7/13/2025 1200 by Angela Enriquez RN  Supportive Measures:   active listening utilized   positive reinforcement provided  Taken 7/13/2025 0800 by Angela Enriquez RN  Supportive Measures:   active listening utilized   positive reinforcement provided  Goal: Improved Behavioral Control  Outcome: Not Progressing  Intervention: Prevent and Manage Agitation  Recent Flowsheet Documentation  Taken 7/13/2025 1600 by Angela Enriquez RN  Environment Familiarity/Consistency: daily routine followed  Taken 7/13/2025 1200 by Angela Enriquez RN  Environment Familiarity/Consistency: daily routine followed  Taken 7/13/2025 0800 by Angela Enriquez RN  Environment Familiarity/Consistency: daily routine followed  Intervention: Minimize Safety Risk  Recent Flowsheet Documentation  Taken 7/13/2025 1600 by Angela Enriquez RN  Enhanced Safety Measures:  at bedside  Trust Relationship/Rapport:   care explained   emotional support provided   questions answered   reassurance provided  Taken 7/13/2025 1200 by Angela Enriquez RN  Enhanced Safety Measures:  at bedside  Trust Relationship/Rapport:   care explained   emotional support provided   questions answered   reassurance provided  Taken 7/13/2025 0800 by Angela Enriquez RN  Enhanced Safety Measures:  at bedside  Trust Relationship/Rapport:   care explained   emotional support provided   questions answered   reassurance  provided  Goal: Improved Attention and Thought Clarity  Outcome: Not Progressing  Goal: Improved Sleep  Outcome: Not Progressing     Problem: Heart Failure  Goal: Effective Oxygenation and Ventilation  Outcome: Progressing  Intervention: Promote Airway Secretion Clearance  Recent Flowsheet Documentation  Taken 7/13/2025 0800 by Angela Enriquez RN  Activity Management: activity adjusted per tolerance  Intervention: Optimize Oxygenation and Ventilation  Recent Flowsheet Documentation  Taken 7/13/2025 0800 by Angela Enriquez RN  Head of Bed (HOB) Positioning: HOB at 60 degrees     Problem: Pain Acute  Goal: Optimal Pain Control and Function  Outcome: Progressing  Intervention: Optimize Psychosocial Wellbeing  Recent Flowsheet Documentation  Taken 7/13/2025 1600 by Angela Enriquez RN  Supportive Measures:   active listening utilized   positive reinforcement provided  Taken 7/13/2025 1200 by Angela Enriquez RN  Supportive Measures:   active listening utilized   positive reinforcement provided  Taken 7/13/2025 0800 by Angela Enriquez RN  Supportive Measures:   active listening utilized   positive reinforcement provided     Problem: Skin Injury Risk Increased  Goal: Skin Health and Integrity  Outcome: Progressing  Intervention: Plan: Nurse Driven Intervention: Moisture Management  Recent Flowsheet Documentation  Taken 7/13/2025 1600 by Angela Enriquez RN  Moisture Interventions:   No brief in bed   Urinary collection device  Taken 7/13/2025 1200 by Angela Enriquez RN  Moisture Interventions:   No brief in bed   Urinary collection device  Taken 7/13/2025 0800 by Angela Enriquez RN  Moisture Interventions:   No brief in bed   Urinary collection device  Taken 7/13/2025 0737 by Angela Enriquez RN  Moisture Interventions:   No brief in bed   Urinary collection device  Intervention: Optimize Skin Protection  Recent Flowsheet Documentation  Taken 7/13/2025 0800 by Angela Enriquez RN  Activity Management:  activity adjusted per tolerance  Head of Bed (HOB) Positioning: HOB at 60 degrees   Goal Outcome Evaluation:

## 2025-07-13 NOTE — PLAN OF CARE
Goal Outcome Evaluation:  Patient is alert and oriented to self only- orientation fluctuates. 1:1 sitter in place throughout night- attempts to get out of bed as well as pull IV out. Redirectable at times. A-fib/a-flutter on telemetry- rate controlled. RA-1L to maintain saturations. Hypertensive. External catheter in place with good urine output. Complained of no pain.     Problem: Delirium  Goal: Optimal Coping  Outcome: Not Progressing     Problem: Delirium  Goal: Improved Behavioral Control  Outcome: Not Progressing     Problem: Delirium  Goal: Improved Behavioral Control  Intervention: Minimize Safety Risk  Recent Flowsheet Documentation  Taken 7/13/2025 0400 by Aislinn Ludwig RN  Enhanced Safety Measures:  at bedside  Trust Relationship/Rapport:   care explained   questions encouraged   thoughts/feelings acknowledged  Taken 7/13/2025 0000 by Aislinn Ludwig RN  Enhanced Safety Measures:  at bedside  Trust Relationship/Rapport:   care explained   questions encouraged   thoughts/feelings acknowledged  Taken 7/12/2025 2137 by Aislinn Ludwig RN  Enhanced Safety Measures:  at bedside  Trust Relationship/Rapport:   care explained   questions encouraged   thoughts/feelings acknowledged     Problem: Delirium  Goal: Improved Attention and Thought Clarity  Outcome: Not Progressing     Problem: Delirium  Goal: Improved Sleep  Outcome: Not Progressing     Problem: Heart Failure  Goal: Effective Oxygenation and Ventilation  Outcome: Progressing

## 2025-07-13 NOTE — PROGRESS NOTES
S:  Katie was seen in the [facility] ICU Room 3 today for the evaluation, measurements and fitting of an TLSO brace ordered through the on-call practitioner.  The patient presents with lower back pain and discomfort associated with the diagnosis of multiple compression fractures.  The patient is not scheduled for surgery at this time.    O:  The patient was lying in bed talking with her son and one of the nursing assistants.  She was in good spirits and understood she was being fit with a brace.  The Physical Therapist arrived when I did and assisted with the log rolling into the brace.    A:  Measurements were taken, and they were fit with a [place] [employer] 456 Back-pack style spinal TLSO orthotic.  This brace will assist in the maintaining a neutral posture of the thoracic and lumbar vertebrate and while they wear it during their standing, walking and sitting activities.  Adjustments were made during the fitting to minimize discomfort to the patient while they wear the orthotic.  They understand that the primary goal of the device is to secure and stabilize the spine.  Comfort wearing a spinal orthotic is secondary.  Instruction of proper donning, doffing and care of the brace was left for the son and hospital staff.      P:  The patient will wear the brace at all times currently and follow their physicians wear schedule.  They understand to contact us at [facility] if they have any issues with the brace in the future.      NIMA Hoang, ATC

## 2025-07-13 NOTE — PROGRESS NOTES
Neurosurgery progress note    Patient continues to refuse MRI.  Today she states she is not having much back pain, has some stiffness and soreness.  Denies radicular pain in her lower extremities.  States prior to falling she was walking daily.  She is open to a brace and x-ray evaluation as opposed to MRI.    Exam    Alert, follows commands appropriately  Moving all extremities with 5-5 strength  Thoracic and lumbar spine nontender to palpation      Assessment    Status post fall  Acute T3 and T4 compression fracture  Age-indeterminate T11 compression fracture    Plan    Patient refused MRI.  Explained to patient that MRI would help us determine the acuity and severity of the compression fracture is better than x-rays, but again she prefers not to do an MRI and is open to obtaining a brace and x-ray.  Therefore will obtain off-the-shelf TLSO brace, and upright thoracic x-rays in brace, if fractures are stable in alignment, will advance activity.  Okay to proceed with anticoagulation, no surgery anticipated.      Addendum 3:45 PM    Patient received her brace, underwent an upright thoracic x-ray which demonstrated stable appearing compression fractures with stable alignment.    Okay to proceed with activity as tolerated while avoiding heavy lifting excessive bending and twisting, she should wear her brace when upright and out of bed.    We will follow-up with the patient in 6 weeks with a repeat thoracic x-ray     neurosurgery will sign off

## 2025-07-13 NOTE — PROGRESS NOTES
07/13/25 1410   Appointment Info   Signing Clinician's Name / Credentials (PT) Shaniqua Mays DPT   Rehab Comments (PT) Spine precautions, TLSO   Living Environment   People in Home alone   Current Living Arrangements assisted living   Transportation Anticipated family or friend will provide   Self-Care   Equipment Currently Used at Home   (4WW)   Fall history within last six months yes   Number of times patient has fallen within last six months 1   General Information   Onset of Illness/Injury or Date of Surgery 07/12/25   Referring Physician Alejandro Chandler   Patient/Family Therapy Goals Statement (PT) Return home   Pertinent History of Current Problem (include personal factors and/or comorbidities that impact the POC) PMH:  hypertension, A-fib, prior vertebral compression fractures, hospital delirium. Adm for fall. CT findings: T3, T4, T11 and L1 compression fractures, which T3 and T4 are new since 4/10/2025. T11 is indeterminate secondary to lack of similar comparison studies. Height loss involving the T3 and T4 superior endplates is minimal. Fracture through the T11   endplate in the anterior cortex and inferior endplate with less than 25% height loss. L1 fracture is chronic.   Existing Precautions/Restrictions spinal   General Observations TLSO. Activity order: Up with Assist   Cognition   Affect/Mental Status (Cognition) other (see comments)   Cognitive Status Comments Mild confusion noted, pt questioned why she was putting herself through the pain of a brace at age 89   Pain Assessment   Patient Currently in Pain   (Low pain resting and with bed mobility. Pain to touch on B LE lower legs)   Posture    Posture Comments mild kyphosis, tendency for L lateral lean in sitting and in bed per RN   Range of Motion (ROM)   Range of Motion ROM is WFL   Strength (Manual Muscle Testing)   Strength (Manual Muscle Testing) strength is WFL   Bed Mobility   Bed Mobility rolling left;rolling right;supine-sit   Rolling  Left [place] (Bed Mobility) verbal cues;minimum assist (75% patient effort)   Rolling Right [place] (Bed Mobility) verbal cues;minimum assist (75% patient effort)   Supine-Sit [place] (Bed Mobility) verbal cues;minimum assist (75% patient effort)   Assistive Device (Bed Mobility) bed rails   Comment, (Bed Mobility) spine precautions   Transfers   Transfers sit-stand transfer   Sit-Stand Transfer   Sit-Stand [place] (Transfers) verbal cues;contact guard   Assistive Device (Sit-Stand Transfers) walker, front-wheeled   Gait/Stairs (Locomotion)   [place] Level (Gait) verbal cues;contact guard   Assistive Device (Gait) walker, front-wheeled   Distance in Feet (Gait) 5   Balance   Balance Comments SBA static sitting, CGA dynamic sitting   Coordination   Coordination no deficits were identified   Muscle Tone   Muscle Tone no deficits were identified   Clinical Impression   Criteria for Skilled Therapeutic Intervention Yes, treatment indicated   PT Diagnosis (PT) impaired functional mobility   Influenced by the following impairments pain, weakness   Functional limitations due to impairments impaired bed mobility, transfers, gait   Clinical Presentation (PT Evaluation Complexity) evolving   Clinical Presentation Rationale clinical judgement   Clinical Decision Making (Complexity) low complexity   Planned Therapy Interventions (PT) balance training;bed mobility training;gait training;home exercise program;patient/family education;ROM (range of motion);stair training;strengthening;transfer training   Risk & Benefits of therapy have been explained evaluation/treatment results reviewed;care plan/treatment goals reviewed;patient   PT Total Evaluation Time   PT Jose Low Complexity Minutes (33791) 10   Physical Therapy Goals   PT Frequency Daily   PT Predicted Duration/Target Date for Goal Attainment 07/20/25   PT Goals Transfers;Gait   PT: Transfers Modified independent;Sit to/from stand;Bed to/from  chair;Assistive device;Within precautions   PT: Gait Modified independent;Assistive device;Within precautions;100 feet   Interventions   Interventions Quick Adds Therapeutic Activity   Therapeutic Activity   Therapeutic Activities: dynamic activities to improve functional performance Minutes (73964) 20   Treatment Detail/Skilled Intervention RN communicated with PT regarding brace delivery and coordination of session. PT met orthotist at room. Pt was educated on spine precautions. HOB slowly lowered to flat for log rolling. Pt was educated on log roll technique. V/c and min A provided for roll in each direction. Orthotist fit pt with TLSO. Pt able to tolerate. V/c and Min A for log roll to L and transfer to sitting EOB. Initial LH. CGA sit>stand 2WW. Took steps to sit in chair. Stood and took steps forward/backward with CGA, 2WW, sat in chair with pad in place. Pt was left to rest with NST present to transfer to w/c and go for xray. 4WW brought into room for ease of mobility. Communicated with RN post session. RN to obtain pillows for support in chair due to L lateral lean tendency. VSS post mobility on RA   PT Discharge Planning   PT Plan PT will progress pending xray results and NSGY clearance for higher level mobility. Obtain full subjective history in next session.   PT Discharge Recommendation (DC Rec) other (see comments)   PT Rationale for DC Rec Anticipate pt will be able to transition back to [facility] pending further medical work up and progression with skilled therapies. Pt may require increased cares.   PT Brief overview of current status Min A bed mobility, CGA transfers 2WW   PT Total Distance Amb During Session (feet) 5   Physical Therapy Time and Intention   Timed Code Treatment Minutes 20   Total Session Time (sum of timed and untimed services) 30

## 2025-07-14 ENCOUNTER — APPOINTMENT (OUTPATIENT)
Dept: PHYSICAL THERAPY | Facility: CLINIC | Age: OVER 89
DRG: 291 | End: 2025-07-14
Payer: MEDICARE

## 2025-07-14 ENCOUNTER — TELEPHONE (OUTPATIENT)
Dept: CARDIOLOGY | Facility: CLINIC | Age: OVER 89
End: 2025-07-14
Payer: MEDICARE

## 2025-07-14 ENCOUNTER — APPOINTMENT (OUTPATIENT)
Dept: MRI IMAGING | Facility: CLINIC | Age: OVER 89
DRG: 291 | End: 2025-07-14
Attending: FAMILY MEDICINE
Payer: MEDICARE

## 2025-07-14 ENCOUNTER — APPOINTMENT (OUTPATIENT)
Dept: OCCUPATIONAL THERAPY | Facility: CLINIC | Age: OVER 89
DRG: 291 | End: 2025-07-14
Attending: HOSPITALIST
Payer: MEDICARE

## 2025-07-14 DIAGNOSIS — I50.9 ACUTE DECOMPENSATED HEART FAILURE (H): Primary | ICD-10-CM

## 2025-07-14 LAB
ANION GAP SERPL CALCULATED.3IONS-SCNC: 15 MMOL/L (ref 7–15)
BUN SERPL-MCNC: 25.7 MG/DL (ref 8–23)
CALCIUM SERPL-MCNC: 9.8 MG/DL (ref 8.8–10.4)
CHLORIDE SERPL-SCNC: 91 MMOL/L (ref 98–107)
CREAT SERPL-MCNC: 0.87 MG/DL (ref 0.51–0.95)
EGFRCR SERPLBLD CKD-EPI 2021: 62 ML/MIN/1.73M2
GLUCOSE BLDC GLUCOMTR-MCNC: 235 MG/DL (ref 70–99)
GLUCOSE SERPL-MCNC: 116 MG/DL (ref 70–99)
HCO3 SERPL-SCNC: 28 MMOL/L (ref 22–29)
HOLD SPECIMEN: NORMAL
MAGNESIUM SERPL-MCNC: 2.2 MG/DL (ref 1.7–2.3)
POTASSIUM SERPL-SCNC: 3.3 MMOL/L (ref 3.4–5.3)
POTASSIUM SERPL-SCNC: 4.4 MMOL/L (ref 3.4–5.3)
SODIUM SERPL-SCNC: 134 MMOL/L (ref 135–145)
VIT B12 SERPL-MCNC: 823 PG/ML (ref 232–1245)

## 2025-07-14 PROCEDURE — 99233 SBSQ HOSP IP/OBS HIGH 50: CPT | Performed by: FAMILY MEDICINE

## 2025-07-14 PROCEDURE — 250N000013 HC RX MED GY IP 250 OP 250 PS 637: Performed by: FAMILY MEDICINE

## 2025-07-14 PROCEDURE — 80048 BASIC METABOLIC PNL TOTAL CA: CPT | Performed by: HOSPITALIST

## 2025-07-14 PROCEDURE — 70551 MRI BRAIN STEM W/O DYE: CPT

## 2025-07-14 PROCEDURE — 36415 COLL VENOUS BLD VENIPUNCTURE: CPT | Performed by: FAMILY MEDICINE

## 2025-07-14 PROCEDURE — 97166 OT EVAL MOD COMPLEX 45 MIN: CPT | Mod: GO

## 2025-07-14 PROCEDURE — 250N000013 HC RX MED GY IP 250 OP 250 PS 637: Performed by: HOSPITALIST

## 2025-07-14 PROCEDURE — 97116 GAIT TRAINING THERAPY: CPT | Mod: GP

## 2025-07-14 PROCEDURE — 36415 COLL VENOUS BLD VENIPUNCTURE: CPT | Performed by: HOSPITALIST

## 2025-07-14 PROCEDURE — 83735 ASSAY OF MAGNESIUM: CPT | Performed by: FAMILY MEDICINE

## 2025-07-14 PROCEDURE — 97535 SELF CARE MNGMENT TRAINING: CPT | Mod: GO

## 2025-07-14 PROCEDURE — 84132 ASSAY OF SERUM POTASSIUM: CPT | Performed by: FAMILY MEDICINE

## 2025-07-14 PROCEDURE — 120N000004 HC R&B MS OVERFLOW

## 2025-07-14 RX ORDER — POTASSIUM CHLORIDE 1500 MG/1
40 TABLET, EXTENDED RELEASE ORAL ONCE
Status: COMPLETED | OUTPATIENT
Start: 2025-07-14 | End: 2025-07-14

## 2025-07-14 RX ORDER — FUROSEMIDE 20 MG/1
20 TABLET ORAL DAILY
Status: DISCONTINUED | OUTPATIENT
Start: 2025-07-14 | End: 2025-07-16

## 2025-07-14 RX ORDER — POTASSIUM CHLORIDE 1500 MG/1
40 TABLET, EXTENDED RELEASE ORAL ONCE
Status: DISCONTINUED | OUTPATIENT
Start: 2025-07-14 | End: 2025-07-14

## 2025-07-14 RX ORDER — POLYETHYLENE GLYCOL 3350 17 G/17G
17 POWDER, FOR SOLUTION ORAL DAILY
Status: DISCONTINUED | OUTPATIENT
Start: 2025-07-14 | End: 2025-07-16 | Stop reason: HOSPADM

## 2025-07-14 RX ADMIN — DOCUSATE SODIUM 100 MG: 100 CAPSULE, LIQUID FILLED ORAL at 08:59

## 2025-07-14 RX ADMIN — ACETAMINOPHEN 975 MG: 325 TABLET ORAL at 19:45

## 2025-07-14 RX ADMIN — QUETIAPINE FUMARATE 12.5 MG: 25 TABLET ORAL at 15:01

## 2025-07-14 RX ADMIN — FLECAINIDE ACETATE 50 MG: 50 TABLET ORAL at 08:59

## 2025-07-14 RX ADMIN — POLYETHYLENE GLYCOL 3350 17 G: 17 POWDER, FOR SOLUTION ORAL at 08:59

## 2025-07-14 RX ADMIN — DOCUSATE SODIUM 100 MG: 100 CAPSULE, LIQUID FILLED ORAL at 19:45

## 2025-07-14 RX ADMIN — ESCITALOPRAM 5 MG: 5 TABLET, FILM COATED ORAL at 08:59

## 2025-07-14 RX ADMIN — METOPROLOL TARTRATE 37.5 MG: 25 TABLET, FILM COATED ORAL at 19:45

## 2025-07-14 RX ADMIN — ACETAMINOPHEN 975 MG: 325 TABLET ORAL at 08:59

## 2025-07-14 RX ADMIN — METOPROLOL TARTRATE 37.5 MG: 25 TABLET, FILM COATED ORAL at 08:59

## 2025-07-14 RX ADMIN — TRAZODONE HYDROCHLORIDE 50 MG: 50 TABLET ORAL at 21:06

## 2025-07-14 RX ADMIN — APIXABAN 2.5 MG: 2.5 TABLET, FILM COATED ORAL at 19:45

## 2025-07-14 RX ADMIN — APIXABAN 2.5 MG: 2.5 TABLET, FILM COATED ORAL at 08:59

## 2025-07-14 RX ADMIN — AMLODIPINE BESYLATE 2.5 MG: 2.5 TABLET ORAL at 21:06

## 2025-07-14 RX ADMIN — ATORVASTATIN CALCIUM 20 MG: 10 TABLET, FILM COATED ORAL at 19:45

## 2025-07-14 RX ADMIN — LOSARTAN POTASSIUM 100 MG: 25 TABLET, FILM COATED ORAL at 08:59

## 2025-07-14 RX ADMIN — SALINE NASAL SPRAY 2 SPRAY: 1.5 SOLUTION NASAL at 19:46

## 2025-07-14 RX ADMIN — LEVOTHYROXINE SODIUM 88 MCG: 0.09 TABLET ORAL at 06:46

## 2025-07-14 RX ADMIN — SALINE NASAL SPRAY 2 SPRAY: 1.5 SOLUTION NASAL at 09:00

## 2025-07-14 RX ADMIN — FLECAINIDE ACETATE 50 MG: 50 TABLET ORAL at 19:49

## 2025-07-14 RX ADMIN — POTASSIUM CHLORIDE 40 MEQ: 1500 TABLET, EXTENDED RELEASE ORAL at 08:59

## 2025-07-14 RX ADMIN — FUROSEMIDE 20 MG: 20 TABLET ORAL at 08:59

## 2025-07-14 RX ADMIN — ACETAMINOPHEN 975 MG: 325 TABLET ORAL at 15:01

## 2025-07-14 ASSESSMENT — ACTIVITIES OF DAILY LIVING (ADL)
ADLS_ACUITY_SCORE: 68
ADLS_ACUITY_SCORE: 64
ADLS_ACUITY_SCORE: 68
ADLS_ACUITY_SCORE: 68
ADLS_ACUITY_SCORE: 64
DEPENDENT_IADLS:: CLEANING;LAUNDRY;SHOPPING;MEAL PREPARATION;MEDICATION MANAGEMENT;TRANSPORTATION
ADLS_ACUITY_SCORE: 68
ADLS_ACUITY_SCORE: 64
ADLS_ACUITY_SCORE: 64
ADLS_ACUITY_SCORE: 68
ADLS_ACUITY_SCORE: 68
ADLS_ACUITY_SCORE: 64
ADLS_ACUITY_SCORE: 68
ADLS_ACUITY_SCORE: 64
ADLS_ACUITY_SCORE: 68
ADLS_ACUITY_SCORE: 64
ADLS_ACUITY_SCORE: 68
ADLS_ACUITY_SCORE: 64
ADLS_ACUITY_SCORE: 68
ADLS_ACUITY_SCORE: 64

## 2025-07-14 NOTE — PROGRESS NOTES
Patient remains disoriented to place, but otherwise doing well cognitively. No complaint about brace. On 1:1 at present   Appears to be diuresing well. Contiue daily weights and drop oral lasix to 1x daily.  Discharge back to facility once off 1:1.  Full note to follow.

## 2025-07-14 NOTE — PROGRESS NOTES
07/14/25 0850   Appointment Info   Signing Clinician's Name / Credentials (OT) Shalini Fischer OTR/L   Living Environment   People in Home alone   Current Living Arrangements assisted living   Transportation Anticipated family or friend will provide   Self-Care   Usual Activity Tolerance good   Current Activity Tolerance moderate   Fall history within last six months yes   Number of times patient has fallen within last six months 1   Instrumental Activities of Daily Living (IADL)   IADL Comments All IADLs completed by AL staff or Pt's Son   General Information   Onset of Illness/Injury or Date of Surgery 07/12/25   Referring Physician Dr. Kam Nunez   Patient/Family Therapy Goal Statement (OT) To return home.   Additional Occupational Profile Info/Pertinent History of Current Problem Adm with fall, HF, CHI - acute vertebral comp. fx. PMH: hypertension, A-fib, prior vertebral compression fractures, hospital delirium. Adm for fall. CT findings: T3, T4, T11 and L1 compression fractures, which T3 and T4 are new since 4/10/2025. T11 is indeterminate secondary to lack of similar comparison studies. Height loss involving the T3 and T4 superior endplates is minimal. Fracture through the T11 endplate in the anterior cortex and inferior endplate with less than 25% height loss. L1 fracture is chronic.   Existing Precautions/Restrictions (S)  spinal;brace worn when out of bed   Cognitive Status Examination   Orientation Status person;time  (Knew correct mo and yr.  Did not know place.)   Follows Commands WFL   Visual Perception   Visual Impairment/Limitations corrective lenses for reading  (Eye glasses here in the hospital.)   Range of Motion Comprehensive   General Range of Motion no range of motion deficits identified   Coordination   Fine Motor Coordination Intact for opposition bilat hands   Bed Mobility   Bed Mobility supine-sit;sit-supine   Supine-Sit [place] (Bed Mobility) moderate assist (50% patient  effort)   Sit-Supine [place] (Bed Mobility) moderate assist (50% patient effort)   Assistive Device (Bed Mobility) bed rails   Transfers   Transfers sit-stand transfer;toilet transfer   Sit-Stand Transfer   Sit-Stand [place] (Transfers) minimum assist (75% patient effort)   Assistive Device (Sit-Stand Transfers) walker, 4-wheeled   Toilet Transfer   Type (Toilet Transfer) sit-stand;stand-sit   [place] Level (Toilet Transfer) minimum assist (75% patient effort)   Assistive Device (Toilet Transfer) walker, 4-wheeled   Balance   Balance Assessment standing dynamic balance   Standing Balance: Dynamic contact guard;minimal assist   Position/Device Used, Standing Balance walker, 4-wheeled   Activities of Daily Living   BADL Assessment/Intervention lower body dressing   Lower Body Dressing Assessment/Training   [place] Level (Lower Body Dressing) dependent (less than 25% patient effort)   Position (Lower Body Dressing) supported sitting;supported standing   Clinical Impression   Criteria for Skilled Therapeutic Interventions Met (OT) Yes, treatment indicated   OT Diagnosis decreased indep with ADLs and trsfs due to acute vertebral comp. fx.   OT Problem List-Impairments impacting ADL problems related to;activity tolerance impaired;balance;cognition;mobility;pain  (back brace.)   Assessment of Occupational Performance 5 or more Performance Deficits   Identified Performance Deficits dressing, toileting, bathing, G/H, trsf and cognition.   Planned Therapy Interventions (OT) ADL retraining;cognition;transfer training   Clinical Decision Making Complexity (OT) detailed assessment/moderate complexity   Risk & Benefits of therapy have been explained evaluation/treatment results reviewed;participants included;patient;son  (PCA)   OT Total Evaluation Time   OT Eval, Moderate Complexity Minutes (86182) 15   OT Goals   Therapy Frequency (OT) 5 times/week   OT Predicted Duration/Target Date for Goal Attainment  07/18/25   OT Goals Hygiene/Grooming;Toilet Transfer/Toileting;Cognition;Bed Mobility   OT: Hygiene/Grooming modified independent;while standing  (use of 4WW, back brace)   OT: Bed Mobility Supervision/stand-by assist;rolling;within precautions  (log rolling)   OT: Toilet Transfer/Toileting Modified independent;within precautions;using adaptive equipment;cleaning and garment management  (RTS, GB, 4WW, back brace)   OT: Cognitive Patient/caregiver will verbalize understanding of cognitive assessment results/recommendations as needed for safe discharge planning  (with VC as needed.)   Interventions   Interventions Quick Adds Self-Care/Home Management   Self-Care/Home Management   Self-Care/Home Mgmt/ADL, Compensatory, Meal Prep Minutes (51069) 78   Symptoms Noted During/After Treatment (Meal Preparation/Planning Training) increased pain  (lower legs.)   Treatment Detail/Skilled Intervention Pt sitting in her chair when therapist arrived.  RN present.  PCA present.  Son arrived later in the session.  Initial eval completed.  Began treatment.  Pt wearing her TLSO.  Worked on L/B dressing using AE.  Pt unable to tolerate a figure four of her legs.  Pt not able to successfully use the reacher and sock aid.  Pt will have assist with dressing at home per family report.  Worked on room trsfs using her 4WW.  Pt was CGA to min A of one for all trsfs.  Needs constant VC for locking and unlocking her 4WW brakes.  VC for safe hand placement using her GB.  Completed trsfs to the chair, toilet, BR sink and bed.  Pt unable to lift her legs onto the bed in sidelying.  Max A of one for bed trsf and mobility.  Pt c/o pain behind her lower legs.  Pt also c/o double vision.  RN made aware.  Pt stood at the BR sink for hand washing with CGA of one.  Pt wearing her back brace the entire time.  At end of session, Pt was sitting in her chair with chair alarm on and 1:1 nsg present.  Call light in reach.   OT Discharge Planning   OT Plan Bed  trsf and mobility using log rolling.  Brace TLSO on when up.  Multiple trsf using 4WW.  SLUMS.   OT Discharge Recommendation (DC Rec) (S)  Transitional Care Facility;home with assist  (Could return home with increased supervision if available.)   OT Rationale for DC Rec Pt would benefit from further OT to increase indep with ADLs, trsfs while following her spine precautions and while wearing her back brace which is new for this Pt.  Currently needs max A with bed trsf.  Min to CGA with toilet, chair and G/H cares.  Has PCA help at home but not sure to what extent.   OT Brief overview of current status max A with bed trsf. Min to CGA with toilet, chair and G/H cares. Oriented to self, mo and yr.  Not oriented to place.  Pt c/o double vision since last wk.   OT Total Distance Amb During Session (feet) 24   Total Session Time   Timed Code Treatment Minutes 45   Total Session Time (sum of timed and untimed services) 60

## 2025-07-14 NOTE — PLAN OF CARE
Patient A&O x3 and pleasant. VSS on RA, intermittently bradycardic. Tele reads A. Fib / A. Flutter. K and Mag protocols addressed, AM rechecks. Pain is managed with PO tylenol. PRN seroquel given for MRI, was somewhat effective. Patient ambulates SBA with gait belt and walker. PIV is patent and saline locked. Tolerating oral intake and voiding appropriately, intermittently incontinent. Patient up in chair. Patient is safe, call light within reach.    Amisha Wynne RN    Problem: Adult Inpatient Plan of Care  Goal: Optimal Comfort and Wellbeing  Outcome: Progressing     Problem: Delirium  Goal: Improved Behavioral Control  Outcome: Progressing  Intervention: Minimize Safety Risk  Recent Flowsheet Documentation  Taken 7/14/2025 1600 by Amisha Wynne RN  Enhanced Safety Measures:    at bedside   room near unit station  Taken 7/14/2025 1200 by Amisha Wynne RN  Enhanced Safety Measures:    at bedside   room near unit station  Taken 7/14/2025 0800 by Amisha Wynne RN  Enhanced Safety Measures:    at bedside   room near unit station  Goal: Improved Sleep  Outcome: Progressing     Problem: Heart Failure  Goal: Effective Oxygenation and Ventilation  Outcome: Progressing  Intervention: Promote Airway Secretion Clearance  Recent Flowsheet Documentation  Taken 7/14/2025 1600 by Amisha Wynne RN  Cough And Deep Breathing: done with encouragement  Taken 7/14/2025 1200 by Amisha Wynne RN  Cough And Deep Breathing: done with encouragement  Taken 7/14/2025 0800 by Amisha Wynne RN  Cough And Deep Breathing: done with encouragement     Problem: Risk for Delirium  Goal: Improved Behavioral Control  Outcome: Progressing  Intervention: Minimize Safety Risk  Recent Flowsheet Documentation  Taken 7/14/2025 1600 by Amisha Wynne RN  Enhanced Safety Measures:    at bedside   room near unit station  Taken 7/14/2025 1200 by  Amisha Wynne, RN  Enhanced Safety Measures:    at bedside   room near unit station  Taken 7/14/2025 0800 by Amisha Wynne, RN  Enhanced Safety Measures:    at bedside   room near unit station  Goal: Improved Sleep  Outcome: Progressing   Goal Outcome Evaluation:

## 2025-07-14 NOTE — SIGNIFICANT EVENT
Significant Event Note -- Code YAMILET     Back in bed when I saw her, seems calm.   Not getting out of bed in bed this time   Has a sitter  Had trazodone last evening   Had zyprexa last evening     Plans  - Continue sitter for now

## 2025-07-14 NOTE — PROGRESS NOTES
[facility] PROGRESS NOTE      Identification/Summary:   Katie Mar is a 89 year old female who presented with complaints of falling at assisted living.    Medical history is notable for hypertension, A-fib, prior vertebral compression fractures, hospital delirium.    Initial evaluation revealed markedly elevated blood pressures over 200, normal temperature, low oxygen saturation on room air, creatinine 0.8, normal electrolytes, BNP of 2200, troponin unremarkable, CBC normal, urine normal.     X-ray of the knee negative for effusion or fracture.  X-ray of the ankles negative for fracture.  CT head negative for acute pathology.  CT cervical spine negative for fracture or subluxation.  CT chest abdomen pelvis with multiple age-indeterminate compression fractures in both the thoracic and lumbar spine with old appearing displaced lower sacral fracture, moderate pulmonary edema with trace pleural effusions and cardiomegaly noted.  Dedicated CT of thoracic and lumbar spine showed acute thoracic compression fractures.  MRI recommended but patient unable to tolerate.  Neurosurgery consulted.     EKG with atrial flutter, no obvious ischemic changes.    Initial treatment included 60 mg IV Lasix, Nitropatch.  Echocardiogram with ejection fraction of 55 to 60%, mild LVH, mild aortic stenosis, mild to moderate mitral regurgitation, mild mitral stenosis, elevated RA pressure.    Had Acute encephalopathy the night of 7/13.  Likely multifactoial.  Will got to TCU once delirium is controlled off 1:1.    Assessment and Plan:  Acute congestive heart failure, unclear if systolic or diastolic  Acute respiratory failure with hypoxia likely related to CHF  Hypertensive urgency  Essential hypertension  Atrial fibrillation/flutter  Not typically on diuretics at home  Home meds include amlodipine, flecainide, losartan, metoprolol  Changed to oral Lasix 20 mg twice daily for now  Repeat basic metabolic panel in the  morning  Echo with normal EF, mild AS, mild to moderate mitral regurgitation, mild mitral stenosis  Now on room air, continue to follow pulmonary status  Reviewed with cardiology informally, no need to change other home meds at this time, advised against higher doses of flecainide.  Has had some asymptomatic episodes of HR in the 40s.  Wrote hold parameters for metoprolol  Should have follow-up with cardiology as an outpatient    Fall  Acute vertebral compression fractures  Appreciate neurosurgery consult  MRI of spine ordered but not tolerated, not sure if it is pain or anxiety but she does not want to have it done  PT and OT consults  Scheduled Tylenol, tramadol as needed  Fall precautions  Spine surgery consult recommends brace and 6 week follow up with them.     Essential hypertension  At home she takes amlodipine 2.5 at bedtime, losartan 100 mg in the morning, metoprolol 37.5 twice daily  Increase metoprolol to 50 mg twice daily  Continue losartan and amlodipine at usual dose  Adding Lasix, monitor response     Atrial fibrillation/flutter  Continue home flecainide, metoprolol  Monitor on telemetry  Would consult cardiology if unable to control heart rate  Hold Eliquis for now pending MRI results, spine recommendations    Acute Encephalopathy   Mild cognitive impairment  Previous hospital stay complicated by some delirium  Had CODE YAMILET the night of 4/13 - now on 1:1  Seroquel ordered prn  Continued trazodone at bedtime, escitalopram    Intermittent report of double vision  - exam that she can comply with is normal  - ct from admission negative   - try for MRI today  - She notes that she wears glasses and doesn't have them.  Will work on getting her glasses.  Could be contributing to delirium.    Diet: Fluid restriction 2000 ML FLUID (and additional linked orders)  Combination Diet Regular Diet Adult  Fluids: None  Pain meds: Scheduled Tylenol, tramadol as needed  Therapy: PT and OT help appreciated  DVT  Prophylaxis: Resume Eliquis  Code Status: No CPR- Do NOT Intubate  Medically Ready for Discharge: Anticipated Tomorrow  - will need to be off 1:1 and IV antipsychotics to go to TCU.    Interval History/Subjective:  No major concerns.  Still not oriented to place    Physical Exam/Objective:  Gen: no acute distress, comfortable, alert, pleasant, sitting in bed eating breakfast, wide-awake but seems forgetful  ENT: no scleral icterus  Pulm: lungs are clear without wheezing, crackles  CV: Irregular, normal heart rate, lower extremity edema has improved  Derm: Warm, dry, not pale  Psych: EOMI, pupils reactive, Moves all extremitities, cranial nerves grossly intact, appropriate affect, pleasantly confused at the time my encounter    Medications:   Current Facility-Administered Medications   Medication Dose Route Frequency Provider Last Rate Last Admin    acetaminophen (TYLENOL) tablet 975 mg  975 mg Oral TID Alejandro Chandler DO   975 mg at 07/14/25 1501    amLODIPine (NORVASC) tablet 2.5 mg  2.5 mg Oral At Bedtime Alejandro Chandler DO   2.5 mg at 07/13/25 2020    apixaban ANTICOAGULANT (ELIQUIS) tablet 2.5 mg  2.5 mg Oral BID Alejandro Chandler DO   2.5 mg at 07/14/25 0859    atorvastatin (LIPITOR) tablet 20 mg  20 mg Oral QPM Alejandro Chandler DO   20 mg at 07/13/25 2020    [Held by provider] diclofenac (VOLTAREN) 1 % topical gel 2-4 g  2-4 g Topical 4x Daily Alejandro Chandler DO        docusate sodium (COLACE) capsule 100 mg  100 mg Oral BID Alejandro Chandler DO   100 mg at 07/14/25 0859    escitalopram (LEXAPRO) tablet 5 mg  5 mg Oral QAM Alejandro Chandler DO   5 mg at 07/14/25 0859    flecainide (TAMBOCOR) tablet 50 mg  50 mg Oral BID Alejandro Chandler DO   50 mg at 07/14/25 0859    furosemide (LASIX) tablet 20 mg  20 mg Oral Daily Kam Nunez MD   20 mg at 07/14/25 0859    levothyroxine (SYNTHROID/LEVOTHROID) tablet 88 mcg  88 mcg Oral QAM AC Chandler, Alejandro C, DO   88 mcg at 07/14/25 0646    losartan (COZAAR) tablet 100 mg   100 mg Oral Daily Alejandro Chandler DO   100 mg at 07/14/25 0859    metoprolol tartrate (LOPRESSOR) half-tab 37.5 mg  37.5 mg Oral BID Kam Nunez MD   37.5 mg at 07/14/25 0859    polyethylene glycol (MIRALAX) Packet 17 g  17 g Oral Daily Kam Nunez MD   17 g at 07/14/25 0859    sodium chloride (OCEAN) 0.65 % nasal spray 2 spray  2 spray Nasal BID Alejandro Chandler DO   2 spray at 07/14/25 0900    sodium chloride (PF) 0.9% PF flush 3 mL  3 mL Intracatheter Q8H MARILU Alejandro Chandler,    3 mL at 07/14/25 1501    traZODone (DESYREL) tablet 50 mg  50 mg Oral At Bedtime Alejandro Chandler, DO   50 mg at 07/13/25 2020     Clinically Significant Risk Factors        # Hypokalemia: Lowest K = 3.3 mmol/L in last 2 days, will replace as needed  # Hyponatremia: Lowest Na = 134 mmol/L in last 2 days, will monitor as appropriate  # Hypochloremia: Lowest Cl = 91 mmol/L in last 2 days, will monitor as appropriate         # Coagulation Defect: INR = 1.16 (Ref range: 0.85 - 1.15) and/or PTT = 32 Seconds (Ref range: 22 - 38 Seconds), will monitor for bleeding    # Hypertension: Noted on problem list  # Acute heart failure with preserved ejection fraction: heart failure noted on problem list, last echo with EF >50%, and receiving IV diuretics               # Financial/Environmental Concerns: none

## 2025-07-14 NOTE — PLAN OF CARE
Goal Outcome Evaluation:  Assumed care of patient from [id number].     Patient was only oriented to self throughout night. 1:1 sitter at bedside. PRN Zyprexa administered at HS due to agitation. Remains a-fib and a-flutter on telemetry. VSS. RA to maintain saturations. Denies pain. Patient to wear TLSO brace when ambulating per neurosurgery. Redness in groin- area was cleaned.     Code YAMILET called at 0130 due to patient not responding to staff and agitation. Patient got up out of bed refusing to wear TLSO brace- patient able to be directed into bed without harm.    Problem: Adult Inpatient Plan of Care  Goal: Optimal Comfort and Wellbeing  Outcome: Not Progressing     Problem: Delirium  Goal: Optimal Coping  Outcome: Not Progressing     Problem: Delirium  Goal: Improved Behavioral Control  Outcome: Not Progressing     Problem: Delirium  Goal: Improved Sleep  Outcome: Not Progressing     Problem: Heart Failure  Goal: Stable Heart Rate and Rhythm  Outcome: Not Progressing     Problem: Risk for Delirium  Goal: Optimal Coping  Outcome: Not Progressing     Problem: Risk for Delirium  Goal: Improved Behavioral Control  Outcome: Not Progressing

## 2025-07-14 NOTE — PLAN OF CARE
Problem: Adult Inpatient Plan of Care  Goal: Plan of Care Review  Description: The Plan of Care Review/Shift note should be completed every shift.  The Outcome Evaluation is a brief statement about your assessment that the patient is improving, declining, or no change.  This information will be displayed automatically on your shift  note.  7/14/2025 1251 by Debra Dwyer, RN  Outcome: Progressing  Flowsheets (Taken 7/14/2025 1251)  Outcome Evaluation: Patient and son hopeful that patient can discharge back to [facility] with increased services.  Plan of Care Reviewed With:   patient   child  Overall Patient Progress: improving  7/14/2025 1237 by Debra Dwyer, RN  Flowsheets (Taken 7/14/2025 1237)  Plan of Care Reviewed With:   child   patient   Goal Outcome Evaluation:      Plan of Care Reviewed With: patient, child    Overall Patient Progress: improvingOverall Patient Progress: improving    Outcome Evaluation: Patient and son hopeful that patient can discharge back to [facility] with increased services.

## 2025-07-15 ENCOUNTER — APPOINTMENT (OUTPATIENT)
Dept: OCCUPATIONAL THERAPY | Facility: CLINIC | Age: OVER 89
DRG: 291 | End: 2025-07-15
Payer: MEDICARE

## 2025-07-15 ENCOUNTER — APPOINTMENT (OUTPATIENT)
Dept: PHYSICAL THERAPY | Facility: CLINIC | Age: OVER 89
DRG: 291 | End: 2025-07-15
Payer: MEDICARE

## 2025-07-15 LAB
MAGNESIUM SERPL-MCNC: 2.3 MG/DL (ref 1.7–2.3)
POTASSIUM SERPL-SCNC: 4 MMOL/L (ref 3.4–5.3)

## 2025-07-15 PROCEDURE — 97530 THERAPEUTIC ACTIVITIES: CPT | Mod: GP

## 2025-07-15 PROCEDURE — 84132 ASSAY OF SERUM POTASSIUM: CPT | Performed by: FAMILY MEDICINE

## 2025-07-15 PROCEDURE — 97535 SELF CARE MNGMENT TRAINING: CPT | Mod: GO

## 2025-07-15 PROCEDURE — 36415 COLL VENOUS BLD VENIPUNCTURE: CPT | Performed by: FAMILY MEDICINE

## 2025-07-15 PROCEDURE — 250N000013 HC RX MED GY IP 250 OP 250 PS 637: Performed by: FAMILY MEDICINE

## 2025-07-15 PROCEDURE — 120N000004 HC R&B MS OVERFLOW

## 2025-07-15 PROCEDURE — 250N000013 HC RX MED GY IP 250 OP 250 PS 637: Performed by: HOSPITALIST

## 2025-07-15 PROCEDURE — 83735 ASSAY OF MAGNESIUM: CPT | Performed by: FAMILY MEDICINE

## 2025-07-15 PROCEDURE — 97116 GAIT TRAINING THERAPY: CPT | Mod: GP

## 2025-07-15 PROCEDURE — 99233 SBSQ HOSP IP/OBS HIGH 50: CPT | Performed by: INTERNAL MEDICINE

## 2025-07-15 RX ADMIN — ACETAMINOPHEN 975 MG: 325 TABLET ORAL at 10:11

## 2025-07-15 RX ADMIN — SALINE NASAL SPRAY 2 SPRAY: 1.5 SOLUTION NASAL at 19:44

## 2025-07-15 RX ADMIN — FLECAINIDE ACETATE 50 MG: 50 TABLET ORAL at 19:44

## 2025-07-15 RX ADMIN — POLYETHYLENE GLYCOL 3350 17 G: 17 POWDER, FOR SOLUTION ORAL at 10:15

## 2025-07-15 RX ADMIN — TRAMADOL HYDROCHLORIDE 50 MG: 50 TABLET, COATED ORAL at 20:44

## 2025-07-15 RX ADMIN — DOCUSATE SODIUM 100 MG: 100 CAPSULE, LIQUID FILLED ORAL at 19:44

## 2025-07-15 RX ADMIN — ACETAMINOPHEN 975 MG: 325 TABLET ORAL at 19:44

## 2025-07-15 RX ADMIN — FUROSEMIDE 20 MG: 20 TABLET ORAL at 10:15

## 2025-07-15 RX ADMIN — TRAZODONE HYDROCHLORIDE 50 MG: 50 TABLET ORAL at 21:20

## 2025-07-15 RX ADMIN — SALINE NASAL SPRAY 2 SPRAY: 1.5 SOLUTION NASAL at 10:15

## 2025-07-15 RX ADMIN — LOSARTAN POTASSIUM 100 MG: 25 TABLET, FILM COATED ORAL at 10:12

## 2025-07-15 RX ADMIN — DOCUSATE SODIUM 100 MG: 100 CAPSULE, LIQUID FILLED ORAL at 10:11

## 2025-07-15 RX ADMIN — ATORVASTATIN CALCIUM 20 MG: 10 TABLET, FILM COATED ORAL at 19:44

## 2025-07-15 RX ADMIN — FLECAINIDE ACETATE 50 MG: 50 TABLET ORAL at 10:11

## 2025-07-15 RX ADMIN — ACETAMINOPHEN 975 MG: 325 TABLET ORAL at 14:36

## 2025-07-15 RX ADMIN — METOPROLOL TARTRATE 37.5 MG: 25 TABLET, FILM COATED ORAL at 19:44

## 2025-07-15 RX ADMIN — QUETIAPINE FUMARATE 12.5 MG: 25 TABLET ORAL at 23:44

## 2025-07-15 RX ADMIN — METOPROLOL TARTRATE 37.5 MG: 25 TABLET, FILM COATED ORAL at 10:13

## 2025-07-15 RX ADMIN — APIXABAN 2.5 MG: 2.5 TABLET, FILM COATED ORAL at 19:44

## 2025-07-15 RX ADMIN — ESCITALOPRAM 5 MG: 5 TABLET, FILM COATED ORAL at 10:12

## 2025-07-15 RX ADMIN — APIXABAN 2.5 MG: 2.5 TABLET, FILM COATED ORAL at 10:10

## 2025-07-15 RX ADMIN — LEVOTHYROXINE SODIUM 88 MCG: 0.09 TABLET ORAL at 06:09

## 2025-07-15 ASSESSMENT — ACTIVITIES OF DAILY LIVING (ADL)
ADLS_ACUITY_SCORE: 68
ADLS_ACUITY_SCORE: 68
ADLS_ACUITY_SCORE: 65
ADLS_ACUITY_SCORE: 68
ADLS_ACUITY_SCORE: 62
ADLS_ACUITY_SCORE: 68
ADLS_ACUITY_SCORE: 65
ADLS_ACUITY_SCORE: 68
ADLS_ACUITY_SCORE: 64
ADLS_ACUITY_SCORE: 64

## 2025-07-15 NOTE — PLAN OF CARE
Goal Outcome Evaluation:      Plan of Care Reviewed With: patient, family Patient is alert and able to communicate her needs to staff. She is forgetful ,but does remember that she lives at [facility]. Medicated with Tylenol 975 mg. Patient has been up in her chair with TLSO brace on. Plans are for discharge tomorrow morning, back to [facility]. Patient's son has been educated by OT on the correct application of Back Brace. Will continue to monitor.

## 2025-07-15 NOTE — PROGRESS NOTES
Care Management Follow Up    Length of Stay (days): 3    Expected Discharge Date: 07/16/2025     Concerns to be Addressed: discharge planning  Patient and patient's son would like to return to [facility] with increased services upon discharge.  Patient plan of care discussed at interdisciplinary rounds: Yes    Anticipated Discharge Disposition: Assisted Living, Transitional Care     Anticipated Discharge Services:    Anticipated Discharge DME: None    Patient/family educated on Medicare website which has current facility and service quality ratings:    Education Provided on the Discharge Plan:    Patient/Family in Agreement with the Plan:      Referrals Placed by CM/SW:    Private pay costs discussed: Not applicable    Discussed  Partnership in Safe Discharge Planning  document with patient/family: No     Handoff Completed: No, handoff not indicated or clinically appropriate    Additional Information:    [facility] - Nurse Stephani [phone] -  spoke to nurse Styles.  3N Nurse's station phone number provided to Stephani.  Per Stephani, this facility uses [facility].  CM would need to coordinate homecare PT/OT.    1:16 PM  [facility] - Clinical  Rubina [phone] -  received a call from Rubina, stating her nurse Stephani received report from [facility] Bedside RN.  At baseline, [facility] was assisting with bathing, med management, cleaning, laundry, medication management; pt was largely independent.  3N Nurse's station phone number provided to Rubina to get direct report from bedside RN if desired.  Rubina will reach out to pt's son Alejandro to review her recommendations for TCU.  Rubina to update CM after she speaks to Alejandro.    Next Steps:   Discharge goals from son Alejandro    CM will continue to follow.     Karma Krishnamurthy RN

## 2025-07-15 NOTE — PROGRESS NOTES
Care Management Follow Up    Length of Stay (days): 3    Expected Discharge Date: 07/16/2025     Concerns to be Addressed: discharge planning  Patient and patient's son would like to return to [facility] with increased services upon discharge.  Patient plan of care discussed at interdisciplinary rounds: Yes    Anticipated Discharge Disposition: Back to [facility] w/ home PT/OT and increased services      Anticipated Discharge Services:  Home PT/OT through [facility]  Anticipated Discharge DME: TLSO brace    Patient/family educated on Medicare website which has current facility and service quality ratings:    Education Provided on the Discharge Plan:  Declined list as family and patient are hoping they return to previous living arrangement   Patient/Family in Agreement with the Plan:  Yes    Referrals Placed by CM/SW:  [facility]   Private pay costs discussed: transportation costs    Handoff Completed: Yes, [facility] PCP: Internal handoff referral completed    Additional Information:  Patient's son, Bi, spoke with [facility] Clinical , Rubina, regarding increasing services when patient returns to [facility]. Home PT/OT referral sent to [facility] and patient has been accepted. Per son, Bi, patient will need [facility] w/c transport back to facility tomorrow. Rubina at [facility] asked that son be taught how to take on and off the TLSO brace. She also asked that written instructions with pictures be included in discharge paperwork so that her staff at [facility] is educated on putting on and/or removing brace. Written instructions with pictures provided to son by this writer. OT is in patient's room educating the son on the brace at this time. [facility] w/c transport arranged for 7/16 8:30-9:20am, son, patient, MD, [facility] RN Stephani, and bedside RN updated.      Next Steps: Fax final discharge orders to [facility] and [facility].     Debra Dwyer, DUANE BSN CM    Inpatient Care Management   [facility]

## 2025-07-15 NOTE — PROGRESS NOTES
[facility]    Medicine Progress Note - Hospitalist Service    Date of Admission:  7/12/2025    Assessment & Plan     Katie Mar is a 89 year old female with PMH of hypertension, A-fib, prior vertebral compression fractures and h/o delerium who presented with complaints of falling at assisted living.    CT chest abdomen pelvis with multiple age-indeterminate compression fractures in both the thoracic and lumbar spine with old appearing displaced lower sacral fracture, moderate pulmonary edema with trace pleural effusions and cardiomegaly noted.  Dedicated CT of thoracic and lumbar spine showed acute thoracic compression fractures.  MRI recommended but patient unable to tolerate.  Neurosurgery consulted.     Had Acute encephalopathy the night of 7/13.  Likely multifactoial.  Will got to TCU once delirium is controlled off 1:1.    Assessment and Plan:    Fall  Acute vertebral compression fractures    Appreciate neurosurgery consult  MRI of spine ordered but not tolerated, not sure if it is pain or anxiety but she does not want to have it done  PT and OT consulted  Scheduled Tylenol, tramadol as needed  Fall precautions  Spine surgery consult recommends brace and 6 week follow up with them.    Acute congestive heart failure, unclear if systolic or diastolic  Acute respiratory failure with hypoxia likely related to CHF  Hypertensive urgency  Essential hypertension  Atrial fibrillation/flutter    Did receive several doses of IV lasix during the initial 24 hours of hospital admission  Not typically on diuretics at home  Home meds include amlodipine, flecainide, losartan, metoprolol  Changed to oral Lasix 20 mg twice daily for now    Echo with normal EF, mild AS, mild to moderate mitral regurgitation, mild mitral stenosis  Now on room air - respiratory status is stable    Reviewed with cardiology informally, no need to change other home meds at this time, advised against higher doses of  flecainide.    Has had some asymptomatic episodes of HR in the 40s.  Wrote hold parameters for metoprolol  Should have follow-up with cardiology as an outpatient    Acute Encephalopathy - improving  Mild cognitive impairment    Previous hospital stay complicated by some delirium  Had CODE YAMILET the night of 4/13 - now on 1:1  Seroquel ordered prn (last dose afternoon 7/14/25)  Continued trazodone at bedtime, escitalopram      7/15/25 - spoke with bedside RN - will trial off of her 1:1 this am    Essential hypertension  At home she takes amlodipine 2.5 at bedtime, losartan 100 mg in the morning, metoprolol 37.5 twice daily    Continue losartan and amlodipine at usual dose  Adding Lasix, monitor response     Atrial fibrillation/flutter  Continue home flecainide, metoprolol  Monitor on telemetry  PTA eliquis resumed  Will need outpt cardiology f/up after discharge    Noted to have some mild karena when metoprolol dose increased on hospital admit  (one 50mg po dose given)       Intermittent report of double vision  - exam that she can comply with is normal  - CT from admission negative   - has been unable to tolerate MRI imaging    - She notes that she wears glasses and doesn't have them.  Will work on getting her glasses.  Could be contributing to delirium.     Diet: Fluid restriction 2000 ML FLUID (and additional linked orders)  Combination Diet Regular Diet Adult  Fluids: None  Pain meds: Scheduled Tylenol, tramadol as needed  Therapy: PT and OT help appreciated  DVT Prophylaxis: Resume Eliquis  Code Status: No CPR- Do NOT Intubate  Medically Ready for Discharge: Anticipated Tomorrow to TCU      PPE Used:  Mask, gloves          Diet: Fluid restriction 2000 ML FLUID (and additional linked orders)  Combination Diet Regular Diet Adult    DVT Prophylaxis: DOAC  Vanegas Catheter: Not present  Lines: None     Cardiac Monitoring: ACTIVE order. Indication: Acute decompensated heart failure (48 hours)  Code Status: No CPR- Do NOT  "Intubate      Clinically Significant Risk Factors        # Hypokalemia: Lowest K = 3.3 mmol/L in last 2 days, will replace as needed  # Hyponatremia: Lowest Na = 134 mmol/L in last 2 days, will monitor as appropriate  # Hypochloremia: Lowest Cl = 91 mmol/L in last 2 days, will monitor as appropriate          # Hypertension: Noted on problem list  # Acute heart failure with preserved ejection fraction: heart failure noted on problem list, last echo with EF >50%, and receiving IV diuretics               # Financial/Environmental Concerns: none         Disposition Plan     Medically Ready for Discharge: Anticipated Tomorrow             Dannielle Gutierrez DO  Hospitalist Service  [facility]  Securely message with [employer] (more info)  Text page via [employer] Paging/Directory   ______________________________________________________________________    Interval History     Seen with RN and sitter in room.  States that her back is \"tight\" at times.  Just had breakfast - no N/V.  No c/o HA, CP, SOB.      \"I want to go home\"    Physical Exam   Vital Signs: Temp: 97.5  F (36.4  C) Temp src: Oral BP: (!) 169/69 Pulse: 52   Resp: 18 SpO2: 92 % O2 Device: None (Room air) Oxygen Delivery: 1 LPM  Weight: 125 lbs 1.6 oz    GEN:  elderly female who is alert and conversant.             No acute respiratory distress  HEENT:  bruise noted to the left upper frontal area; not significantly tender to the touch.   CV:  somewhat distant but regular rate and rhythm,   LUNGS:  Clear to auscultation ant/lat bilaterally without rales/rhonchi/wheezing/retractions.  Symmetric chest rise on inhalation noted.  Post exam limited d/t TLSO brace in place  EXT:  No significant pretibial edema bilaterally.  No cyanosis.    SKIN:  Dry to touch, no exanthems noted in the visualized areas.  PSYCH:  calm, cooperative, very talkative but does not appear to have rushed speech specifically    Medical Decision Making       51 MINUTES " SPENT BY ME on the date of service doing chart review, history, exam, documentation & further activities per the note.      Data   Medications   Current Facility-Administered Medications   Medication Dose Route Frequency Provider Last Rate Last Admin    Continuing ACE inhibitor/ARB/ARNI from home medication list OR ACE inhibitor/ARB/ARNI order already placed during this visit   Does not apply DOES NOT GO TO Alejandro Roman DO        Continuing beta blocker from home medication list OR beta blocker order already placed during this visit   Does not apply DOES NOT GO TO Alejandro Roman DO        Patient is already receiving anticoagulation with heparin, enoxaparin (LOVENOX), warfarin (COUMADIN)  or other anticoagulant medication   Does not apply Continuous PRN Alejandro Chandler DO         Current Facility-Administered Medications   Medication Dose Route Frequency Provider Last Rate Last Admin    acetaminophen (TYLENOL) tablet 975 mg  975 mg Oral TID Alejandro Chandler DO   975 mg at 07/14/25 1945    amLODIPine (NORVASC) tablet 2.5 mg  2.5 mg Oral At Bedtime Alejandro Chandler DO   2.5 mg at 07/14/25 2106    apixaban ANTICOAGULANT (ELIQUIS) tablet 2.5 mg  2.5 mg Oral BID Alejandro Chandler DO   2.5 mg at 07/14/25 1945    atorvastatin (LIPITOR) tablet 20 mg  20 mg Oral QPM Alejandro Chandler DO   20 mg at 07/14/25 1945    [Held by provider] diclofenac (VOLTAREN) 1 % topical gel 2-4 g  2-4 g Topical 4x Daily Alejandro Chandler DO        docusate sodium (COLACE) capsule 100 mg  100 mg Oral BID Alejandro Chandler DO   100 mg at 07/14/25 1945    escitalopram (LEXAPRO) tablet 5 mg  5 mg Oral QAM Alejandro Chandler DO   5 mg at 07/14/25 0859    flecainide (TAMBOCOR) tablet 50 mg  50 mg Oral BID Alejandro Chandler DO   50 mg at 07/14/25 1949    furosemide (LASIX) tablet 20 mg  20 mg Oral Daily Kam Nunez MD   20 mg at 07/14/25 0859    levothyroxine (SYNTHROID/LEVOTHROID) tablet 88 mcg  88 mcg Oral QAM AC Alejandro Chandler DO   88 mcg at  07/15/25 0609    losartan (COZAAR) tablet 100 mg  100 mg Oral Daily Alejandro Chandler, DO   100 mg at 07/14/25 0859    metoprolol tartrate (LOPRESSOR) half-tab 37.5 mg  37.5 mg Oral BID Kam Nunez MD   37.5 mg at 07/14/25 1945    polyethylene glycol (MIRALAX) Packet 17 g  17 g Oral Daily Kam Nunez MD   17 g at 07/14/25 0859    sodium chloride (OCEAN) 0.65 % nasal spray 2 spray  2 spray Nasal BID Alejandro Chandler DO   2 spray at 07/14/25 1946    sodium chloride (PF) 0.9% PF flush 3 mL  3 mL Intracatheter Q8H MARILU Alejandro Chandler,    3 mL at 07/15/25 0609    traZODone (DESYREL) tablet 50 mg  50 mg Oral At Bedtime Alejandro Chandler,    50 mg at 07/14/25 2106     Labs and Imaging results below reviewed today.  Recent Labs   Lab 07/13/25 0617 07/12/25 0449   WBC 7.6 7.3   HGB 13.3 12.9   HCT 40.3 39.7   MCV 97 99    260     Recent Labs   Lab 07/15/25  0457 07/14/25  1338 07/14/25  0903 07/14/25  0717 07/13/25  0511 07/12/25  0449   NA  --   --   --  134* 135 136   POTASSIUM 4.0 4.4  --  3.3* 3.6 4.2   CHLORIDE  --   --   --  91* 94* 98   CO2  --   --   --  28 28 27   ANIONGAP  --   --   --  15 13 11   GLC  --   --  235* 116* 103* 121*   BUN  --   --   --  25.7* 23.3* 25.2*   CR  --   --   --  0.87 0.83 0.81   GFRESTIMATED  --   --   --  62 65 67   MAIRA  --   --   --  9.8 9.3 9.6     7-Day Micro Results       No results found for the last 168 hours.          Recent Labs   Lab 07/12/25 0449   NTBNP 2,276*       Recent Results (from the past 24 hours)   MR Brain and Orbits w/o Contrast    Narrative    EXAM: MR BRAIN AND ORBITS W/O CONTRAST  LOCATION: [facility]  DATE: 7/14/2025    INDICATION: Double Vision  COMPARISON: Head CT July 12, 2025.  TECHNIQUE: Routine multiplanar multisequence head MRI with intravenous contrast. Patient unable to complete the contrast-enhanced portion of the exam.  CONTRAST: None    FINDINGS:  INTRACRANIAL CONTENTS: No acute or subacute infarct.  No mass, acute hemorrhage, or extra-axial fluid collections. Patchy nonspecific T2/FLAIR hyperintensities within the cerebral white matter and munir most consistent with mild to moderate chronic   microvascular ischemic change. Moderate generalized cerebral atrophy. No hydrocephalus. Normal position of the cerebellar tonsils. No pathologic enhancement.    OTHER: Included portions of the orbits and visual pathways unremarkable. Cavernous sinuses and orbit apices unremarkable.      Impression    IMPRESSION:  1.  Atrophy and chronic vascular changes.  2.  Negative for acute intracranial process.  3.  No mass or mass effect.  4.  No evidence for orbital abnormality.

## 2025-07-15 NOTE — PLAN OF CARE
Pt is AxO to self. Calm overnight, no events. 1:1 in place overnight. Vitals stable on 1L via NC overnight, now on RA. A fib/flutter on tele, rates 40- 60s. Denying pain. Ax1 with walker and gait belt. Brace on when OOB. PIV is SL. Regular diet. 2L fluid restriction. Strict I&Os. On K and mag protocols.     Problem: Adult Inpatient Plan of Care  Goal: Optimal Comfort and Wellbeing  7/15/2025 0102 by Helen Cadet, RN  Outcome: Progressing  7/15/2025 0101 by Helen Cadet, RN  Outcome: Progressing     Problem: Delirium  Goal: Improved Behavioral Control  7/15/2025 0102 by Helen Cadet, RN  Outcome: Progressing  7/15/2025 0101 by Helen Cadet, RN  Outcome: Progressing  Intervention: Minimize Safety Risk  Recent Flowsheet Documentation  Taken 7/14/2025 5741 by Helen Cadet, RN  Enhanced Safety Measures:  at bedside

## 2025-07-15 NOTE — PROGRESS NOTES
SPIRITUAL HEALTH SERVICES Progress Note  [facility], ICU    Saw pt Katie Mar per length of stay.    Patient/Family Understanding of Illness and Goals of Care - Safia shared about her fall at home, but shared mostly about her life history.     Distress and Loss - Ongoing hospitalization and doesn't enjoy taking so many pills at one time. Very pleasant conversation with no significant distress. She shared about her late , who  in .     Strengths, Coping, and Resources - She shared life history, growing up in [place] and moving many times during her childhood. She has three children that are source of support. One of her sons lives locally and Safia stated that he visited earlier this morning. She shared about her love of birding with friends throughout her life and commented on the pierce at her living facility that has caught her attention.     Meaning, Beliefs, and Spirituality - Patient comes from Jehovah's witness pushpa background and derives meaning, purpose, and comfort from pushpa. She has mixture of Anglican Denominational connections, but mostly Worship and Presbyterian. No current pushpa community identified at this.    Plan of Care - A  will continue to visit as able or per request by patient/family/staff.      Gino Jung MDiv, [facility]  /Manager Spiritual Health Services  [phone]       Spiritual Health Services is available  for emergent requests and consults, either by paging the on-call  or by entering an ASAP/STAT consult in [employer], which will also page the on-call .

## 2025-07-16 VITALS
TEMPERATURE: 97.8 F | HEIGHT: 64 IN | OXYGEN SATURATION: 93 % | RESPIRATION RATE: 18 BRPM | SYSTOLIC BLOOD PRESSURE: 163 MMHG | DIASTOLIC BLOOD PRESSURE: 74 MMHG | WEIGHT: 126.3 LBS | BODY MASS INDEX: 21.56 KG/M2 | HEART RATE: 62 BPM

## 2025-07-16 LAB
ANION GAP SERPL CALCULATED.3IONS-SCNC: 12 MMOL/L (ref 7–15)
BUN SERPL-MCNC: 27.7 MG/DL (ref 8–23)
CALCIUM SERPL-MCNC: 9.4 MG/DL (ref 8.8–10.4)
CHLORIDE SERPL-SCNC: 94 MMOL/L (ref 98–107)
CREAT SERPL-MCNC: 0.72 MG/DL (ref 0.51–0.95)
EGFRCR SERPLBLD CKD-EPI 2021: 78 ML/MIN/1.73M2
GLUCOSE SERPL-MCNC: 94 MG/DL (ref 70–99)
HCO3 SERPL-SCNC: 25 MMOL/L (ref 22–29)
MAGNESIUM SERPL-MCNC: 2.3 MG/DL (ref 1.7–2.3)
POTASSIUM SERPL-SCNC: 4.4 MMOL/L (ref 3.4–5.3)
POTASSIUM SERPL-SCNC: 4.4 MMOL/L (ref 3.4–5.3)
SODIUM SERPL-SCNC: 131 MMOL/L (ref 135–145)

## 2025-07-16 PROCEDURE — 80048 BASIC METABOLIC PNL TOTAL CA: CPT | Performed by: INTERNAL MEDICINE

## 2025-07-16 PROCEDURE — 84132 ASSAY OF SERUM POTASSIUM: CPT | Performed by: FAMILY MEDICINE

## 2025-07-16 PROCEDURE — 99239 HOSP IP/OBS DSCHRG MGMT >30: CPT | Performed by: INTERNAL MEDICINE

## 2025-07-16 PROCEDURE — 250N000013 HC RX MED GY IP 250 OP 250 PS 637: Performed by: HOSPITALIST

## 2025-07-16 PROCEDURE — 36415 COLL VENOUS BLD VENIPUNCTURE: CPT | Performed by: FAMILY MEDICINE

## 2025-07-16 PROCEDURE — 83735 ASSAY OF MAGNESIUM: CPT | Performed by: FAMILY MEDICINE

## 2025-07-16 PROCEDURE — 250N000013 HC RX MED GY IP 250 OP 250 PS 637: Performed by: FAMILY MEDICINE

## 2025-07-16 RX ORDER — QUETIAPINE FUMARATE 25 MG/1
12.5 TABLET, FILM COATED ORAL EVERY 6 HOURS PRN
Qty: 10 TABLET | Refills: 0 | Status: SHIPPED | OUTPATIENT
Start: 2025-07-16

## 2025-07-16 RX ORDER — FUROSEMIDE 20 MG/1
20 TABLET ORAL DAILY
Qty: 30 TABLET | Refills: 0 | Status: SHIPPED | OUTPATIENT
Start: 2025-07-16

## 2025-07-16 RX ORDER — ACETAMINOPHEN 325 MG/1
975 TABLET ORAL 3 TIMES DAILY
COMMUNITY
Start: 2025-07-16

## 2025-07-16 RX ORDER — FUROSEMIDE 20 MG/1
20 TABLET ORAL DAILY PRN
Qty: 30 TABLET | Refills: 0 | Status: SHIPPED | OUTPATIENT
Start: 2025-07-16

## 2025-07-16 RX ORDER — FUROSEMIDE 20 MG/1
20 TABLET ORAL DAILY PRN
Qty: 30 TABLET | Refills: 0 | Status: SHIPPED | OUTPATIENT
Start: 2025-07-16 | End: 2025-07-16

## 2025-07-16 RX ORDER — QUETIAPINE FUMARATE 25 MG/1
12.5 TABLET, FILM COATED ORAL EVERY 6 HOURS PRN
Qty: 10 TABLET | Refills: 0 | Status: SHIPPED | OUTPATIENT
Start: 2025-07-16 | End: 2025-07-16

## 2025-07-16 RX ORDER — ACETAMINOPHEN 325 MG/1
975 TABLET ORAL 3 TIMES DAILY
COMMUNITY
Start: 2025-07-16 | End: 2025-07-16

## 2025-07-16 RX ADMIN — SALINE NASAL SPRAY 2 SPRAY: 1.5 SOLUTION NASAL at 07:50

## 2025-07-16 RX ADMIN — LOSARTAN POTASSIUM 100 MG: 25 TABLET, FILM COATED ORAL at 07:48

## 2025-07-16 RX ADMIN — DOCUSATE SODIUM 100 MG: 100 CAPSULE, LIQUID FILLED ORAL at 07:48

## 2025-07-16 RX ADMIN — METOPROLOL TARTRATE 37.5 MG: 25 TABLET, FILM COATED ORAL at 07:49

## 2025-07-16 RX ADMIN — ESCITALOPRAM 5 MG: 5 TABLET, FILM COATED ORAL at 07:49

## 2025-07-16 RX ADMIN — FUROSEMIDE 20 MG: 20 TABLET ORAL at 07:48

## 2025-07-16 RX ADMIN — LEVOTHYROXINE SODIUM 88 MCG: 0.09 TABLET ORAL at 07:48

## 2025-07-16 RX ADMIN — APIXABAN 2.5 MG: 2.5 TABLET, FILM COATED ORAL at 07:48

## 2025-07-16 RX ADMIN — ACETAMINOPHEN 975 MG: 325 TABLET ORAL at 07:49

## 2025-07-16 RX ADMIN — FLECAINIDE ACETATE 50 MG: 50 TABLET ORAL at 07:50

## 2025-07-16 ASSESSMENT — ACTIVITIES OF DAILY LIVING (ADL)
ADLS_ACUITY_SCORE: 64

## 2025-07-16 NOTE — PROGRESS NOTES
Physical Therapy Discharge Summary    Reason for therapy discharge:    Discharged to home with home therapy.    Progress towards therapy goal(s). See goals on Care Plan in [facility] electronic health record for goal details.  Goals partially met.  Barriers to achieving goals:   limited tolerance for therapy.    Therapy recommendation(s):    Continued therapy is recommended.  Rationale/Recommendations:   .Patient would benefit from further rehab to improve functional mobility and safety

## 2025-07-16 NOTE — PLAN OF CARE
Problem: Risk for Delirium  Goal: Improved Behavioral Control  Outcome: Progressing  Intervention: Minimize Safety Risk  Recent Flowsheet Documentation  Taken 7/15/2025 2359 by Breanna Moody RN  Enhanced Safety Measures: room near unit station  Taken 7/15/2025 1944 by Breanna Moody RN  Enhanced Safety Measures: room near unit station     Problem: Skin Injury Risk Increased  Goal: Skin Health and Integrity  Outcome: Progressing  Intervention: Plan: Nurse Driven Intervention: Moisture Management  Recent Flowsheet Documentation  Taken 7/15/2025 2359 by Breanna Moody RN  Moisture Interventions: Encourage regular toileting  Taken 7/15/2025 1944 by Breanna Moody RN  Moisture Interventions: Encourage regular toileting  Intervention: Optimize Skin Protection  Recent Flowsheet Documentation  Taken 7/15/2025 2359 by Breanna Moody RN  Activity Management: activity adjusted per tolerance  Head of Bed (HOB) Positioning: HOB at 20-30 degrees  Taken 7/15/2025 1944 by Breanna Moody RN  Activity Management: activity adjusted per tolerance   Pt disoriented to time and situation. Occasionally able to be reoriented. Tele afib/flutter. Denying pain. TLS brace on while out of bed. Taking medications as prescribed. Pt refused some cares overnight, became frustrated with staff and declined vital signs due to staff mistrust.

## 2025-07-16 NOTE — DISCHARGE INSTRUCTIONS
Home Care Services have been arranged for you.  Home Care Agency:  [facility]  Home Care Agency Telephone:  [phone]   Instructions:  Home care agency will call you within 48 hours of discharge to schedule appointments.  If you do not hear from them within 24 - 48 hours, please reach out to them.

## 2025-07-16 NOTE — PROGRESS NOTES
Occupational Therapy Discharge Summary    Reason for therapy discharge:    Discharged to home with home therapy.    Progress towards therapy goal(s). See goals on Care Plan in [facility] electronic health record for goal details.  Goals not met.  Barriers to achieving goals:   limited tolerance for therapy.    Therapy recommendation(s):    Continued therapy is recommended.  Rationale/Recommendations:  Ensure safety at home with ADL.

## 2025-07-16 NOTE — DISCHARGE SUMMARY
[facility]    Discharge Summary  Hospitalist    Date of Admission:  7/12/2025  Date of Discharge:  {DISCHARGE DATE:209996}  Provider:  Dannielle Gutierrez DO    Discharge Diagnoses   ***    Other medical issues:  Past Medical History:   Diagnosis Date    Anxiety state     Essential hypertension     Persistent atrial fibrillation (H) 1/18/2018    Pure hypercholesterolemia        History of Present Illness   Katie Mar is an 89 year old female who presented with ***.  Please see the admission history and physical for full details.    Hospital Course   Katie Mar was admitted on 7/12/2025.  The following problems were addressed during her hospitalization:    ***      Significant Results and Procedures   ***    Pending Results   Unresulted Labs Ordered in the Past 30 Days of this Admission       No orders found from 6/12/2025 to 7/13/2025.            Code Status   {CODE STATUS      :855439}       Primary Care Physician   Shamar Inhdrew    Physical Exam   Temp: 97.8  F (36.6  C) Temp src: Oral BP: (!) 163/74 Pulse: 62   Resp: 18 SpO2: 93 % O2 Device: None (Room air)    Vitals:    07/12/25 1308 07/15/25 0300 07/16/25 0400   Weight: 58.6 kg (129 lb 3.2 oz) 56.7 kg (125 lb 1.6 oz) 57.3 kg (126 lb 4.8 oz)     Vital Signs with Ranges  Temp:  [97.6  F (36.4  C)-97.8  F (36.6  C)] 97.8  F (36.6  C)  Pulse:  [56-62] 62  Resp:  [16-26] 18  BP: (111-173)/(58-74) 163/74  SpO2:  [93 %-94 %] 93 %  I/O last 3 completed shifts:  In: 1020 [P.O.:1020]  Out: 200 [Urine:200]    GEN:  Alert, oriented x 3, comfortable, NAD.  ***  HEENT:  Normocephalic/atraumatic, PERRL, no scleral icterus, no nasal discharge, mouth moist, no oral ulcers or thrush noted.  NECK:  No clear thyromegaly of clear JVD  CV:  Regular rate and rhythm, no murmur to ausc.  S1 + S2 noted, no S3 or S4.  LUNGS:  Clear to auscultation bilaterally.  No rales/rhonchi/wheezing auscultated bilaterally.  No costal retractions  "bilaterally.  Symmetric chest rise on inhalation noted.  ABD:  Active bowel sounds, soft, non-tender/non-distended.  No rebound/guarding/rigidity.  No masses palpated.  No obvious HSM to exam.  EXT:  No edema or cyanosis bilaterally. No joint synovitis noted.  No calf-tenderness or asymmetry noted.  SKIN:  Dry to touch, no rashes or jaundice noted.  PSYCH:  Mood appropriate, Not tearful or depressed.  Maintains direct eye contact.  NEURO:  No tremors at rest    Discharge Disposition   {                 :1041795::\"Discharged to home\"}    Consultations This Hospital Stay   CORE CLINIC EVALUATION IP CONSULT  OCCUPATIONAL THERAPY ADULT IP CONSULT  PHYSICAL THERAPY ADULT IP CONSULT  OCCUPATIONAL THERAPY ADULT IP CONSULT  SPINE SURGERY ADULT IP CONSULT  CARE MANAGEMENT / SOCIAL WORK IP CONSULT  PHYSICAL THERAPY ADULT IP CONSULT  OCCUPATIONAL THERAPY ADULT IP CONSULT    Time Spent on this Encounter   {How much time did you spend on discharge?:87501692}***    Discharge Orders      XR Thoracic Spine 2 Views     Home Care Referral      Reason for your hospital stay    You were admitted for fall and acute compression fractures     Activity - Up with nursing assistance     Follow Up and recommended labs and tests    Follow up with primary care provider in 5-7 days.  The following labs/tests are recommended: BMP on 7/18/25 with results sent to PCP  Follow up with specialist, cardiology within one month with BMP  F/up with neurosurgery in 6 weeks for hospital f/up compression fractures     No CPR- Do NOT Intubate     Fall precautions     Diet    Follow this diet upon discharge: cardiac     Discharge Medications   Current Discharge Medication List        START taking these medications    Details   furosemide (LASIX) 20 MG tablet Take 1 tablet (20 mg) by mouth daily.  Qty: 30 tablet, Refills: 0    Associated Diagnoses: Acute on chronic diastolic congestive heart failure (H)      QUEtiapine (SEROQUEL) 25 MG tablet Take 0.5 tablets " (12.5 mg) by mouth every 6 hours as needed (Agitation).  Qty: 10 tablet, Refills: 0    Associated Diagnoses: Agitation           CONTINUE these medications which have CHANGED    Details   !! acetaminophen (TYLENOL) 325 MG tablet Take 3 tablets (975 mg) by mouth 3 times daily.    Associated Diagnoses: Closed fracture of thoracic vertebra, unspecified fracture morphology, unspecified thoracic vertebral level, initial encounter (H)      apixaban ANTICOAGULANT (ELIQUIS) 2.5 MG tablet Take 1 tablet (2.5 mg) by mouth 2 times daily.  Qty: 60 tablet, Refills: 0    Associated Diagnoses: Chronic atrial fibrillation (H)       !! - Potential duplicate medications found. Please discuss with provider.        CONTINUE these medications which have NOT CHANGED    Details   !! acetaminophen (TYLENOL) 500 MG tablet Take 1,000 mg by mouth daily as needed for pain. Take 1000 mg twice daily PLUS an additional 1000 mg daily as needed      amLODIPine (NORVASC) 2.5 MG tablet Take 2.5 mg by mouth at bedtime.      atorvastatin (LIPITOR) 20 MG tablet Take 1 tablet by mouth once daily  Qty: 90 tablet, Refills: 1    Associated Diagnoses: Hypercholesteremia      diclofenac (VOLTAREN) 1 % topical gel Apply 2-4 g topically as needed for moderate pain.      escitalopram (LEXAPRO) 5 MG tablet Take 5 mg by mouth every morning.      flecainide (TAMBOCOR) 50 MG tablet Take 1 tablet (50 mg) by mouth 2 times daily  Qty: 180 tablet, Refills: 3    Associated Diagnoses: Persistent atrial fibrillation (H)      levothyroxine (SYNTHROID/LEVOTHROID) 88 MCG tablet Take 88 mcg by mouth every morning (before breakfast).      Lidocaine (LIDOCARE) 4 % Patch Apply topically daily. Apply to lower back for pain for 12 hours on and 12 hours off      losartan (COZAAR) 100 MG tablet Take 1 tablet by mouth once daily  Qty: 90 tablet, Refills: 1    Associated Diagnoses: Essential hypertension      melatonin 3 MG tablet Take 1 mg by mouth At Bedtime      metoprolol tartrate  (LOPRESSOR) 25 MG tablet Take 37.5 mg by mouth 2 times daily      sodium chloride (DEEP SEA NASAL SPRAY) 0.65 % nasal spray Spray 2 sprays in nostril 2 times daily.      traZODone (DESYREL) 50 MG tablet Take 50 mg by mouth at bedtime.       !! - Potential duplicate medications found. Please discuss with provider.        Allergies   Allergies   Allergen Reactions    Clindamycin Hives    Doxycycline Hyclate [Doxycycline] Hives    Penicillins Hives    Tetanus Toxoid Unknown     Site reaction. Hard area of redness and pain.     Data   {Hospitalist Lab Pick List (Most refreshable):646548}  {Image Import Pick List:110040}       MG tablet Take 50 mg by mouth at bedtime.       !! - Potential duplicate medications found. Please discuss with provider.        Allergies   Allergies   Allergen Reactions    Clindamycin Hives    Doxycycline Hyclate [Doxycycline] Hives    Penicillins Hives    Tetanus Toxoid Unknown     Site reaction. Hard area of redness and pain.     Data   Recent Labs   Lab 07/13/25  0617 07/12/25  0449   WBC 7.6 7.3   HGB 13.3 12.9   HCT 40.3 39.7   MCV 97 99    260     Recent Labs   Lab 07/16/25  0458 07/15/25  0457 07/14/25  1338 07/14/25  0903 07/14/25  0717 07/13/25  0511   *  --   --   --  134* 135   POTASSIUM 4.4  4.4 4.0 4.4  --  3.3* 3.6   CHLORIDE 94*  --   --   --  91* 94*   CO2 25  --   --   --  28 28   ANIONGAP 12  --   --   --  15 13   GLC 94  --   --  235* 116* 103*   BUN 27.7*  --   --   --  25.7* 23.3*   CR 0.72  --   --   --  0.87 0.83   GFRESTIMATED 78  --   --   --  62 65   MAIRA 9.4  --   --   --  9.8 9.3     Recent Labs   Lab 07/12/25  0449   INR 1.16*     Recent Labs   Lab 07/12/25  0539   COLOR Yellow   APPEARANCE Clear   URINEGLC Negative   URINEBILI Negative   URINEKETONE Negative   SG 1.009   UBLD Negative   URINEPH 7.0   PROTEIN Negative   NITRITE Negative   LEUKEST Negative   RBCU 0   WBCU 0     Results for orders placed or performed during the hospital encounter of 07/12/25   Head CT w/o contrast    Narrative    EXAM: CT HEAD W/O CONTRAST  LOCATION: [facility]  DATE: 7/12/2025    INDICATION: closed head injury on blood thinners  COMPARISON: April 10, 2025   TECHNIQUE: Routine CT Head without IV contrast. Multiplanar reformats. Dose reduction techniques were used.    FINDINGS:  INTRACRANIAL CONTENTS: No intracranial hemorrhage, extraaxial collection, or mass effect.  No CT evidence of acute infarct. Mild presumed chronic small vessel ischemic changes. Moderate generalized volume loss. No hydrocephalus.     VISUALIZED ORBITS/SINUSES/MASTOIDS: Prior bilateral  cataract surgery. Visualized portions of the orbits are otherwise unremarkable. No paranasal sinus mucosal disease. No middle ear or mastoid effusion.    BONES/SOFT TISSUES: No acute abnormality.      Impression    IMPRESSION:  1.  No acute intracranial process.     CT Cervical Spine w/o Contrast    Narrative    EXAM: CT CERVICAL SPINE W/O CONTRAST  LOCATION: [facility]  DATE: 7/12/2025    INDICATION: midline neck pain after fall  COMPARISON: April 10, 2025   TECHNIQUE: Routine CT Cervical Spine without IV contrast. Multiplanar reformats. Dose reduction techniques were used.    FINDINGS:  VERTEBRA: Normal vertebral body heights. Stable alignment. No fracture or posttraumatic subluxation.     CANAL/FORAMINA: No canal or neural foraminal stenosis.    PARASPINAL: Unchanged pleural calcifications.       Impression    IMPRESSION:  1.  No fracture or posttraumatic subluxation.     CT Chest Abdomen Pelvis w/o Contrast    Narrative    EXAM: CT CHEST/ABDOMEN/PELVIS WITHOUT CONTRAST  LOCATION: [facility]  DATE: 07/12/2025    INDICATION: Blunt abdominal and chest injury.  COMPARISON: None.  TECHNIQUE: CT scan of the chest, abdomen, and pelvis was performed without IV contrast. Multiplanar reformats were obtained. Dose reduction techniques were used.   CONTRAST: None.    FINDINGS:   LUNGS AND PLEURA: No pneumothorax or hemothorax. Trace simple density pleural effusions. Moderate degree of interstitial and alveolar pulmonary edema.    MEDIASTINUM/AXILLAE: No mediastinal hemorrhage. No injury to the noncontrast thoracic aorta or enlarged heart. Three-vessel coronary artery disease. No pericardial hemorrhage or effusion. Several mildly enlarged lymph nodes in the superior mediastinum   involving right upper paratracheal and right lower paratracheal lymph nodes.    HEPATOBILIARY: Benign simple cyst measuring roughly 3 cm the central right hepatic lobe.    PANCREAS:  Normal.    SPLEEN: Normal.    ADRENAL GLANDS: Normal.    KIDNEYS/BLADDER: Normal.    BOWEL: No bowel or mesenteric injury.    Diverticulosis coli without diverticulitis.    LYMPH NODES: Normal.    VASCULATURE: Normal.    PELVIC ORGANS: Normal.    MUSCULOSKELETAL: No acute rib or sternal fracture. Multiple age-indeterminate compression fractures in the thoracic and lumbar spine. Recommend dedicated thoracic and lumbar spine CT for more sensitive evaluation of the spine. No acute hip or pelvis   fracture. Old appearing horizontally oriented displaced fracture of the S3-S4 level (tailbone). No body wall contusion or hematoma.      Impression    IMPRESSION:  1.  Multiple age-indeterminate compression fractures in the thoracic and lumbar spine with old appearing displaced lower sacral fracture. Consider dedicated thoracic and lumbar spine CT for more sensitive evaluation of the spine.  2.  Moderate degree of pulmonary edema with trace pleural effusions and cardiomegaly.       XR Ankle Bilateral G/E 3 Views    Narrative    EXAM: XR ANKLE BILATERAL G/E 3 VIEWS  LOCATION: [facility]  DATE: 7/12/2025    INDICATION: trauma; pain  COMPARISON: None.      Impression    IMPRESSION:   RIGHT ANKLE: Osteopenia. No displaced fracture or dislocation. The ankle mortise is congruent on these images. Achilles and plantar calcaneal spurring.     LEFT ANKLE: Osteopenia. No displaced fracture or dislocation. Plantar calcaneal spurring. The ankle mortise is congruent on these views.   XR Knee Right 1/2 Views    Narrative    EXAM: XR KNEE RIGHT 1/2 VIEWS  LOCATION: [facility]  DATE: 07/12/2025    INDICATION: Trauma; pain.  COMPARISON: None.      Impression    IMPRESSION: Negative for fracture or dislocation. Mild degenerative arthritis right knee. No obvious knee effusion. There is some benign chondroid matrix in the distal right femoral shaft probably due to a benign enchondroma and of  doubtful significance.   Vascular calcification.     CT Thoracic Spine Reconstructed    Narrative    EXAM: CT LUMBAR SPINE RECONSTRUCTED, CT THORACIC SPINE RECONSTRUCTED  LOCATION: [facility]  DATE: 7/12/2025    INDICATION: spine fracture noted on chest abd pelvis ct  COMPARISON: 4/10/2025.  TECHNIQUE: Dedicated axial, sagittal, and coronal images of the thoracic and lumbar spine were generated utilizing CT chest, abdomen and pelvis source data and are separately reviewed. Dose reduction techniques were used.       Impression    IMPRESSION:  VERTEBRA: T3, T4, T11 and L1 compression fractures, which T3 and T4 are new since 4/10/2025. T11 is indeterminate secondary to lack of similar comparison studies. Height loss involving the T3 and T4 superior endplates is minimal. Fracture through the T11   endplate in the anterior cortex and inferior endplate with less than 25% height loss. L1 fracture is chronic.    No posttraumatic subluxation in the thoracic and/or lumbar spine. S-shaped thoracolumbar scoliosis. Preserved vertebral alignment in the lumbar spine. L3 inferior endplate intravertebral disc herniation.     CANAL: No retropulsed fragmentation at any level. No bony encroachment on the spinal canal at any level.    PARASPINAL: No acute extraspinal abnormality.   CT Lumbar Spine Reconstructed    Narrative    EXAM: CT LUMBAR SPINE RECONSTRUCTED, CT THORACIC SPINE RECONSTRUCTED  LOCATION: [facility]  DATE: 7/12/2025    INDICATION: spine fracture noted on chest abd pelvis ct  COMPARISON: 4/10/2025.  TECHNIQUE: Dedicated axial, sagittal, and coronal images of the thoracic and lumbar spine were generated utilizing CT chest, abdomen and pelvis source data and are separately reviewed. Dose reduction techniques were used.       Impression    IMPRESSION:  VERTEBRA: T3, T4, T11 and L1 compression fractures, which T3 and T4 are new since 4/10/2025. T11 is indeterminate  secondary to lack of similar comparison studies. Height loss involving the T3 and T4 superior endplates is minimal. Fracture through the T11   endplate in the anterior cortex and inferior endplate with less than 25% height loss. L1 fracture is chronic.    No posttraumatic subluxation in the thoracic and/or lumbar spine. S-shaped thoracolumbar scoliosis. Preserved vertebral alignment in the lumbar spine. L3 inferior endplate intravertebral disc herniation.     CANAL: No retropulsed fragmentation at any level. No bony encroachment on the spinal canal at any level.    PARASPINAL: No acute extraspinal abnormality.   XR Thoracic Spine 2 Views    Narrative    EXAM: XR THORACIC SPINE 2 VIEWS  LOCATION: [facility]  DATE: 7/13/2025    INDICATION: Multuple compression fx  COMPARISON: CT 7/12/2025.      Impression    IMPRESSION:   Alignment: Unchanged with moderate dextrocurvature.  Vertebral body heights: Multilevel vertebral body compression deformities, most pronounced at T11 and L1 (T3 and T4 difficult to assess), appear similar (somewhat difficult to definitively characterize given positioning and demineralization).  Degenerative changes: Mild degenerative changes with vertebral body osteophyte formation, endplate irregularity, and facet arthropathy.  Other: Diffuse osseous demineralization.       MR Brain and Orbits w/o Contrast    Narrative    EXAM: MR BRAIN AND ORBITS W/O CONTRAST  LOCATION: [facility]  DATE: 7/14/2025    INDICATION: Double Vision  COMPARISON: Head CT July 12, 2025.  TECHNIQUE: Routine multiplanar multisequence head MRI with intravenous contrast. Patient unable to complete the contrast-enhanced portion of the exam.  CONTRAST: None    FINDINGS:  INTRACRANIAL CONTENTS: No acute or subacute infarct. No mass, acute hemorrhage, or extra-axial fluid collections. Patchy nonspecific T2/FLAIR hyperintensities within the cerebral white matter and munir most  consistent with mild to moderate chronic   microvascular ischemic change. Moderate generalized cerebral atrophy. No hydrocephalus. Normal position of the cerebellar tonsils. No pathologic enhancement.    OTHER: Included portions of the orbits and visual pathways unremarkable. Cavernous sinuses and orbit apices unremarkable.      Impression    IMPRESSION:  1.  Atrophy and chronic vascular changes.  2.  Negative for acute intracranial process.  3.  No mass or mass effect.  4.  No evidence for orbital abnormality.     Echocardiogram Complete     Value    LVEF  55-60%    [facility]    [id number]  [id number]  [id number]  [id number]^AMBAR^GARDENIA^[place] [address]     Name: ZEINAB HAYS  MRN: [id number]  : 1931  Study Date: 2025 08:59 AM  Age: 89 yrs  Gender: Female  Patient Location: [facility]  Reason For Study: CHF  Ordering Physician: GARDENIA VILLALTA  Performed By: ACE^^^^     BSA: 1.6 m2  Height: 64 in  Weight: 131 lb  HR: 46  BP: 195/91 mmHg  ______________________________________________________________________________  Procedure  Echocardiogram with two-dimensional, color and spectral Doppler.  ______________________________________________________________________________  Interpretation Summary     1. Left ventricular function is normal.The ejection fraction is 55-60%. There  is mild concentric left ventricular hypertrophy.  2. Normal right ventricle size and systolic function.  3. Moderate valvular aortic stenosis.  4. There is mild to moderate (1-2+) mitral regurgitation. There is mild mitral  stenosis.  5. High RA pressure estimated at 15 mmHg or greater.     ______________________________________________________________________________  Left Ventricle  The left ventricle is normal in size. Left ventricular function is normal.The  ejection fraction is 55-60%. There is mild concentric left ventricular  hypertrophy. Diastolic function not assessed due to  significant mitral annular  calcification. Normal left ventricular wall motion.     Right Ventricle  Normal right ventricle size and systolic function.     Atria  The left atrium is severely dilated. The right atrium is moderately dilated.     Mitral Valve  There is moderate mitral annular calcification. There is mild to moderate (1-  2+) mitral regurgitation. There is mild mitral stenosis.     Tricuspid Valve  The tricuspid valve is not well visualized, but is grossly normal. The  tricuspid valve is not well visualized. There is trace tricuspid  regurgitation. The right ventricular systolic pressure is approximated at 15.8  mmHg plus the right atrial pressure. There is no tricuspid stenosis.     Aortic Valve  Severe aortic valve calcification is present. There is mild (1+) aortic  regurgitation. Moderate valvular aortic stenosis.     Pulmonic Valve  The pulmonic valve is not well visualized.     Vessels  The aorta root is normal. Normal size ascending aorta. IVC diameter >2.1 cm  collapsing <50% with sniff suggests a high RA pressure estimated at 15 mmHg or  greater.     Pericardium  Trivial pericardial effusion.     ______________________________________________________________________________  MMode/2D Measurements & Calculations  IVSd: 1.1 cm  LVIDd: 3.9 cm  LVIDs: 2.8 cm  LVPWd: 1.2 cm  FS: 28.4 %     LV mass(C)d: 145.9 grams  LV mass(C)dI: 89.3 grams/m2  Ao root diam: 3.0 cm  LA dimension: 4.9 cm  asc Aorta Diam: 2.9 cm  LA/Ao: 1.6  LVOT diam: 1.9 cm  LVOT area: 2.8 cm2  Ao root diam index Ht(cm/m): 1.8  Ao root diam index BSA (cm/m2): 1.8  Asc Ao diam index BSA (cm/m2): 1.8  Asc Ao diam index Ht(cm/m): 1.8  EF Biplane: 57.5 %  LA Volume (BP): 72.0 ml     LA Volume Index (BP): 44.2 ml/m2  LA Volume Indexed (AL/bp): 48.3 ml/m2  RV Base: 3.7 cm  RWT: 0.60  TAPSE: 1.2 cm     Time Measurements  MM HR: 55.0 BPM     Doppler Measurements & Calculations  MV E max tona: 109.0 cm/sec  MV A max tona: 42.7 cm/sec  MV E/A:  2.6  MV max P.1 mmHg  MV mean P.8 mmHg  MV V2 VTI: 41.9 cm  MVA(VTI): 1.5 cm2  MV dec slope: 675.0 cm/sec2  MV dec time: 0.16 sec  Ao V2 max: 208.7 cm/sec  Ao max P.0 mmHg  Ao V2 mean: 154.0 cm/sec  Ao mean P.0 mmHg  Ao V2 VTI: 56.9 cm  KILEY(I,D): 1.1 cm2  KILEY(V,D): 1.3 cm2  LV V1 max PG: 3.7 mmHg  LV V1 max: 95.8 cm/sec  LV V1 VTI: 22.2 cm  SV(LVOT): 62.8 ml  SI(LVOT): 38.5 ml/m2  TR max carlos: 199.0 cm/sec  TR max PG: 15.8 mmHg  AV Carlos Ratio (DI): 0.46  KILEY Index (cm2/m2): 0.68  E/E': 10.9  E/E' av.9  Lateral E/e': 10.9  Medial E/e': 17.0  Peak E' Carlos: 10.0 cm/sec  RV S Carlos: 10.5 cm/sec     ______________________________________________________________________________  Report approved by: Jn Araujo MD on 2025 01:07 PM

## 2025-07-16 NOTE — PROGRESS NOTES
Care Management Follow Up    Length of Stay (days): 4    Expected Discharge Date: 07/16/2025     Concerns to be Addressed: discharge planning  d/c planning  Patient plan of care discussed at interdisciplinary rounds: Yes    Anticipated Discharge Disposition: Assisted Living, Home Care      Anticipated Discharge Services: Transportation Services  Anticipated Discharge DME: None    Patient/family educated on Medicare website which has current facility and service quality ratings: yes  Education Provided on the Discharge Plan: Yes  Patient/Family in Agreement with the Plan: yes    Referrals Placed by CM/SW: Post Acute Facilities    Private pay costs discussed: transportation costs    Discussed  Partnership in Safe Discharge Planning  document with patient/family: No     Handoff Completed: No, handoff not indicated or clinically appropriate    Additional Information:    Anticipate discharge to [facility] with homecare.    CM rescheduled transportation, per [facility] request:      [facility] wheelchair to [facility] Wed today 7/16, pickup [phone].    Update given to [facility].  Update given to bedside RN.  Bedside RN reports son is present at pt's bedside; bedside RN agrees to update pt's son.  Update given to Charge RN and [facility].  Update given to Nurse Styles at [facility] [phone].    Next Steps:   Discharge orders to [facility] and [facility].    CM will continue to follow.     Karma Krishnamurthy, RN

## 2025-07-16 NOTE — PLAN OF CARE
A&Ox3. Disoriented to situation. VSS on RA. Up A1 GB and walker. TLSO brace on when OOB. Regular diet with 2000mL fluid restriction. Denies pain. LS diminished. Denies SOB/CP. BS active. Denies nausea. Incontinent at times. IV removed. Son at bedside. Paperwork printed and given to son otherwise packet with transport. Transporting with wheelchair ride.     Goal Outcome Evaluation:  Problem: Pain Acute  Goal: Optimal Pain Control and Function  Intervention: Prevent or Manage Pain  Recent Flowsheet Documentation  Taken 7/16/2025 2515 by Scarlett Chavez, RN  Sensory Stimulation Regulation:   care clustered   lighting decreased   quiet environment promoted  Medication Review/Management: medications reviewed

## 2025-07-16 NOTE — PROGRESS NOTES
Care Management Discharge Note    Discharge Date: 07/16/2025       Discharge Disposition: Assisted Living, Home Care    Discharge Services: Transportation Services    Discharge DME: None    Discharge Transportation: family or friend will provide    Private pay costs discussed: transportation costs    Does the patient's insurance plan have a 3 day qualifying hospital stay waiver?  No    PAS Confirmation Code:    Patient/family educated on Medicare website which has current facility and service quality ratings: yes    Education Provided on the Discharge Plan: Yes AVS per bedside nurse     Persons Notified of Discharge Plans:  patient, son, [facility]    Patient/Family in Agreement with the Plan: yes    Handoff Referral Completed: No, handoff not indicated or clinically appropriate    Additional Information:    Pt discharging today.    [facility] wheelchair to [facility] Wed today 7/16, pickup [phone].     [facility] - Accepted this pt for homecare services.  CM notified HC agency of pt's discharge today via phone call.  Discharge orders sent to HC agency via [employer].     Discharge plans confirmed with facility nurse Stephani [phone]; Fax [phone].  [facility] uses [facility].  [facility] made aware of this.  OT educated son regarding TLSO application on 7/15.    TLSO Informational Insert given to [facility] to send to [facility] in discharge packet.  Discharge orders sent to facility via fax.    [facility] to fax to facility/send with patient packet: discharge orders, hard scripts, additional discharge paperwork, as per protocol.     See also CM progress note 7/16/25.     Karma Krishnamurthy RN

## 2025-07-22 ENCOUNTER — LAB REQUISITION (OUTPATIENT)
Dept: LAB | Facility: CLINIC | Age: OVER 89
End: 2025-07-22
Payer: MEDICARE

## 2025-07-22 DIAGNOSIS — I10 ESSENTIAL (PRIMARY) HYPERTENSION: ICD-10-CM

## 2025-07-22 DIAGNOSIS — D64.9 ANEMIA, UNSPECIFIED: ICD-10-CM

## 2025-07-23 LAB
ANION GAP SERPL CALCULATED.3IONS-SCNC: 12 MMOL/L (ref 7–15)
BUN SERPL-MCNC: 24 MG/DL (ref 8–23)
CALCIUM SERPL-MCNC: 9.5 MG/DL (ref 8.8–10.4)
CHLORIDE SERPL-SCNC: 94 MMOL/L (ref 98–107)
CREAT SERPL-MCNC: 0.73 MG/DL (ref 0.51–0.95)
EGFRCR SERPLBLD CKD-EPI 2021: 76 ML/MIN/1.73M2
ERYTHROCYTE [DISTWIDTH] IN BLOOD BY AUTOMATED COUNT: 14.8 % (ref 10–15)
GLUCOSE SERPL-MCNC: 105 MG/DL (ref 70–99)
HCO3 SERPL-SCNC: 27 MMOL/L (ref 22–29)
HCT VFR BLD AUTO: 37.4 % (ref 35–47)
HGB BLD-MCNC: 11.8 G/DL (ref 11.7–15.7)
MCH RBC QN AUTO: 31.2 PG (ref 26.5–33)
MCHC RBC AUTO-ENTMCNC: 31.6 G/DL (ref 31.5–36.5)
MCV RBC AUTO: 99 FL (ref 78–100)
PLATELET # BLD AUTO: 258 10E3/UL (ref 150–450)
POTASSIUM SERPL-SCNC: 4.8 MMOL/L (ref 3.4–5.3)
RBC # BLD AUTO: 3.78 10E6/UL (ref 3.8–5.2)
SODIUM SERPL-SCNC: 133 MMOL/L (ref 135–145)
WBC # BLD AUTO: 10.7 10E3/UL (ref 4–11)

## 2025-07-23 PROCEDURE — 36415 COLL VENOUS BLD VENIPUNCTURE: CPT | Mod: ORL | Performed by: NURSE PRACTITIONER

## 2025-07-23 PROCEDURE — P9603 ONE-WAY ALLOW PRORATED MILES: HCPCS | Mod: ORL | Performed by: NURSE PRACTITIONER

## 2025-07-23 PROCEDURE — 85027 COMPLETE CBC AUTOMATED: CPT | Mod: ORL | Performed by: NURSE PRACTITIONER

## 2025-07-23 PROCEDURE — 80048 BASIC METABOLIC PNL TOTAL CA: CPT | Mod: ORL | Performed by: NURSE PRACTITIONER

## 2025-07-29 ENCOUNTER — TRANSFERRED RECORDS (OUTPATIENT)
Dept: HEALTH INFORMATION MANAGEMENT | Facility: CLINIC | Age: OVER 89
End: 2025-07-29
Payer: MEDICARE

## 2025-08-07 ENCOUNTER — LAB REQUISITION (OUTPATIENT)
Dept: LAB | Facility: CLINIC | Age: OVER 89
End: 2025-08-07
Payer: MEDICARE

## 2025-08-07 DIAGNOSIS — I10 ESSENTIAL (PRIMARY) HYPERTENSION: ICD-10-CM

## 2025-08-12 PROBLEM — I50.31 ACUTE HEART FAILURE WITH PRESERVED EJECTION FRACTION (HFPEF) (H): Status: ACTIVE | Noted: 2025-07-12

## 2025-08-13 ENCOUNTER — APPOINTMENT (OUTPATIENT)
Dept: CARDIOLOGY | Facility: CLINIC | Age: OVER 89
End: 2025-08-13
Payer: MEDICARE

## 2025-08-13 ENCOUNTER — OFFICE VISIT (OUTPATIENT)
Dept: CARDIOLOGY | Facility: CLINIC | Age: OVER 89
End: 2025-08-13
Payer: MEDICARE

## 2025-08-13 VITALS
HEART RATE: 47 BPM | SYSTOLIC BLOOD PRESSURE: 125 MMHG | DIASTOLIC BLOOD PRESSURE: 57 MMHG | OXYGEN SATURATION: 94 % | RESPIRATION RATE: 16 BRPM

## 2025-08-13 DIAGNOSIS — I50.9 ACUTE DECOMPENSATED HEART FAILURE (H): ICD-10-CM

## 2025-08-13 DIAGNOSIS — I10 ESSENTIAL HYPERTENSION: Primary | Chronic | ICD-10-CM

## 2025-08-13 DIAGNOSIS — I48.19 PERSISTENT ATRIAL FIBRILLATION (H): Chronic | ICD-10-CM

## 2025-08-13 DIAGNOSIS — I50.31 ACUTE HEART FAILURE WITH PRESERVED EJECTION FRACTION (HFPEF) (H): ICD-10-CM

## 2025-08-13 LAB
ANION GAP SERPL CALCULATED.3IONS-SCNC: 12 MMOL/L (ref 7–15)
BUN SERPL-MCNC: 23.3 MG/DL (ref 8–23)
CALCIUM SERPL-MCNC: 9.6 MG/DL (ref 8.8–10.4)
CHLORIDE SERPL-SCNC: 94 MMOL/L (ref 98–107)
CREAT SERPL-MCNC: 0.91 MG/DL (ref 0.51–0.95)
EGFRCR SERPLBLD CKD-EPI 2021: 59 ML/MIN/1.73M2
GLUCOSE SERPL-MCNC: 99 MG/DL (ref 70–99)
HCO3 SERPL-SCNC: 30 MMOL/L (ref 22–29)
MAGNESIUM SERPL-MCNC: 2.3 MG/DL (ref 1.7–2.3)
NT-PROBNP SERPL-MCNC: 2049 PG/ML (ref 0–624)
POTASSIUM SERPL-SCNC: 4.5 MMOL/L (ref 3.4–5.3)
SODIUM SERPL-SCNC: 136 MMOL/L (ref 135–145)

## 2025-08-13 PROCEDURE — 36415 COLL VENOUS BLD VENIPUNCTURE: CPT | Performed by: NURSE PRACTITIONER

## 2025-08-13 PROCEDURE — 3074F SYST BP LT 130 MM HG: CPT | Performed by: NURSE PRACTITIONER

## 2025-08-13 PROCEDURE — 99204 OFFICE O/P NEW MOD 45 MIN: CPT | Performed by: NURSE PRACTITIONER

## 2025-08-13 PROCEDURE — 83735 ASSAY OF MAGNESIUM: CPT | Performed by: NURSE PRACTITIONER

## 2025-08-13 PROCEDURE — 83880 ASSAY OF NATRIURETIC PEPTIDE: CPT | Performed by: NURSE PRACTITIONER

## 2025-08-13 PROCEDURE — 80048 BASIC METABOLIC PNL TOTAL CA: CPT | Performed by: NURSE PRACTITIONER

## 2025-08-13 PROCEDURE — 3078F DIAST BP <80 MM HG: CPT | Performed by: NURSE PRACTITIONER

## 2025-08-13 PROCEDURE — G2211 COMPLEX E/M VISIT ADD ON: HCPCS | Performed by: NURSE PRACTITIONER

## 2025-08-20 LAB
ANION GAP SERPL CALCULATED.3IONS-SCNC: 13 MMOL/L (ref 7–15)
BUN SERPL-MCNC: 21.8 MG/DL (ref 8–23)
CALCIUM SERPL-MCNC: 9.6 MG/DL (ref 8.8–10.4)
CHLORIDE SERPL-SCNC: 95 MMOL/L (ref 98–107)
CREAT SERPL-MCNC: 0.8 MG/DL (ref 0.51–0.95)
EGFRCR SERPLBLD CKD-EPI 2021: 68 ML/MIN/1.73M2
ERYTHROCYTE [DISTWIDTH] IN BLOOD BY AUTOMATED COUNT: 14.7 % (ref 10–15)
GLUCOSE SERPL-MCNC: 94 MG/DL (ref 70–99)
HCO3 SERPL-SCNC: 25 MMOL/L (ref 22–29)
HCT VFR BLD AUTO: 38.6 % (ref 35–47)
HGB BLD-MCNC: 12.4 G/DL (ref 11.7–15.7)
MCH RBC QN AUTO: 31.8 PG (ref 26.5–33)
MCHC RBC AUTO-ENTMCNC: 32.1 G/DL (ref 31.5–36.5)
MCV RBC AUTO: 99 FL (ref 78–100)
PLATELET # BLD AUTO: 261 10E3/UL (ref 150–450)
POTASSIUM SERPL-SCNC: 4.7 MMOL/L (ref 3.4–5.3)
RBC # BLD AUTO: 3.9 10E6/UL (ref 3.8–5.2)
SODIUM SERPL-SCNC: 133 MMOL/L (ref 135–145)
WBC # BLD AUTO: 7.03 10E3/UL (ref 4–11)

## 2025-08-21 ENCOUNTER — TELEPHONE (OUTPATIENT)
Dept: CARDIOLOGY | Facility: CLINIC | Age: OVER 89
End: 2025-08-21
Payer: MEDICARE

## 2025-08-21 DIAGNOSIS — I50.31 ACUTE HEART FAILURE WITH PRESERVED EJECTION FRACTION (HFPEF) (H): Primary | ICD-10-CM

## 2025-08-22 ENCOUNTER — APPOINTMENT (OUTPATIENT)
Dept: CT IMAGING | Facility: CLINIC | Age: OVER 89
End: 2025-08-22
Attending: EMERGENCY MEDICINE
Payer: MEDICARE

## 2025-08-22 ENCOUNTER — HOSPITAL ENCOUNTER (EMERGENCY)
Facility: CLINIC | Age: OVER 89
Discharge: HOME | End: 2025-08-22
Attending: EMERGENCY MEDICINE | Admitting: EMERGENCY MEDICINE
Payer: MEDICARE

## 2025-08-22 VITALS
DIASTOLIC BLOOD PRESSURE: 58 MMHG | RESPIRATION RATE: 20 BRPM | HEART RATE: 66 BPM | OXYGEN SATURATION: 93 % | WEIGHT: 126 LBS | SYSTOLIC BLOOD PRESSURE: 134 MMHG | TEMPERATURE: 97.4 F | BODY MASS INDEX: 21.63 KG/M2
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
## 2025-08-22 PROCEDURE — 99284 EMERGENCY DEPT VISIT MOD MDM: CPT | Mod: 25 | Performed by: EMERGENCY MEDICINE

## 2025-08-22 PROCEDURE — 72125 CT NECK SPINE W/O DYE: CPT

## 2025-08-22 PROCEDURE — 70450 CT HEAD/BRAIN W/O DYE: CPT

## 2025-08-22 ASSESSMENT — ACTIVITIES OF DAILY LIVING (ADL)
ADLS_ACUITY_SCORE: 57

## 2025-08-22 ASSESSMENT — COLUMBIA-SUICIDE SEVERITY RATING SCALE - C-SSRS
1. IN THE PAST MONTH, HAVE YOU WISHED YOU WERE DEAD OR WISHED YOU COULD GO TO SLEEP AND NOT WAKE UP?: NO
6. HAVE YOU EVER DONE ANYTHING, STARTED TO DO ANYTHING, OR PREPARED TO DO ANYTHING TO END YOUR LIFE?: NO
2. HAVE YOU ACTUALLY HAD ANY THOUGHTS OF KILLING YOURSELF IN THE PAST MONTH?: NO

## 2025-08-27 ENCOUNTER — DOCUMENTATION ONLY (OUTPATIENT)
Dept: OTHER | Facility: CLINIC | Age: OVER 89
End: 2025-08-27
Payer: MEDICARE